# Patient Record
Sex: FEMALE | Race: WHITE | NOT HISPANIC OR LATINO | Employment: OTHER | URBAN - METROPOLITAN AREA
[De-identification: names, ages, dates, MRNs, and addresses within clinical notes are randomized per-mention and may not be internally consistent; named-entity substitution may affect disease eponyms.]

---

## 2023-06-27 ENCOUNTER — HOSPITAL ENCOUNTER (OUTPATIENT)
Dept: DATA CONVERSION | Facility: HOSPITAL | Age: 43
Discharge: STILL ADMITTED OR ADMINISTRATIVE | End: 2023-06-28
Attending: INTERNAL MEDICINE | Admitting: INTERNAL MEDICINE

## 2023-06-27 LAB
ACANTHOCYTES BLD QL SMEAR: ABNORMAL
ALBUMIN SERPL-MCNC: 3.9 GM/DL (ref 3.5–5)
ALBUMIN/GLOB SERPL: 1.2 RATIO (ref 1.5–3)
ALP BLD-CCNC: 179 U/L (ref 35–125)
ALT SERPL-CCNC: 28 U/L (ref 5–40)
AMPHETAMINES UR QL SCN>1000 NG/ML: NEGATIVE
ANION GAP SERPL CALCULATED.3IONS-SCNC: 8 MMOL/L (ref 0–19)
ANISOCYTOSIS BLD QL SMEAR: ABNORMAL
AST SERPL-CCNC: 33 U/L (ref 5–40)
BACTERIA SPEC CULT: NORMAL
BACTERIA UR QL AUTO: NEGATIVE
BARBITURATES UR QL SCN>300 NG/ML: NEGATIVE
BASOPHILS # BLD AUTO: 0.06 K/UL (ref 0–0.22)
BASOPHILS NFR BLD AUTO: 1.5 % (ref 0–1)
BENZODIAZ UR QL SCN>300 NG/ML: NEGATIVE
BILIRUB DIRECT SERPL-MCNC: 0.2 MG/DL (ref 0–0.2)
BILIRUB INDIRECT SERPL-MCNC: 0.3 MG/DL (ref 0–0.8)
BILIRUB SERPL-MCNC: 0.5 MG/DL (ref 0.1–1.2)
BILIRUB UR QL STRIP.AUTO: NEGATIVE
BUN SERPL-MCNC: 5 MG/DL (ref 8–25)
BUN/CREAT SERPL: 5.6 RATIO (ref 8–21)
BZE UR QL SCN>300 NG/ML: NORMAL
CALCIUM SERPL-MCNC: 8.7 MG/DL (ref 8.5–10.4)
CANNABINOIDS UR QL SCN>50 NG/ML: NORMAL
CC # UR: NORMAL /UL
CHLORIDE SERPL-SCNC: 100 MMOL/L (ref 97–107)
CLARITY UR: CLEAR
CO2 SERPL-SCNC: 26 MMOL/L (ref 24–31)
COLOR UR: YELLOW
CREAT SERPL-MCNC: 0.9 MG/DL (ref 0.4–1.6)
DEPRECATED RDW RBC AUTO: 56 FL (ref 37–54)
DIFFERENTIAL METHOD BLD: ABNORMAL
DRUG SCREEN COMMENT UR-IMP: NORMAL
EOSINOPHIL # BLD AUTO: 0.19 K/UL (ref 0–0.45)
EOSINOPHIL NFR BLD: 4.7 % (ref 0–3)
ERYTHROCYTE [DISTWIDTH] IN BLOOD BY AUTOMATED COUNT: 20.8 % (ref 11.7–15)
FENTANYL+NORFENTANYL UR QL SCN: NEGATIVE
GFR SERPL CREATININE-BSD FRML MDRD: 81 ML/MIN/1.73 M2
GLOBULIN SER-MCNC: 3.2 G/DL (ref 1.9–3.7)
GLUCOSE SERPL-MCNC: 72 MG/DL (ref 65–99)
GLUCOSE UR STRIP.AUTO-MCNC: NEGATIVE MG/DL
HCG UR QL: NEGATIVE
HCT VFR BLD AUTO: 34.7 % (ref 36–44)
HGB BLD-MCNC: 11.2 GM/DL (ref 12–15)
HGB UR QL STRIP.AUTO: 1 /HPF (ref 0–3)
HGB UR QL: NEGATIVE
HS TROPONIN T DELTA: 0 (ref 0–4)
HS TROPONIN T DELTA: NORMAL (ref 0–4)
HYALINE CASTS UR QL AUTO: 3 /LPF
IMM GRANULOCYTES # BLD AUTO: 0.01 K/UL (ref 0–0.1)
KETONES UR QL STRIP.AUTO: NEGATIVE
LEUKOCYTE ESTERASE UR QL STRIP.AUTO: ABNORMAL
LIPASE SERPL-CCNC: 16 U/L (ref 16–63)
LYMPHOCYTES # BLD AUTO: 1.46 K/UL (ref 1.2–3.2)
LYMPHOCYTES NFR BLD MANUAL: 36.3 % (ref 20–40)
MCH RBC QN AUTO: 24.2 PG (ref 26–34)
MCHC RBC AUTO-ENTMCNC: 32.3 % (ref 31–37)
MCV RBC AUTO: 75.1 FL (ref 80–100)
METHADONE UR QL SCN>300 NG/ML: NEGATIVE
MICROCYTES BLD QL SMEAR: ABNORMAL
MICROSCOPIC (UA): ABNORMAL
MONOCYTES # BLD AUTO: 0.2 K/UL (ref 0–0.8)
MONOCYTES NFR BLD MANUAL: 5 % (ref 0–8)
NEUTROPHILS # BLD AUTO: 2.1 K/UL
NEUTROPHILS # BLD AUTO: 2.1 K/UL (ref 1.8–7.7)
NEUTROPHILS.IMMATURE NFR BLD: 0.2 % (ref 0–1)
NEUTS SEG NFR BLD: 52.3 % (ref 50–70)
NITRITE UR QL STRIP.AUTO: NEGATIVE
NRBC BLD-RTO: 0 /100 WBC
OPIATES UR QL SCN>300 NG/ML: NEGATIVE
OVALOCYTES BLD QL SMEAR: ABNORMAL
OXYCODONE UR QL: NEGATIVE
PCP UR QL SCN>25 NG/ML: NEGATIVE
PH UR STRIP.AUTO: 5.5 [PH] (ref 4.6–8)
PLATELET # BLD AUTO: 152 K/UL (ref 150–450)
PLATELET BLD QL SMEAR: ABNORMAL
PMV BLD AUTO: ABNORMAL CU (ref 7–12.6)
POIKILOCYTOSIS BLD QL SMEAR: ABNORMAL
POTASSIUM SERPL-SCNC: 3.4 MMOL/L (ref 3.4–5.1)
PROT SERPL-MCNC: 7.1 G/DL (ref 5.9–7.9)
PROT UR STRIP.AUTO-MCNC: ABNORMAL MG/DL
RBC # BLD AUTO: 4.62 M/UL (ref 4–4.9)
REPORT STATUS -LH SQ DATA CONVERSION: NORMAL
SERVICE CMNT-IMP: NORMAL
SODIUM SERPL-SCNC: 134 MMOL/L (ref 133–145)
SP GR UR STRIP.AUTO: 1.02 (ref 1–1.03)
SPECIMEN SOURCE: NORMAL
SQUAMOUS UR QL AUTO: ABNORMAL /HPF
TROPONIN T SERPL-MCNC: 6 NG/L
TROPONIN T SERPL-MCNC: 6 NG/L
URINE CULTURE: ABNORMAL
UROBILINOGEN UR QL STRIP.AUTO: NORMAL MG/DL (ref 0–1)
WBC # BLD AUTO: 4 K/UL (ref 4.5–11)
WBC #/AREA URNS AUTO: 15 /HPF (ref 0–3)
WBC MORPH BLD: ABNORMAL

## 2023-06-28 DIAGNOSIS — F17.290 NICOTINE DEPENDENCE, OTHER TOBACCO PRODUCT, UNCOMPLICATED: ICD-10-CM

## 2023-06-28 DIAGNOSIS — G89.29 OTHER CHRONIC PAIN: ICD-10-CM

## 2023-06-28 DIAGNOSIS — E87.6 HYPOKALEMIA: ICD-10-CM

## 2023-06-28 DIAGNOSIS — I95.1 ORTHOSTATIC HYPOTENSION: Primary | ICD-10-CM

## 2023-06-28 DIAGNOSIS — F14.10 COCAINE ABUSE, UNCOMPLICATED (MULTI): ICD-10-CM

## 2023-06-28 DIAGNOSIS — Z95.1 PRESENCE OF AORTOCORONARY BYPASS GRAFT: ICD-10-CM

## 2023-06-28 DIAGNOSIS — F41.9 ANXIETY DISORDER, UNSPECIFIED: ICD-10-CM

## 2023-06-28 DIAGNOSIS — M54.12 RADICULOPATHY, CERVICAL REGION: ICD-10-CM

## 2023-06-28 DIAGNOSIS — I25.10 ATHEROSCLEROTIC HEART DISEASE OF NATIVE CORONARY ARTERY WITHOUT ANGINA PECTORIS: ICD-10-CM

## 2023-06-28 DIAGNOSIS — F32.A DEPRESSION, UNSPECIFIED: ICD-10-CM

## 2023-06-28 DIAGNOSIS — Z79.82 LONG TERM (CURRENT) USE OF ASPIRIN: ICD-10-CM

## 2023-06-28 DIAGNOSIS — Z95.2 PRESENCE OF PROSTHETIC HEART VALVE: ICD-10-CM

## 2023-06-28 DIAGNOSIS — Z95.5 PRESENCE OF CORONARY ANGIOPLASTY IMPLANT AND GRAFT: ICD-10-CM

## 2023-06-28 DIAGNOSIS — I95.9 HYPOTENSION, UNSPECIFIED: ICD-10-CM

## 2023-06-28 LAB
ACANTHOCYTES BLD QL SMEAR: ABNORMAL
ANION GAP SERPL CALCULATED.3IONS-SCNC: 11 MMOL/L (ref 0–19)
ANISOCYTOSIS BLD QL SMEAR: ABNORMAL
BASOPHILS # BLD AUTO: 0.04 K/UL (ref 0–0.22)
BASOPHILS NFR BLD AUTO: 1 % (ref 0–1)
BUN SERPL-MCNC: 7 MG/DL (ref 8–25)
BUN/CREAT SERPL: 8.8 RATIO (ref 8–21)
CALCIUM SERPL-MCNC: 7.4 MG/DL (ref 8.5–10.4)
CHLORIDE SERPL-SCNC: 105 MMOL/L (ref 97–107)
CO2 SERPL-SCNC: 22 MMOL/L (ref 24–31)
CREAT SERPL-MCNC: 0.8 MG/DL (ref 0.4–1.6)
DACRYOCYTES BLD QL SMEAR: ABNORMAL
DEPRECATED RDW RBC AUTO: 55.1 FL (ref 37–54)
DIFFERENTIAL METHOD BLD: MANUAL DIFF
EOSINOPHIL # BLD AUTO: 0.22 K/UL (ref 0–0.45)
EOSINOPHIL NFR BLD: 6 % (ref 0–3)
ERYTHROCYTE [DISTWIDTH] IN BLOOD BY AUTOMATED COUNT: 20.4 % (ref 11.7–15)
GFR SERPL CREATININE-BSD FRML MDRD: 94 ML/MIN/1.73 M2
GLUCOSE SERPL-MCNC: 109 MG/DL (ref 65–99)
HCT VFR BLD AUTO: 30.6 % (ref 36–44)
HGB BLD-MCNC: 9.8 GM/DL (ref 12–15)
LYMPHOCYTES # BLD AUTO: 2.04 K/UL (ref 1.2–3.2)
LYMPHOCYTES NFR BLD MANUAL: 55 % (ref 20–40)
MCH RBC QN AUTO: 24 PG (ref 26–34)
MCHC RBC AUTO-ENTMCNC: 32 % (ref 31–37)
MCV RBC AUTO: 74.8 FL (ref 80–100)
METAMYELOCYTES # BLD MANUAL: 0.04 K/UL
METAMYELOCYTES NFR BLD MANUAL: 1 %
MICROCYTES BLD QL SMEAR: ABNORMAL
MONOCYTES # BLD AUTO: 0.26 K/UL (ref 0–0.8)
MONOCYTES NFR BLD MANUAL: 7 % (ref 0–8)
NEUTROPHILS # BLD AUTO: 0.89 K/UL (ref 1.8–7.7)
NEUTROPHILS # BLD AUTO: 1.29 K/UL
NEUTROPHILS # BLD MANUAL: 1.08 10*3/UL
NEUTS BAND # BLD AUTO: 0.19 K/UL
NEUTS BAND BLD QL AUTO: 5 %
NEUTS SEG NFR BLD: 24 % (ref 50–70)
NRBC BLD-RTO: 0 /100 WBC
OVALOCYTES BLD QL SMEAR: ABNORMAL
PLATELET # BLD AUTO: 114 K/UL (ref 150–450)
PLATELET BLD QL SMEAR: ABNORMAL
PMV BLD AUTO: ABNORMAL CU (ref 7–12.6)
POIKILOCYTOSIS BLD QL SMEAR: ABNORMAL
POLYCHROMASIA BLD QL SMEAR: ABNORMAL
POTASSIUM SERPL-SCNC: 3.6 MMOL/L (ref 3.4–5.1)
RBC # BLD AUTO: 4.09 M/UL (ref 4–4.9)
SCHISTOCYTES BLD QL SMEAR: ABNORMAL
SODIUM SERPL-SCNC: 138 MMOL/L (ref 133–145)
VARIANT LYMPHS # BLD MANUAL: 1 %
VARIANT LYMPHS NFR BLD MANUAL: 0.04 K/UL
WBC # BLD AUTO: 3.7 K/UL (ref 4.5–11)

## 2023-10-09 ENCOUNTER — APPOINTMENT (OUTPATIENT)
Dept: RADIOLOGY | Facility: HOSPITAL | Age: 43
End: 2023-10-09
Payer: COMMERCIAL

## 2023-10-09 ENCOUNTER — HOSPITAL ENCOUNTER (EMERGENCY)
Facility: HOSPITAL | Age: 43
Discharge: HOME | End: 2023-10-09
Attending: EMERGENCY MEDICINE
Payer: COMMERCIAL

## 2023-10-09 VITALS
WEIGHT: 132 LBS | HEIGHT: 62 IN | BODY MASS INDEX: 24.29 KG/M2 | HEART RATE: 76 BPM | TEMPERATURE: 97.8 F | SYSTOLIC BLOOD PRESSURE: 111 MMHG | DIASTOLIC BLOOD PRESSURE: 75 MMHG | OXYGEN SATURATION: 100 % | RESPIRATION RATE: 19 BRPM

## 2023-10-09 LAB
ALBUMIN SERPL BCP-MCNC: 4.7 G/DL (ref 3.4–5)
ALP SERPL-CCNC: 107 U/L (ref 33–110)
ALT SERPL W P-5'-P-CCNC: 17 U/L (ref 7–45)
ANION GAP SERPL CALC-SCNC: 16 MMOL/L (ref 10–20)
APTT PPP: 38 SECONDS (ref 27–38)
AST SERPL W P-5'-P-CCNC: 25 U/L (ref 9–39)
BASOPHILS # BLD AUTO: 0.1 X10*3/UL (ref 0–0.1)
BASOPHILS NFR BLD AUTO: 1.4 %
BILIRUB SERPL-MCNC: 0.4 MG/DL (ref 0–1.2)
BUN SERPL-MCNC: 10 MG/DL (ref 6–23)
CALCIUM SERPL-MCNC: 9.4 MG/DL (ref 8.6–10.3)
CARDIAC TROPONIN I PNL SERPL HS: <3 NG/L (ref 0–13)
CHLORIDE SERPL-SCNC: 109 MMOL/L (ref 98–107)
CO2 SERPL-SCNC: 23 MMOL/L (ref 21–32)
CREAT SERPL-MCNC: 0.87 MG/DL (ref 0.5–1.05)
EOSINOPHIL # BLD AUTO: 0.28 X10*3/UL (ref 0–0.7)
EOSINOPHIL NFR BLD AUTO: 3.9 %
ERYTHROCYTE [DISTWIDTH] IN BLOOD BY AUTOMATED COUNT: 19.9 % (ref 11.5–14.5)
GFR SERPL CREATININE-BSD FRML MDRD: 85 ML/MIN/1.73M*2
GLUCOSE BLD MANUAL STRIP-MCNC: 113 MG/DL (ref 74–99)
GLUCOSE SERPL-MCNC: 75 MG/DL (ref 74–99)
HCT VFR BLD AUTO: 47.3 % (ref 36–46)
HGB BLD-MCNC: 14.5 G/DL (ref 12–16)
IMM GRANULOCYTES # BLD AUTO: 0.02 X10*3/UL (ref 0–0.7)
IMM GRANULOCYTES NFR BLD AUTO: 0.3 % (ref 0–0.9)
INR PPP: 1 (ref 0.9–1.1)
LYMPHOCYTES # BLD AUTO: 3.14 X10*3/UL (ref 1.2–4.8)
LYMPHOCYTES NFR BLD AUTO: 43.3 %
MCH RBC QN AUTO: 23.5 PG (ref 26–34)
MCHC RBC AUTO-ENTMCNC: 30.7 G/DL (ref 32–36)
MCV RBC AUTO: 77 FL (ref 80–100)
MONOCYTES # BLD AUTO: 0.26 X10*3/UL (ref 0.1–1)
MONOCYTES NFR BLD AUTO: 3.6 %
NEUTROPHILS # BLD AUTO: 3.45 X10*3/UL (ref 1.2–7.7)
NEUTROPHILS NFR BLD AUTO: 47.5 %
NRBC BLD-RTO: 0 /100 WBCS (ref 0–0)
PLATELET # BLD AUTO: 239 X10*3/UL (ref 150–450)
PMV BLD AUTO: 9.9 FL (ref 7.5–11.5)
POTASSIUM SERPL-SCNC: 4.5 MMOL/L (ref 3.5–5.3)
PROT SERPL-MCNC: 7.6 G/DL (ref 6.4–8.2)
PROTHROMBIN TIME: 11 SECONDS (ref 9.8–12.8)
RBC # BLD AUTO: 6.18 X10*6/UL (ref 4–5.2)
SODIUM SERPL-SCNC: 143 MMOL/L (ref 136–145)
WBC # BLD AUTO: 7.3 X10*3/UL (ref 4.4–11.3)

## 2023-10-09 PROCEDURE — 70450 CT HEAD/BRAIN W/O DYE: CPT

## 2023-10-09 PROCEDURE — 70450 CT HEAD/BRAIN W/O DYE: CPT | Performed by: RADIOLOGY

## 2023-10-09 PROCEDURE — 85610 PROTHROMBIN TIME: CPT | Performed by: EMERGENCY MEDICINE

## 2023-10-09 PROCEDURE — 96375 TX/PRO/DX INJ NEW DRUG ADDON: CPT

## 2023-10-09 PROCEDURE — 2500000004 HC RX 250 GENERAL PHARMACY W/ HCPCS (ALT 636 FOR OP/ED): Mod: SE

## 2023-10-09 PROCEDURE — 84484 ASSAY OF TROPONIN QUANT: CPT | Performed by: EMERGENCY MEDICINE

## 2023-10-09 PROCEDURE — 84075 ASSAY ALKALINE PHOSPHATASE: CPT | Performed by: EMERGENCY MEDICINE

## 2023-10-09 PROCEDURE — 99284 EMERGENCY DEPT VISIT MOD MDM: CPT | Mod: 25

## 2023-10-09 PROCEDURE — 85025 COMPLETE CBC W/AUTO DIFF WBC: CPT | Performed by: EMERGENCY MEDICINE

## 2023-10-09 PROCEDURE — 96374 THER/PROPH/DIAG INJ IV PUSH: CPT

## 2023-10-09 PROCEDURE — 82947 ASSAY GLUCOSE BLOOD QUANT: CPT | Mod: 59

## 2023-10-09 PROCEDURE — 85730 THROMBOPLASTIN TIME PARTIAL: CPT | Performed by: EMERGENCY MEDICINE

## 2023-10-09 PROCEDURE — 36415 COLL VENOUS BLD VENIPUNCTURE: CPT | Performed by: EMERGENCY MEDICINE

## 2023-10-09 RX ORDER — ONDANSETRON HYDROCHLORIDE 2 MG/ML
INJECTION, SOLUTION INTRAVENOUS
Status: COMPLETED
Start: 2023-10-09 | End: 2023-10-09

## 2023-10-09 RX ORDER — KETOROLAC TROMETHAMINE 15 MG/ML
15 INJECTION, SOLUTION INTRAMUSCULAR; INTRAVENOUS ONCE
Status: COMPLETED | OUTPATIENT
Start: 2023-10-09 | End: 2023-10-09

## 2023-10-09 RX ORDER — KETOROLAC TROMETHAMINE 15 MG/ML
INJECTION, SOLUTION INTRAMUSCULAR; INTRAVENOUS
Status: COMPLETED
Start: 2023-10-09 | End: 2023-10-09

## 2023-10-09 RX ORDER — ONDANSETRON HYDROCHLORIDE 2 MG/ML
4 INJECTION, SOLUTION INTRAVENOUS ONCE
Status: COMPLETED | OUTPATIENT
Start: 2023-10-09 | End: 2023-10-09

## 2023-10-09 RX ADMIN — ONDANSETRON 4 MG: 2 INJECTION INTRAMUSCULAR; INTRAVENOUS at 13:35

## 2023-10-09 RX ADMIN — KETOROLAC TROMETHAMINE 15 MG: 15 INJECTION INTRAMUSCULAR; INTRAVENOUS at 13:33

## 2023-10-09 RX ADMIN — KETOROLAC TROMETHAMINE 15 MG: 15 INJECTION, SOLUTION INTRAMUSCULAR; INTRAVENOUS at 13:33

## 2023-10-09 RX ADMIN — ONDANSETRON HYDROCHLORIDE 4 MG: 2 INJECTION, SOLUTION INTRAVENOUS at 13:35

## 2023-10-09 ASSESSMENT — COLUMBIA-SUICIDE SEVERITY RATING SCALE - C-SSRS
2. HAVE YOU ACTUALLY HAD ANY THOUGHTS OF KILLING YOURSELF?: NO
6. HAVE YOU EVER DONE ANYTHING, STARTED TO DO ANYTHING, OR PREPARED TO DO ANYTHING TO END YOUR LIFE?: NO
1. IN THE PAST MONTH, HAVE YOU WISHED YOU WERE DEAD OR WISHED YOU COULD GO TO SLEEP AND NOT WAKE UP?: NO

## 2023-10-09 ASSESSMENT — PAIN DESCRIPTION - DESCRIPTORS
DESCRIPTORS: PRESSURE
DESCRIPTORS: PRESSURE

## 2023-10-09 ASSESSMENT — PAIN DESCRIPTION - FREQUENCY: FREQUENCY: CONSTANT/CONTINUOUS

## 2023-10-09 ASSESSMENT — PAIN - FUNCTIONAL ASSESSMENT: PAIN_FUNCTIONAL_ASSESSMENT: 0-10

## 2023-10-09 ASSESSMENT — PAIN SCALES - GENERAL: PAINLEVEL_OUTOF10: 2

## 2023-10-09 NOTE — ED PROVIDER NOTES
"HPI   Chief Complaint   Patient presents with    Dizziness     Dizziness, headache, nausea, chest pressure/pain, head pressure started last night when going to bed and woke feeling the same way. Went to doctor's today for low BP. Doctor's office sent patient here for \"patient's talking\" per patient. Patient says \"brain fart's\"       HPI                    Julian Coma Scale Score: 15         NIH Stroke Scale: 1          Patient History   Past Medical History:   Diagnosis Date    COPD (chronic obstructive pulmonary disease) (CMS/HCC)     Coronary artery disease     Personal history of other endocrine, nutritional and metabolic disease     History of hypopituitarism    Personal history of other mental and behavioral disorders     History of depression    Personal history of other specified conditions     History of brain tumor    Unspecified convulsions (CMS/HCC)     Convulsion     Past Surgical History:   Procedure Laterality Date    MR HEAD ANGIO WO IV CONTRAST  7/15/2021    MR HEAD ANGIO WO IV CONTRAST 7/15/2021 [facility]    MR HEAD ANGIO WO IV CONTRAST  11/28/2017    MR HEAD ANGIO WO IV CONTRAST [facility]    MR HEAD ANGIO WO IV CONTRAST  3/13/2018    MR HEAD ANGIO WO IV CONTRAST [facility]    MR NECK ANGIO WO IV CONTRAST  7/15/2021    MR NECK ANGIO WO IV CONTRAST 7/15/2021 [facility]    OTHER SURGICAL HISTORY  08/17/2013    Craniotomy Tumor Removal - Complete     No family history on file.  Social History     Tobacco Use    Smoking status: Not on file    Smokeless tobacco: Not on file   Substance Use Topics    Alcohol use: Not on file    Drug use: Not on file       Physical Exam   ED Triage Vitals [10/09/23 1020]   Temp Heart Rate Resp BP   36.6 °C (97.8 °F) 76 16 111/75      SpO2 Temp Source Heart Rate Source Patient Position   100 % Oral Monitor --      BP Location FiO2 (%)     -- --       Physical Exam  Constitutional:       General: She is not in acute distress.     " Appearance: Normal appearance. She is not toxic-appearing.   HENT:      Head: Normocephalic and atraumatic.      Right Ear: Tympanic membrane normal.      Left Ear: Tympanic membrane normal.      Mouth/Throat:      Mouth: Mucous membranes are moist.      Pharynx: Oropharynx is clear.   Eyes:      Conjunctiva/sclera: Conjunctivae normal.      Pupils: Pupils are equal, round, and reactive to light.   Cardiovascular:      Rate and Rhythm: Normal rate and regular rhythm.      Pulses: Normal pulses.      Heart sounds: Normal heart sounds.   Pulmonary:      Effort: Pulmonary effort is normal. No respiratory distress.      Breath sounds: Normal breath sounds. No wheezing.   Abdominal:      General: Bowel sounds are normal.      Palpations: Abdomen is soft.      Tenderness: There is no abdominal tenderness. There is no guarding or rebound.   Musculoskeletal:         General: Normal range of motion.      Cervical back: Normal range of motion.   Skin:     General: Skin is warm and dry.   Neurological:      Mental Status: She is alert and oriented to person, place, and time.      Comments: NIH Stroke Scale: 1   (LLE slight weakness)         ED Course & MDM   ED Course as of 10/09/23 1551   Mon Oct 09, 2023   1102 EKG was reviewed at 1102.  Ventricular rate of 77 normal sinus rhythm no ST elevation no evidence of a heart attack at this time.  Interpreted by me. [KA]   1527 Reviewed the blood work the blood work is unremarkable.  Awaiting the urine results at this time. [KA]   1535 Went to update the patient the planned she has removed her IV and removed all her stuff and she walked out of the ED. [KA]      ED Course User Index  [KA] Fracisco Prince, DO       Medical Decision Making    42-year-old female presents to the ER with chief complaint of feeling confused patient reports this been going on since yesterday according to the patient.  Patient went to her PCP who sent her to the ED for evaluation.  According to the patient  she has no focal complaints and she reports that her upper and lower extremities are normal.  Did do an NIH patient had slight weakness left lower extremity at that point decided to do a stroke alert.  Initial head CT is negative.  Blood work imaging was negative.  Updated the patient of her results and patient reports that her foot does not have any weakness.  However due to her lower extremity weakness plan was to admit the patient to the hospital actually  spoke to the inpatient hospitalist Nikki who accepted the patient.  Went to update the patient she was walking out of the ED.        Procedure  Procedures     Fracisco Prince, DO  10/09/23 155

## 2023-10-24 ENCOUNTER — APPOINTMENT (OUTPATIENT)
Dept: RADIOLOGY | Facility: HOSPITAL | Age: 43
End: 2023-10-24
Payer: COMMERCIAL

## 2023-10-24 ENCOUNTER — HOSPITAL ENCOUNTER (EMERGENCY)
Facility: HOSPITAL | Age: 43
Discharge: HOME | End: 2023-10-25
Attending: EMERGENCY MEDICINE
Payer: COMMERCIAL

## 2023-10-24 VITALS
SYSTOLIC BLOOD PRESSURE: 76 MMHG | BODY MASS INDEX: 23.44 KG/M2 | HEIGHT: 63 IN | WEIGHT: 132.28 LBS | OXYGEN SATURATION: 98 % | RESPIRATION RATE: 18 BRPM | HEART RATE: 90 BPM | DIASTOLIC BLOOD PRESSURE: 63 MMHG | TEMPERATURE: 97 F

## 2023-10-24 DIAGNOSIS — R10.9 NONSPECIFIC ABDOMINAL PAIN: Primary | ICD-10-CM

## 2023-10-24 DIAGNOSIS — N30.00 ACUTE CYSTITIS WITHOUT HEMATURIA: ICD-10-CM

## 2023-10-24 LAB
ALBUMIN SERPL BCP-MCNC: 4.6 G/DL (ref 3.4–5)
ALP SERPL-CCNC: 130 U/L (ref 33–110)
ALT SERPL W P-5'-P-CCNC: 16 U/L (ref 7–45)
ANION GAP SERPL CALC-SCNC: 12 MMOL/L (ref 10–20)
APPEARANCE UR: CLEAR
AST SERPL W P-5'-P-CCNC: 28 U/L (ref 9–39)
BASOPHILS # BLD AUTO: 0.07 X10*3/UL (ref 0–0.1)
BASOPHILS NFR BLD AUTO: 1 %
BILIRUB SERPL-MCNC: 0.5 MG/DL (ref 0–1.2)
BILIRUB UR STRIP.AUTO-MCNC: NEGATIVE MG/DL
BUN SERPL-MCNC: 6 MG/DL (ref 6–23)
CALCIUM SERPL-MCNC: 10.1 MG/DL (ref 8.6–10.3)
CHLORIDE SERPL-SCNC: 103 MMOL/L (ref 98–107)
CO2 SERPL-SCNC: 28 MMOL/L (ref 21–32)
COLOR UR: ABNORMAL
CREAT SERPL-MCNC: 0.83 MG/DL (ref 0.5–1.05)
EOSINOPHIL # BLD AUTO: 0.32 X10*3/UL (ref 0–0.7)
EOSINOPHIL NFR BLD AUTO: 4.8 %
ERYTHROCYTE [DISTWIDTH] IN BLOOD BY AUTOMATED COUNT: 18.8 % (ref 11.5–14.5)
GFR SERPL CREATININE-BSD FRML MDRD: 90 ML/MIN/1.73M*2
GLUCOSE SERPL-MCNC: 82 MG/DL (ref 74–99)
GLUCOSE UR STRIP.AUTO-MCNC: NEGATIVE MG/DL
HCT VFR BLD AUTO: 42.8 % (ref 36–46)
HGB BLD-MCNC: 13.2 G/DL (ref 12–16)
IMM GRANULOCYTES # BLD AUTO: 0.01 X10*3/UL (ref 0–0.7)
IMM GRANULOCYTES NFR BLD AUTO: 0.1 % (ref 0–0.9)
KETONES UR STRIP.AUTO-MCNC: NEGATIVE MG/DL
LACTATE SERPL-SCNC: 1.2 MMOL/L (ref 0.4–2)
LEUKOCYTE ESTERASE UR QL STRIP.AUTO: ABNORMAL
LIPASE SERPL-CCNC: 8 U/L (ref 9–82)
LYMPHOCYTES # BLD AUTO: 2.82 X10*3/UL (ref 1.2–4.8)
LYMPHOCYTES NFR BLD AUTO: 42.2 %
MCH RBC QN AUTO: 23.2 PG (ref 26–34)
MCHC RBC AUTO-ENTMCNC: 30.8 G/DL (ref 32–36)
MCV RBC AUTO: 75 FL (ref 80–100)
MONOCYTES # BLD AUTO: 0.39 X10*3/UL (ref 0.1–1)
MONOCYTES NFR BLD AUTO: 5.8 %
NEUTROPHILS # BLD AUTO: 3.08 X10*3/UL (ref 1.2–7.7)
NEUTROPHILS NFR BLD AUTO: 46.1 %
NITRITE UR QL STRIP.AUTO: NEGATIVE
NRBC BLD-RTO: 0 /100 WBCS (ref 0–0)
PH UR STRIP.AUTO: 6 [PH]
PLATELET # BLD AUTO: 238 X10*3/UL (ref 150–450)
PMV BLD AUTO: 10.4 FL (ref 7.5–11.5)
POTASSIUM SERPL-SCNC: 3.8 MMOL/L (ref 3.5–5.3)
PROT SERPL-MCNC: 7.9 G/DL (ref 6.4–8.2)
PROT UR STRIP.AUTO-MCNC: NEGATIVE MG/DL
RBC # BLD AUTO: 5.68 X10*6/UL (ref 4–5.2)
RBC # UR STRIP.AUTO: ABNORMAL /UL
RBC #/AREA URNS AUTO: ABNORMAL /HPF
SODIUM SERPL-SCNC: 139 MMOL/L (ref 136–145)
SP GR UR STRIP.AUTO: 1
UROBILINOGEN UR STRIP.AUTO-MCNC: <2 MG/DL
WBC # BLD AUTO: 6.7 X10*3/UL (ref 4.4–11.3)
WBC #/AREA URNS AUTO: ABNORMAL /HPF

## 2023-10-24 PROCEDURE — 87800 DETECT AGNT MULT DNA DIREC: CPT | Mod: GENLAB | Performed by: EMERGENCY MEDICINE

## 2023-10-24 PROCEDURE — 81025 URINE PREGNANCY TEST: CPT | Performed by: EMERGENCY MEDICINE

## 2023-10-24 PROCEDURE — 74177 CT ABD & PELVIS W/CONTRAST: CPT

## 2023-10-24 PROCEDURE — 2550000001 HC RX 255 CONTRASTS: Mod: SE | Performed by: EMERGENCY MEDICINE

## 2023-10-24 PROCEDURE — 81001 URINALYSIS AUTO W/SCOPE: CPT | Performed by: EMERGENCY MEDICINE

## 2023-10-24 PROCEDURE — 83605 ASSAY OF LACTIC ACID: CPT | Performed by: EMERGENCY MEDICINE

## 2023-10-24 PROCEDURE — 85025 COMPLETE CBC W/AUTO DIFF WBC: CPT | Performed by: EMERGENCY MEDICINE

## 2023-10-24 PROCEDURE — 96365 THER/PROPH/DIAG IV INF INIT: CPT | Mod: 59

## 2023-10-24 PROCEDURE — 87086 URINE CULTURE/COLONY COUNT: CPT | Mod: GENLAB | Performed by: EMERGENCY MEDICINE

## 2023-10-24 PROCEDURE — 99284 EMERGENCY DEPT VISIT MOD MDM: CPT | Mod: 25 | Performed by: EMERGENCY MEDICINE

## 2023-10-24 PROCEDURE — 96366 THER/PROPH/DIAG IV INF ADDON: CPT

## 2023-10-24 PROCEDURE — 36415 COLL VENOUS BLD VENIPUNCTURE: CPT | Performed by: EMERGENCY MEDICINE

## 2023-10-24 PROCEDURE — 80053 COMPREHEN METABOLIC PANEL: CPT | Performed by: EMERGENCY MEDICINE

## 2023-10-24 PROCEDURE — 83690 ASSAY OF LIPASE: CPT | Performed by: EMERGENCY MEDICINE

## 2023-10-24 PROCEDURE — 2500000004 HC RX 250 GENERAL PHARMACY W/ HCPCS (ALT 636 FOR OP/ED): Mod: SE | Performed by: EMERGENCY MEDICINE

## 2023-10-24 PROCEDURE — 96375 TX/PRO/DX INJ NEW DRUG ADDON: CPT

## 2023-10-24 RX ORDER — FENTANYL CITRATE 50 UG/ML
50 INJECTION, SOLUTION INTRAMUSCULAR; INTRAVENOUS ONCE
Status: COMPLETED | OUTPATIENT
Start: 2023-10-24 | End: 2023-10-24

## 2023-10-24 RX ORDER — KETOROLAC TROMETHAMINE 15 MG/ML
15 INJECTION, SOLUTION INTRAMUSCULAR; INTRAVENOUS ONCE
Status: COMPLETED | OUTPATIENT
Start: 2023-10-24 | End: 2023-10-24

## 2023-10-24 RX ORDER — PROMETHAZINE HYDROCHLORIDE 25 MG/ML
INJECTION, SOLUTION INTRAMUSCULAR; INTRAVENOUS
Status: DISCONTINUED
Start: 2023-10-24 | End: 2023-10-25 | Stop reason: HOSPADM

## 2023-10-24 RX ORDER — FAMOTIDINE 10 MG/ML
20 INJECTION INTRAVENOUS ONCE
Status: COMPLETED | OUTPATIENT
Start: 2023-10-24 | End: 2023-10-24

## 2023-10-24 RX ADMIN — FENTANYL CITRATE 50 MCG: 50 INJECTION, SOLUTION INTRAMUSCULAR; INTRAVENOUS at 21:20

## 2023-10-24 RX ADMIN — KETOROLAC TROMETHAMINE 15 MG: 15 INJECTION, SOLUTION INTRAMUSCULAR; INTRAVENOUS at 21:20

## 2023-10-24 RX ADMIN — Medication 12.5 MG: at 21:25

## 2023-10-24 RX ADMIN — FAMOTIDINE 20 MG: 10 INJECTION, SOLUTION INTRAVENOUS at 21:25

## 2023-10-24 RX ADMIN — SODIUM CHLORIDE 1000 ML: 9 INJECTION, SOLUTION INTRAVENOUS at 21:20

## 2023-10-25 PROBLEM — N30.00 ACUTE CYSTITIS WITHOUT HEMATURIA: Status: ACTIVE | Noted: 2023-10-25

## 2023-10-25 PROBLEM — R10.9 NONSPECIFIC ABDOMINAL PAIN: Status: ACTIVE | Noted: 2023-10-25

## 2023-10-25 LAB
C TRACH RRNA SPEC QL NAA+PROBE: POSITIVE
HCG UR QL IA.RAPID: NEGATIVE
HOLD SPECIMEN: NORMAL
N GONORRHOEA DNA SPEC QL PROBE+SIG AMP: NEGATIVE

## 2023-10-25 PROCEDURE — 74177 CT ABD & PELVIS W/CONTRAST: CPT | Performed by: RADIOLOGY

## 2023-10-25 PROCEDURE — 2550000001 HC RX 255 CONTRASTS: Mod: SE | Performed by: EMERGENCY MEDICINE

## 2023-10-25 PROCEDURE — 96372 THER/PROPH/DIAG INJ SC/IM: CPT

## 2023-10-25 PROCEDURE — 2500000001 HC RX 250 WO HCPCS SELF ADMINISTERED DRUGS (ALT 637 FOR MEDICARE OP): Mod: SE

## 2023-10-25 PROCEDURE — 2500000004 HC RX 250 GENERAL PHARMACY W/ HCPCS (ALT 636 FOR OP/ED): Mod: SE | Performed by: EMERGENCY MEDICINE

## 2023-10-25 RX ORDER — IBUPROFEN 600 MG/1
600 TABLET ORAL EVERY 8 HOURS PRN
Qty: 30 TABLET | Refills: 0 | Status: SHIPPED | OUTPATIENT
Start: 2023-10-25 | End: 2024-01-29 | Stop reason: HOSPADM

## 2023-10-25 RX ORDER — DOXYCYCLINE 100 MG/1
100 TABLET ORAL 2 TIMES DAILY
Qty: 20 TABLET | Refills: 0 | Status: SHIPPED | OUTPATIENT
Start: 2023-10-25 | End: 2023-11-04

## 2023-10-25 RX ORDER — CEFTRIAXONE 500 MG/1
500 INJECTION, POWDER, FOR SOLUTION INTRAMUSCULAR; INTRAVENOUS ONCE
Status: COMPLETED | OUTPATIENT
Start: 2023-10-25 | End: 2023-10-25

## 2023-10-25 RX ORDER — DOXYCYCLINE HYCLATE 100 MG
100 TABLET ORAL ONCE
Status: COMPLETED | OUTPATIENT
Start: 2023-10-25 | End: 2023-10-25

## 2023-10-25 RX ORDER — DOXYCYCLINE HYCLATE 50 MG/1
CAPSULE ORAL
Status: COMPLETED
Start: 2023-10-25 | End: 2023-10-25

## 2023-10-25 RX ADMIN — Medication 100 MG: at 01:40

## 2023-10-25 RX ADMIN — CEFTRIAXONE SODIUM 500 MG: 500 INJECTION, POWDER, FOR SOLUTION INTRAMUSCULAR; INTRAVENOUS at 01:40

## 2023-10-25 RX ADMIN — DOXYCYCLINE HYCLATE 100 MG: 50 CAPSULE ORAL at 01:40

## 2023-10-25 RX ADMIN — IOHEXOL 75 ML: 350 INJECTION, SOLUTION INTRAVENOUS at 00:59

## 2023-10-25 ASSESSMENT — COLUMBIA-SUICIDE SEVERITY RATING SCALE - C-SSRS
6. HAVE YOU EVER DONE ANYTHING, STARTED TO DO ANYTHING, OR PREPARED TO DO ANYTHING TO END YOUR LIFE?: NO
1. IN THE PAST MONTH, HAVE YOU WISHED YOU WERE DEAD OR WISHED YOU COULD GO TO SLEEP AND NOT WAKE UP?: NO
2. HAVE YOU ACTUALLY HAD ANY THOUGHTS OF KILLING YOURSELF?: NO

## 2023-10-25 ASSESSMENT — PAIN SCALES - GENERAL: PAINLEVEL_OUTOF10: 9

## 2023-10-25 ASSESSMENT — PAIN DESCRIPTION - DESCRIPTORS: DESCRIPTORS: STABBING

## 2023-10-25 ASSESSMENT — PAIN - FUNCTIONAL ASSESSMENT: PAIN_FUNCTIONAL_ASSESSMENT: 0-10

## 2023-10-25 NOTE — ED PROVIDER NOTES
Department of Emergency Medicine   ED  Provider Note  Admit Date/RoomTime: 10/24/2023  8:56 PM  ED Room: [address] [facility]               History of Present Illness:   Lu Fall is a 42 y.o. female presenting to the ED for right lower quadrant pain, beginning yesterday. Onset gradual..  The complaint has been persistent, moderate in severity, and worsened by nothing.  Patient reports nausea and vomiting x2.  She has had a couple episodes of diarrhea, no constipation.  Pain is in the right lower quadrant that does not radiate.  She denies any dysuria urgency or frequency.  She has diabetes insipidus.  She denies any vaginal discharge.  She did eat some toast around 5 PM and this increased her nausea.  She is a little bit decreased appetite.  She has had prior  x1 but otherwise has not had any significant abdominal surgeries.  She denies any pliar fever or chills.  No URI symptoms.  No chest pain or shortness of breath.      Review of Systems:   Pertinent positives and review of systems as noted above.  Remaining 10 review of systems is negative or noncontributory to today's episode of care.  Review of Systems       --------------------------------------------- PAST HISTORY ---------------------------------------------  Past Medical History:  has a past medical history of COPD (chronic obstructive pulmonary disease) (CMS/Former[facility]), Coronary artery disease, Personal history of other endocrine, nutritional and metabolic disease, Personal history of other mental and behavioral disorders, Personal history of other specified conditions, and Unspecified convulsions (CMS/Former[facility]).    Past Surgical History:  has a past surgical history that includes Other surgical history (2013); MR angio head wo IV contrast (7/15/2021); MR angio neck wo IV contrast (7/15/2021); MR angio head wo IV contrast (2017); and MR angio head wo IV contrast (3/13/2018).  Past surgical history of prior  tricuspid valve repair, aortic valve and mitral valve replacement.  She has had previous craniotomy and removal of pituitary.    Social History:  No tobacco use.  No alcohol use.  No recreational drug use.    Family History: family history is not on file. Unless otherwise noted, family history is non contributory    Patient's Medications   New Prescriptions    DOXYCYCLINE (ADOXA) 100 MG TABLET    Take 1 tablet (100 mg) by mouth 2 times a day for 10 days. Take with a full glass of water and do not lie down for at least 30 minutes after    IBUPROFEN 600 MG TABLET    Take 1 tablet (600 mg) by mouth every 8 hours if needed for mild pain (1 - 3) or fever (temp greater than 38.0 C).   Previous Medications    No medications on file   Modified Medications    No medications on file   Discontinued Medications    No medications on file      The patient’s home medications have been reviewed.    Allergies: Adhesive, Hydrocodone-acetaminophen, Ondansetron, and Hydromorphone patient states she is not allergic to hydromorphone.    -------------------------------------------------- RESULTS -------------------------------------------------  All laboratory and radiology results have been personally reviewed by myself   LABS:  Labs Reviewed   CBC WITH AUTO DIFFERENTIAL - Abnormal       Result Value    WBC 6.7      nRBC 0.0      RBC 5.68 (*)     Hemoglobin 13.2      Hematocrit 42.8      MCV 75 (*)     MCH 23.2 (*)     MCHC 30.8 (*)     RDW 18.8 (*)     Platelets 238      MPV 10.4      Neutrophils % 46.1      Immature Granulocytes %, Automated 0.1      Lymphocytes % 42.2      Monocytes % 5.8      Eosinophils % 4.8      Basophils % 1.0      Neutrophils Absolute 3.08      Immature Granulocytes Absolute, Automated 0.01      Lymphocytes Absolute 2.82      Monocytes Absolute 0.39      Eosinophils Absolute 0.32      Basophils Absolute 0.07     COMPREHENSIVE METABOLIC PANEL - Abnormal    Glucose 82      Sodium 139      Potassium 3.8       Chloride 103      Bicarbonate 28      Anion Gap 12      Urea Nitrogen 6      Creatinine 0.83      eGFR 90      Calcium 10.1      Albumin 4.6      Alkaline Phosphatase 130 (*)     Total Protein 7.9      AST 28      Bilirubin, Total 0.5      ALT 16     LIPASE - Abnormal    Lipase 8 (*)     Narrative:     Venipuncture immediately after or during the administration of Metamizole may lead to falsely low results. Testing should be performed immediately prior to Metamizole dosing.   URINALYSIS WITH REFLEX MICROSCOPIC AND CULTURE - Abnormal    Color, Urine Straw      Appearance, Urine Clear      Specific Gravity, Urine 1.001 (*)     pH, Urine 6.0      Protein, Urine NEGATIVE      Glucose, Urine NEGATIVE      Blood, Urine SMALL (1+) (*)     Ketones, Urine NEGATIVE      Bilirubin, Urine NEGATIVE      Urobilinogen, Urine <2.0      Nitrite, Urine NEGATIVE      Leukocyte Esterase, Urine SMALL (1+) (*)    MICROSCOPIC ONLY, URINE - Abnormal    WBC, Urine 6-10 (*)     RBC, Urine NONE     LACTATE - Normal    Lactate 1.2      Narrative:     Venipuncture immediately after or during the administration of Metamizole may lead to falsely low results. Testing should be performed immediately  prior to Metamizole dosing.   HCG, URINE, QUALITATIVE - Normal    HCG, Urine NEGATIVE     URINE CULTURE   URINALYSIS WITH REFLEX MICROSCOPIC AND CULTURE    Narrative:     The following orders were created for panel order Urinalysis with Reflex Microscopic and Culture.  Procedure                               Abnormality         Status                     ---------                               -----------         ------                     Urinalysis with Reflex M...[[id number]]  Abnormal            Final result               Extra Urine Gray Tube[[id number]]                            Final result                 Please view results for these tests on the individual orders.   EXTRA URINE GRAY TUBE    Extra Tube Hold for add-ons.     C. TRACHOMATIS + N.  "GONORRHOEAE, AMPLIFIED   POCT PREGNANCY, URINE         RADIOLOGY:  Interpreted by Radiologist.  CT abdomen pelvis w IV contrast   Final Result   1. Inflammatory change within the right pelvis which appears to be   centered about the right adnexa. This raises concern for pelvic   inflammatory disease. Pelvic ultrasound is recommended for further   assessment.   2. The appendix is unremarkable.   3. Mild circumferential bladder wall thickening which may be reactive   or related to cystitis. Clinical correlation advised.   4. Ill-defined low-density lesion in the medial left hepatic lobe   adjacent to the gallbladder fossa. While this could represent focal   fatty infiltration this was not definitively seen on prior exam.   Recommend MRI for further assessment on a nonemergent basis.        MACRO:   None        Signed by: Avel Perdue 10/25/2023 1:18 AM   Dictation workstation:   [id number]          No results found for this or any previous visit (from the past 4464 hour(s)).  ------------------------- NURSING NOTES AND VITALS REVIEWED ---------------------------   The nursing notes within the ED encounter and vital signs as below have been reviewed.   BP 76/63   Pulse 90   Temp 36.1 °C (97 °F)   Resp 18   Ht 1.6 m (5' 3\")   Wt 60 kg (132 lb 4.4 oz)   SpO2 98%   BMI 23.43 kg/m²   Oxygen Saturation Interpretation: Normal      ---------------------------------------------------PHYSICAL EXAM--------------------------------------  Physical Exam   Constitutional/General: Alert and oriented x3, well appearing, non toxic in NAD  Head: Normocephalic and atraumatic  Eyes: PERRL, EOMI, conjunctiva normal, sclera non icteric  Mouth: Oropharynx clear, handling secretions, no trismus, no asymmetry of the posterior oropharynx or uvular edema  Neck: Supple, full ROM, non tender to palpation in the midline, no stridor, no crepitus, no meningeal signs  Respiratory: Lungs clear to auscultation bilaterally, no wheezes, rales, " or rhonchi. Not in respiratory distress  Cardiovascular:  Regular rate. Regular rhythm. No murmurs, gallops, or rubs. 2+ distal pulses  Chest: No chest wall tenderness  GI:  Abdomen Soft, Non distended.  +BS. No organomegaly, no palpable masses,  No rebound, guarding, or rigidity.  Patient does not have any acute peritoneal signs.  Negative flank tenderness.  She has some nonspecific right lower quadrant tenderness.  Musculoskeletal: Moves all extremities x 4. Warm and well perfused, no clubbing, cyanosis, or edema. Capillary refill <3 seconds  Integument: skin warm and dry. No rashes.   Lymphatic: no lymphadenopathy noted  Neurologic: GCS 15, no focal deficits, symmetric strength 5/5 in the upper and lower extremities bilaterally  Psychiatric: Normal Affect    Procedures  None  ------------------------------ ED COURSE/MEDICAL DECISION MAKING----------------------    Medical Decision Making:   Patient was seen and evaluated by me.  An IV is started.  Appropriate labs of been ordered.  A CT of the abdomen pelvis with IV contrast has been ordered.  I have ordered some IV Toradol 15 mg for pain.  IV fluids.  I will give her a dose of IV Pepcid as well as IV Phenergan for nausea    CBC is unremarkable.  White blood cell count is normal at 9.7  Comprehensive metabolic panel is normal.  Urinalysis revealed small leukocyte Estrace.  Negative nitrite.  She is not having UTI symptoms.  Patient denies any vaginal discharge  Patient is resting comfortably at this time.  Pain is improved at this time.    CT imaging reveals inflammatory disease around the right adnexa.  Patient does not have a fever.  Does not have a white count.  I doubt this represents PID.  I have ordered a urine gonorrhea and chlamydia test.  Patient was given a dose of Rocephin 500 mg here as well as doxycycline 100 mg orally    Discharged home on doxycycline 500 mg twice daily x10 days  Follow-up with primary care and/or gynecology for reevaluation in 3 to  5 days.  Sooner if worse or return.  Tylenol and/or ibuprofen as needed for pain.  Diagnoses as of 10/25/23 0146   Nonspecific abdominal pain   Acute cystitis without hematuria      Counseling:   The emergency provider has spoken with the patient and discussed today’s results, in addition to providing specific details for the plan of care and counseling regarding the diagnosis and prognosis.  Questions are answered at this time and they are agreeable with the plan.      --------------------------------- IMPRESSION AND DISPOSITION ---------------------------------        IMPRESSION  1. Nonspecific abdominal pain    2. Acute cystitis without hematuria        DISPOSITION  Disposition: Discharge to home  Patient condition is good      Billing Provider Critical Care Time: 0 minutes     Adalberto Nicole, DO  10/25/23 0141       Adalberto Nicole, DO  10/25/23 0146       Adalberto Nicole, DO  10/25/23 0154

## 2023-10-26 ENCOUNTER — TELEPHONE (OUTPATIENT)
Dept: PHARMACY | Facility: HOSPITAL | Age: 43
End: 2023-10-26
Payer: COMMERCIAL

## 2023-10-26 LAB — BACTERIA UR CULT: NORMAL

## 2023-10-26 NOTE — PROGRESS NOTES
EDPD Note: Duration Adjust    Contacted Lu Fall regarding positive chlamydia result that was taken during their recent emergency room visit. I completed education with patient. The patient is being treated with the proper medication; however, the duration of the discharge prescription is incorrect. I gave verbal education about the new medication duration found below. No further follow up needed from EDPD Team.     Drug: Doxycycline 100 mg  Sig: Take 1 tablet by mouth twice daily  Days Supply: 10    Patient states she has not picked up her doxycycline prescription yet, but plans to pick it up today. Patient given the option to stop after 7 days of therapy if symptoms are resolved. Patient plans to schedule an appointment with her PCP.     Alley Reyes, ZairaD

## 2023-11-09 ENCOUNTER — HOSPITAL ENCOUNTER (EMERGENCY)
Facility: HOSPITAL | Age: 43
Discharge: HOME | End: 2023-11-09
Payer: COMMERCIAL

## 2023-11-09 ENCOUNTER — APPOINTMENT (OUTPATIENT)
Dept: RADIOLOGY | Facility: HOSPITAL | Age: 43
End: 2023-11-09
Payer: COMMERCIAL

## 2023-11-09 VITALS
HEIGHT: 63 IN | HEART RATE: 86 BPM | WEIGHT: 133.38 LBS | BODY MASS INDEX: 23.63 KG/M2 | RESPIRATION RATE: 18 BRPM | OXYGEN SATURATION: 98 % | SYSTOLIC BLOOD PRESSURE: 116 MMHG | DIASTOLIC BLOOD PRESSURE: 86 MMHG | TEMPERATURE: 97.7 F

## 2023-11-09 DIAGNOSIS — G43.809 OTHER MIGRAINE WITHOUT STATUS MIGRAINOSUS, NOT INTRACTABLE: ICD-10-CM

## 2023-11-09 DIAGNOSIS — B34.9 VIRAL SYNDROME: Primary | ICD-10-CM

## 2023-11-09 LAB
ALBUMIN SERPL-MCNC: 4.3 G/DL (ref 3.5–5)
ALP BLD-CCNC: 93 U/L (ref 35–125)
ALT SERPL-CCNC: 9 U/L (ref 5–40)
ANION GAP SERPL CALC-SCNC: 7 MMOL/L
AST SERPL-CCNC: 22 U/L (ref 5–40)
BASOPHILS # BLD AUTO: 0.08 X10*3/UL (ref 0–0.1)
BASOPHILS NFR BLD AUTO: 1.3 %
BILIRUB SERPL-MCNC: 0.4 MG/DL (ref 0.1–1.2)
BUN SERPL-MCNC: 15 MG/DL (ref 8–25)
CALCIUM SERPL-MCNC: 9.3 MG/DL (ref 8.5–10.4)
CHLORIDE SERPL-SCNC: 102 MMOL/L (ref 97–107)
CO2 SERPL-SCNC: 28 MMOL/L (ref 24–31)
CREAT SERPL-MCNC: 0.8 MG/DL (ref 0.4–1.6)
EOSINOPHIL # BLD AUTO: 0.33 X10*3/UL (ref 0–0.7)
EOSINOPHIL NFR BLD AUTO: 5.5 %
ERYTHROCYTE [DISTWIDTH] IN BLOOD BY AUTOMATED COUNT: 18.9 % (ref 11.5–14.5)
GFR SERPL CREATININE-BSD FRML MDRD: >90 ML/MIN/1.73M*2
GLUCOSE SERPL-MCNC: 86 MG/DL (ref 65–99)
HCT VFR BLD AUTO: 42.1 % (ref 36–46)
HGB BLD-MCNC: 13.1 G/DL (ref 12–16)
IMM GRANULOCYTES # BLD AUTO: 0.01 X10*3/UL (ref 0–0.7)
IMM GRANULOCYTES NFR BLD AUTO: 0.2 % (ref 0–0.9)
LYMPHOCYTES # BLD AUTO: 3.24 X10*3/UL (ref 1.2–4.8)
LYMPHOCYTES NFR BLD AUTO: 53.8 %
MCH RBC QN AUTO: 23.6 PG (ref 26–34)
MCHC RBC AUTO-ENTMCNC: 31.1 G/DL (ref 32–36)
MCV RBC AUTO: 76 FL (ref 80–100)
MONOCYTES # BLD AUTO: 0.4 X10*3/UL (ref 0.1–1)
MONOCYTES NFR BLD AUTO: 6.6 %
NEUTROPHILS # BLD AUTO: 1.96 X10*3/UL (ref 1.2–7.7)
NEUTROPHILS NFR BLD AUTO: 32.6 %
NRBC BLD-RTO: 0 /100 WBCS (ref 0–0)
PLATELET # BLD AUTO: 221 X10*3/UL (ref 150–450)
POTASSIUM SERPL-SCNC: 4.6 MMOL/L (ref 3.4–5.1)
PROT SERPL-MCNC: 7.3 G/DL (ref 5.9–7.9)
RBC # BLD AUTO: 5.54 X10*6/UL (ref 4–5.2)
SARS-COV-2 RNA RESP QL NAA+PROBE: NOT DETECTED
SODIUM SERPL-SCNC: 137 MMOL/L (ref 133–145)
WBC # BLD AUTO: 6 X10*3/UL (ref 4.4–11.3)

## 2023-11-09 PROCEDURE — 87635 SARS-COV-2 COVID-19 AMP PRB: CPT | Performed by: PHYSICIAN ASSISTANT

## 2023-11-09 PROCEDURE — 99284 EMERGENCY DEPT VISIT MOD MDM: CPT | Mod: 25

## 2023-11-09 PROCEDURE — 96375 TX/PRO/DX INJ NEW DRUG ADDON: CPT

## 2023-11-09 PROCEDURE — 76937 US GUIDE VASCULAR ACCESS: CPT

## 2023-11-09 PROCEDURE — 96374 THER/PROPH/DIAG INJ IV PUSH: CPT

## 2023-11-09 PROCEDURE — 94760 N-INVAS EAR/PLS OXIMETRY 1: CPT

## 2023-11-09 PROCEDURE — 70450 CT HEAD/BRAIN W/O DYE: CPT

## 2023-11-09 PROCEDURE — 85025 COMPLETE CBC W/AUTO DIFF WBC: CPT | Performed by: PHYSICIAN ASSISTANT

## 2023-11-09 PROCEDURE — 36415 COLL VENOUS BLD VENIPUNCTURE: CPT | Performed by: PHYSICIAN ASSISTANT

## 2023-11-09 PROCEDURE — 2500000004 HC RX 250 GENERAL PHARMACY W/ HCPCS (ALT 636 FOR OP/ED): Performed by: PHYSICIAN ASSISTANT

## 2023-11-09 PROCEDURE — 99285 EMERGENCY DEPT VISIT HI MDM: CPT | Mod: 25

## 2023-11-09 PROCEDURE — 80053 COMPREHEN METABOLIC PANEL: CPT | Performed by: PHYSICIAN ASSISTANT

## 2023-11-09 RX ORDER — PROCHLORPERAZINE EDISYLATE 5 MG/ML
10 INJECTION INTRAMUSCULAR; INTRAVENOUS ONCE
Status: COMPLETED | OUTPATIENT
Start: 2023-11-09 | End: 2023-11-09

## 2023-11-09 RX ORDER — ACETAMINOPHEN 325 MG/1
650 TABLET ORAL EVERY 6 HOURS PRN
Qty: 30 TABLET | Refills: 0 | Status: SHIPPED | OUTPATIENT
Start: 2023-11-09 | End: 2023-11-19

## 2023-11-09 RX ORDER — PROMETHAZINE HYDROCHLORIDE 25 MG/1
12.5 TABLET ORAL EVERY 6 HOURS PRN
Qty: 5 TABLET | Refills: 0 | Status: SHIPPED | OUTPATIENT
Start: 2023-11-09

## 2023-11-09 RX ORDER — DIPHENHYDRAMINE HYDROCHLORIDE 50 MG/ML
25 INJECTION INTRAMUSCULAR; INTRAVENOUS ONCE
Status: COMPLETED | OUTPATIENT
Start: 2023-11-09 | End: 2023-11-09

## 2023-11-09 RX ORDER — ACETAMINOPHEN 325 MG/1
650 TABLET ORAL ONCE
Status: COMPLETED | OUTPATIENT
Start: 2023-11-09 | End: 2023-11-09

## 2023-11-09 RX ORDER — KETOROLAC TROMETHAMINE 30 MG/ML
30 INJECTION, SOLUTION INTRAMUSCULAR; INTRAVENOUS ONCE
Status: COMPLETED | OUTPATIENT
Start: 2023-11-09 | End: 2023-11-09

## 2023-11-09 RX ADMIN — DIPHENHYDRAMINE HYDROCHLORIDE 25 MG: 50 INJECTION, SOLUTION INTRAMUSCULAR; INTRAVENOUS at 11:43

## 2023-11-09 RX ADMIN — PROCHLORPERAZINE EDISYLATE 10 MG: 5 INJECTION INTRAMUSCULAR; INTRAVENOUS at 11:43

## 2023-11-09 RX ADMIN — SODIUM CHLORIDE 1000 ML: 900 INJECTION, SOLUTION INTRAVENOUS at 11:43

## 2023-11-09 RX ADMIN — KETOROLAC TROMETHAMINE 30 MG: 30 INJECTION, SOLUTION INTRAMUSCULAR at 12:43

## 2023-11-09 RX ADMIN — ACETAMINOPHEN 650 MG: 325 TABLET ORAL at 12:43

## 2023-11-09 ASSESSMENT — PAIN DESCRIPTION - ORIENTATION: ORIENTATION: RIGHT

## 2023-11-09 ASSESSMENT — PAIN DESCRIPTION - LOCATION: LOCATION: HEAD

## 2023-11-09 ASSESSMENT — PAIN DESCRIPTION - FREQUENCY: FREQUENCY: CONSTANT/CONTINUOUS

## 2023-11-09 ASSESSMENT — PAIN DESCRIPTION - PAIN TYPE: TYPE: ACUTE PAIN

## 2023-11-09 ASSESSMENT — PAIN SCALES - GENERAL: PAINLEVEL_OUTOF10: 9

## 2023-11-09 ASSESSMENT — PAIN - FUNCTIONAL ASSESSMENT: PAIN_FUNCTIONAL_ASSESSMENT: 0-10

## 2023-11-09 ASSESSMENT — PAIN DESCRIPTION - DESCRIPTORS: DESCRIPTORS: ACHING

## 2023-11-09 ASSESSMENT — PAIN DESCRIPTION - PROGRESSION: CLINICAL_PROGRESSION: GRADUALLY WORSENING

## 2023-11-09 ASSESSMENT — PAIN DESCRIPTION - ONSET: ONSET: GRADUAL

## 2023-11-09 NOTE — ED PROVIDER NOTES
HPI   Chief Complaint   Patient presents with    Flu Symptoms     Headache, nausea, vomit, dizzy, x 4 days no diarrhea, no cough or congestion       42-year-old female presented emergency department with a chief complaint of headache.  She has a longstanding history of migraines.  This feels similar to prior migraine headache.  Additionally complains of nausea, vomiting, general not feeling well, body aches.  She denies any recent sick contacts.  She denies chest pain shortness of breath abdominal pain dysuria diarrhea.  Well-appearing nontoxic afebrile.  States that her vision was slightly abnormal in both eyes.  This occasionally happens to her when she has migraine.  No other complaint.                          No data recorded                Patient History   Past Medical History:   Diagnosis Date    COPD (chronic obstructive pulmonary disease) (CMS/HCC)     Coronary artery disease     Personal history of other endocrine, nutritional and metabolic disease     History of hypopituitarism    Personal history of other mental and behavioral disorders     History of depression    Personal history of other specified conditions     History of brain tumor    Unspecified convulsions (CMS/HCC)     Convulsion     Past Surgical History:   Procedure Laterality Date    MR HEAD ANGIO WO IV CONTRAST  7/15/2021    MR HEAD ANGIO WO IV CONTRAST 7/15/2021 [facility]    MR HEAD ANGIO WO IV CONTRAST  11/28/2017    MR HEAD ANGIO WO IV CONTRAST [facility]    MR HEAD ANGIO WO IV CONTRAST  3/13/2018    MR HEAD ANGIO WO IV CONTRAST [facility]    MR NECK ANGIO WO IV CONTRAST  7/15/2021    MR NECK ANGIO WO IV CONTRAST 7/15/2021 [facility]    OTHER SURGICAL HISTORY  08/17/2013    Craniotomy Tumor Removal - Complete     No family history on file.  Social History     Tobacco Use    Smoking status: Not on file    Smokeless tobacco: Not on file   Substance Use Topics    Alcohol use: Not on file    Drug use:  Not on file       Physical Exam   ED Triage Vitals [11/09/23 0902]   Temp Heart Rate Resp BP   36.5 °C (97.7 °F) 81 16 117/70      SpO2 Temp Source Heart Rate Source Patient Position   99 % Oral -- Sitting      BP Location FiO2 (%)     Right arm --       Physical Exam  Vitals and nursing note reviewed.   Constitutional:       Appearance: Normal appearance.   HENT:      Head: Normocephalic and atraumatic.      Nose: Nose normal.      Mouth/Throat:      Mouth: Mucous membranes are moist.   Eyes:      Pupils: Pupils are equal, round, and reactive to light.   Cardiovascular:      Rate and Rhythm: Regular rhythm. Tachycardia present.      Pulses: Normal pulses.      Heart sounds: Normal heart sounds.   Pulmonary:      Effort: Pulmonary effort is normal.      Breath sounds: Normal breath sounds.   Abdominal:      General: Abdomen is flat.      Palpations: Abdomen is soft.   Musculoskeletal:         General: No swelling, tenderness or signs of injury. Normal range of motion.      Cervical back: Normal range of motion and neck supple.   Skin:     General: Skin is warm and dry.   Neurological:      General: No focal deficit present.      Mental Status: She is alert and oriented to person, place, and time.      Cranial Nerves: No cranial nerve deficit.      Sensory: No sensory deficit.      Motor: No weakness.      Coordination: Coordination normal.      Gait: Gait normal.      Deep Tendon Reflexes: Reflexes normal.   Psychiatric:         Mood and Affect: Mood normal.         ED Course & MDM   Diagnoses as of 11/09/23 1232   Viral syndrome   Other migraine without status migrainosus, not intractable       Medical Decision Making  I have seen and evaluated this patient.  Physician available for consultation.  Vital signs have been reviewed.  All laboratory and diagnostic imaging is reviewed by myself and interpreted by myself unless otherwise stated.  Additionally imaging is interpreted by radiologist.    Negative CT brain,  CBC without significant leukocytosis or anemia, metabolic panel without significant renal impairment or electrolyte abnormality.  Patient is overall presentation most consistent with viral syndrome in the setting of migraine.  There is no indication of subarachnoid hemorrhage or meningitis.  Patient has improvement of headache with migraine cocktail.  Will be referred back to neurology.  Released in good condition.      Labs Reviewed   CBC WITH AUTO DIFFERENTIAL - Abnormal       Result Value    WBC 6.0      nRBC 0.0      RBC 5.54 (*)     Hemoglobin 13.1      Hematocrit 42.1      MCV 76 (*)     MCH 23.6 (*)     MCHC 31.1 (*)     RDW 18.9 (*)     Platelets 221      Neutrophils % 32.6      Immature Granulocytes %, Automated 0.2      Lymphocytes % 53.8      Monocytes % 6.6      Eosinophils % 5.5      Basophils % 1.3      Neutrophils Absolute 1.96      Immature Granulocytes Absolute, Automated 0.01      Lymphocytes Absolute 3.24      Monocytes Absolute 0.40      Eosinophils Absolute 0.33      Basophils Absolute 0.08     COMPREHENSIVE METABOLIC PANEL - Normal    Glucose 86      Sodium 137      Potassium 4.6      Chloride 102      Bicarbonate 28      Urea Nitrogen 15      Creatinine 0.80      eGFR >90      Calcium 9.3      Albumin 4.3      Alkaline Phosphatase 93      Total Protein 7.3      AST 22      Bilirubin, Total 0.4      ALT 9      Anion Gap 7     SARS-COV-2 PCR, SCREEN ASYMPTOMATIC - Normal    Coronavirus 2019, PCR Not Detected      Narrative:     This assay has received FDA Emergency Use Authorization (EUA) and is only authorized for the duration of time that circumstances exist to justify the authorization of the emergency use of in vitro diagnostic tests for the detection of SARS-CoV-2 virus and/or diagnosis of COVID-19 infection under section 564(b)(1) of the Act, 21 U.S.C. 360bbb-3(b)(1). This assay is an in vitro diagnostic nucleic acid amplification test for the qualitative detection of SARS-CoV-2 from  nasopharyngeal specimens and has been validated for use at [facility]. Negative results do not preclude COVID-19 infections and should not be used as the sole basis for diagnosis, treatment, or other management decisions.       CT head wo IV contrast   Final Result   No acute intracranial findings.        MACRO:   None        Signed by: Konstantin Puente 11/9/2023 11:18 AM   Dictation workstation:   [id number]        Medications   ketorolac (Toradol) injection 30 mg (has no administration in time range)   acetaminophen (Tylenol) tablet 650 mg (has no administration in time range)   prochlorperazine (Compazine) injection 10 mg (10 mg intravenous Given 11/9/23 1143)   diphenhydrAMINE (BENADryl) injection 25 mg (25 mg intravenous Given 11/9/23 1143)   sodium chloride 0.9 % bolus 1,000 mL (0 mL intravenous Stopped 11/9/23 1213)     New Prescriptions    ACETAMINOPHEN (TYLENOL) 325 MG TABLET    Take 2 tablets (650 mg) by mouth every 6 hours if needed for mild pain (1 - 3) for up to 10 days.    PROMETHAZINE (PHENERGAN) 25 MG TABLET    Take 0.5 tablets (12.5 mg) by mouth every 6 hours if needed for nausea or vomiting for up to 10 doses.       Procedure  Procedures     oCdy Ritchie PA-C  11/09/23 2192

## 2023-11-09 NOTE — Clinical Note
Lu Fall was seen and treated in our emergency department on 11/9/2023.  She may return to work on 11/13/2023.       If you have any questions or concerns, please don't hesitate to call.      Cody Ritchie PA-C

## 2023-11-16 ENCOUNTER — HOSPITAL ENCOUNTER (EMERGENCY)
Facility: HOSPITAL | Age: 43
Discharge: OTHER INSTITUTION | End: 2023-11-17
Attending: EMERGENCY MEDICINE
Payer: COMMERCIAL

## 2023-11-16 DIAGNOSIS — T50.902A INTENTIONAL DRUG OVERDOSE, INITIAL ENCOUNTER (MULTI): Primary | ICD-10-CM

## 2023-11-16 DIAGNOSIS — F32.A DEPRESSION WITH SUICIDAL IDEATION: ICD-10-CM

## 2023-11-16 DIAGNOSIS — R45.851 DEPRESSION WITH SUICIDAL IDEATION: ICD-10-CM

## 2023-11-16 LAB
ALBUMIN SERPL BCP-MCNC: 4.4 G/DL (ref 3.4–5)
ALP SERPL-CCNC: 89 U/L (ref 33–110)
ALT SERPL W P-5'-P-CCNC: 31 U/L (ref 7–45)
AMPHETAMINES UR QL SCN: ABNORMAL
ANION GAP SERPL CALC-SCNC: 14 MMOL/L (ref 10–20)
APAP SERPL-MCNC: <10 UG/ML
AST SERPL W P-5'-P-CCNC: 53 U/L (ref 9–39)
BARBITURATES UR QL SCN: ABNORMAL
BASOPHILS # BLD AUTO: 0.07 X10*3/UL (ref 0–0.1)
BASOPHILS NFR BLD AUTO: 1 %
BENZODIAZ UR QL SCN: ABNORMAL
BILIRUB SERPL-MCNC: 1.1 MG/DL (ref 0–1.2)
BUN SERPL-MCNC: 9 MG/DL (ref 6–23)
BZE UR QL SCN: ABNORMAL
CALCIUM SERPL-MCNC: 9 MG/DL (ref 8.6–10.3)
CANNABINOIDS UR QL SCN: ABNORMAL
CHLORIDE SERPL-SCNC: 108 MMOL/L (ref 98–107)
CK SERPL-CCNC: 47 U/L (ref 0–215)
CO2 SERPL-SCNC: 22 MMOL/L (ref 21–32)
CREAT SERPL-MCNC: 0.79 MG/DL (ref 0.5–1.05)
EOSINOPHIL # BLD AUTO: 0.21 X10*3/UL (ref 0–0.7)
EOSINOPHIL NFR BLD AUTO: 3.1 %
ERYTHROCYTE [DISTWIDTH] IN BLOOD BY AUTOMATED COUNT: 17.9 % (ref 11.5–14.5)
ETHANOL SERPL-MCNC: <10 MG/DL
FENTANYL+NORFENTANYL UR QL SCN: ABNORMAL
GFR SERPL CREATININE-BSD FRML MDRD: >90 ML/MIN/1.73M*2
GLUCOSE SERPL-MCNC: 84 MG/DL (ref 74–99)
HCG UR QL IA.RAPID: NEGATIVE
HCT VFR BLD AUTO: 37.7 % (ref 36–46)
HGB BLD-MCNC: 12 G/DL (ref 12–16)
HOLD SPECIMEN: NORMAL
IMM GRANULOCYTES # BLD AUTO: 0.01 X10*3/UL (ref 0–0.7)
IMM GRANULOCYTES NFR BLD AUTO: 0.1 % (ref 0–0.9)
LYMPHOCYTES # BLD AUTO: 1.96 X10*3/UL (ref 1.2–4.8)
LYMPHOCYTES NFR BLD AUTO: 28.9 %
MCH RBC QN AUTO: 24 PG (ref 26–34)
MCHC RBC AUTO-ENTMCNC: 31.8 G/DL (ref 32–36)
MCV RBC AUTO: 75 FL (ref 80–100)
MONOCYTES # BLD AUTO: 0.3 X10*3/UL (ref 0.1–1)
MONOCYTES NFR BLD AUTO: 4.4 %
NEUTROPHILS # BLD AUTO: 4.23 X10*3/UL (ref 1.2–7.7)
NEUTROPHILS NFR BLD AUTO: 62.5 %
NRBC BLD-RTO: 0 /100 WBCS (ref 0–0)
OPIATES UR QL SCN: ABNORMAL
OXYCODONE+OXYMORPHONE UR QL SCN: ABNORMAL
PCP UR QL SCN: ABNORMAL
PLATELET # BLD AUTO: 169 X10*3/UL (ref 150–450)
POTASSIUM SERPL-SCNC: 3.5 MMOL/L (ref 3.5–5.3)
PROT SERPL-MCNC: 6.8 G/DL (ref 6.4–8.2)
RBC # BLD AUTO: 5.01 X10*6/UL (ref 4–5.2)
SALICYLATES SERPL-MCNC: <3 MG/DL
SARS-COV-2 RNA RESP QL NAA+PROBE: NOT DETECTED
SODIUM SERPL-SCNC: 140 MMOL/L (ref 136–145)
T4 FREE SERPL-MCNC: 0.57 NG/DL (ref 0.61–1.12)
TSH SERPL-ACNC: 0.01 MIU/L (ref 0.44–3.98)
WBC # BLD AUTO: 6.8 X10*3/UL (ref 4.4–11.3)

## 2023-11-16 PROCEDURE — 80307 DRUG TEST PRSMV CHEM ANLYZR: CPT | Performed by: EMERGENCY MEDICINE

## 2023-11-16 PROCEDURE — 84439 ASSAY OF FREE THYROXINE: CPT | Performed by: EMERGENCY MEDICINE

## 2023-11-16 PROCEDURE — 80053 COMPREHEN METABOLIC PANEL: CPT | Performed by: EMERGENCY MEDICINE

## 2023-11-16 PROCEDURE — 87635 SARS-COV-2 COVID-19 AMP PRB: CPT | Performed by: EMERGENCY MEDICINE

## 2023-11-16 PROCEDURE — 37799 UNLISTED PX VASCULAR SURGERY: CPT | Performed by: EMERGENCY MEDICINE

## 2023-11-16 PROCEDURE — 99285 EMERGENCY DEPT VISIT HI MDM: CPT | Mod: 25 | Performed by: EMERGENCY MEDICINE

## 2023-11-16 PROCEDURE — 84443 ASSAY THYROID STIM HORMONE: CPT | Performed by: EMERGENCY MEDICINE

## 2023-11-16 PROCEDURE — 80320 DRUG SCREEN QUANTALCOHOLS: CPT | Performed by: EMERGENCY MEDICINE

## 2023-11-16 PROCEDURE — 82550 ASSAY OF CK (CPK): CPT | Performed by: EMERGENCY MEDICINE

## 2023-11-16 PROCEDURE — 85025 COMPLETE CBC W/AUTO DIFF WBC: CPT | Performed by: EMERGENCY MEDICINE

## 2023-11-16 PROCEDURE — 81025 URINE PREGNANCY TEST: CPT | Performed by: EMERGENCY MEDICINE

## 2023-11-16 PROCEDURE — 2500000001 HC RX 250 WO HCPCS SELF ADMINISTERED DRUGS (ALT 637 FOR MEDICARE OP): Mod: SE | Performed by: EMERGENCY MEDICINE

## 2023-11-16 RX ORDER — DESMOPRESSIN ACETATE 0.1 MG/ML
10 SOLUTION NASAL 2 TIMES DAILY
Status: DISCONTINUED | OUTPATIENT
Start: 2023-11-16 | End: 2023-11-17 | Stop reason: HOSPADM

## 2023-11-16 RX ORDER — HYDROCORTISONE 10 MG/1
20 TABLET ORAL ONCE
Status: COMPLETED | OUTPATIENT
Start: 2023-11-16 | End: 2023-11-16

## 2023-11-16 RX ADMIN — DESMOPRESSIN ACETATE 10 MCG: 0.1 SOLUTION NASAL at 22:30

## 2023-11-16 RX ADMIN — HYDROCORTISONE 20 MG: 10 TABLET ORAL at 23:14

## 2023-11-16 SDOH — HEALTH STABILITY: MENTAL HEALTH: SUICIDAL BEHAVIOR (3 MONTHS): YES

## 2023-11-16 SDOH — HEALTH STABILITY: MENTAL HEALTH: IN THE PAST WEEK, HAVE YOU BEEN HAVING THOUGHTS ABOUT KILLING YOURSELF?: YES

## 2023-11-16 SDOH — HEALTH STABILITY: MENTAL HEALTH: NON-SPECIFIC ACTIVE SUICIDAL THOUGHTS (PAST 1 MONTH): NO

## 2023-11-16 SDOH — HEALTH STABILITY: MENTAL HEALTH: IN THE PAST FEW WEEKS, HAVE YOU FELT THAT YOU OR YOUR FAMILY WOULD BE BETTER OFF IF YOU WERE DEAD?: YES

## 2023-11-16 SDOH — HEALTH STABILITY: MENTAL HEALTH: IN THE PAST FEW WEEKS, HAVE YOU WISHED YOU WERE DEAD?: YES

## 2023-11-16 SDOH — HEALTH STABILITY: MENTAL HEALTH: BEHAVIORS/MOOD: DEFIANT;WANDERING;OTHER (COMMENT)

## 2023-11-16 SDOH — HEALTH STABILITY: MENTAL HEALTH: ACTIVE SUICIDAL IDEATION WITH SPECIFIC PLAN AND INTENT (PAST 1 MONTH): YES

## 2023-11-16 SDOH — HEALTH STABILITY: MENTAL HEALTH: BEHAVIORS/MOOD: CALM;COOPERATIVE

## 2023-11-16 SDOH — HEALTH STABILITY: MENTAL HEALTH: HOW DID YOU TRY TO KILL YOURSELF?: OD

## 2023-11-16 SDOH — HEALTH STABILITY: MENTAL HEALTH: BEHAVIORS/MOOD: ANXIOUS

## 2023-11-16 SDOH — HEALTH STABILITY: MENTAL HEALTH: SUICIDAL BEHAVIOR (DESCRIPTION): SA VIA OD

## 2023-11-16 SDOH — HEALTH STABILITY: MENTAL HEALTH: ACTIVE SUICIDAL IDEATION WITH SOME INTENT TO ACT, WITHOUT SPECIFIC PLAN (PAST 1 MONTH): NO

## 2023-11-16 SDOH — HEALTH STABILITY: MENTAL HEALTH: BEHAVIORS/MOOD: AGITATED

## 2023-11-16 SDOH — HEALTH STABILITY: MENTAL HEALTH: ANXIETY SYMPTOMS: GENERALIZED

## 2023-11-16 SDOH — HEALTH STABILITY: MENTAL HEALTH: HAVE YOU EVER TRIED TO KILL YOURSELF?: YES

## 2023-11-16 SDOH — HEALTH STABILITY: MENTAL HEALTH: ARE YOU HAVING THOUGHTS OF KILLING YOURSELF RIGHT NOW?: YES

## 2023-11-16 SDOH — HEALTH STABILITY: MENTAL HEALTH: WISH TO BE DEAD (PAST 1 MONTH): NO

## 2023-11-16 SDOH — ECONOMIC STABILITY: HOUSING INSECURITY: FEELS SAFE LIVING IN HOME: YES

## 2023-11-16 SDOH — HEALTH STABILITY: MENTAL HEALTH: DESCRIBE YOUR THOUGHTS OF KILLING YOURSELF RIGHT NOW:: SA THIS MORNING VIA OD

## 2023-11-16 SDOH — HEALTH STABILITY: MENTAL HEALTH: SUICIDAL BEHAVIOR (LIFETIME): YES

## 2023-11-16 SDOH — HEALTH STABILITY: MENTAL HEALTH: WHEN DID YOU TRY TO KILL YOURSELF?: TONIGHTS

## 2023-11-16 SDOH — HEALTH STABILITY: MENTAL HEALTH
DEPRESSION SYMPTOMS: APPETITE CHANGE;CHANGE IN ENERGY LEVEL;CRYING;FEELINGS OF HELPLESSNESS;FEELINGS OF HOPELESSESS;INCREASED IRRITABILITY;LOSS OF INTEREST

## 2023-11-16 ASSESSMENT — COLUMBIA-SUICIDE SEVERITY RATING SCALE - C-SSRS
1. IN THE PAST MONTH, HAVE YOU WISHED YOU WERE DEAD OR WISHED YOU COULD GO TO SLEEP AND NOT WAKE UP?: YES
6. HAVE YOU EVER DONE ANYTHING, STARTED TO DO ANYTHING, OR PREPARED TO DO ANYTHING TO END YOUR LIFE?: YES
2. HAVE YOU ACTUALLY HAD ANY THOUGHTS OF KILLING YOURSELF?: YES
5. HAVE YOU STARTED TO WORK OUT OR WORKED OUT THE DETAILS OF HOW TO KILL YOURSELF? DO YOU INTEND TO CARRY OUT THIS PLAN?: YES
4. HAVE YOU HAD THESE THOUGHTS AND HAD SOME INTENTION OF ACTING ON THEM?: YES
6. HAVE YOU EVER DONE ANYTHING, STARTED TO DO ANYTHING, OR PREPARED TO DO ANYTHING TO END YOUR LIFE?: YES
6. HAVE YOU EVER DONE ANYTHING, STARTED TO DO ANYTHING, OR PREPARED TO DO ANYTHING TO END YOUR LIFE?: TOOK A BUNCH OF PILLS

## 2023-11-16 ASSESSMENT — PAIN - FUNCTIONAL ASSESSMENT: PAIN_FUNCTIONAL_ASSESSMENT: 0-10

## 2023-11-16 ASSESSMENT — PAIN DESCRIPTION - LOCATION: LOCATION: HEAD

## 2023-11-16 ASSESSMENT — LIFESTYLE VARIABLES
SUBSTANCE_ABUSE_PAST_12_MONTHS: YES
PRESCIPTION_ABUSE_PAST_12_MONTHS: NO

## 2023-11-16 ASSESSMENT — PAIN SCALES - GENERAL: PAINLEVEL_OUTOF10: 3

## 2023-11-16 NOTE — PROGRESS NOTES
EPAT - Social Work Psychiatric Assessment    Arrival Details  Mode of Arrival: Ambulance  Admission Source: Home  Admission Type: Voluntary  EPAT Assessment Start Date: 11/16/23  EPAT Assessment Start Time: 1445  Name of : Fatoumata Sánchez. LPC    History of Present Illness  HPI: Pt is 43 years old  female who presented to the ED after SA. Pt has a history of NATO and depression. Pt has no known history of inpatient psychiatric admissions. ED provider and nursing notes were reviewed, which indicates that pt “was found by her parents this morning with an empty pill bottle of zolpidem 10mg tabs. The bottle was empty.  30 pills were filled on 10/31/23.  The patient states she also took a handful of gabapentin and an arthritis pill that was prescribed to their pet dog. This was an attempt to harm/kill self”.    SW Readmission Information   Readmission within 30 Days: Yes  Previous ED Visit Date and Reason : 11/14/23, headache  Previous Discharge Date and Location: 11/14/23. [place] [facility]  Factors Contributing to  Readmission Inpatient/ED (Team Perspective): Substance Abuse, Lack of Community Support, Lack of Family/Friend Support  Readmission Factors Team Comments: Substance Abuse;Lack of Community Support;Lack of Family/Friend Support;  Factors Contributing to Readmission (Patient/Family Perspective): Substance Abuse;Lack of Community Support;Lack of Family/Friend Support;    Psychiatric Symptoms  Anxiety Symptoms: Generalized  Depression Symptoms: Appetite change, Change in energy level, Crying, Feelings of helplessness, Feelings of hopelessess, Increased irritability, Loss of interest  Estephania Symptoms: No problems reported or observed.    Psychosis Symptoms  Hallucination Type: No problems reported or observed.  Delusion Type: No problems reported or observed.    Additional Symptoms - Adult  Generalized Anxiety Disorder: Difficult to control worry, Excessive anxiety/worry, Irritability, Sleep  disturbance  Obsessive Compulsive Disorder: No problems reported or observed.  Panic Attack: No problems reported or observed.  Post Traumatic Stress Disorder: No problems reported or observed.  Delirium: No problems reported or observed.    Past Psychiatric History/Meds/Treatments  Past Psychiatric History: NATO, depression  Past Psychiatric Meds/Treatments: outpatient  Past Violence/Victimization History: not assessed    Current Mental Health Contacts   Name/Phone Number: none reported   Last Appointment Date: n/a  Provider Name/Phone Number: unknown  Provider Last Appointment Date: n/a    Support System: Other (Comment) (none)    Living Arrangement: Lives with someone, House    Home Safety  Feels Safe Living in Home: Yes    Income Information  Employment Status for: Patient  Employment Status: Unemployed  Income Source: Unable to Assess    [employer] Service/Education History  Current or Previous  Service: None         Legal  Legal Considerations: Patient/ Family Capacity to Make Sound Judgments  Legal Concerns: none reported    Drug Screening  Have you used any substances (canabis, cocaine, heroin, hallucinogens, inhalants, etc.) in the past 12 months?: Yes  Have you used any prescription drugs other than prescribed in the past 12 months?: No  Is a toxicology screen needed?: Yes    Stage of Change  Stage of Change: Precontemplation  History of Treatment: Other (Comment) (unknown)  Type of Treatment Offered: Inpatient    Psychosocial  Psychosocial (WDL): Within Defined Limits    Orientation  Orientation Level: Oriented X4    General Appearance  Motor Activity: Unremarkable  Speech Pattern: Excessively soft, Slow  General Attitude: Guarded, Withdrawn  Appearance/Hygiene: Unremarkable    Thought Process  Content: Unremarkable  Delusions: Other (Comment) (none)  Perception: Not altered  Hallucination: None  Judgment/Insight: Limited  Confusion: None  Cognition: Appropriate safety  awareness, Follows commands, Poor judgement, Poor attention/concentration    Sleep Pattern  Sleep Pattern: Disturbed/interrupted sleep    Risk Factors  Self Harm/Suicidal Ideation Plan: SA  Previous Self Harm/Suicidal Plans: none  Risk Factors: Age < 19 years old, Major mental illness, Substance abuse, Unemployment    Violence Risk Assessment  Assessment of Violence: None noted  Thoughts of Harm to Others: No    Ability to Assess Risk Screen  Risk Screen - Ability to Assess: Able to be screened  Ask Suicide-Screening Questions  1. In the past few weeks, have you wished you were dead?: Yes  2. In the past few weeks, have you felt that you or your family would be better off if you were dead?: Yes  3. In the past week, have you been having thoughts about killing yourself?: Yes  4. Have you ever tried to kill yourself?: Yes  How did you try to kill yourself?: OD  When did you try to kill yourself?: tonights  5. Are you having thoughts of killing yourself right now?: Yes  Describe your thoughts of killing yourself right now:: SA this morning via OD  Calculated Risk Score: Imminent Risk  [place] Suicide Severity Rating Scale (Screener/Recent Self-Report)  1. Wish to be Dead (Past 1 Month): No  2. Non-Specific Active Suicidal Thoughts (Past 1 Month): No  3. Active Suicidal Ideation with any Methods (Not Plan) Without Intent to Act (Past 1 Month): No  4. Active Suicidal Ideation with Some Intent to Act, Without Specific Plan (Past 1 Month): No  5. Active Suicidal Ideation with Specific Plan and Intent (Past 1 Month): Yes  6. Suicidal Behavior (Lifetime): Yes  6. Suicidal Behavior (3 Months): Yes  6. Suicidal Behavior (Description): SA via OD  Calculated C-SSRS Risk Score (Lifetime/Recent): High Risk  Step 1: Risk Factors  Current & Past Psychiatric Dx: Mood disorder, Alcohol/substance abuse disorders  Presenting Symptoms: Impulsivity, Hopelessness or despair, Anxiety and/or panic  Precipitants/Stressors: Triggering events  leading to humiliation, shame, and/or despair (e.g. loss of relationship, financial or health status) (real or anticipated), Substance intoxication or withdrawal, Social isolation, Inadequate social supports  Change in Treatment: Non-compliant or not receiving treatment  Access to Lethal Methods : No  Step 2: Protective Factors   Protective Factors Internal: Identifies reasons for living  Protective Factors External: Other (Comment) (none)  Step 3: Suicidal Ideation Intensity  Most Severe Suicidal Ideation Identified: SA  How Many Times Have You Had These Thoughts: Daily or almost daily  When You Have the Thoughts How Long do They Last : 1-4 hours/a lot of the time  Could/Can You Stop Thinking About Killing Yourself or Wanting to Die if You Want to: Can control thoughts with some difficulty  Are There Things - Anyone or Anything - That Stopped You From Wanting to Die or Acting on: Deterrents most likely did not stop you  What Sort of Reasons Did You Have For Thinking About Wanting to Die or Killing Yourself: Completely to end or stop the pain (you couldn't go on living with the pain or how you were feeling)  Total Score: 19  Step 5: Documentation  Risk Level: High suicide risk    Psychiatric Impression and Plan of Care  Assessment and Plan: PT is 43 years old  female with a history of NATO and depression was brought to the ED after her parents found her unresponsive on the bathroom floor at home. Pt has no known history of inpatient psychiatric admissions. During the assessment, pt was lying in bed, wearing a hospital gown. Pt was irritated, crying, and superficially cooperative. Pt stated that she feels “terrible” today; she has a lot of medical complications and “don’t want to live like that anymore”.  Pt stated that until this morning she was compliant with her medications. Pt is high-risk on the [place] SSRS. Pt endorses SI. Pt had SA last night. Pt denied any previous history of SA. Pt was not able to  identify any support system “no support people”.  Pt has limited protective factors: reasons to stay alive (daughter and parents), hobbies (“go for a walk to a river”).  When asked where he sees himself in 6 months, pt responded loudly: “I have no plans; I have a brain tumor”. Pt denied access to firearms. Pt denied AH/VH. Pt denied any substance use, yet her UDS was positive for cocaine.  Pt’s father, Piyush Brown, was contacted for further collateral information. He confirmed the pt’s story. He also denied any history of inpatient psychiatric admissions or SA in the past. He did not feel safe for the pt to be discharged home at that moment. Piyush wanted her to be admitted; “she needs to be hospitalized”.       At this time, pt meets the criteria for inpatient psychiatric admission for further evaluation, stability, treatment, and safety. Reviewed with Dr. Nicole, who is in agreement.  Specific Resources Provided to Patient: none  CM Notified: none    Outcome/Disposition  Patient's Perception of Outcome Achieved: agreeabrl  Assessment, Recommendations and Risk Level Reviewed with: Dr. Nicole  Contact Name: Piyush Brown  Contact Number(s): [phone]  Contact Relationship: child  EPAT Assessment Completed Date: 11/16/23  EPAT Assessment Completed Time: 1520    Social Work Note

## 2023-11-16 NOTE — ED PROVIDER NOTES
"Department of Emergency Medicine   ED  Provider Note  Admit Date/RoomTime: 11/16/2023 11:11 AM  ED Room: [facility]/05                  History of Present Illness:   Lu Fall is a 43 y.o. female presenting to the ED for drug overdose, beginning last night around 2 AM.  The complaint has been persistent, moderate in severity, and worsened by nothing.  Patient was found by her parents this morning with an empty pill bottle of zolpidem 10mg tabs.  Bottle was empty.  30 pills filled on 10/31/23.  Patient states she also took a handful of gabapentin and arthritis pill that was prescribed to their pet dog.  Again she took this around 2 AM.  This was an attempt to harm/kill self.  Patient has history of chronic migraines and multiple medical problems.  History of pituitary tumor.      Review of Systems:   Pertinent positives and review of systems as noted above.  Remaining 10 review of systems is negative or noncontributory to today's episode of care.  Review of Systems   A complete review of systems is otherwise negative except as noted above.    --------------------------------------------- PAST HISTORY ---------------------------------------------  Past Medical History:  has a past medical history of COPD (chronic obstructive pulmonary disease) (CMS/[facility]), Coronary artery disease, Personal history of other endocrine, nutritional and metabolic disease, Personal history of other mental and behavioral disorders, Personal history of other specified conditions, and Unspecified convulsions (CMS/[facility]).    Past Surgical History:  has a past surgical history that includes Other surgical history (08/17/2013); MR angio head wo IV contrast (7/15/2021); MR angio neck wo IV contrast (7/15/2021); MR angio head wo IV contrast (11/28/2017); and MR angio head wo IV contrast (3/13/2018).    Social History:  reports that she has never smoked. She has never used smokeless tobacco. She reports current drug use. Drug: \"Crack\" cocaine. She " reports that she does not drink alcohol.    Family History: family history is not on file. Unless otherwise noted, family history is non contributory    Patient's Medications   New Prescriptions    No medications on file   Previous Medications    ACETAMINOPHEN (TYLENOL) 325 MG TABLET    Take 2 tablets (650 mg) by mouth every 6 hours if needed for mild pain (1 - 3) for up to 10 days.    IBUPROFEN 600 MG TABLET    Take 1 tablet (600 mg) by mouth every 8 hours if needed for mild pain (1 - 3) or fever (temp greater than 38.0 C).    PROMETHAZINE (PHENERGAN) 25 MG TABLET    Take 0.5 tablets (12.5 mg) by mouth every 6 hours if needed for nausea or vomiting for up to 10 doses.   Modified Medications    No medications on file   Discontinued Medications    No medications on file      The patient’s home medications have been reviewed.    Allergies: Adhesive, Hydrocodone-acetaminophen, Ondansetron, and Hydromorphone    -------------------------------------------------- RESULTS -------------------------------------------------  All laboratory and radiology results have been personally reviewed by myself   LABS:  Labs Reviewed   CBC WITH AUTO DIFFERENTIAL - Abnormal       Result Value    WBC 6.8      nRBC 0.0      RBC 5.01      Hemoglobin 12.0      Hematocrit 37.7      MCV 75 (*)     MCH 24.0 (*)     MCHC 31.8 (*)     RDW 17.9 (*)     Platelets 169      Neutrophils % 62.5      Immature Granulocytes %, Automated 0.1      Lymphocytes % 28.9      Monocytes % 4.4      Eosinophils % 3.1      Basophils % 1.0      Neutrophils Absolute 4.23      Immature Granulocytes Absolute, Automated 0.01      Lymphocytes Absolute 1.96      Monocytes Absolute 0.30      Eosinophils Absolute 0.21      Basophils Absolute 0.07     COMPREHENSIVE METABOLIC PANEL - Abnormal    Glucose 84      Sodium 140      Potassium 3.5      Chloride 108 (*)     Bicarbonate 22      Anion Gap 14      Urea Nitrogen 9      Creatinine 0.79      eGFR >90      Calcium 9.0       Albumin 4.4      Alkaline Phosphatase 89      Total Protein 6.8      AST 53 (*)     Bilirubin, Total 1.1      ALT 31     DRUG SCREEN,URINE - Abnormal    Amphetamine Screen, Urine Presumptive Negative      Barbiturate Screen, Urine Presumptive Negative      Benzodiazepines Screen, Urine Presumptive Negative      Cannabinoid Screen, Urine Presumptive Negative      Cocaine Metabolite Screen, Urine Presumptive Positive (*)     Fentanyl Screen, Urine Presumptive Negative      Opiate Screen, Urine Presumptive Negative      Oxycodone Screen, Urine Presumptive Negative      PCP Screen, Urine Presumptive Negative      Narrative:     Drug screen results are presumptive and should not be used to assess   compliance with prescribed medication. Contact the performing [facility] laboratory   to add-on definitive confirmatory testing if clinically indicated.    Toxicology screening results are reported qualitatively. The concentration must   be greater than or equal to the cutoff to be reported as positive. The concentration   at which the screening test can detect an individual drug or metabolite varies.   The absence of expected drug(s) and/or drug metabolite(s) may indicate non-compliance,   inappropriate timing of specimen collection relative to drug administration, poor drug   absorption, diluted/adulterated urine, or limitations of testing. For medical purposes   only; not valid for forensic use.    Interpretive questions should be directed to the laboratory medical directors.   TSH WITH REFLEX TO FREE T4 IF ABNORMAL - Abnormal    Thyroid Stimulating Hormone 0.01 (*)     Narrative:     TSH testing is performed using different testing methodology at [facility] than at other [facility]. Direct result comparisons should only be made within the same method.     THYROXINE, FREE - Abnormal    Thyroxine, Free 0.57 (*)     Narrative:     Thyroxine Free testing is performed using different testing methodology at  "[facility] than at other [facility]. Direct result comparisons should only be made within the same method.    Biotin can cause falsely elevated free T4 results. Patients taking a Biotin dose of up to 10 mg/day should refrain from taking Biotin for 24 hours before sample collection. Patient taking a Biotin dose of >10 mg/day should consult with their physician or the laboratory before the blood draw.   ACUTE TOXICOLOGY PANEL, BLOOD - Normal    Acetaminophen <10.0      Salicylate  <3      Alcohol <10     HCG, URINE, QUALITATIVE - Normal    HCG, Urine NEGATIVE     SARS-COV-2 PCR, SYMPTOMATIC - Normal    Coronavirus 2019, PCR Not Detected      Narrative:     This assay has received FDA Emergency Use Authorization (EUA) and is only authorized for the duration of time that circumstances exist to justify the authorization of the emergency use of in vitro diagnostic tests for the detection of SARS-CoV-2 virus and/or diagnosis of COVID-19 infection under section 564(b)(1) of the Act, 21 U.S.C. 360bbb-3(b)(1). This assay is an in vitro diagnostic nucleic acid amplification test for the qualitative detection of SARS-CoV-2 from nasopharyngeal specimens and has been validated for use at [facility]. Negative results do not preclude COVID-19 infections and should not be used as the sole basis for diagnosis, treatment, or other management decisions.     CREATINE KINASE - Normal    Creatine Kinase 47     GRAY TOP    Extra Tube Hold for add-ons.           RADIOLOGY:  Interpreted by Radiologist.  No orders to display       No results found for this or any previous visit (from the past 4464 hour(s)).  ------------------------- NURSING NOTES AND VITALS REVIEWED ---------------------------   The nursing notes within the ED encounter and vital signs as below have been reviewed.   /89   Pulse 89   Temp 36.8 °C (98.3 °F) (Temporal)   Resp 20   Ht 1.6 m (5' 3\")   Wt 59 kg (130 lb)   " SpO2 97%   BMI 23.03 kg/m²   Oxygen Saturation Interpretation: Normal      ---------------------------------------------------PHYSICAL EXAM--------------------------------------  Physical Exam  Vitals and nursing note reviewed.   Constitutional:       General: She is not in acute distress.     Appearance: She is well-developed. She is not ill-appearing or toxic-appearing.   HENT:      Head: Normocephalic and atraumatic.      Nose: Nose normal.      Mouth/Throat:      Mouth: Mucous membranes are moist.      Pharynx: Oropharynx is clear. No oropharyngeal exudate or posterior oropharyngeal erythema.   Eyes:      Extraocular Movements: Extraocular movements intact.      Conjunctiva/sclera: Conjunctivae normal.      Pupils: Pupils are equal, round, and reactive to light.   Cardiovascular:      Rate and Rhythm: Normal rate and regular rhythm.      Heart sounds: No murmur heard.     No gallop.   Pulmonary:      Effort: Pulmonary effort is normal. No respiratory distress.      Breath sounds: Normal breath sounds. No wheezing, rhonchi or rales.   Abdominal:      General: There is no distension.      Palpations: Abdomen is soft. There is no mass.      Tenderness: There is no abdominal tenderness. There is no right CVA tenderness, left CVA tenderness, guarding or rebound.      Hernia: No hernia is present.   Musculoskeletal:         General: No swelling.      Cervical back: Normal range of motion and neck supple. No rigidity or tenderness.      Right lower leg: No edema.      Left lower leg: No edema.   Lymphadenopathy:      Cervical: No cervical adenopathy.   Skin:     General: Skin is warm and dry.      Capillary Refill: Capillary refill takes less than 2 seconds.      Coloration: Skin is not jaundiced.      Findings: No rash.   Neurological:      Mental Status: She is alert and oriented to person, place, and time.      Cranial Nerves: No cranial nerve deficit.      Sensory: No sensory deficit.      Motor: No weakness.       Comments: Alert and oriented x2-3.  She follows commands.  She is cooperative.  Moving all 4 extremities.  No focal neurologic findings.   Psychiatric:      Comments: Patient has depressed mood and depressed affect.            Procedures  NONE  ------------------------------ ED COURSE/MEDICAL DECISION MAKING----------------------    Medical Decision Making:   Patient was seen and examined by me.  Nursing called discussed with poison control.  Treatment is supportive care at this point.    ED Course as of 11/16/23 1840   Thu Nov 16, 2023   1244 EKG 1122 interpreted by me at 1130.  Normal sinus rhythm 92 bpm.  Normal axis.   ms, QRS 94 ms,  ms.  Patient has nonspecific ST/T wave change.  There is no evidence of acute ST segment elevation or depression.  No STEMI. [EC]   1306 Urine toxicology is negative except presumptive positive for cocaine metabolite.  Patient denies to using cocaine at this time. [EC]   1358 CBC and Auto Differential(!)  White blood cell count 6.8, hemoglobin 12.0, hematocrit 37, platelet count 169, no shift on the differential [EC]   1359 Comprehensive Metabolic Panel(!)  Comprehensive metabolic panel is unremarkable. [EC]   1400 Creatine Kinase [EC]   1400 CK is normal at 47 with [EC]   1400 Acute Toxicology Panel, Blood  Acute toxicology panel revealed normal acetaminophen level, normal salicylate level, normal alcohol level. [EC]   1400 Pregnancy test is negative.  Coronavirus 2019 is not detected. [EC]   1401 The patient will be medically cleared for further evaluation by emergency psychiatric assessment team. [EC]      ED Course User Index  [EC] Adalberto Nicole,          Diagnoses as of 11/16/23 1840   Intentional drug overdose, initial encounter (CMS/[facility])   Depression with suicidal ideation      PATIENT IS MEDICALLY CLEARED FOR PSYCHIATRIC EVALUATION AND/OR ADMISSION BY PSYCHIATRY    The patient was seen and evaluated by EPAT  EPAT feels patient would benefit from urgent  hospitalization and stabilization of her Depression and SI.  Patient is currently awaiting placement and will be signed out on shift change to Dr JONATHON Schuster.    Counseling:   The emergency provider has spoken with the patient and discussed today’s results, in addition to providing specific details for the plan of care and counseling regarding the diagnosis and prognosis.  Questions are answered at this time and they are agreeable with the plan.      --------------------------------- IMPRESSION AND DISPOSITION ---------------------------------        IMPRESSION  1. Intentional drug overdose, initial encounter (CMS/[facility])    2. Depression with suicidal ideation        DISPOSITION  Disposition:  Patient seen and evaluated by EPAT.  Patient deemed to benefit from urgent hospitalization and stabilization of her depression and suicidal ideation.  Patient condition is fair      Billing Provider Critical Care Time: 0 minutes     Adalberto Nicole,   11/16/23 7015

## 2023-11-17 VITALS
HEIGHT: 63 IN | TEMPERATURE: 98.2 F | WEIGHT: 130 LBS | DIASTOLIC BLOOD PRESSURE: 91 MMHG | HEART RATE: 102 BPM | SYSTOLIC BLOOD PRESSURE: 138 MMHG | BODY MASS INDEX: 23.04 KG/M2 | OXYGEN SATURATION: 95 % | RESPIRATION RATE: 16 BRPM

## 2023-11-17 SDOH — HEALTH STABILITY: MENTAL HEALTH: BEHAVIORS/MOOD: CALM;COOPERATIVE

## 2023-11-17 NOTE — SIGNIFICANT EVENT
Application for Emergency Admission      Ready for Transfer?  Is the patient medically cleared for transfer to inpatient psychiatry: Yes  Has the patient been accepted to an inpatient psychiatric hospital: Yes    Application for Emergency Admission  IN ACCORDANCE WITH [place] 5122[place]10 [place]  The Chief Clinical Officer of: Mary 11/16/2023 .11:35 PM    Reason for Hospitalization  The undersigned has reason to believe that: Lu Fall Is a mentally ill person subject to hospitalization by court order under division B Section 5122.01 of the Revised Code, i.e., this person:    1.Yes  Represents a substantial risk of physical harm to self as manifested by evidence of threats of, or attempts at, suicide or serious self-inflicted bodily harm    2.Yes Represents a substantial risk of physical harm to others as manifested by evidence of recent homicidal or other violent behavior, evidence of recent threats that place another in reasonable fear of violent behavior and serious physical harm, or other evidence of present dangerousness    3.Yes Represents a substantial and immediate risk of serious physical impairment or injury to self as manifested by  evidence that the person is unable to provide for and is not providing for the person's basic physical needs because of the person's mental illness and that appropriate provision for those needs cannot be made  immediately available in the community    4.Yes Would benefit from treatment in a hospital for his mental illness and is in need of such treatment as manifested by evidence of behavior that creates a grave and imminent risk to substantial rights of others or  himself.    5.Yes Would benefit from treatment as manifested by evidence of behavior that indicates all of the following:       (a) The person is unlikely to survive safely in the community without supervision, based on a clinical determination.       (b) The person has a history of lack of  compliance with treatment for mental illness and one of the following applies:      (i) At least twice within the thirty-six months prior to the filing of an affidavit seeking court-ordered treatment of the person under section 5122.111 of the Revised Code, the lack of compliance has been a significant factor in necessitating hospitalization in a hospital or receipt of services in a forensic or other mental health unit of a correctional facility, provided that the thirty-six-month period shall be extended by the length of any hospitalization or incarceration of the person that occurred within the thirty-six-month period.      (ii) Within the forty-eight months prior to the filing of an affidavit seeking court-ordered treatment of the person under section 5122.111 of the Revised Code, the lack of compliance resulted in one or more acts of serious violent behavior toward self or others or threats of, or attempts at, serious physical harm to self or others, provided that the forty-eight-month period shall be extended by the length of any hospitalization or incarceration of the person that occurred within the forty-eight-month period.      (c) The person, as a result of mental illness, is unlikely to voluntarily participate in necessary treatment.       (d) In view of the person's treatment history and current behavior, the person is in need of treatment in order to prevent a relapse or deterioration that would be likely to result in substantial risk of serious harm to the person or others.    (e) Represents a substantial risk of physical harm to self or others if allowed to remain at liberty pending examination.    Therefore, it is requested that said person be admitted to the above named facility.    STATEMENT OF BELIEF    Must be filled out by one of the following: a psychiatrist, licensed physician, licensed clinical psychologist, health or ,  or .  (Statement shall include the  circumstances under which the individual was taken into custody and the reason for the person's belief that hospitalization is necessary. The statement shall also include a reference to efforts made to secure the individual's property at his residence if he was taken into custody there. Every reasonable and appropriate effort should be made to take this person into custody in the least conspicuous manner possible.)    [facility]     Romel Schuster MD 11/16/2023     _____________________________________________________________   Place of Employment: [facility]    STATEMENT OF OBSERVATION BY PSYCHIATRIST, LICENSED PHYSICIAN, OR LICENSED CLINICAL PSYCHOLOGIST, IF APPLICABLE    Place of Observation (e.g., [facility], general hospital, office, emergency facility)  (If applicable, please complete)    Romel Schuster MD 11/16/2023    _____________________________________________________________

## 2023-11-21 ENCOUNTER — APPOINTMENT (OUTPATIENT)
Dept: RADIOLOGY | Facility: HOSPITAL | Age: 43
End: 2023-11-21
Payer: COMMERCIAL

## 2023-11-21 ENCOUNTER — HOSPITAL ENCOUNTER (EMERGENCY)
Facility: HOSPITAL | Age: 43
Discharge: HOME | End: 2023-11-21
Attending: STUDENT IN AN ORGANIZED HEALTH CARE EDUCATION/TRAINING PROGRAM
Payer: COMMERCIAL

## 2023-11-21 VITALS
WEIGHT: 134.26 LBS | RESPIRATION RATE: 16 BRPM | HEART RATE: 85 BPM | TEMPERATURE: 98.4 F | SYSTOLIC BLOOD PRESSURE: 134 MMHG | BODY MASS INDEX: 23.79 KG/M2 | HEIGHT: 63 IN | OXYGEN SATURATION: 100 % | DIASTOLIC BLOOD PRESSURE: 82 MMHG

## 2023-11-21 DIAGNOSIS — R42 DIZZINESS: Primary | ICD-10-CM

## 2023-11-21 PROCEDURE — 70450 CT HEAD/BRAIN W/O DYE: CPT

## 2023-11-21 PROCEDURE — 99285 EMERGENCY DEPT VISIT HI MDM: CPT | Mod: 25 | Performed by: STUDENT IN AN ORGANIZED HEALTH CARE EDUCATION/TRAINING PROGRAM

## 2023-11-21 PROCEDURE — 99284 EMERGENCY DEPT VISIT MOD MDM: CPT | Mod: 25 | Performed by: STUDENT IN AN ORGANIZED HEALTH CARE EDUCATION/TRAINING PROGRAM

## 2023-11-21 ASSESSMENT — PAIN SCALES - GENERAL
PAINLEVEL_OUTOF10: 0 - NO PAIN

## 2023-11-21 ASSESSMENT — PAIN - FUNCTIONAL ASSESSMENT: PAIN_FUNCTIONAL_ASSESSMENT: 0-10

## 2023-11-21 ASSESSMENT — COLUMBIA-SUICIDE SEVERITY RATING SCALE - C-SSRS

## 2023-11-22 NOTE — DISCHARGE INSTRUCTIONS
Thank you for choosing [facility]  for your emergency care.    Please return to the Emergency Department immediately if new or worsening symptoms occur. Symptoms that are most concerning include fevers or chill that necessitate immediate return.     It is important to remember that your care does not end here and you must continue to monitor your condition closely. Please return to the emergency department for any worsening or concerning signs or symptoms as directed by our conversations and the discharge instructions. Otherwise please follow up with your doctor in 2 days if no better or worse. If you do not have a doctor please contact the referral number on your discharge instructions. Please contact any physician specialists provided in your discharge notes as it is very important to follow up with them regarding your condition. If you are unable to reach the physicians provided, please come back to the Emergency Department at any time.      As always, please take medications as directed. If you have any questions at all regarding your medications, please contact the pharmacist, the emergency department, or your doctor. Before taking any medication prescribed in the Emergency Department, please review the medication side effects and drug interactions as they may interact with your home medications.     Having trouble affording medications? Try [employer] ! (This is not a hospital endorsed website, merely a recommendation based on my own personal experiences with [employer])       Star Oneill MD

## 2023-11-22 NOTE — ED PROVIDER NOTES
"HPI   Chief Complaint   Patient presents with    Dizziness       HPI  See [facility]                  Mason Coma Scale Score: 14         NIH Stroke Scale: 1          Patient History   Past Medical History:   Diagnosis Date    COPD (chronic obstructive pulmonary disease) (CMS/HCC)     Coronary artery disease     Personal history of other endocrine, nutritional and metabolic disease     History of hypopituitarism    Personal history of other mental and behavioral disorders     History of depression    Personal history of other specified conditions     History of brain tumor    Unspecified convulsions (CMS/HCC)     Convulsion     Past Surgical History:   Procedure Laterality Date    MR HEAD ANGIO WO IV CONTRAST  7/15/2021    MR HEAD ANGIO WO IV CONTRAST 7/15/2021 [facility]    MR HEAD ANGIO WO IV CONTRAST  11/28/2017    MR HEAD ANGIO WO IV CONTRAST [facility]    MR HEAD ANGIO WO IV CONTRAST  3/13/2018    MR HEAD ANGIO WO IV CONTRAST [facility]    MR NECK ANGIO WO IV CONTRAST  7/15/2021    MR NECK ANGIO WO IV CONTRAST 7/15/2021 [facility]    OTHER SURGICAL HISTORY  08/17/2013    Craniotomy Tumor Removal - Complete     No family history on file.  Social History     Tobacco Use    Smoking status: Never    Smokeless tobacco: Never   Vaping Use    Vaping Use: Never used   Substance Use Topics    Alcohol use: Never    Drug use: Yes     Types: \"Crack\" cocaine     Comment: last used 4 weeks ago       Physical Exam   ED Triage Vitals [11/21/23 1813]   Temp Heart Rate Resp BP   36.9 °C (98.4 °F) 82 16 (!) 128/114      SpO2 Temp Source Heart Rate Source Patient Position   100 % Oral Monitor Lying      BP Location FiO2 (%)     Left arm --       Physical Exam  See [facility]  ED Course & [facility]   Diagnoses as of 11/21/23 1922   Dizziness       Medical Decision Making  43-year-old female with various past medical history presents emergency room with complaint of dizziness.  Patient at this time is unable to " explain how long the symptoms have been going on for.  Patient was recently discharged from [facility] for a suicide attempt.  Patient notes that she is otherwise just been sleeping at home.  Review of documentation from out side facility's reveals that patient has been having symptoms for about 2 weeks now.  Patient has been to multiple emergency room's and at this time is requesting an MRI.  She otherwise denies recent fevers, chills, falls, nausea, vomiting.    Vital signs reviewed: Afebrile, 82 bpm, normotensive, 100% on room air    Exam     Constitutional: No acute distress. Resting comfortably.   Head: Normocephalic, atraumatic.   Eyes: Pupils equal bilaterally, EOM grossly intact, conjunctiva normal.  Mouth/Throat: Oropharynx is clear, moist mucus membranes.   Neck: Supple. No lymphadenopathy.  Cardiovascular: Regular rate and regular rhythm. Extremities are well-perfused.   Pulmonary/Chest: No respiratory distress, breathing comfortably on room air.    Abdominal: Soft, non-tender, non-distended. No rebound or guarding.   Musculoskeletal: No lower extremity edema.       Skin: Warm, dry, and intact.   Neurological: Cranial nerves II through XII grossly intact, no drift appreciated in upper or lower extremities, strength 5 out of 5 in upper and lower extremities.  No dysdiadochokinesia, ambulatory with a steady gait.  Patient is oriented to person, place, time, and situation. Face symmetric, hearing intact to voice, speech normal. Moves all extremities.   Psych: Mood, affect, thought content, and judgment normal.    Differential includes but is not limited to:  Viral illness versus malingering    Amount and/or Complexity of Data Reviewed  External Data Reviewed: notes.  Patient seen in emergency room's on 11/16, 11/14, 11/9, 10/24.  Multiple notes from various providers reviewed including patient's endocrinologist notably a virtual visit on 11/13 who has patient scheduled for MRI outpatient.  Patient  had some medication changes at that visit but otherwise no significant changes.    Multiple calls made to endocrinologist office and they are recommending she presents to emergency room to evaluate for hemorrhage.  Patient has had a CT head performed on the ninth of this month that was unremarkable and CT head was performed today which is also unremarkable.    Labs: Considered but not indicated.    Radiology: ordered and independent interpretation performed.  CT head as interpreted by me shows no large intracranial hemorrhage at this time.    EKG as interpreted by me shows normal sinus rhythm at a ventricular rate of 82 bpm, no ST changes to suggest ischemia, normal axis, prolonged QT to 486.    Patient with inconsistent exam and inconsistent history at this time.  Suspect malingering at this time versus anxiety.  Patient mildly compliant with neurologic exam but when compliant shows no focal findings at this time.  CT per radiology is interpreted as negative with no focal or acute findings.    Patient otherwise has scheduled MRI outpatient and will follow-up with her endocrinologist Dr. Balbuena.    PLAN AND FOLLOW-UP: Patient counseled on all findings, diagnosis and treatment plan. Patient's questions and concerns addressed. Patient stable, discharged with instructions to follow up with PMD, and to return to ED at any time for worsening symptoms or any other concerns. Patient demonstrates understanding of the findings and the importance of appropriate follow up care.  Procedure  Procedures     Star Oneill MD  11/21/23 1927       Star Oneill MD  11/22/23 0008

## 2023-12-01 ENCOUNTER — HOSPITAL ENCOUNTER (OUTPATIENT)
Dept: CARDIOLOGY | Facility: HOSPITAL | Age: 43
Discharge: HOME | End: 2023-12-01
Payer: COMMERCIAL

## 2023-12-01 PROCEDURE — 93005 ELECTROCARDIOGRAM TRACING: CPT

## 2023-12-04 ENCOUNTER — HOSPITAL ENCOUNTER (OUTPATIENT)
Dept: CARDIOLOGY | Facility: HOSPITAL | Age: 43
Discharge: HOME | End: 2023-12-04
Payer: COMMERCIAL

## 2023-12-04 PROCEDURE — 93005 ELECTROCARDIOGRAM TRACING: CPT

## 2023-12-05 LAB
ATRIAL RATE: 77 BPM
ATRIAL RATE: 92 BPM
P AXIS: 0 DEGREES
P AXIS: 14 DEGREES
P OFFSET: 173 MS
P OFFSET: 179 MS
P ONSET: 123 MS
P ONSET: 140 MS
PR INTERVAL: 160 MS
PR INTERVAL: 194 MS
Q ONSET: 220 MS
Q ONSET: 220 MS
QRS COUNT: 12 BEATS
QRS COUNT: 15 BEATS
QRS DURATION: 94 MS
QRS DURATION: 94 MS
QT INTERVAL: 406 MS
QT INTERVAL: 414 MS
QTC CALCULATION(BAZETT): 459 MS
QTC CALCULATION(BAZETT): 511 MS
QTC FREDERICIA: 441 MS
QTC FREDERICIA: 477 MS
R AXIS: 63 DEGREES
R AXIS: 80 DEGREES
T AXIS: 117 DEGREES
T AXIS: 17 DEGREES
T OFFSET: 423 MS
T OFFSET: 427 MS
VENTRICULAR RATE: 77 BPM
VENTRICULAR RATE: 92 BPM

## 2023-12-13 LAB
HOLD SPECIMEN: NORMAL

## 2023-12-15 NOTE — PROGRESS NOTES
"Emergency Medicine Transition of Care Note.    I received Lu Fall in signout from  Dr. Nicole. Please see the previous ED provider note for all HPI, PE and MDM up to the time of signout at 1900.. This is in addition to the primary record.    In brief Lu Fall is an 43 y.o. female presenting for   Chief Complaint   Patient presents with    Drug Overdose     Patient took a bottle of zolpidem 10mg last night she says about 30 pills. She also  states she took some of her dog''s arthritis mediation unknown amount or what it is called, she also took an unknown amount of 100mg gabapentin. States took it at approx 2am. She states she took it because \"she's going to die\" . Upon arrival she is sleepy but is alert , alert only to herself.      At the time of signout we were awaiting: EPAT followup.    ED Course as of 12/15/23 1721   Thu Nov 16, 2023   1244 EKG 1122 interpreted by me at 1130.  Normal sinus rhythm 92 bpm.  Normal axis.   ms, QRS 94 ms,  ms.  Patient has nonspecific ST/T wave change.  There is no evidence of acute ST segment elevation or depression.  No STEMI. [EC]   1306 Urine toxicology is negative except presumptive positive for cocaine metabolite.  Patient denies to using cocaine at this time. [EC]   1358 CBC and Auto Differential(!)  White blood cell count 6.8, hemoglobin 12.0, hematocrit 37, platelet count 169, no shift on the differential [EC]   1359 Comprehensive Metabolic Panel(!)  Comprehensive metabolic panel is unremarkable. [EC]   1400 Creatine Kinase [EC]   1400 CK is normal at 47 with [EC]   1400 Acute Toxicology Panel, Blood  Acute toxicology panel revealed normal acetaminophen level, normal salicylate level, normal alcohol level. [EC]   1400 Pregnancy test is negative.  Coronavirus 2019 is not detected. [EC]   1401 The patient will be medically cleared for further evaluation by emergency psychiatric assessment team. [EC]      ED Course User Index  [EC] Adalberto ARANDA" DO Bonnie         Diagnoses as of 12/15/23 1721   Intentional drug overdose, initial encounter (CMS/[facility])   Depression with suicidal ideation       Medical Decision Making  Awaiting EPAT followup. Once they have discussed, they will be placing the patient.        Final diagnoses:   [T50.902A] Intentional drug overdose, initial encounter (CMS/[facility])   [F32.A, R45.851] Depression with suicidal ideation           Procedure  Procedures    Romel Schuster MD

## 2024-01-28 ENCOUNTER — HOSPITAL ENCOUNTER (OUTPATIENT)
Facility: HOSPITAL | Age: 44
Setting detail: OBSERVATION
LOS: 1 days | Discharge: HOME | DRG: 206 | End: 2024-01-29
Attending: EMERGENCY MEDICINE | Admitting: NURSE PRACTITIONER
Payer: MEDICARE

## 2024-01-28 ENCOUNTER — APPOINTMENT (OUTPATIENT)
Dept: RADIOLOGY | Facility: HOSPITAL | Age: 44
DRG: 206 | End: 2024-01-28
Payer: MEDICARE

## 2024-01-28 DIAGNOSIS — R07.9 CHEST PAIN, UNSPECIFIED TYPE: Primary | ICD-10-CM

## 2024-01-28 DIAGNOSIS — R06.00 DYSPNEA, UNSPECIFIED TYPE: ICD-10-CM

## 2024-01-28 DIAGNOSIS — R07.2 PRECORDIAL PAIN: ICD-10-CM

## 2024-01-28 LAB
ALBUMIN SERPL BCP-MCNC: 4.7 G/DL (ref 3.4–5)
ALP SERPL-CCNC: 82 U/L (ref 33–110)
ALT SERPL W P-5'-P-CCNC: 11 U/L (ref 7–45)
ANION GAP SERPL CALC-SCNC: 12 MMOL/L (ref 10–20)
AST SERPL W P-5'-P-CCNC: 34 U/L (ref 9–39)
BASOPHILS # BLD AUTO: 0.09 X10*3/UL (ref 0–0.1)
BASOPHILS NFR BLD AUTO: 1.4 %
BILIRUB SERPL-MCNC: 0.5 MG/DL (ref 0–1.2)
BNP SERPL-MCNC: 31 PG/ML (ref 0–99)
BUN SERPL-MCNC: 18 MG/DL (ref 6–23)
CALCIUM SERPL-MCNC: 9.6 MG/DL (ref 8.6–10.3)
CARDIAC TROPONIN I PNL SERPL HS: <3 NG/L (ref 0–13)
CARDIAC TROPONIN I PNL SERPL HS: <3 NG/L (ref 0–13)
CHLORIDE SERPL-SCNC: 100 MMOL/L (ref 98–107)
CO2 SERPL-SCNC: 28 MMOL/L (ref 21–32)
CREAT SERPL-MCNC: 0.81 MG/DL (ref 0.5–1.05)
D DIMER PPP FEU-MCNC: 386 NG/ML FEU
EGFRCR SERPLBLD CKD-EPI 2021: >90 ML/MIN/1.73M*2
EOSINOPHIL # BLD AUTO: 0.28 X10*3/UL (ref 0–0.7)
EOSINOPHIL NFR BLD AUTO: 4.4 %
ERYTHROCYTE [DISTWIDTH] IN BLOOD BY AUTOMATED COUNT: 17.3 % (ref 11.5–14.5)
FLUAV RNA RESP QL NAA+PROBE: NOT DETECTED
FLUBV RNA RESP QL NAA+PROBE: NOT DETECTED
GLUCOSE SERPL-MCNC: 67 MG/DL (ref 74–99)
HCT VFR BLD AUTO: 39.9 % (ref 36–46)
HGB BLD-MCNC: 13 G/DL (ref 12–16)
IMM GRANULOCYTES # BLD AUTO: 0.01 X10*3/UL (ref 0–0.7)
IMM GRANULOCYTES NFR BLD AUTO: 0.2 % (ref 0–0.9)
LYMPHOCYTES # BLD AUTO: 3.09 X10*3/UL (ref 1.2–4.8)
LYMPHOCYTES NFR BLD AUTO: 48.4 %
MAGNESIUM SERPL-MCNC: 2.42 MG/DL (ref 1.6–2.4)
MCH RBC QN AUTO: 25.5 PG (ref 26–34)
MCHC RBC AUTO-ENTMCNC: 32.6 G/DL (ref 32–36)
MCV RBC AUTO: 78 FL (ref 80–100)
MONOCYTES # BLD AUTO: 0.3 X10*3/UL (ref 0.1–1)
MONOCYTES NFR BLD AUTO: 4.7 %
NEUTROPHILS # BLD AUTO: 2.62 X10*3/UL (ref 1.2–7.7)
NEUTROPHILS NFR BLD AUTO: 40.9 %
NRBC BLD-RTO: 0 /100 WBCS (ref 0–0)
PLATELET # BLD AUTO: 207 X10*3/UL (ref 150–450)
POTASSIUM SERPL-SCNC: 5 MMOL/L (ref 3.5–5.3)
PROT SERPL-MCNC: 7.9 G/DL (ref 6.4–8.2)
RBC # BLD AUTO: 5.09 X10*6/UL (ref 4–5.2)
RSV RNA RESP QL NAA+PROBE: NOT DETECTED
SARS-COV-2 RNA RESP QL NAA+PROBE: NOT DETECTED
SODIUM SERPL-SCNC: 135 MMOL/L (ref 136–145)
WBC # BLD AUTO: 6.4 X10*3/UL (ref 4.4–11.3)

## 2024-01-28 PROCEDURE — 96375 TX/PRO/DX INJ NEW DRUG ADDON: CPT

## 2024-01-28 PROCEDURE — 71045 X-RAY EXAM CHEST 1 VIEW: CPT | Performed by: RADIOLOGY

## 2024-01-28 PROCEDURE — 99285 EMERGENCY DEPT VISIT HI MDM: CPT | Performed by: EMERGENCY MEDICINE

## 2024-01-28 PROCEDURE — 99285 EMERGENCY DEPT VISIT HI MDM: CPT | Mod: 25

## 2024-01-28 PROCEDURE — 83880 ASSAY OF NATRIURETIC PEPTIDE: CPT

## 2024-01-28 PROCEDURE — 84484 ASSAY OF TROPONIN QUANT: CPT | Mod: IPSPLIT | Performed by: NURSE PRACTITIONER

## 2024-01-28 PROCEDURE — 36415 COLL VENOUS BLD VENIPUNCTURE: CPT

## 2024-01-28 PROCEDURE — G0378 HOSPITAL OBSERVATION PER HR: HCPCS

## 2024-01-28 PROCEDURE — 83735 ASSAY OF MAGNESIUM: CPT

## 2024-01-28 PROCEDURE — 87502 INFLUENZA DNA AMP PROBE: CPT

## 2024-01-28 PROCEDURE — 2500000004 HC RX 250 GENERAL PHARMACY W/ HCPCS (ALT 636 FOR OP/ED): Mod: SE | Performed by: EMERGENCY MEDICINE

## 2024-01-28 PROCEDURE — 96365 THER/PROPH/DIAG IV INF INIT: CPT

## 2024-01-28 PROCEDURE — 71045 X-RAY EXAM CHEST 1 VIEW: CPT

## 2024-01-28 PROCEDURE — 2500000004 HC RX 250 GENERAL PHARMACY W/ HCPCS (ALT 636 FOR OP/ED): Mod: SE

## 2024-01-28 PROCEDURE — 93010 ELECTROCARDIOGRAM REPORT: CPT | Performed by: INTERNAL MEDICINE

## 2024-01-28 PROCEDURE — 1200000002 HC GENERAL ROOM WITH TELEMETRY DAILY: Mod: IPSPLIT

## 2024-01-28 PROCEDURE — 84484 ASSAY OF TROPONIN QUANT: CPT

## 2024-01-28 PROCEDURE — 80053 COMPREHEN METABOLIC PANEL: CPT

## 2024-01-28 PROCEDURE — 85379 FIBRIN DEGRADATION QUANT: CPT

## 2024-01-28 PROCEDURE — 85025 COMPLETE CBC W/AUTO DIFF WBC: CPT

## 2024-01-28 RX ORDER — ATORVASTATIN CALCIUM 40 MG/1
80 TABLET, FILM COATED ORAL NIGHTLY
Status: DISCONTINUED | OUTPATIENT
Start: 2024-01-28 | End: 2024-01-29 | Stop reason: HOSPADM

## 2024-01-28 RX ORDER — MORPHINE SULFATE 4 MG/ML
INJECTION, SOLUTION INTRAMUSCULAR; INTRAVENOUS
Status: COMPLETED
Start: 2024-01-28 | End: 2024-01-28

## 2024-01-28 RX ORDER — ENOXAPARIN SODIUM 100 MG/ML
40 INJECTION SUBCUTANEOUS EVERY 24 HOURS
Status: DISCONTINUED | OUTPATIENT
Start: 2024-01-28 | End: 2024-01-29 | Stop reason: HOSPADM

## 2024-01-28 RX ORDER — NAPROXEN SODIUM 220 MG/1
81 TABLET, FILM COATED ORAL DAILY
Status: DISCONTINUED | OUTPATIENT
Start: 2024-01-29 | End: 2024-01-29 | Stop reason: HOSPADM

## 2024-01-28 RX ORDER — PROMETHAZINE HYDROCHLORIDE 25 MG/ML
INJECTION, SOLUTION INTRAMUSCULAR; INTRAVENOUS
Status: COMPLETED
Start: 2024-01-28 | End: 2024-01-28

## 2024-01-28 RX ORDER — NITROGLYCERIN 0.4 MG/1
0.4 TABLET SUBLINGUAL EVERY 5 MIN PRN
Status: DISCONTINUED | OUTPATIENT
Start: 2024-01-28 | End: 2024-01-29 | Stop reason: HOSPADM

## 2024-01-28 RX ORDER — ONDANSETRON HYDROCHLORIDE 2 MG/ML
4 INJECTION, SOLUTION INTRAVENOUS ONCE
Status: DISCONTINUED | OUTPATIENT
Start: 2024-01-28 | End: 2024-01-28

## 2024-01-28 RX ORDER — SODIUM CHLORIDE 9 MG/ML
100 INJECTION, SOLUTION INTRAVENOUS CONTINUOUS
Status: DISCONTINUED | OUTPATIENT
Start: 2024-01-28 | End: 2024-01-29

## 2024-01-28 RX ORDER — METOPROLOL TARTRATE 25 MG/1
25 TABLET, FILM COATED ORAL EVERY 12 HOURS SCHEDULED
Status: DISCONTINUED | OUTPATIENT
Start: 2024-01-28 | End: 2024-01-29

## 2024-01-28 RX ORDER — MORPHINE SULFATE 4 MG/ML
4 INJECTION, SOLUTION INTRAMUSCULAR; INTRAVENOUS ONCE
Status: COMPLETED | OUTPATIENT
Start: 2024-01-28 | End: 2024-01-28

## 2024-01-28 RX ADMIN — PROMETHAZINE HYDROCHLORIDE 12.5 MG: 25 INJECTION INTRAMUSCULAR; INTRAVENOUS at 20:25

## 2024-01-28 RX ADMIN — MORPHINE SULFATE 4 MG: 4 INJECTION, SOLUTION INTRAMUSCULAR; INTRAVENOUS at 21:55

## 2024-01-28 ASSESSMENT — PAIN DESCRIPTION - ONSET: ONSET: AWAKENED FROM SLEEP

## 2024-01-28 ASSESSMENT — PAIN DESCRIPTION - DESCRIPTORS
DESCRIPTORS: PRESSURE
DESCRIPTORS: ACHING

## 2024-01-28 ASSESSMENT — PAIN SCALES - GENERAL
PAINLEVEL_OUTOF10: 10 - WORST POSSIBLE PAIN
PAINLEVEL_OUTOF10: 10 - WORST POSSIBLE PAIN

## 2024-01-28 ASSESSMENT — PAIN DESCRIPTION - PROGRESSION: CLINICAL_PROGRESSION: NOT CHANGED

## 2024-01-28 ASSESSMENT — COLUMBIA-SUICIDE SEVERITY RATING SCALE - C-SSRS
1. IN THE PAST MONTH, HAVE YOU WISHED YOU WERE DEAD OR WISHED YOU COULD GO TO SLEEP AND NOT WAKE UP?: NO
2. HAVE YOU ACTUALLY HAD ANY THOUGHTS OF KILLING YOURSELF?: NO
6. HAVE YOU EVER DONE ANYTHING, STARTED TO DO ANYTHING, OR PREPARED TO DO ANYTHING TO END YOUR LIFE?: NO

## 2024-01-28 ASSESSMENT — PAIN - FUNCTIONAL ASSESSMENT: PAIN_FUNCTIONAL_ASSESSMENT: 0-10

## 2024-01-28 ASSESSMENT — PAIN DESCRIPTION - ORIENTATION: ORIENTATION: LEFT;RIGHT;MID

## 2024-01-28 ASSESSMENT — PAIN DESCRIPTION - FREQUENCY: FREQUENCY: CONSTANT/CONTINUOUS

## 2024-01-28 ASSESSMENT — PAIN DESCRIPTION - PAIN TYPE: TYPE: ACUTE PAIN

## 2024-01-28 ASSESSMENT — PAIN DESCRIPTION - LOCATION: LOCATION: CHEST

## 2024-01-29 ENCOUNTER — APPOINTMENT (OUTPATIENT)
Dept: CARDIOLOGY | Facility: HOSPITAL | Age: 44
DRG: 206 | End: 2024-01-29
Payer: MEDICARE

## 2024-01-29 VITALS
BODY MASS INDEX: 23.55 KG/M2 | HEART RATE: 79 BPM | RESPIRATION RATE: 18 BRPM | WEIGHT: 132.94 LBS | TEMPERATURE: 97.9 F | SYSTOLIC BLOOD PRESSURE: 100 MMHG | HEIGHT: 63 IN | DIASTOLIC BLOOD PRESSURE: 71 MMHG | OXYGEN SATURATION: 95 %

## 2024-01-29 LAB
ANION GAP SERPL CALC-SCNC: 12 MMOL/L (ref 10–20)
AORTIC VALVE PEAK VELOCITY: 1.48 M/S
APTT PPP: 44 SECONDS (ref 27–38)
AV PEAK GRADIENT: 8.8 MMHG
AVA (PEAK VEL): 2.41 CM2
BUN SERPL-MCNC: 16 MG/DL (ref 6–23)
CALCIUM SERPL-MCNC: 9.1 MG/DL (ref 8.6–10.3)
CARDIAC TROPONIN I PNL SERPL HS: <3 NG/L (ref 0–13)
CHLORIDE SERPL-SCNC: 101 MMOL/L (ref 98–107)
CHOLEST SERPL-MCNC: 154 MG/DL (ref 0–199)
CHOLESTEROL/HDL RATIO: 3.5
CO2 SERPL-SCNC: 30 MMOL/L (ref 21–32)
CREAT SERPL-MCNC: 0.81 MG/DL (ref 0.5–1.05)
CRP SERPL-MCNC: 1.4 MG/DL
EGFRCR SERPLBLD CKD-EPI 2021: >90 ML/MIN/1.73M*2
EJECTION FRACTION APICAL 4 CHAMBER: 66.4
EJECTION FRACTION: 66 %
ERYTHROCYTE [DISTWIDTH] IN BLOOD BY AUTOMATED COUNT: 17.1 % (ref 11.5–14.5)
EST. AVERAGE GLUCOSE BLD GHB EST-MCNC: 97 MG/DL
GLUCOSE SERPL-MCNC: 80 MG/DL (ref 74–99)
HBA1C MFR BLD: 5 %
HCT VFR BLD AUTO: 40 % (ref 36–46)
HDLC SERPL-MCNC: 44.2 MG/DL
HGB BLD-MCNC: 12.6 G/DL (ref 12–16)
INR PPP: 1.2 (ref 0.9–1.1)
LDLC SERPL CALC-MCNC: 84 MG/DL
LEFT ATRIUM VOLUME AREA LENGTH INDEX BSA: 18.5 ML/M2
LEFT VENTRICLE INTERNAL DIMENSION DIASTOLE: 4.49 CM (ref 3.5–6)
LEFT VENTRICULAR OUTFLOW TRACT DIAMETER: 1.9 CM
MCH RBC QN AUTO: 25.3 PG (ref 26–34)
MCHC RBC AUTO-ENTMCNC: 31.5 G/DL (ref 32–36)
MCV RBC AUTO: 80 FL (ref 80–100)
MITRAL VALVE E/A RATIO: 1.89
MITRAL VALVE E/E' RATIO: 18.11
NON HDL CHOLESTEROL: 110 MG/DL (ref 0–149)
NRBC BLD-RTO: 0 /100 WBCS (ref 0–0)
PLATELET # BLD AUTO: 204 X10*3/UL (ref 150–450)
POTASSIUM SERPL-SCNC: 3.5 MMOL/L (ref 3.5–5.3)
PROTHROMBIN TIME: 13.6 SECONDS (ref 9.8–12.8)
RBC # BLD AUTO: 4.99 X10*6/UL (ref 4–5.2)
RIGHT VENTRICLE FREE WALL PEAK S': 9.15 CM/S
RIGHT VENTRICLE PEAK SYSTOLIC PRESSURE: 27.4 MMHG
SODIUM SERPL-SCNC: 139 MMOL/L (ref 136–145)
TRICUSPID ANNULAR PLANE SYSTOLIC EXCURSION: 1.3 CM
TRIGL SERPL-MCNC: 131 MG/DL (ref 0–149)
VLDL: 26 MG/DL (ref 0–40)
WBC # BLD AUTO: 5.8 X10*3/UL (ref 4.4–11.3)

## 2024-01-29 PROCEDURE — 99222 1ST HOSP IP/OBS MODERATE 55: CPT | Performed by: NURSE PRACTITIONER

## 2024-01-29 PROCEDURE — 2500000004 HC RX 250 GENERAL PHARMACY W/ HCPCS (ALT 636 FOR OP/ED): Mod: IPSPLIT | Performed by: NURSE PRACTITIONER

## 2024-01-29 PROCEDURE — 2500000001 HC RX 250 WO HCPCS SELF ADMINISTERED DRUGS (ALT 637 FOR MEDICARE OP): Mod: IPSPLIT

## 2024-01-29 PROCEDURE — 83036 HEMOGLOBIN GLYCOSYLATED A1C: CPT | Mod: GENLAB | Performed by: NURSE PRACTITIONER

## 2024-01-29 PROCEDURE — 93306 TTE W/DOPPLER COMPLETE: CPT | Mod: IPSPLIT

## 2024-01-29 PROCEDURE — 2500000004 HC RX 250 GENERAL PHARMACY W/ HCPCS (ALT 636 FOR OP/ED): Mod: IPSPLIT

## 2024-01-29 PROCEDURE — 96375 TX/PRO/DX INJ NEW DRUG ADDON: CPT | Mod: 59

## 2024-01-29 PROCEDURE — 2500000005 HC RX 250 GENERAL PHARMACY W/O HCPCS: Mod: IPSPLIT

## 2024-01-29 PROCEDURE — 93005 ELECTROCARDIOGRAM TRACING: CPT | Mod: 76,IPSPLIT

## 2024-01-29 PROCEDURE — 99232 SBSQ HOSP IP/OBS MODERATE 35: CPT

## 2024-01-29 PROCEDURE — 80061 LIPID PANEL: CPT | Mod: IPSPLIT | Performed by: NURSE PRACTITIONER

## 2024-01-29 PROCEDURE — 85610 PROTHROMBIN TIME: CPT | Mod: IPSPLIT | Performed by: NURSE PRACTITIONER

## 2024-01-29 PROCEDURE — 36415 COLL VENOUS BLD VENIPUNCTURE: CPT | Mod: IPSPLIT | Performed by: NURSE PRACTITIONER

## 2024-01-29 PROCEDURE — 85027 COMPLETE CBC AUTOMATED: CPT | Mod: IPSPLIT | Performed by: NURSE PRACTITIONER

## 2024-01-29 PROCEDURE — 96361 HYDRATE IV INFUSION ADD-ON: CPT

## 2024-01-29 PROCEDURE — 80048 BASIC METABOLIC PNL TOTAL CA: CPT | Mod: IPSPLIT | Performed by: NURSE PRACTITIONER

## 2024-01-29 PROCEDURE — 96374 THER/PROPH/DIAG INJ IV PUSH: CPT

## 2024-01-29 PROCEDURE — 93005 ELECTROCARDIOGRAM TRACING: CPT | Mod: IPSPLIT

## 2024-01-29 PROCEDURE — 93010 ELECTROCARDIOGRAM REPORT: CPT | Performed by: INTERNAL MEDICINE

## 2024-01-29 PROCEDURE — 2500000001 HC RX 250 WO HCPCS SELF ADMINISTERED DRUGS (ALT 637 FOR MEDICARE OP): Mod: IPSPLIT | Performed by: NURSE PRACTITIONER

## 2024-01-29 PROCEDURE — G0378 HOSPITAL OBSERVATION PER HR: HCPCS

## 2024-01-29 PROCEDURE — 86140 C-REACTIVE PROTEIN: CPT | Mod: IPSPLIT | Performed by: NURSE PRACTITIONER

## 2024-01-29 PROCEDURE — 96376 TX/PRO/DX INJ SAME DRUG ADON: CPT | Mod: 59

## 2024-01-29 PROCEDURE — 2500000002 HC RX 250 W HCPCS SELF ADMINISTERED DRUGS (ALT 637 FOR MEDICARE OP, ALT 636 FOR OP/ED): Mod: IPSPLIT

## 2024-01-29 RX ORDER — ATORVASTATIN CALCIUM 80 MG/1
80 TABLET, FILM COATED ORAL NIGHTLY
COMMUNITY

## 2024-01-29 RX ORDER — MIDODRINE HYDROCHLORIDE 5 MG/1
10 TABLET ORAL 3 TIMES DAILY
COMMUNITY
Start: 2023-07-13

## 2024-01-29 RX ORDER — BUSPIRONE HYDROCHLORIDE 15 MG/1
15 TABLET ORAL EVERY 12 HOURS PRN
COMMUNITY
Start: 2022-11-30

## 2024-01-29 RX ORDER — MECLIZINE HYDROCHLORIDE 25 MG/1
1 TABLET ORAL 3 TIMES DAILY PRN
COMMUNITY
Start: 2021-11-30

## 2024-01-29 RX ORDER — FLUOXETINE 10 MG/1
10 CAPSULE ORAL DAILY
Status: DISCONTINUED | OUTPATIENT
Start: 2024-01-29 | End: 2024-01-29 | Stop reason: HOSPADM

## 2024-01-29 RX ORDER — LIDOCAINE 560 MG/1
1 PATCH PERCUTANEOUS; TOPICAL; TRANSDERMAL DAILY
Qty: 15 PATCH | Refills: 0 | Status: SHIPPED | OUTPATIENT
Start: 2024-01-30

## 2024-01-29 RX ORDER — METOCLOPRAMIDE 10 MG/1
10 TABLET ORAL EVERY 8 HOURS PRN
COMMUNITY
Start: 2020-08-28

## 2024-01-29 RX ORDER — ALUMINUM HYDROXIDE, MAGNESIUM HYDROXIDE, AND SIMETHICONE 1200; 120; 1200 MG/30ML; MG/30ML; MG/30ML
30 SUSPENSION ORAL 4 TIMES DAILY PRN
Qty: 355 ML | Refills: 0 | Status: SHIPPED | OUTPATIENT
Start: 2024-01-29

## 2024-01-29 RX ORDER — ALUMINUM HYDROXIDE, MAGNESIUM HYDROXIDE, AND SIMETHICONE 1200; 120; 1200 MG/30ML; MG/30ML; MG/30ML
30 SUSPENSION ORAL 4 TIMES DAILY PRN
Status: DISCONTINUED | OUTPATIENT
Start: 2024-01-29 | End: 2024-01-29 | Stop reason: HOSPADM

## 2024-01-29 RX ORDER — LEVOTHYROXINE SODIUM 112 UG/1
112 TABLET ORAL DAILY
Status: DISCONTINUED | OUTPATIENT
Start: 2024-01-29 | End: 2024-01-29 | Stop reason: HOSPADM

## 2024-01-29 RX ORDER — ARIPIPRAZOLE 15 MG/1
1 TABLET ORAL DAILY
COMMUNITY
Start: 2018-10-24

## 2024-01-29 RX ORDER — MIDODRINE HYDROCHLORIDE 5 MG/1
10 TABLET ORAL
Status: DISCONTINUED | OUTPATIENT
Start: 2024-01-29 | End: 2024-01-29 | Stop reason: HOSPADM

## 2024-01-29 RX ORDER — PROMETHAZINE HYDROCHLORIDE 25 MG/ML
INJECTION, SOLUTION INTRAMUSCULAR; INTRAVENOUS
Status: DISPENSED
Start: 2024-01-29 | End: 2024-01-29

## 2024-01-29 RX ORDER — ZOLPIDEM TARTRATE 10 MG/1
10 TABLET ORAL DAILY PRN
COMMUNITY
Start: 2022-06-30 | End: 2024-03-25

## 2024-01-29 RX ORDER — PANTOPRAZOLE SODIUM 40 MG/1
40 TABLET, DELAYED RELEASE ORAL
Status: DISCONTINUED | OUTPATIENT
Start: 2024-01-29 | End: 2024-01-29 | Stop reason: HOSPADM

## 2024-01-29 RX ORDER — PANTOPRAZOLE SODIUM 40 MG/1
40 TABLET, DELAYED RELEASE ORAL
COMMUNITY
Start: 2022-09-26

## 2024-01-29 RX ORDER — AMANTADINE HYDROCHLORIDE 100 MG/1
100 CAPSULE, GELATIN COATED ORAL 2 TIMES DAILY
Status: DISCONTINUED | OUTPATIENT
Start: 2024-01-29 | End: 2024-01-29 | Stop reason: HOSPADM

## 2024-01-29 RX ORDER — AMANTADINE HYDROCHLORIDE 100 MG/1
100 CAPSULE, GELATIN COATED ORAL 2 TIMES DAILY
COMMUNITY
Start: 2022-09-26

## 2024-01-29 RX ORDER — NAPROXEN SODIUM 220 MG/1
81 TABLET, FILM COATED ORAL DAILY
COMMUNITY
Start: 2018-09-26

## 2024-01-29 RX ORDER — EZETIMIBE 10 MG/1
10 TABLET ORAL DAILY
COMMUNITY
Start: 2022-07-01

## 2024-01-29 RX ORDER — NAPROXEN 500 MG/1
500 TABLET ORAL
Qty: 10 TABLET | Refills: 0 | Status: SHIPPED | OUTPATIENT
Start: 2024-01-30 | End: 2024-02-04

## 2024-01-29 RX ORDER — LEVETIRACETAM 750 MG/1
750 TABLET ORAL 2 TIMES DAILY
COMMUNITY
Start: 2023-09-27 | End: 2024-03-25

## 2024-01-29 RX ORDER — CETIRIZINE HYDROCHLORIDE 10 MG/1
10 TABLET ORAL EVERY 12 HOURS
COMMUNITY

## 2024-01-29 RX ORDER — ARIPIPRAZOLE 2 MG/1
2 TABLET ORAL NIGHTLY
COMMUNITY
Start: 2018-10-24

## 2024-01-29 RX ORDER — LEVETIRACETAM 500 MG/1
750 TABLET ORAL 2 TIMES DAILY
Status: DISCONTINUED | OUTPATIENT
Start: 2024-01-29 | End: 2024-01-29 | Stop reason: HOSPADM

## 2024-01-29 RX ORDER — EZETIMIBE 10 MG/1
10 TABLET ORAL DAILY
Status: DISCONTINUED | OUTPATIENT
Start: 2024-01-29 | End: 2024-01-29 | Stop reason: HOSPADM

## 2024-01-29 RX ORDER — METHOCARBAMOL 500 MG/1
500 TABLET, FILM COATED ORAL EVERY 6 HOURS PRN
Status: DISCONTINUED | OUTPATIENT
Start: 2024-01-29 | End: 2024-01-29 | Stop reason: HOSPADM

## 2024-01-29 RX ORDER — ACETAMINOPHEN 500 MG
1000 TABLET ORAL EVERY 6 HOURS PRN
COMMUNITY
Start: 2021-11-29

## 2024-01-29 RX ORDER — SODIUM CHLORIDE 9 MG/ML
INJECTION, SOLUTION INTRAVENOUS
Status: COMPLETED
Start: 2024-01-29 | End: 2024-01-29

## 2024-01-29 RX ORDER — METHOCARBAMOL 500 MG/1
500 TABLET, FILM COATED ORAL EVERY 6 HOURS PRN
COMMUNITY
Start: 2023-10-30

## 2024-01-29 RX ORDER — PREGABALIN 100 MG/1
100 CAPSULE ORAL 2 TIMES DAILY
COMMUNITY

## 2024-01-29 RX ORDER — DESMOPRESSIN ACETATE 0.1 MG/ML
1 SOLUTION NASAL NIGHTLY
Status: DISCONTINUED | OUTPATIENT
Start: 2024-01-29 | End: 2024-01-29 | Stop reason: HOSPADM

## 2024-01-29 RX ORDER — HYDROCORTISONE 10 MG/1
5 TABLET ORAL
Status: DISCONTINUED | OUTPATIENT
Start: 2024-01-29 | End: 2024-01-29 | Stop reason: HOSPADM

## 2024-01-29 RX ORDER — FLUOXETINE 10 MG/1
10 CAPSULE ORAL DAILY
COMMUNITY
Start: 2022-06-11

## 2024-01-29 RX ORDER — TRAMADOL HYDROCHLORIDE 50 MG/1
50 TABLET ORAL EVERY 6 HOURS PRN
Status: DISCONTINUED | OUTPATIENT
Start: 2024-01-29 | End: 2024-01-29 | Stop reason: HOSPADM

## 2024-01-29 RX ORDER — FREMANEZUMAB-VFRM 225 MG/1.5ML
225 INJECTION SUBCUTANEOUS
COMMUNITY
Start: 2019-08-26

## 2024-01-29 RX ORDER — MECLIZINE HCL 12.5 MG 12.5 MG/1
25 TABLET ORAL 3 TIMES DAILY PRN
Status: DISCONTINUED | OUTPATIENT
Start: 2024-01-29 | End: 2024-01-29 | Stop reason: HOSPADM

## 2024-01-29 RX ORDER — ARIPIPRAZOLE 5 MG/1
15 TABLET ORAL DAILY
Status: DISCONTINUED | OUTPATIENT
Start: 2024-01-29 | End: 2024-01-29 | Stop reason: HOSPADM

## 2024-01-29 RX ORDER — METOCLOPRAMIDE 10 MG/1
10 TABLET ORAL EVERY 8 HOURS PRN
Status: DISCONTINUED | OUTPATIENT
Start: 2024-01-29 | End: 2024-01-29 | Stop reason: HOSPADM

## 2024-01-29 RX ORDER — ZOLPIDEM TARTRATE 5 MG/1
10 TABLET ORAL DAILY PRN
Status: DISCONTINUED | OUTPATIENT
Start: 2024-01-29 | End: 2024-01-29 | Stop reason: HOSPADM

## 2024-01-29 RX ORDER — HYDROCORTISONE 10 MG/1
10 TABLET ORAL
Status: DISCONTINUED | OUTPATIENT
Start: 2024-01-30 | End: 2024-01-29 | Stop reason: HOSPADM

## 2024-01-29 RX ORDER — NAPROXEN 250 MG/1
500 TABLET ORAL
Status: DISCONTINUED | OUTPATIENT
Start: 2024-01-29 | End: 2024-01-29 | Stop reason: HOSPADM

## 2024-01-29 RX ORDER — PREGABALIN 100 MG/1
100 CAPSULE ORAL 2 TIMES DAILY
Status: DISCONTINUED | OUTPATIENT
Start: 2024-01-29 | End: 2024-01-29 | Stop reason: HOSPADM

## 2024-01-29 RX ORDER — ACETAMINOPHEN 325 MG/1
650 TABLET ORAL EVERY 6 HOURS PRN
Status: DISCONTINUED | OUTPATIENT
Start: 2024-01-29 | End: 2024-01-29 | Stop reason: HOSPADM

## 2024-01-29 RX ORDER — ARIPIPRAZOLE 5 MG/1
2.5 TABLET ORAL NIGHTLY
Status: DISCONTINUED | OUTPATIENT
Start: 2024-01-29 | End: 2024-01-29 | Stop reason: HOSPADM

## 2024-01-29 RX ORDER — HYDROCORTISONE 5 MG/1
5 TABLET ORAL
COMMUNITY
Start: 2012-10-05

## 2024-01-29 RX ORDER — DESMOPRESSIN ACETATE 0.1 MG/ML
1 SOLUTION NASAL NIGHTLY
COMMUNITY
Start: 2012-09-04

## 2024-01-29 RX ORDER — LORATADINE 10 MG/1
10 TABLET ORAL DAILY
Status: DISCONTINUED | OUTPATIENT
Start: 2024-01-29 | End: 2024-01-29 | Stop reason: HOSPADM

## 2024-01-29 RX ORDER — LEVOTHYROXINE SODIUM 112 UG/1
112 TABLET ORAL DAILY
COMMUNITY

## 2024-01-29 RX ORDER — LIDOCAINE 560 MG/1
1 PATCH PERCUTANEOUS; TOPICAL; TRANSDERMAL DAILY
Status: DISCONTINUED | OUTPATIENT
Start: 2024-01-29 | End: 2024-01-29 | Stop reason: HOSPADM

## 2024-01-29 RX ADMIN — LEVOTHYROXINE SODIUM 112 MCG: 0.11 TABLET ORAL at 10:54

## 2024-01-29 RX ADMIN — ALUMINUM HYDROXIDE, MAGNESIUM HYDROXIDE, AND SIMETHICONE 30 ML: 200; 200; 20 SUSPENSION ORAL at 09:02

## 2024-01-29 RX ADMIN — HYDROCORTISONE 5 MG: 10 TABLET ORAL at 11:03

## 2024-01-29 RX ADMIN — NAPROXEN 500 MG: 250 TABLET ORAL at 16:26

## 2024-01-29 RX ADMIN — MIDODRINE HYDROCHLORIDE 10 MG: 5 TABLET ORAL at 11:04

## 2024-01-29 RX ADMIN — MIDODRINE HYDROCHLORIDE 10 MG: 5 TABLET ORAL at 16:26

## 2024-01-29 RX ADMIN — ARIPIPRAZOLE 15 MG: 5 TABLET ORAL at 10:54

## 2024-01-29 RX ADMIN — NITROGLYCERIN 0.4 MG: 0.4 TABLET SUBLINGUAL at 03:06

## 2024-01-29 RX ADMIN — PROMETHAZINE HYDROCHLORIDE 12.5 MG: 25 INJECTION INTRAMUSCULAR; INTRAVENOUS at 05:45

## 2024-01-29 RX ADMIN — NAPROXEN 500 MG: 250 TABLET ORAL at 10:53

## 2024-01-29 RX ADMIN — ACETAMINOPHEN 650 MG: 325 TABLET ORAL at 03:33

## 2024-01-29 RX ADMIN — LEVETIRACETAM 750 MG: 500 TABLET, FILM COATED ORAL at 10:57

## 2024-01-29 RX ADMIN — PANTOPRAZOLE SODIUM 40 MG: 40 TABLET, DELAYED RELEASE ORAL at 11:02

## 2024-01-29 RX ADMIN — AMANTADINE 100 MG: 100 CAPSULE ORAL at 10:56

## 2024-01-29 RX ADMIN — LORATADINE 10 MG: 10 TABLET ORAL at 10:54

## 2024-01-29 RX ADMIN — PREGABALIN 100 MG: 100 CAPSULE ORAL at 10:53

## 2024-01-29 RX ADMIN — SODIUM CHLORIDE 100 ML/HR: 9 INJECTION, SOLUTION INTRAVENOUS at 00:05

## 2024-01-29 RX ADMIN — ENOXAPARIN SODIUM 40 MG: 40 INJECTION SUBCUTANEOUS at 09:02

## 2024-01-29 RX ADMIN — ASPIRIN 81 MG CHEWABLE TABLET 81 MG: 81 TABLET CHEWABLE at 09:02

## 2024-01-29 RX ADMIN — EZETIMIBE 10 MG: 10 TABLET ORAL at 10:55

## 2024-01-29 RX ADMIN — ALUMINUM HYDROXIDE, MAGNESIUM HYDROXIDE, AND SIMETHICONE 30 ML: 200; 200; 20 SUSPENSION ORAL at 16:27

## 2024-01-29 RX ADMIN — LIDOCAINE 1 PATCH: 4 PATCH TOPICAL at 10:50

## 2024-01-29 RX ADMIN — PROMETHAZINE HYDROCHLORIDE 12.5 MG: 25 INJECTION INTRAMUSCULAR; INTRAVENOUS at 16:27

## 2024-01-29 RX ADMIN — HYDROMORPHONE HYDROCHLORIDE 0.4 MG: 1 INJECTION, SOLUTION INTRAMUSCULAR; INTRAVENOUS; SUBCUTANEOUS at 00:05

## 2024-01-29 RX ADMIN — FLUOXETINE 10 MG: 10 CAPSULE ORAL at 10:56

## 2024-01-29 SDOH — SOCIAL STABILITY: SOCIAL INSECURITY: DOES ANYONE TRY TO KEEP YOU FROM HAVING/CONTACTING OTHER FRIENDS OR DOING THINGS OUTSIDE YOUR HOME?: NO

## 2024-01-29 SDOH — SOCIAL STABILITY: SOCIAL INSECURITY: ARE THERE ANY APPARENT SIGNS OF INJURIES/BEHAVIORS THAT COULD BE RELATED TO ABUSE/NEGLECT?: NO

## 2024-01-29 SDOH — SOCIAL STABILITY: SOCIAL INSECURITY: DO YOU FEEL UNSAFE GOING BACK TO THE PLACE WHERE YOU ARE LIVING?: NO

## 2024-01-29 SDOH — SOCIAL STABILITY: SOCIAL INSECURITY: DO YOU FEEL ANYONE HAS EXPLOITED OR TAKEN ADVANTAGE OF YOU FINANCIALLY OR OF YOUR PERSONAL PROPERTY?: NO

## 2024-01-29 SDOH — SOCIAL STABILITY: SOCIAL INSECURITY: ARE YOU OR HAVE YOU BEEN THREATENED OR ABUSED PHYSICALLY, EMOTIONALLY, OR SEXUALLY BY ANYONE?: NO

## 2024-01-29 SDOH — SOCIAL STABILITY: SOCIAL INSECURITY: HAS ANYONE EVER THREATENED TO HURT YOUR FAMILY OR YOUR PETS?: NO

## 2024-01-29 SDOH — SOCIAL STABILITY: SOCIAL INSECURITY: ABUSE: ADULT

## 2024-01-29 SDOH — SOCIAL STABILITY: SOCIAL INSECURITY: HAVE YOU HAD THOUGHTS OF HARMING ANYONE ELSE?: NO

## 2024-01-29 ASSESSMENT — COGNITIVE AND FUNCTIONAL STATUS - GENERAL
MOBILITY SCORE: 24
DAILY ACTIVITIY SCORE: 24
MOBILITY SCORE: 24
PATIENT BASELINE BEDBOUND: NO
DAILY ACTIVITIY SCORE: 24

## 2024-01-29 ASSESSMENT — ACTIVITIES OF DAILY LIVING (ADL)
JUDGMENT_ADEQUATE_SAFELY_COMPLETE_DAILY_ACTIVITIES: YES
LACK_OF_TRANSPORTATION: NO
GROOMING: INDEPENDENT
WALKS IN HOME: INDEPENDENT
HEARING - RIGHT EAR: FUNCTIONAL
PATIENT'S MEMORY ADEQUATE TO SAFELY COMPLETE DAILY ACTIVITIES?: YES
LACK_OF_TRANSPORTATION: NO
ADEQUATE_TO_COMPLETE_ADL: YES
HEARING - LEFT EAR: FUNCTIONAL
BATHING: INDEPENDENT
FEEDING YOURSELF: INDEPENDENT
TOILETING: INDEPENDENT
DRESSING YOURSELF: INDEPENDENT

## 2024-01-29 ASSESSMENT — PAIN - FUNCTIONAL ASSESSMENT
PAIN_FUNCTIONAL_ASSESSMENT: 0-10

## 2024-01-29 ASSESSMENT — LIFESTYLE VARIABLES
HOW OFTEN DO YOU HAVE A DRINK CONTAINING ALCOHOL: NEVER
SKIP TO QUESTIONS 9-10: 1
HOW OFTEN DO YOU HAVE 6 OR MORE DRINKS ON ONE OCCASION: NEVER
AUDIT-C TOTAL SCORE: 0
HOW MANY STANDARD DRINKS CONTAINING ALCOHOL DO YOU HAVE ON A TYPICAL DAY: PATIENT DOES NOT DRINK
AUDIT-C TOTAL SCORE: 0

## 2024-01-29 ASSESSMENT — COLUMBIA-SUICIDE SEVERITY RATING SCALE - C-SSRS
2. HAVE YOU ACTUALLY HAD ANY THOUGHTS OF KILLING YOURSELF?: NO
1. IN THE PAST MONTH, HAVE YOU WISHED YOU WERE DEAD OR WISHED YOU COULD GO TO SLEEP AND NOT WAKE UP?: NO
6. HAVE YOU EVER DONE ANYTHING, STARTED TO DO ANYTHING, OR PREPARED TO DO ANYTHING TO END YOUR LIFE?: NO

## 2024-01-29 ASSESSMENT — PAIN SCALES - GENERAL
PAINLEVEL_OUTOF10: 8
PAINLEVEL_OUTOF10: 10 - WORST POSSIBLE PAIN
PAINLEVEL_OUTOF10: 10 - WORST POSSIBLE PAIN
PAINLEVEL_OUTOF10: 6
PAINLEVEL_OUTOF10: 3
PAINLEVEL_OUTOF10: 8
PAINLEVEL_OUTOF10: 9

## 2024-01-29 ASSESSMENT — ENCOUNTER SYMPTOMS: BACK PAIN: 1

## 2024-01-29 ASSESSMENT — PATIENT HEALTH QUESTIONNAIRE - PHQ9
2. FEELING DOWN, DEPRESSED OR HOPELESS: NOT AT ALL
1. LITTLE INTEREST OR PLEASURE IN DOING THINGS: NOT AT ALL
SUM OF ALL RESPONSES TO PHQ9 QUESTIONS 1 & 2: 0

## 2024-01-29 ASSESSMENT — PAIN DESCRIPTION - DESCRIPTORS: DESCRIPTORS: SQUEEZING

## 2024-01-29 NOTE — PROGRESS NOTES
Emergency Medicine Transition of Care Note.    I received Lu Fall in signout from   Nunu Land.Please see the previous ED provider note for all HPI, PE and MDM up to the time of signout at 8 PM.. This is in addition to the primary record.    In brief Lu Fall is an 43 y.o. female presenting for   Chief Complaint   Patient presents with    Chest Pain     Chest pain starting at 5am cardiac hx     At the time of signout we were awaiting: Repeat troponin    Diagnoses as of 01/28/24 2214   Chest pain, unspecified type   Dyspnea, unspecified type   Precordial pain       Medical Decision Making  Patient was signed out to me in stable condition by my nurse practitioner Nunu Land.  Patient has a history of coronary artery disease and has had bypass before.  She comes in with chest pain some dyspnea.  Her EKG was nondiagnostic and her first and second troponins were less than 3.  Patient's pain came back so she required 4 of morphine IV.  I feel that she will be best served being observed overnight with repeat troponins and cardiology consult if indicated.        Final diagnoses:   [R07.9] Chest pain, unspecified type   [R06.00] Dyspnea, unspecified type   [R07.2] Precordial pain           Procedure  Procedures    Romel Schuster MD

## 2024-01-29 NOTE — CONSULTS
Cardiology Consult Note  Patient: Lu Fall  Unit/Bed: 227/227-A  YOB: 1980    Admitting Diagnosis: Precordial pain [R07.2]  Dyspnea, unspecified type [R06.00]  Chest pain, unspecified type [R07.9]  Date:  1/28/2024  Hospital Day: 1  Attending: Domenico Fonseca MD      Chief Complaint   Patient presents with    Chest Pain     Chest pain starting at 5am cardiac hx        SUBJECTIVE:     History of Present Illness:  Lu Fall is a 43 y.o. female patient who is being seen at the request of Dr. Fonseca for inpatient consultation of  chest pain. Presents to ED with complaints of global chest discomfort radiating into shoulder blades that started around 5am yesterday. Describes is as a dull ache/pressure and has been consistent. Nothing makes it better or worse. Also notes midsternal pain from sternotomy patient reports has been there since her bypass. That pain is worse with movement and deep breathing. Per patient this is not like any discomfort she has had before. States she has been nauseated for a couple of days and vomited this AM. Still nauseated and afraid to eat, but ate a cooking during interview. Pain/discomfort does not change with position sitting up or laying down. Denies smoking or ETOH, endorses Cocaine use 1 week ago with a history of abuse as well.     All previous records reviewed.    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      Allergies:  Allergies   Allergen Reactions    Adhesive Hives and Unknown    Hydrocodone-Acetaminophen Nausea/vomiting, Other and Unknown     Other  reaction(s): Vomiting    Ondansetron Unknown and Other     prolonged QT    Client is unable to take this medication due  An elongated QT wave   Other reaction(s): Unknown   Prolonged QT per patient    Prolonged QT per patient    Hydromorphone Itching and Rash      Home Medication:  Medications Prior to Admission   Medication Sig Dispense Refill Last Dose    acetaminophen (Tylenol) 500 mg tablet Take 2 tablets (1,000 mg) by mouth every 6 hours if needed for moderate pain (4 - 6) or mild pain (1 - 3).       amantadine (Symmetrel) 100 mg capsule Take 1 capsule (100 mg) by mouth twice a day.       ARIPiprazole (Abilify) 15 mg tablet Take 1 tablet (15 mg) by mouth once daily.       ARIPiprazole (Abilify) 2 mg tablet Take 1 tablet (2 mg) by mouth once daily at bedtime.       aspirin 81 mg chewable tablet Chew 1 tablet (81 mg) once daily.       busPIRone (Buspar) 15 mg tablet Take 1 tablet (15 mg) by mouth every 12 hours if needed.       desmopressin (DDAVP) 10 mcg/spray (0.1 mL) Administer 1 spray (10 mcg) into one nostril once daily at bedtime.       ezetimibe (Zetia) 10 mg tablet Take 1 tablet (10 mg) by mouth once daily.       FLUoxetine (PROzac) 10 mg capsule Take 1 capsule (10 mg) by mouth once daily.       fremanezumab (Ajovy Syringe) 225 mg/1.5 mL prefilled syringe Inject 1.5 mL (225 mg) under the skin every 30 (thirty) days.       hydrocortisone (Cortef) 5 mg tablet Take 1 tablet (5 mg) by mouth.  Take 2 tabs in am, one tablet at lunch, and one tablet at dinner       levETIRAcetam (Keppra) 750 mg tablet Take 1 tablet (750 mg) by mouth twice a day.       meclizine (Antivert) 25 mg tablet Take 1 tablet (25 mg) by mouth 3 times a day as needed for dizziness.       methocarbamol (Robaxin) 500 mg tablet Take 1 tablet (500 mg) by mouth every 6 hours if needed.       metoclopramide (Reglan) 10 mg tablet Take 1 tablet (10 mg) by mouth every 8 hours if needed.       midodrine (Proamatine) 5 mg tablet Take 2 tablets (10 mg)  by mouth 3 times a day.       pantoprazole (ProtoNix) 40 mg EC tablet Take 1 tablet (40 mg) by mouth once daily.       zolpidem (Ambien) 10 mg tablet Take 1 tablet (10 mg) by mouth once daily as needed for sleep.       atorvastatin (Lipitor) 80 mg tablet Take 1 tablet (80 mg) by mouth once daily at bedtime.       cetirizine (ZyrTEC) 10 mg tablet Take 1 tablet (10 mg) by mouth every 12 hours.       ibuprofen 600 mg tablet Take 1 tablet (600 mg) by mouth every 8 hours if needed for mild pain (1 - 3) or fever (temp greater than 38.0 C). (Patient not taking: Reported on 1/29/2024) 30 tablet 0 Not Taking    levothyroxine (Synthroid, Levoxyl) 112 mcg tablet Take 1 tablet (112 mcg) by mouth once daily.       pregabalin (Lyrica) 100 mg capsule Take 1 capsule (100 mg) by mouth 2 times a day.       promethazine (Phenergan) 25 mg tablet Take 0.5 tablets (12.5 mg) by mouth every 6 hours if needed for nausea or vomiting for up to 10 doses. 5 tablet 0     ubrogepant (Ubrelvy) 100 mg tablet tablet Take 1 tablet (100 mg) by mouth if needed.         PMHx:  Past Medical History:   Diagnosis Date    COPD (chronic obstructive pulmonary disease) (CMS/HCC)     Coronary artery disease     Personal history of other endocrine, nutritional and metabolic disease     History of hypopituitarism    Personal history of other mental and behavioral disorders     History of depression    Personal history of other specified conditions     History of brain tumor    Unspecified convulsions (CMS/HCC)     Convulsion       PSHx:  Past Surgical History:   Procedure Laterality Date    MR HEAD ANGIO WO IV CONTRAST  7/15/2021    MR HEAD ANGIO WO IV CONTRAST 7/15/2021 [facility]    MR HEAD ANGIO WO IV CONTRAST  11/28/2017    MR HEAD ANGIO WO IV CONTRAST [facility]    MR HEAD ANGIO WO IV CONTRAST  3/13/2018    MR HEAD ANGIO WO IV CONTRAST [facility]    MR NECK ANGIO WO IV CONTRAST  7/15/2021    MR NECK ANGIO WO IV CONTRAST  "7/15/2021 [facility]    OTHER SURGICAL HISTORY  08/17/2013    Craniotomy Tumor Removal - Complete       Social Hx:  Social History     Socioeconomic History    Marital status:      Spouse name: None    Number of children: None    Years of education: None    Highest education level: None   Occupational History    None   Tobacco Use    Smoking status: Never    Smokeless tobacco: Never   Vaping Use    Vaping Use: Never used   Substance and Sexual Activity    Alcohol use: Never    Drug use: Yes     Types: \"Crack\" cocaine     Comment: last used 4 weeks ago    Sexual activity: None   Other Topics Concern    None   Social History Narrative    None     Social Determinants of Health     Financial Resource Strain: Low Risk  (1/29/2024)    Overall Financial Resource Strain (CARDIA)     Difficulty of Paying Living Expenses: Not very hard   Food Insecurity: Not on file   Transportation Needs: No Transportation Needs (1/29/2024)    PRAPARE - Transportation     Lack of Transportation (Medical): No     Lack of Transportation (Non-Medical): No   Physical Activity: Not on file   Stress: Not on file   Social Connections: Not on file   Intimate Partner Violence: Not on file   Housing Stability: Low Risk  (1/29/2024)    Housing Stability Vital Sign     Unable to Pay for Housing in the Last Year: No     Number of Places Lived in the Last Year: 1     Unstable Housing in the Last Year: No     Family Hx:  No family history on file.    OBJECTIVE  Vitals:   Visit Vitals  BP 98/69 (BP Location: Left arm, Patient Position: Lying)   Pulse 69   Temp 36.1 °C (97 °F) (Temporal)   Resp 22        Wt Readings from Last 5 Encounters:   01/28/24 60.3 kg (132 lb 15 oz)   11/21/23 60.9 kg (134 lb 4.2 oz)   11/16/23 59 kg (130 lb)   11/09/23 60.5 kg (133 lb 6.1 oz)   10/24/23 60 kg (132 lb 4.4 oz)       Intake / Output:   I/O last 3 completed shifts:  In: 1060 (17.6 mL/kg) [P.O.:300; I.V.:710 (11.8 mL/kg); IV Piggyback:50]  Out: - (0 " mL/kg)   Weight: 60.3 kg      Tele monitoring: SR rate 60s    Physical Examination:    Vitals reviewed.   Constitutional:       General: Awake.      Appearance: Normal and healthy appearance. Well-developed and not in distress.   Eyes:      General: Lids are normal.      Pupils: Pupils are equal, round, and reactive to light.   HENT:      Nose: Nose normal.    Mouth/Throat:      Lips: Pink.      Mouth: Mucous membranes are moist.   Neck:      Vascular: JVD normal.   Pulmonary:      Effort: Pulmonary effort is normal.      Breath sounds: Normal breath sounds and air entry.   Chest:      Chest wall: Not tender to palpatation.   Cardiovascular:      PMI at left midclavicular line. Normal rate. Regular rhythm. Normal S1. Normal S2.       Murmurs: There is no murmur.   Edema:     Peripheral edema absent.   Abdominal:      General: Bowel sounds are normal.      Palpations: Abdomen is soft.      Tenderness: There is no abdominal tenderness.   Musculoskeletal: Normal range of motion.      Cervical back: Full passive range of motion without pain, normal range of motion and neck supple.      Comments: Point tenderness to sternum Skin:     General: Skin is warm and dry.   Neurological:      General: No focal deficit present.      Mental Status: Alert, oriented to person, place, and time and oriented to person, place and time. Mental status is at baseline.   Psychiatric:         Attention and Perception: Attention and perception normal.         Mood and Affect: Mood and affect normal.         Speech: Speech normal.         Behavior: Behavior normal. Behavior is cooperative.         Thought Content: Thought content normal.          LABS:  CMP:   Recent Labs     01/29/24  0522 01/28/24  1922 11/16/23  1325 11/09/23  1101 10/24/23  2210 10/09/23  1416 09/12/23  1218 08/27/23  1625 07/10/23  1532 06/28/23  0443 06/27/23  0916 06/26/23  0935 06/24/23  0120 06/16/23  1403 06/07/23  2321 06/06/23  1032 05/30/23  1136 04/22/23  0527  04/21/23  1137 12/20/22  1942 12/14/22  1926 12/08/22  1202 11/28/22  0827 10/08/22  0323 08/30/22  1211    135* 140 137 139 143 134* 138 136 138 134 137 137   < > 140 135 139   < > 133*   < > 137   < > 137   < > 136   K 3.5 5.0 3.5 4.6 3.8 4.5 4.0 4.1 3.4* 3.6 3.4 3.8 3.9   < > 3.6 4.4 4.1   < > 3.7   < > 4.2   < > 3.3*   < > 3.2*    100 108* 102 103 109* 99 107 101 105 100 105 103   < > 107 103 105   < > 102   < > 99   < > 103   < > 99   CO2 30 28 22 28 28 23 27 22 26 22* 26 24 25   < > 24 26 25   < > 23   < > 26   < > 25   < > 25   ANIONGAP 12 12 14 7 12 16 12 13 12 11 8 12 13   < > 13 6 9   < > 12   < > 16   < > 12   < > 15   BUN 16 18 9 15 6 10 8 15 7 7* 5* 8 8   < > 14 11 10   < > 8   < > 12   < > 6   < > 5*   CREATININE 0.81 0.81 0.79 0.80 0.83 0.87 0.81 0.67 0.74 0.8 0.9 0.79 0.72   < > 0.73 0.7 0.8   < > 0.75   < > 0.91   < > 0.72   < > 0.77   EGFR >90 >90 >90 >90 90 85  --   --   --  94 81  --   --   --   --  110 94   < >  --    < >  --    < >  --    < >  --    MG  --  2.42*  --   --   --   --  2.32 2.07  --   --   --  2.02 2.17  --  2.33  --   --   --  1.92  --  2.00  --  1.81  --  1.98    < > = values in this interval not displayed.     LIVER ENZYMES:   Recent Labs     01/28/24  1922 11/16/23  1325 11/09/23  1101 10/24/23  2210 10/09/23  1416 09/12/23  1218 08/27/23  1625 07/10/23  1532 07/10/23  1500 06/27/23  0916 06/16/23  1403 06/08/23  0022 04/07/23  0155 02/07/23  2304 12/20/22  1942 12/14/22  1926 11/28/22  0827 11/12/22  1824 10/30/22  1618   ALBUMIN 4.7 4.4 4.3 4.6 4.7 4.5 4.2 3.9  --  3.9   < >  --    < > 4.6   < > 4.5   < > 4.1 4.3   ALT 11 31 9 16 17 17 26 20  --  28   < >  --    < > 13   < > 25   < > 14 7   AST 34 53* 22 28 25 23 34 38  --  33   < >  --    < > 22   < > 56*   < > 44* 24   BILITOT 0.5 1.1 0.4 0.5 0.4 0.8 0.4 0.8  --  0.5   < >  --    < > 0.5   < > 0.7   < > 0.4 0.3   LIPASE  --   --   --  8*  --   --   --   --  7* 16  --  12  --  20  --  9  --  8* 16    < > =  values in this interval not displayed.     CBC:  Recent Labs     01/29/24  0522 01/28/24  1922 11/16/23  1325 11/09/23  1101 10/24/23  2210 10/09/23  1415 09/12/23  1218 08/27/23  1625   WBC 5.8 6.4 6.8 6.0 6.7 7.3 5.8 7.0   HGB 12.6 13.0 12.0 13.1 13.2 14.5 11.8* 12.0   HCT 40.0 39.9 37.7 42.1 42.8 47.3* 38.2 38.5    207 169 221 238 239 207 199   MCV 80 78* 75* 76* 75* 77* 75* 76*     COAG:   Recent Labs     01/29/24  0522 10/09/23  1416 07/10/23  1500 06/07/23  2321 12/14/22  1926 11/28/22  0827 11/12/22  1824 08/30/22  1211   INR 1.2* 1.0 1.2* 1.1 1.2* 1.3* 1.3* 1.4*     ABO:   Recent Labs     12/14/22 2055   ABO A     HEME/ENDO:  Recent Labs     11/16/23  1325 07/28/23  1413 05/05/23  0831 02/01/23  1033 07/13/22  1405   FERRITIN  --   --   --  34  --    IRONSAT  --   --   --  16*  --    TSH 0.01*  --  0.92  --   --    HGBA1C  --  5.3  --   --  5.0      CARDIAC:   Recent Labs     01/28/24  2343 01/28/24  2054 01/28/24  1922 10/09/23  1415 08/27/23  2025 08/27/23  1919 08/27/23  1625 07/10/23  1839 06/24/23  0120 06/16/23  1403 04/21/23  1137 12/14/22  1926 11/28/22  0947 11/28/22  0827 08/30/22  1338 08/30/22  1211 08/30/22  1210 06/29/22  1044 06/29/22  0925 05/10/22  1124 05/10/22  1006 01/10/22  0910   LDH  --   --   --   --   --   --   --   --   --   --   --   --   --   --   --  225  --   --   --   --   --   --    TROPHS <3 <3 <3 <3 CANCELED 3 <3 7   < > 3   < > 3   < > 3   < > 3  --    < > 3   < > <3  --    BNP  --   --  31  --   --   --   --   --   --  34  --  40  --  50  --   --  44  --  340*  --  50 42    < > = values in this interval not displayed.       Lipid Panel:  Lab Results   Component Value Date    HDL 44.2 01/29/2024    CHHDL 3.5 01/29/2024    VLDL 26 01/29/2024    TRIG 131 01/29/2024    NHDL 110 01/29/2024        Current Medications:   MEDICATIONS  Infusions:  sodium chloride 0.9%, Last Rate: 100 mL/hr (01/29/24 0005)      Scheduled:  aspirin, 81 mg, Daily  atorvastatin, 80 mg,  Nightly  enoxaparin, 40 mg, q24h  promethazine, ,       PRN:  acetaminophen, 650 mg, q6h PRN  alum-mag hydroxide-simeth, 30 mL, 4x daily PRN  nitroglycerin, 0.4 mg, q5 min PRN  oxygen, , Continuous PRN - O2/gases  promethazine, ,         Cardiovascular studies:    EKG:  All available ECGs independently reviewed  Encounter Date: 01/28/24   ECG 12 lead Serial 0, 2, 4   Result Value    Ventricular Rate 59    Atrial Rate 59    NY Interval 162    QRS Duration 92    QT Interval 474    QTC Calculation(Bazett) 469    P Axis 21    R Axis 79    T Axis 74    QRS Count 10    Q Onset 219    P Onset 138    P Offset 173    T Offset 456    QTC Fredericia 471    Narrative    Sinus bradycardia  Otherwise normal ECG  When compared with ECG of 28-JAN-2024 23:22, (unconfirmed)  No significant change was found     Echocardiogram: IMGRESULT([id number]:1:1825)   Echocardiogram     Narrative  PROCEDURE:         ECHO 2-D WO CONTRAST - IEC  0010  REASON FOR EXAM: Dizziness, CAD    RESULT: [id number]    [place] [address]  Phone [phone]    TRANSTHORACIC ECHOCARDIOGRAM REPORT      Patient Name:     LEATHA Fajardo Physician:  [id number] Adrián White MD  Study Date:       6/28/2023         Referring           [id number] Dorisbette Diaz CNP  Physician:  MRN/PID:          [id number]          PCP:  Accession/Order#: [id number]        Department          [facility]  Location:  YOB: 1980        Fellow:  Gender:           F                 Nurse:  Admit Date:       6/28/2023         Sonographer:        Nunu Lopez RDCS  Admission Status: Inpatient -       Additional Staff:  Routine  Height:           160.02 cm         CC Report to:  Weight:           58.97 kg          Study Type:         ECHO 2-D WO CONTRAST  BSA:              1.61 m2  Blood Pressure: 92 /58 mmHg    Diagnosis/ICD: R42-Dizziness and giddiness  Indication:     Dizziness, CAD  Procedure/CPT: Echo Complete w Full [id number]    Patient History:  Pertinent History: CAD. orthostatic hypotension, craniopharyngioma, diabetes  insipidus, anxiety, migraines, panhypopituitarism, pituitary  tumor, cervical radiculopathy, chronic pain, mitral valve  repair with porcine valve 7 months ago, tricuspid valve  repair, CABG x1.    Study Detail: The following Echo studies were performed: 2D, M-Mode, Doppler  and  color flow.      PHYSICIAN INTERPRETATION:  Left Ventricle: Left ventricular systolic function is normal, with an  estimated ejection fraction of 60%. There are no regional wall motion  abnormalities. The left ventricular cavity size is normal. Spectral Doppler  shows a normal pattern of left ventricular diastolic filling.  Left Atrium: The left atrium is normal in size.  Right Ventricle: The right ventricle is normal in size. There is normal  right ventricular global systolic function.  Right Atrium: The right atrium is normal in size.  Aortic Valve: The aortic valve is trileaflet. There is trivial aortic valve  regurgitation. The peak instantaneous gradient of the aortic valve is 11.2  mmHg. The mean gradient of the aortic valve is 6.0 mmHg.  Mitral Valve: There is a prosthetic mitral valve present. Echo findings are  consistent with normal mitral valve prosthesis structure and function. There  is a bileaflet mitral valve prosthesis. There is a mitral valve  bioprosthesis. There is no prosthetic mitral valve regurgitaion. There is no  evidence of mitral valve regurgitation. S/p mitral ring placement.  Tricuspid Valve: The tricuspid valve is structurally normal. There is mild  tricuspid regurgitation. The tricuspid valve regurgitant jet flows toward  the atrial septum. The Doppler estimated RVSP is mildly elevated at 40.0  mmHg.  Pulmonic Valve: The pulmonic valve is structurally normal. There is no  indication of pulmonic valve regurgitation.  Pericardium: There is no  pericardial effusion noted.  Aorta: The aortic root is normal.      CONCLUSIONS:  1. Left ventricular systolic function is normal with a 60% estimated  ejection fraction.  2. S/p mitral ring placement.  3. There is a bileaflet mitral valve prosthesis.  4. No evidence of mitral valve regurgitation.  5. Mild tricuspid regurgitation is visualized.  6. Mildly elevated RVSP.    QUANTITATIVE DATA SUMMARY:  2D MEASUREMENTS:  Normal Ranges:  LAs:           3.00 cm   (2.7-4.0cm)  IVSd:          0.75 cm   (0.6-1.1cm)  LVPWd:         1.02 cm   (0.6-1.1cm)  LVIDd:         4.07 cm   (3.9-5.9cm)  LVIDs:         2.82 cm  LV Mass Index: 68.4 g/m2  LV % FS        30.7 %    LA VOLUME:  Normal Ranges:  LA Volume Index: 21.7 ml/m2    RA VOLUME BY A/L METHOD:  Normal Ranges:  RA Area A4C: 10.8 cm2    M-MODE MEASUREMENTS:  Normal Ranges:  Ao Root: 2.60 cm (2.0-3.7cm)  LAs:     3.50 cm (2.7-4.0cm)    LV SYSTOLIC FUNCTION BY 2D PLANIMETRY (MOD):  Normal Ranges:  EF-A4C View: 58.2 % (>=55%)  EF-A2C View: 61.1 %  EF-Biplane:  59.9 %    LV DIASTOLIC FUNCTION:  Normal Ranges:  MV Peak E:    2.09 m/s (0.7-1.2 m/s)  MV Peak A:    0.93 m/s (0.42-0.7 m/s)  E/A Ratio:    2.25     (1.0-2.2)  MV lateral e' 0.08 m/s  MV medial e'  0.06 m/s    MITRAL VALVE:  Normal Ranges:  MV Vmax:    2.61 m/s  (<=1.3m/s)  MV peak P.2 mmHg (<5mmHg)  MV mean P.0 mmHg  (<48mmHg)  MV DT:      404 msec  (150-240msec)    AORTIC VALVE:  Normal Ranges:  AoV Vmax:                1.67 m/s  (<=1.7m/s)  AoV Peak P.2 mmHg (<20mmHg)  AoV Mean P.0 mmHg  (1.7-11.5mmHg)  LVOT Max Sp:            1.35 m/s  (<=1.1m/s)  AoV VTI:                 39.50 cm  (18-25cm)  LVOT VTI:                33.00 cm  LVOT Diameter:           1.60 cm   (1.8-2.4cm)  AoV Area, VTI:           1.68 cm2  (2.5-5.5cm2)  AoV Area,Vmax:           1.63 cm2  (2.5-4.5cm2)  AoV Dimensionless Index: 0.84      RIGHT VENTRICLE:  RV Basal 3.29 cm  RV Mid   2.59 cm  RV Major 7.3  cm  TAPSE:   18.9 mm  RV s'    0.09 m/s    TRICUSPID VALVE/RVSP:  Normal Ranges:  Peak TR Velocity: 3.04 m/s  RV Syst Pressure: 40.0 mmHg (< 30mmHg)  IVC Diam:         1.64 cm      [id number] Adrián Estrada MD  Electronically signed on 6/28/2023 at 10:17:34 AM    Original Interpreting Physician:   ADRIÁN ESTRADA MD  Original Transcribed by/Date: DEMETRA Jun 28 2023  7:48A  Original Electronically Signed by/Date: ADRIÁN ESTRADA MD Jun 28 2023  10:17A    Addendum Interpreting Physician:  Addendum Transcribed by/Date: NO ADDENDUM  Addendum Electronically Signed by/Date:    Stress Testing IMGRESULT([id number]:1:1825):   NM CARDIAC STRESS REST (MYOCARDIAL PERFUSION MIBI) 01/12/2022    Narrative  MRN: [id number]  Patient Name: LEATHA VALDEZ    STUDY:  CARDIAC STRESS/REST INJECTION; CARDIAC STRESS/REST (MYOCARDIAL  PERFUSION/MIBI);  1/12/2022 10:32 am    INDICATION:  Chest pain, abnormal EKG, diabetes, high cholesterol, CAD, previous  heart surgery, history of smoking, fatigue, cough, family history of  heart disease    COMPARISON:  None.    ACCESSION NUMBER(S):  [id number]; [id number]    ORDERING CLINICIAN:  EFRAIN HORNER    TECHNIQUE:  DIVISION OF NUCLEAR MEDICINE  PHARMACOLOGIC STRESS MYOCARDIAL PERFUSION SCAN, ONE DAY PROTOCOL    The patient received an intravenous dose of  10.5 mCi of Tc-99m  Myoview and resting emission tomographic (SPECT) images of the  myocardium were acquired. The patient then received an intravenous  infusion of 0.4mg regadenoson (Lexiscan)  followed by an additional  dose of  34.5 mCi of Tc-99m  Myoview. Stress phase SPECT images of  the myocardium were then acquired. These included ECG-gated images to  assess and quantify ventricular function.    FINDINGS:  Both stress and rest studies demonstrate grossly normal perfusion  throughout the left ventricle. Small area of mild anteroapical defect  is likely artifactual.    The left ventricle is normal in size.    Gated rest and post-stress images  demonstrate normal LV wall motion  with an LV EF estimated at greater than 65%.    Impression  1.  No definite evidence of ischemia or prior infarction.  2. The left ventricle is normal in size.  3. Normal LV wall motion with an LV EF estimated at greater than 65%.      I personally reviewed the images/study and I agree with the findings  as stated. This study was interpreted at [facility], [place], [place].    Cardiac Catheterization: No results found for this or any previous visit from the past 1825 days.  No results found for this or any previous visit from the past 3650 days.     Cardiac Scoring: No results found for this or any previous visit from the past 1825 days.    AAA : No results found for this or any previous visit from the past 1825 days.    OTHER: No results found for this or any previous visit from the past 1825 days.      LAST IMAGING RESULTS   ECG 12 lead Serial 0, 2, 4  Sinus bradycardia  Otherwise normal ECG  When compared with ECG of 28-JAN-2024 23:22, (unconfirmed)  No significant change was found  ECG 12 lead  Normal sinus rhythm  Nonspecific T wave abnormality  Prolonged QT  Abnormal ECG  When compared with ECG of 28-JAN-2024 18:56, (unconfirmed)  No significant change was found  ECG 12 lead  Normal sinus rhythm  Rightward axis  T wave abnormality, consider anterolateral ischemia  Prolonged QT  Abnormal ECG  When compared with ECG of 21-NOV-2023 18:15, (unconfirmed)  T wave inversion now evident in Anterior leads      Assessment:    Patient Active Problem List   Diagnosis    Nonspecific abdominal pain    Acute cystitis without hematuria    Intentional drug overdose (CMS/HCC)    Depression with suicidal ideation    Chest pain, unspecified type        Assessment/Plan   Problem List Items Addressed This Visit             ICD-10-CM    * (Principal) Chest pain, unspecified type - Primary R07.9    Relevant Orders    Transthoracic Echo (TTE) Complete     Other  Visit Diagnoses         Codes    Dyspnea, unspecified type     R06.00    Precordial pain     R07.2          42 yo women admitted with Chest pain with significant Cardiac history  CAD s/p PCI 2018 LAD  CABG x1 LIMA-LAD 2022  Severe Mitral stenosis s/p MVR with bioprosthetic 2022  Tricuspid valve repair 2022  Chronic hypotension  DLD  Diabetes insipidus  Adrenal insufficiency  Hx Cocaine abuse     Plan:   ACS ruled out, ECGs with out acute or dynamic changes, HS trop <3 x3  Echocardiogram 6/2023 reviewed, preserved LVEF and normally functioning Mitral valve with trace mitral regurg  Pain is atypical and non-cardiac in nature  Agree with treatment of costochondritis-Short course Naproxen 500 mg BID  Continue all home cardiac medication as prescribed, no changes  Supportive treatment per primary team    Follow up as an OP with primary cardiologist    Thank you for the opportunity to participate in the care of your patient.  Do not hesitate to call if you have any questions.   Will follow peripherally.     Electronically signed by HEMALATHA Montoya on 1/29/2024 at 8:59 AM  ____________________________________________________________  Division of Cardiovascular Medicine  [facility]  [facility] and [facility]

## 2024-01-29 NOTE — PROGRESS NOTES
Patient is day 1 admission for chest pain.  ADOD 1-2 days.    Discussed plan of care in interdisciplinary rounds,  cardiology consult and ECHO pending.      Met with patient at bedside to discuss discharge planning. Patient is A&OX3 from home with her parents and her daughter, they reside in a two story home with one step to enter.   There are 12 steps to the second floor, patient denies difficulty navigating the stairs.  Prior to admission patient was independent in all ADL's and IADL's,  uses no DME, and does not drive.  Patient relies on her family to provide transportation.  Confirmed PCP is Freddy Bernabe.  Patient feels they have all the help they need at home and are currently denying the need for additional services and/or resources on discharge.       Home no need  DC plan secure

## 2024-01-29 NOTE — CARE PLAN
The patient's goals for the shift include Get some pain free rest this shift    The clinical goals for the shift include Patient to be pain free by end of shift  Patient only slept in small doses during the night. Pain was an issue and addressed with the HS provider multiple times. Tele NSR, long QT. VSS on room air.

## 2024-01-29 NOTE — ED PROVIDER NOTES
Limitations to history: None  Independent Historians: Family  External Records Reviewed: HIE, OARRS, outpatient notes, inpatient notes, paper charts if needed    History of Present Illness:  Patient is a 8 chest pain that radiates into her shoulder blades that started at 5 AM.  Patient reports a past medical history of drug abuse, COPD, coronary artery disease & CABG.patient reports she does not take blood thinners, that she does use birth control.  Patient reports that she is also been experiencing some shortness of breath.  Denies any fevers, denies vomiting and/or diarrhea.  Patient is alert and orient x 3 upon examination, no apparent distress.      Denies HA, ABD pain, Vomiting, Diarrhea, Weakness, Dizziness, Fever, Chills.    PMFSH:   As per HPI, otherwise nurses notes reviewed in EMR    Physical Exam:  Appearance: Alert, oriented x3, supine on exam table with head elevated, cooperative, in no acute distress. Well nourished & well hydrated.      Skin: Intact, dry skin, no lesions, rash, petechiae or purpura.     Eyes: PERRLA, EOMs intact, Conjunctiva pink with no redness or exudates. No scleral icterus.     Ears: Hearing grossly intact.      Nose: Nares patent, no epistaxis.     Mouth: Dentition without concerning abnormalities. no obstruction of posterior pharynx.     Neck: Supple, without meningismus. Trachea at midline.     Pulmonary: Clear bilaterally with good chest wall excursion. No rales, rhonchi or wheezing. No accessory muscle use or stridor. Talking in full sentences.     Cardiac: Normal S1, S2 without murmur, rub, gallop or extrasystole.     Abdomen: Soft, nontender to light and deep palpation to all quadrants, normoactive bowel sounds.  No palpable organomegaly.  No rebound or guarding.     Genitourinary: Physical exam deferred.     Musculoskeletal: Normal gait. Full range of motion to all extremities. Rest of the exam reveals no pain on palpation, instability, or deformity. Pulses full and equal.  No cyanosis or clubbing. capillary refill <2 seconds to all examined digits.     Neurological:  Cranial nerves II through XII are grossly intact, normal sensation, no weakness, no focal findings identified.      Psychiatric: Appropriate mood and affect.    EKG interpreted by me shows   Ventricular rate of  73 bpm  VT interval    180            ms  QTc     418/460                       ms  No T wave elevation or depression        Differential Diagnosis: MI, COPD, pulmonary embolism, bronchitis, influenza, COVID, RSV, pneumonia    Labs Reviewed   CBC WITH AUTO DIFFERENTIAL - Abnormal       Result Value    WBC 6.4      nRBC 0.0      RBC 5.09      Hemoglobin 13.0      Hematocrit 39.9      MCV 78 (*)     MCH 25.5 (*)     MCHC 32.6      RDW 17.3 (*)     Platelets 207      Neutrophils % 40.9      Immature Granulocytes %, Automated 0.2      Lymphocytes % 48.4      Monocytes % 4.7      Eosinophils % 4.4      Basophils % 1.4      Neutrophils Absolute 2.62      Immature Granulocytes Absolute, Automated 0.01      Lymphocytes Absolute 3.09      Monocytes Absolute 0.30      Eosinophils Absolute 0.28      Basophils Absolute 0.09     COMPREHENSIVE METABOLIC PANEL - Abnormal    Glucose 67 (*)     Sodium 135 (*)     Potassium 5.0      Chloride 100      Bicarbonate 28      Anion Gap 12      Urea Nitrogen 18      Creatinine 0.81      eGFR >90      Calcium 9.6      Albumin 4.7      Alkaline Phosphatase 82      Total Protein 7.9      AST 34      Bilirubin, Total 0.5      ALT 11     MAGNESIUM - Abnormal    Magnesium 2.42 (*)    D-DIMER, NON VTE - Normal    D-Dimer Non VTE, Quant (ng/mL FEU) 386      Narrative:     The D-Dimer assay is reported in ng/mL Fibrinogen Equivalent Units (FEU). The results of this assay should NOT be used for the exclusion of Deep Vein Thrombosis and/or Pulmonary Embolism.   SERIAL TROPONIN-INITIAL - Normal    Troponin I, High Sensitivity <3      Narrative:     Less than 99th percentile of normal range  cutoff-  Female and children under 18 years old <14 ng/L; Male <21 ng/L: Negative  Repeat testing should be performed if clinically indicated.     Female and children under 18 years old 14-50 ng/L; Male 21-50 ng/L:  Consistent with possible cardiac damage and possible increased clinical   risk. Serial measurements may help to assess extent of myocardial damage.     >50 ng/L: Consistent with cardiac damage, increased clinical risk and  myocardial infarction. Serial measurements may help assess extent of   myocardial damage.      NOTE: Children less than 1 year old may have higher baseline troponin   levels and results should be interpreted in conjunction with the overall   clinical context.     NOTE: Troponin I testing is performed using a different   testing methodology at [facility] than at other   [facility]. Direct result comparisons should only   be made within the same method.   B-TYPE NATRIURETIC PEPTIDE - Normal    BNP 31      Narrative:        <100 pg/mL - Heart failure unlikely  100-299 pg/mL - Intermediate probability of acute heart                  failure exacerbation. Correlate with clinical                  context and patient history.    >=300 pg/mL - Heart Failure likely. Correlate with clinical                  context and patient history.    BNP testing is performed using different testing methodology at [facility] than at other [facility]. Direct result comparisons should only be made within the same method.      SARS-COV-2 PCR, SYMPTOMATIC - Normal    Coronavirus 2019, PCR Not Detected      Narrative:     This assay has received FDA Emergency Use Authorization (EUA) and is only authorized for the duration of time that circumstances exist to justify the authorization of the emergency use of in vitro diagnostic tests for the detection of SARS-CoV-2 virus and/or diagnosis of COVID-19 infection under section 564(b)(1) of the Act, 21 U.S.C. 360bbb-3(b)(1). This  assay is an in vitro diagnostic nucleic acid amplification test for the qualitative detection of SARS-CoV-2 from nasopharyngeal specimens and has been validated for use at [facility]. Negative results do not preclude COVID-19 infections and should not be used as the sole basis for diagnosis, treatment, or other management decisions.     INFLUENZA A AND B PCR - Normal    Flu A Result Not Detected      Flu B Result Not Detected      Narrative:     This assay is an in vitro diagnostic multiplex nucleic acid amplification test for the detection and discrimination of Influenza A & B from nasopharyngeal specimens, and has been validated for use at [facility]. Negative results do not preclude Influenza A/B infections, and should not be used as the sole basis for diagnosis, treatment, or other management decisions. If Influenza A/B and RSV PCR results are negative, testing for Parainfluenza virus, Adenovirus and Metapneumovirus is routinely performed for [facility] oncology and intensive care inpatients, and is available on other patients by placing an add-on request.   RSV PCR - Normal    RSV PCR Not Detected      Narrative:     This assay is an FDA-cleared, in vitro diagnostic nucleic acid amplification test for the detection of RSV from nasopharyngeal specimens, and has been validated for use at [facility]. Negative results do not preclude RSV infections, and should not be used as the sole basis for diagnosis, treatment, or other management decisions. If Influenza A/B and RSV PCR results are negative, testing for Parainfluenza virus, Adenovirus and Metapneumovirus is routinely performed for pediatric oncology and intensive care inpatients at [facility], and is available on other patients by placing an add-on request.       TROPONIN SERIES- (INITIAL, 1 HR)    Narrative:     The following orders were created for panel order Troponin I Series, High  Sensitivity (0, 1 HR).  Procedure                               Abnormality         Status                     ---------                               -----------         ------                     Troponin I, High Sensiti...[[id number]]  Normal              Final result               Troponin, High Sensitivi...[[id number]]                                                   Please view results for these tests on the individual orders.   SERIAL TROPONIN, 1 HOUR      XR chest 1 view   Final Result   No acute findings.             Signed by: Regine Choudhury 1/28/2024 7:57 PM   Dictation workstation:   [id number]           Repeat Evaluation below    Summary:  Medical Decision Making:   Patient presented as described in HPI. Patient case including ROS, PE, and treatment and plan discussed with ED attending if attached as cosigner. Due to patients presentation orders completed include as documented.  Patient evaluated for complaints of chest pain, shortness of breath.  Chest x-ray revealed no acute findings.  Troponin level negative.  Patient is found to be RSV, influenza, COVID-negative.  Patient's BNP value of 31 obtained.  All lab work was within normal limits.  Magnesium slightly elevated.  All Case findings also discussed with ED assuming provider Dr. Little.      Tests/Medications/Escalations of Care considered but not given:    Patient care discussed with: N/A  Social Determinants affecting care: N/A    Final diagnosis and disposition as documented in impression         Disposition:  Discharge         This note has been transcribed using voice recognition and may contain grammatical errors, misplaced words, incorrect words, incorrect phrases or other errors.     Nunu Land, ROSENDO-CNP  01/28/24 [id number]

## 2024-01-29 NOTE — PROGRESS NOTES
"Lu Fall is a 43 y.o. female on day 1 of admission presenting with Chest pain, unspecified type.    Subjective     No overnight events reported.     Patient reports continued chest pain this morning. She states that she has had intermittent sharp chest pain since September of 2022 when she had her CABG. However, she is now experiencing a constant pressure that worsens with cough or deep breath. She says she had some associated dizziness and shortness of breath yesterday. She did not pass out or have any palpitations at that time. Chest pain is reproducible on exam. Cardiology is consulted to see the patient. Plan of care discussed with patient, all questions answered at this time.        Objective     Physical Exam  Constitutional:       General: She is not in acute distress.     Appearance: She is normal weight. She is not toxic-appearing.   HENT:      Head: Normocephalic and atraumatic.      Mouth/Throat:      Mouth: Mucous membranes are moist.   Eyes:      Conjunctiva/sclera: Conjunctivae normal.   Cardiovascular:      Rate and Rhythm: Normal rate and regular rhythm.      Heart sounds: No murmur heard.  Pulmonary:      Effort: No respiratory distress.      Breath sounds: Normal breath sounds.   Abdominal:      General: There is no distension.      Palpations: Abdomen is soft.      Tenderness: There is no abdominal tenderness.   Musculoskeletal:         General: No swelling.      Comments: Reproducible anterior chest wall tenderness   Skin:     General: Skin is warm and dry.      Findings: No rash.   Neurological:      Mental Status: She is alert and oriented to person, place, and time.   Psychiatric:         Mood and Affect: Mood normal.         Behavior: Behavior normal.         Last Recorded Vitals  Blood pressure 98/69, pulse 69, temperature 36.1 °C (97 °F), temperature source Temporal, resp. rate 22, height 1.6 m (5' 3\"), weight 60.3 kg (132 lb 15 oz), SpO2 95 %.  Intake/Output last 3 Shifts:  I/O last " 3 completed shifts:  In: 1060 (17.6 mL/kg) [P.O.:300; I.V.:710 (11.8 mL/kg); IV Piggyback:50]  Out: - (0 mL/kg)   Weight: 60.3 kg     Relevant Results        Scheduled medications  amantadine, 100 mg, oral, BID  ARIPiprazole, 15 mg, oral, Daily  ARIPiprazole, 2.5 mg, oral, Nightly  aspirin, 81 mg, oral, Daily  atorvastatin, 80 mg, oral, Nightly  desmopressin, 1 spray, One Nostril, Nightly  enoxaparin, 40 mg, subcutaneous, q24h  ezetimibe, 10 mg, oral, Daily  FLUoxetine, 10 mg, oral, Daily  [START ON 1/30/2024] hydrocortisone, 10 mg, oral, Daily before breakfast  hydrocortisone, 5 mg, oral, 2 times per day  levETIRAcetam, 750 mg, oral, BID  levothyroxine, 112 mcg, oral, Daily  lidocaine, 1 patch, transdermal, Daily  loratadine, 10 mg, oral, Daily  midodrine, 10 mg, oral, TID with meals  naproxen, 500 mg, oral, BID with meals  pantoprazole, 40 mg, oral, Daily  pregabalin, 100 mg, oral, BID  promethazine, , ,       Continuous medications  sodium chloride 0.9%, 100 mL/hr, Last Rate: 100 mL/hr (01/29/24 0005)      PRN medications  PRN medications: acetaminophen, alum-mag hydroxide-simeth, busPIRone, meclizine, methocarbamol, metoclopramide, nitroglycerin, oxygen, promethazine, promethazine, traMADol, zolpidem    Results for orders placed or performed during the hospital encounter of 01/28/24 (from the past 24 hour(s))   ECG 12 lead   Result Value Ref Range    Ventricular Rate 73 BPM    Atrial Rate 73 BPM    NC Interval 180 ms    QRS Duration 96 ms    QT Interval 418 ms    QTC Calculation(Bazett) 460 ms    P Axis 68 degrees    R Axis 98 degrees    T Axis 83 degrees    QRS Count 12 beats    Q Onset 221 ms    P Onset 131 ms    P Offset 177 ms    T Offset 430 ms    QTC Fredericia 446 ms   CBC and Auto Differential   Result Value Ref Range    WBC 6.4 4.4 - 11.3 x10*3/uL    nRBC 0.0 0.0 - 0.0 /100 WBCs    RBC 5.09 4.00 - 5.20 x10*6/uL    Hemoglobin 13.0 12.0 - 16.0 g/dL    Hematocrit 39.9 36.0 - 46.0 %    MCV 78 (L) 80 - 100  fL    MCH 25.5 (L) 26.0 - 34.0 pg    MCHC 32.6 32.0 - 36.0 g/dL    RDW 17.3 (H) 11.5 - 14.5 %    Platelets 207 150 - 450 x10*3/uL    Neutrophils % 40.9 40.0 - 80.0 %    Immature Granulocytes %, Automated 0.2 0.0 - 0.9 %    Lymphocytes % 48.4 13.0 - 44.0 %    Monocytes % 4.7 2.0 - 10.0 %    Eosinophils % 4.4 0.0 - 6.0 %    Basophils % 1.4 0.0 - 2.0 %    Neutrophils Absolute 2.62 1.20 - 7.70 x10*3/uL    Immature Granulocytes Absolute, Automated 0.01 0.00 - 0.70 x10*3/uL    Lymphocytes Absolute 3.09 1.20 - 4.80 x10*3/uL    Monocytes Absolute 0.30 0.10 - 1.00 x10*3/uL    Eosinophils Absolute 0.28 0.00 - 0.70 x10*3/uL    Basophils Absolute 0.09 0.00 - 0.10 x10*3/uL   Comprehensive Metabolic Panel   Result Value Ref Range    Glucose 67 (L) 74 - 99 mg/dL    Sodium 135 (L) 136 - 145 mmol/L    Potassium 5.0 3.5 - 5.3 mmol/L    Chloride 100 98 - 107 mmol/L    Bicarbonate 28 21 - 32 mmol/L    Anion Gap 12 10 - 20 mmol/L    Urea Nitrogen 18 6 - 23 mg/dL    Creatinine 0.81 0.50 - 1.05 mg/dL    eGFR >90 >60 mL/min/1.73m*2    Calcium 9.6 8.6 - 10.3 mg/dL    Albumin 4.7 3.4 - 5.0 g/dL    Alkaline Phosphatase 82 33 - 110 U/L    Total Protein 7.9 6.4 - 8.2 g/dL    AST 34 9 - 39 U/L    Bilirubin, Total 0.5 0.0 - 1.2 mg/dL    ALT 11 7 - 45 U/L   Magnesium   Result Value Ref Range    Magnesium 2.42 (H) 1.60 - 2.40 mg/dL   D-Dimer, Quantitative Non VTE   Result Value Ref Range    D-Dimer Non VTE, Quant (ng/mL FEU) 386 <=500 ng/mL FEU   Troponin I, High Sensitivity, Initial   Result Value Ref Range    Troponin I, High Sensitivity <3 0 - 13 ng/L   B-type natriuretic peptide   Result Value Ref Range    BNP 31 0 - 99 pg/mL   SARS-CoV-2 RT PCR   Result Value Ref Range    Coronavirus 2019, PCR Not Detected Not Detected   Influenza A, and B PCR   Result Value Ref Range    Flu A Result Not Detected Not Detected    Flu B Result Not Detected Not Detected   RSV PCR   Result Value Ref Range    RSV PCR Not Detected Not Detected   ECG 12 lead   Result  Value Ref Range    Ventricular Rate 67 BPM    Atrial Rate 67 BPM    CO Interval 188 ms    QRS Duration 96 ms    QT Interval 428 ms    QTC Calculation(Bazett) 452 ms    P Axis 40 degrees    R Axis 95 degrees    T Axis 83 degrees    QRS Count 11 beats    Q Onset 220 ms    P Onset 126 ms    P Offset 176 ms    T Offset 434 ms    QTC Fredericia 444 ms   Troponin, High Sensitivity, 1 Hour   Result Value Ref Range    Troponin I, High Sensitivity <3 0 - 13 ng/L   Troponin I, High Sensitivity   Result Value Ref Range    Troponin I, High Sensitivity <3 0 - 13 ng/L   ECG 12 lead Serial 0, 2, 4   Result Value Ref Range    Ventricular Rate 64 BPM    Atrial Rate 64 BPM    CO Interval 186 ms    QRS Duration 92 ms    QT Interval 478 ms    QTC Calculation(Bazett) 493 ms    P Axis 36 degrees    R Axis 83 degrees    T Axis 78 degrees    QRS Count 10 beats    Q Onset 220 ms    P Onset 127 ms    P Offset 175 ms    T Offset 459 ms    QTC Fredericia 488 ms   ECG 12 lead Serial 0, 2, 4   Result Value Ref Range    Ventricular Rate 59 BPM    Atrial Rate 59 BPM    CO Interval 162 ms    QRS Duration 92 ms    QT Interval 474 ms    QTC Calculation(Bazett) 469 ms    P Axis 21 degrees    R Axis 79 degrees    T Axis 74 degrees    QRS Count 10 beats    Q Onset 219 ms    P Onset 138 ms    P Offset 173 ms    T Offset 456 ms    QTC Fredericia 471 ms   C-Reactive Protein   Result Value Ref Range    C-Reactive Protein 1.40 (H) <1.00 mg/dL   CBC   Result Value Ref Range    WBC 5.8 4.4 - 11.3 x10*3/uL    nRBC 0.0 0.0 - 0.0 /100 WBCs    RBC 4.99 4.00 - 5.20 x10*6/uL    Hemoglobin 12.6 12.0 - 16.0 g/dL    Hematocrit 40.0 36.0 - 46.0 %    MCV 80 80 - 100 fL    MCH 25.3 (L) 26.0 - 34.0 pg    MCHC 31.5 (L) 32.0 - 36.0 g/dL    RDW 17.1 (H) 11.5 - 14.5 %    Platelets 204 150 - 450 x10*3/uL   Coagulation Screen   Result Value Ref Range    Protime 13.6 (H) 9.8 - 12.8 seconds    INR 1.2 (H) 0.9 - 1.1    aPTT 44 (H) 27 - 38 seconds   Basic Metabolic Panel   Result  Value Ref Range    Glucose 80 74 - 99 mg/dL    Sodium 139 136 - 145 mmol/L    Potassium 3.5 3.5 - 5.3 mmol/L    Chloride 101 98 - 107 mmol/L    Bicarbonate 30 21 - 32 mmol/L    Anion Gap 12 10 - 20 mmol/L    Urea Nitrogen 16 6 - 23 mg/dL    Creatinine 0.81 0.50 - 1.05 mg/dL    eGFR >90 >60 mL/min/1.73m*2    Calcium 9.1 8.6 - 10.3 mg/dL   Lipid Panel   Result Value Ref Range    Cholesterol 154 0 - 199 mg/dL    HDL-Cholesterol 44.2 mg/dL    Cholesterol/HDL Ratio 3.5     LDL Calculated 84 <=99 mg/dL    VLDL 26 0 - 40 mg/dL    Triglycerides 131 0 - 149 mg/dL    Non HDL Cholesterol 110 0 - 149 mg/dL       ECG 12 lead Serial 0, 2, 4    Result Date: 1/29/2024  Normal sinus rhythm Nonspecific T wave abnormality Prolonged QT Abnormal ECG When compared with ECG of 28-JAN-2024 20:26, (unconfirmed) No significant change was found    ECG 12 lead Serial 0, 2, 4    Result Date: 1/29/2024  Sinus bradycardia Otherwise normal ECG When compared with ECG of 28-JAN-2024 23:22, (unconfirmed) No significant change was found    ECG 12 lead    Result Date: 1/29/2024  Normal sinus rhythm Rightward axis T wave abnormality, consider anterior ischemia Abnormal ECG When compared with ECG of 28-JAN-2024 18:56, (unconfirmed) No significant change was found    ECG 12 lead    Result Date: 1/29/2024  Normal sinus rhythm Rightward axis T wave abnormality, consider anterolateral ischemia Prolonged QT Abnormal ECG When compared with ECG of 21-NOV-2023 18:15, (unconfirmed) T wave inversion now evident in Anterior leads    XR chest 1 view    Result Date: 1/28/2024  Interpreted By:  Regine Choudhury, STUDY: XR CHEST 1 VIEW; ;  1/28/2024 7:28 pm   INDICATION: Signs/Symptoms:cp, sob.   COMPARISON: 09/12/2023   ACCESSION NUMBER(S): [id number]   ORDERING CLINICIAN: KITTY CABRALES   TECHNIQUE: Portable chest   FINDINGS: Intact sternotomy wires. Mediastinal clips, likely from CABG. Lungs are clear. Heart size and mediastinal contours are within normal limits. No  evidence of pleural effusion or pneumothorax. Bones are unremarkable.       No acute findings.     Signed by: Regine Choudhury 1/28/2024 7:57 PM Dictation workstation:   [id number]                  Assessment/Plan   Principal Problem:    Chest pain, unspecified type    #Chest pain, ACS ruled out, likely 2/2 costochondritis   #CAD s/p CABG   #Hypotension, chronic  #HLD  - EKG with no ischemic changes   - Trop <3 x 3   - BNP 31   - D-dimer 386 on admission   - CRP 1.40   - Lipid panel WNL   - A1c pending   - Echo from 6/20223 with normal LVEF   - Continue aspirin, atorvastatin and ezetimibe - home meds   - Continue midodrine TID for hypotension   - Start treatment for costochondritis: naproxen 500 mg BID  - Lidocaine patch ordered   - Cardiac diet  - Telemetry monitoring  - Monitor HR and BP; last recorded HR 69 bpm, BP 98/69   - Stat EKG for worsening chest pain or new symptoms   - Cardiology consulted, agree with treating for costochondritis     #Adrenal insufficiency  - continue hydrocortisone    #Diabetes insipidus   - desmopressin nasal spray not stocked by pharmacy    #Hypothyroidism  - continue levothyroxine    #Cocaine use  #Anxiety  #Mixed obsessional thoughts  - Patient reports that she last used cocaine about a week ago  - Continue amantadine, aripiprazole, fluoxetine, levetiracetam, and pregabalin  - Buspar PRN for anxiety   - Encourage cocaine cessation on discharge; may be contributing to chest pain      DVT PPx: Lovenox   GI PPx: Protonix   Diet: Cardiac  Code Status: Full code     Disposition: Patient requires inpatient management at this time.     Total accumulated time spent face to face and not face to face preparing to see the patient, obtaining and reviewing separately obtained history; performing a medically appropriate examination and/or evaluation; counseling and educating the patient, family; ordering medications, tests, or procedures; referring and communicating with other health care professionals;  documenting clinical information in the patient's medical record; independently interpreting results and communicating the results to the patient, family; and care coordination was 45 minutes.     Nikki Solorio PA-C

## 2024-01-29 NOTE — DISCHARGE INSTR - OTHER ORDERS
Thank you for choosing [facility] for your Health Care needs.  As you transition from the hospital back to home, we hope we took your preferences into account on how you manage your health needs so you can manage your health at home.  You may receive a survey in the mail within a couple weeks.  Please take the time to complete it and return it.  Your input is ALWAYS important to us.  Thank you!  Danna Rodriguez Debbie, & Justo  Your Care Transition team, [phone]

## 2024-01-29 NOTE — H&P
History and Physical         Lu Fall 43 y.o. 1980     History Of Present Illness  Lu Fall is a 43 y.o. female presented to [facility] ED from home.  PMH COPD, coronary artery disease & CABG Patient presented today with right sided chest pain radiating to her back.  EKG at bedside NSR   CXR  Intact sternotomy wires. Mediastinal clips, likely from CABG. Lungs are clear. Heart size and mediastinal contours are within normal limits. No evidence of pleural effusion or pneumothorax. Bones are unremarkable.     Troponin  Serial all <3 -BNP 31  D Dimer 386  On exam patient resting in bed.  Patient alert x 4. Tearful. Patient c/o pain  between shoulders 10/10 that increases with inspiration. Patient denies CP SOB N/V/D      Past Medical History  Past Medical History:   Diagnosis Date    COPD (chronic obstructive pulmonary disease) (CMS/HCC)     Coronary artery disease     Personal history of other endocrine, nutritional and metabolic disease     History of hypopituitarism    Personal history of other mental and behavioral disorders     History of depression    Personal history of other specified conditions     History of brain tumor    Unspecified convulsions (CMS/HCC)     Convulsion        Surgical History  She has a past surgical history that includes Other surgical history (08/17/2013); MR angio head wo IV contrast (7/15/2021); MR angio neck wo IV contrast (7/15/2021); MR angio head wo IV contrast (11/28/2017); and MR angio head wo IV contrast (3/13/2018).     Social History  Social History     Socioeconomic History    Marital status:      Spouse name: Not on file    Number of children: Not on file    Years of education: Not on file    Highest education level: Not on file   Occupational History    Not on file   Tobacco Use    Smoking status: Never    Smokeless tobacco: Never   Vaping Use    Vaping Use: Never used   Substance and Sexual Activity    Alcohol use: Never     "Drug use: Yes     Types: \"Crack\" cocaine     Comment: last used 4 weeks ago    Sexual activity: Not on file   Other Topics Concern    Not on file   Social History Narrative    Not on file     Social Determinants of Health     Financial Resource Strain: Not on file   Food Insecurity: Not on file   Transportation Needs: Not on file   Physical Activity: Not on file   Stress: Not on file   Social Connections: Not on file   Intimate Partner Violence: Not on file   Housing Stability: Not on file        Family History  No family history on file.     Allergies  Allergies   Allergen Reactions    Adhesive Hives and Unknown    Hydrocodone-Acetaminophen Nausea/vomiting, Other and Unknown     Other reaction(s): Vomiting    Ondansetron Unknown and Other     prolonged QT    Client is unable to take this medication due  An elongated QT wave   Other reaction(s): Unknown   Prolonged QT per patient    Prolonged QT per patient    Hydromorphone Itching and Rash        Vital Signs  Temp:  [35.4 °C (95.7 °F)-36.1 °C (97 °F)] 35.4 °C (95.7 °F)  Heart Rate:  [61-81] 67  Resp:  [11-24] 22  BP: (127-149)/() 127/85    Home Medications   Prior to Admission Medications   Prescriptions Last Dose Informant Patient Reported? Taking?   ibuprofen 600 mg tablet   No No   Sig: Take 1 tablet (600 mg) by mouth every 8 hours if needed for mild pain (1 - 3) or fever (temp greater than 38.0 C).   promethazine (Phenergan) 25 mg tablet   No No   Sig: Take 0.5 tablets (12.5 mg) by mouth every 6 hours if needed for nausea or vomiting for up to 10 doses.      Facility-Administered Medications: None       New Hospital Orders  Current Facility-Administered Medications   Medication Dose Route Frequency Provider Last Rate Last Admin    aspirin chewable tablet 81 mg  81 mg oral Daily Star Duron APRN-DAMIAN        atorvastatin (Lipitor) tablet 80 mg  80 mg oral Nightly ROSENDO Boyd-CNP        enoxaparin (Lovenox) syringe 40 mg  40 mg subcutaneous q24h " "ROSENDO Boyd-DAMIAN        metoprolol tartrate (Lopressor) tablet 25 mg  25 mg oral q12h [facility] ROSENDO Boyd-DAMIAN        nitroglycerin (Nitrostat) SL tablet 0.4 mg  0.4 mg sublingual q5 min PRN HEMALATHA Boyd        oxygen (O2) therapy   inhalation Continuous PRN - O2/gases ROSENDO Boyd-CNP        sodium chloride 0.9% infusion  100 mL/hr intravenous Continuous DEA BoydCNP 100 mL/hr at 01/29/24 0005 100 mL/hr at 01/29/24 0005           Review of Systems   Musculoskeletal:  Positive for back pain.   All other systems reviewed and are negative.          Physical Exam  Constitutional:       Appearance: Normal appearance.   HENT:      Head: Normocephalic and atraumatic.      Nose: Nose normal.      Mouth/Throat:      Mouth: Mucous membranes are moist.      Pharynx: Oropharynx is clear.   Eyes:      Conjunctiva/sclera: Conjunctivae normal.      Pupils: Pupils are equal, round, and reactive to light.   Cardiovascular:      Rate and Rhythm: Normal rate and regular rhythm.      Pulses: Normal pulses.      Heart sounds: Normal heart sounds.   Pulmonary:      Effort: Pulmonary effort is normal.      Breath sounds: Normal breath sounds.   Abdominal:      General: Abdomen is flat. Bowel sounds are normal.      Palpations: Abdomen is soft.   Musculoskeletal:         General: Tenderness present.      Cervical back: Normal range of motion and neck supple.      Comments: Pain between shoulders    Skin:     General: Skin is warm and dry.   Neurological:      General: No focal deficit present.      Mental Status: She is alert and oriented to person, place, and time.   Psychiatric:      Comments: Anxious             Last Recorded Vitals  Blood pressure 127/85, pulse 67, temperature 35.4 °C (95.7 °F), temperature source Temporal, resp. rate 22, height 1.6 m (5' 3\"), weight 60.3 kg (132 lb 15 oz), SpO2 96 %.    Relevant Results  Results for orders placed or performed during the hospital encounter of " 01/28/24   CBC and Auto Differential   Result Value Ref Range    WBC 6.4 4.4 - 11.3 x10*3/uL    nRBC 0.0 0.0 - 0.0 /100 WBCs    RBC 5.09 4.00 - 5.20 x10*6/uL    Hemoglobin 13.0 12.0 - 16.0 g/dL    Hematocrit 39.9 36.0 - 46.0 %    MCV 78 (L) 80 - 100 fL    MCH 25.5 (L) 26.0 - 34.0 pg    MCHC 32.6 32.0 - 36.0 g/dL    RDW 17.3 (H) 11.5 - 14.5 %    Platelets 207 150 - 450 x10*3/uL    Neutrophils % 40.9 40.0 - 80.0 %    Immature Granulocytes %, Automated 0.2 0.0 - 0.9 %    Lymphocytes % 48.4 13.0 - 44.0 %    Monocytes % 4.7 2.0 - 10.0 %    Eosinophils % 4.4 0.0 - 6.0 %    Basophils % 1.4 0.0 - 2.0 %    Neutrophils Absolute 2.62 1.20 - 7.70 x10*3/uL    Immature Granulocytes Absolute, Automated 0.01 0.00 - 0.70 x10*3/uL    Lymphocytes Absolute 3.09 1.20 - 4.80 x10*3/uL    Monocytes Absolute 0.30 0.10 - 1.00 x10*3/uL    Eosinophils Absolute 0.28 0.00 - 0.70 x10*3/uL    Basophils Absolute 0.09 0.00 - 0.10 x10*3/uL   Comprehensive Metabolic Panel   Result Value Ref Range    Glucose 67 (L) 74 - 99 mg/dL    Sodium 135 (L) 136 - 145 mmol/L    Potassium 5.0 3.5 - 5.3 mmol/L    Chloride 100 98 - 107 mmol/L    Bicarbonate 28 21 - 32 mmol/L    Anion Gap 12 10 - 20 mmol/L    Urea Nitrogen 18 6 - 23 mg/dL    Creatinine 0.81 0.50 - 1.05 mg/dL    eGFR >90 >60 mL/min/1.73m*2    Calcium 9.6 8.6 - 10.3 mg/dL    Albumin 4.7 3.4 - 5.0 g/dL    Alkaline Phosphatase 82 33 - 110 U/L    Total Protein 7.9 6.4 - 8.2 g/dL    AST 34 9 - 39 U/L    Bilirubin, Total 0.5 0.0 - 1.2 mg/dL    ALT 11 7 - 45 U/L   Magnesium   Result Value Ref Range    Magnesium 2.42 (H) 1.60 - 2.40 mg/dL   D-Dimer, Quantitative Non VTE   Result Value Ref Range    D-Dimer Non VTE, Quant (ng/mL FEU) 386 <=500 ng/mL FEU   Troponin I, High Sensitivity, Initial   Result Value Ref Range    Troponin I, High Sensitivity <3 0 - 13 ng/L   B-type natriuretic peptide   Result Value Ref Range    BNP 31 0 - 99 pg/mL   SARS-CoV-2 RT PCR   Result Value Ref Range    Coronavirus 2019, PCR Not  Detected Not Detected   Influenza A, and B PCR   Result Value Ref Range    Flu A Result Not Detected Not Detected    Flu B Result Not Detected Not Detected   RSV PCR   Result Value Ref Range    RSV PCR Not Detected Not Detected   Troponin, High Sensitivity, 1 Hour   Result Value Ref Range    Troponin I, High Sensitivity <3 0 - 13 ng/L   Troponin I, High Sensitivity   Result Value Ref Range    Troponin I, High Sensitivity <3 0 - 13 ng/L        Imaging   XR chest 1 view    Result Date: 1/28/2024  Interpreted By:  Regine Choudhury, STUDY: XR CHEST 1 VIEW; ;  1/28/2024 7:28 pm   INDICATION: Signs/Symptoms:cp, sob.   COMPARISON: 09/12/2023   ACCESSION NUMBER(S): [id number]   ORDERING CLINICIAN: KITTY CABRALES   TECHNIQUE: Portable chest   FINDINGS: Intact sternotomy wires. Mediastinal clips, likely from CABG. Lungs are clear. Heart size and mediastinal contours are within normal limits. No evidence of pleural effusion or pneumothorax. Bones are unremarkable.       No acute findings.     Signed by: Regine Choudhury 1/28/2024 7:57 PM Dictation workstation:   [id number]        Assessment/Plan   Principal Problem:    Chest pain, unspecified type  Patient is a 43 year old female who presented today with right sided chest pain radiating to her back. Patient c/o pain 10/10 that increases with inspiration.   PMH COPD, coronary artery disease & CABG   - EKG at bedside NSR   -CXR  Intact sternotomy wires. Mediastinal clips, likely from CABG. Lungs are clear. Heart size and mediastinal contours are within normal limits. No evidence of pleural effusion or pneumothorax. Bones are unremarkable.     - Troponin  Serial all <3   -BNP 31  - D Dimer 386   -Aspirin 81 mg daily   - Atorvastatin 80 mg at bedtime   - Pending HbA1c   -Continuous telemetry monitoring  -Nitroglycerin as needed if BP appropriate  -Morphine given in ED with limited results   -Ordered Dilaudid IVP x 1 NOW   -Cardiology consulted, appreciate recommendations       DVT Prophylaxis  Lovenox   Fluids: PRN    Electrolytes: replace as needed  Nutrition: Cardiac  2 mg sodium   Adjuncts: PIV        Total accumulated time spent face to face and not face to face preparing to see the patient, obtaining and reviewing separately obtained history; performing a medically appropriate examination and/or evaluation; counseling and educating the patient, family; ordering medications, tests, or procedures; referring and communicating with other health care professionals; documenting clinical information in the patient's medical record; independently interpreting results and communicating the results to the patient, family; and care coordination was 40 minutes      ROSENDO Boyd-CNP

## 2024-01-29 NOTE — NURSING NOTE
Patient arrived to the floor with pain to the chest and back, rated it as a 10/10. Seen by JAVIER Duron and hydromorphone was given.  When woke for EKG Patient states   227 her pain was dull while sleeping but is now an 8/10, center chest and back. Bp 128/84, HR 64, pox 92 on room air. Here is her serial Ekg. Want me to give nitro?   Colt instructed to give nitroglycerin.  0320 Patient took one nitro, continues to rate pain at a 9/10. now has a HA and BP is down to 96/68  42 mins  DB  Star Duron, APRN-CNP     Provider states her BP is too low for pain meds and to give tylenol for headache. This was done and patient cooperative with plan.    0530 Patient rates pain as a 10/10 pressure and pain to breath. Also nausea. BP 97/68, HR 65, 96% on RA. Colt, NP ordered phenergan and stated BP is too soft for pain meds. Cardiology to see in am.

## 2024-01-29 NOTE — DISCHARGE INSTRUCTIONS
Costochondritis Discharge Instructions    About this topic  Your chest wall is made up of bones and cartilage. These protect the lungs and heart. Costochondritis is a pain or discomfort felt in your chest from your chest wall. You may feel a sharp stabbing or aching pain in your ribs. The area will likely be painful to touch. It often hurts worse when you take a deep breath or do certain movements. This illness can be caused by:  An injury to the ribs  A strain from exercise or putting too much stress on your body  Repeated coughing  Costochondritis can go away after a few days even without treatment. Your doctor may give you drugs to help with pain.    What care is needed at home?  Ask your doctor what you need to do when you go home. Make sure you ask questions if you do not understand what the doctor says. This way you will know what you need to do.  Get lots of rest.paragraph.  If the chest pain is because of coughing, put a cool-mist humidifier in your room.  For coughing, hold a pillow to your chest with your arms for support.  If your doctor tells you to use heat to help with pain, put a heating pad on your sore area for no more than 20 minutes at a time. Never go to sleep with a heating pad on as this can cause burns.  Avoid moving your arms and shoulders if it causes pain.  What follow-up care is needed?  Your doctor may ask you to make visits to the office to check on your progress. Be sure to keep these visits.  What drugs may be needed?  The doctor may order drugs to:  Help with pain  Reduce cough  Will physical activity be limited?  Limit movements that can trigger your chest wall pain.  You may have to limit your activities until your pain is gone. You can slowly increase your normal activities. Talk to your doctor about the right amount of activity for you.  What problems could happen?  Your pain may come back.  What can be done to prevent this health problem?  Avoid lifting heavy objects. This can  pull your chest muscles.  Avoid activities that can injure your chest.  When do I need to call the doctor?  Sudden breathing problems  Coughing up blood  Pain does not get better in a few days  Helpful tips  Try to relax when you feel pain in your chest. Slow, deep breathing will help.  Teach Back: Helping You Understand  The Teach Back Method helps you understand the information we are giving you. After you talk with the staff, tell them in your own words what you learned. This helps to make sure the staff has described each thing clearly. It also helps to explain things that may have been confusing. Before going home, make sure you can do these:  I can tell you about my condition.  I can tell you what may help ease my pain.  I can tell you what I will do if I have trouble breathing, I cough up blood, or my pain does not go away.

## 2024-01-29 NOTE — CARE PLAN
The patient's goals for the shift include Get some pain free rest this shift    The clinical goals for the shift include Patient to be pain free by end of shift  Patient had complaint of pain throughout shift and consulted with the provider multiple times. Several things were tried but by morning no further pain meds ordered. Cardiology to consult on the concern. Tolerated fluids well. TELE NSR with long QT, VSS

## 2024-01-29 NOTE — CARE PLAN
The patient's goals for the shift include Get some pain free rest this shift    The clinical goals for the shift include Pain will less than 5/10 after pain meds  Pt discharged to home with daughter. She will go home on antiinlammatory.  She rates chest pain at 3/10 now--no furthur nausea or emesis. Ambulating in room. Appetite good.  Up ad stefano.

## 2024-01-30 LAB
ATRIAL RATE: 65 BPM
P AXIS: 15 DEGREES
P OFFSET: 169 MS
P ONSET: 140 MS
PR INTERVAL: 158 MS
Q ONSET: 219 MS
QRS COUNT: 10 BEATS
QRS DURATION: 98 MS
QT INTERVAL: 450 MS
QTC CALCULATION(BAZETT): 468 MS
QTC FREDERICIA: 462 MS
R AXIS: 84 DEGREES
T AXIS: 90 DEGREES
T OFFSET: 444 MS
VENTRICULAR RATE: 65 BPM

## 2024-01-30 NOTE — DISCHARGE SUMMARY
Discharge Diagnosis  Chest pain, unspecified type    Issues Requiring Follow-Up      Discharge Meds     Your medication list        START taking these medications        Instructions Last Dose Given Next Dose Due   alum-mag hydroxide-simeth 200-200-20 mg/5 mL oral suspension  Commonly known as: Mylanta      Take 30 mL by mouth 4 times a day as needed for indigestion or heartburn.       lidocaine 4 % patch  Start taking on: January 30, 2024      Place 1 patch over 12 hours on the skin once daily. Remove & discard patch within 12 hours or as directed by MD. Do not start before January 30, 2024.       naproxen 500 mg tablet  Commonly known as: Naprosyn  Start taking on: January 30, 2024      Take 1 tablet (500 mg) by mouth 2 times a day with meals for 5 days. Do not start before January 30, 2024.              CONTINUE taking these medications        Instructions Last Dose Given Next Dose Due   acetaminophen 500 mg tablet  Commonly known as: Tylenol           Ajovy Syringe 225 mg/1.5 mL prefilled syringe  Generic drug: fremanezumab           amantadine 100 mg capsule  Commonly known as: Symmetrel           ARIPiprazole 15 mg tablet  Commonly known as: Abilify           ARIPiprazole 2 mg tablet  Commonly known as: Abilify           aspirin 81 mg chewable tablet           atorvastatin 80 mg tablet  Commonly known as: Lipitor           busPIRone 15 mg tablet  Commonly known as: Buspar           cetirizine 10 mg tablet  Commonly known as: ZyrTEC           desmopressin 10 mcg/spray (0.1 mL)  Commonly known as: DDAVP           ezetimibe 10 mg tablet  Commonly known as: Zetia           FLUoxetine 10 mg capsule  Commonly known as: PROzac           hydrocortisone 5 mg tablet  Commonly known as: Cortef           levETIRAcetam 750 mg tablet  Commonly known as: Keppra           levothyroxine 112 mcg tablet  Commonly known as: Synthroid, Levoxyl           meclizine 25 mg tablet  Commonly known as: Antivert           methocarbamol  500 mg tablet  Commonly known as: Robaxin           metoclopramide 10 mg tablet  Commonly known as: Reglan           midodrine 5 mg tablet  Commonly known as: Proamatine           pantoprazole 40 mg EC tablet  Commonly known as: ProtoNix           pregabalin 100 mg capsule  Commonly known as: Lyrica           promethazine 25 mg tablet  Commonly known as: Phenergan      Take 0.5 tablets (12.5 mg) by mouth every 6 hours if needed for nausea or vomiting for up to 10 doses.       ubrogepant 100 mg tablet tablet  Commonly known as: Ubrelvy           zolpidem 10 mg tablet  Commonly known as: Ambien                  STOP taking these medications      ibuprofen 600 mg tablet                  Where to Get Your Medications        These medications were sent to [facility] [address] [place] [address]  [address] [place] [address]      Phone: [phone]   alum-mag hydroxide-simeth 200-200-20 mg/5 mL oral suspension  lidocaine 4 % patch  naproxen 500 mg tablet         Test Results Pending At Discharge  Pending Labs       No current pending labs.            Hospital Course   Lu Fall is a 43 y.o. female presented to [facility] ED from home.  PMH COPD, coronary artery disease & CABG Patient presented today with right sided chest pain radiating to her back.  EKG at bedside NSR   CXR  Intact sternotomy wires. Mediastinal clips, likely from CABG. Lungs are clear. Heart size and mediastinal contours are within normal limits. No evidence of pleural effusion or pneumothorax. Bones are unremarkable.     Troponin  Serial all <3 -BNP 31  D Dimer 386  On exam patient resting in bed.  Patient alert x 4. Tearful. Patient c/o pain  between shoulders 10/10 that increases with inspiration. Patient denies CP SOB N/V/D     #Chest pain, ACS ruled out, likely 2/2 costochondritis   #CAD s/p CABG   #Hypotension, chronic  #HLD  - EKG with no ischemic changes   - Trop <3 x 3   - BNP 31   - D-dimer 386 on admission    - CRP 1.40   - Lipid panel WNL   - A1c 5.0  - Echo from 6/20223 with normal LVEF   - Continue aspirin, atorvastatin and ezetimibe - home meds   - Continue midodrine TID for hypotension   - Start treatment for costochondritis: naproxen 500 mg BID  - Lidocaine patch ordered   - Cardiac diet  - Telemetry monitoring  - Monitor HR and BP; last recorded HR 79 bpm, /71   - Stat EKG for worsening chest pain or new symptoms   - Cardiology consulted, agree with treating for costochondritis      #Adrenal insufficiency  - continue hydrocortisone     #Diabetes insipidus   - desmopressin nasal spray not stocked by pharmacy     #Hypothyroidism  - continue levothyroxine     #Cocaine use  #Anxiety  #Mixed obsessional thoughts  - Patient reports that she last used cocaine about a week ago  - Continue amantadine, aripiprazole, fluoxetine, levetiracetam, and pregabalin  - Buspar PRN for anxiety   - Encourage cocaine cessation on discharge; may be contributing to chest pain      DVT PPx: Lovenox   GI PPx: Protonix   Diet: Cardiac  Code Status: Full code     Disposition: Patient stable for discharge home. She is discharged on naproxen 500 mg PO BID x 5 days for costochondritis. Rx for lidocaine patch and Maalox sent to pharmacy. Patient will follow up with her PCP and cardiology on discharge.     Total cumulative time spent in preparation of this discharge including documentation review, coordination of care with the medical team including PT/SW/care coordinators and treating consultants, discussion with patient and pertinent family members and finalization of prescriptions, follow-up appointments, and this discharge summary was approximately 45 minutes.     Pertinent Physical Exam At Time of Discharge  Physical Exam  Constitutional:       General: She is not in acute distress.     Appearance: She is not toxic-appearing.   HENT:      Head: Normocephalic and atraumatic.      Mouth/Throat:      Mouth: Mucous membranes are moist.    Eyes:      Conjunctiva/sclera: Conjunctivae normal.   Cardiovascular:      Rate and Rhythm: Normal rate and regular rhythm.      Heart sounds: No murmur heard.  Pulmonary:      Effort: No respiratory distress.      Breath sounds: Normal breath sounds.   Abdominal:      General: There is no distension.      Palpations: Abdomen is soft.      Tenderness: There is no abdominal tenderness.   Musculoskeletal:         General: No swelling.      Comments: Reproducible anterior chest wall tenderness    Skin:     General: Skin is warm and dry.      Findings: No rash.   Neurological:      Mental Status: She is alert and oriented to person, place, and time.   Psychiatric:         Mood and Affect: Mood normal.         Behavior: Behavior normal.         Outpatient Follow-Up  No future appointments.      Nikki Solorio PA-C

## 2024-01-31 LAB
ATRIAL RATE: 67 BPM
ATRIAL RATE: 73 BPM
P AXIS: 40 DEGREES
P AXIS: 68 DEGREES
P OFFSET: 176 MS
P OFFSET: 177 MS
P ONSET: 126 MS
P ONSET: 131 MS
PR INTERVAL: 180 MS
PR INTERVAL: 188 MS
Q ONSET: 220 MS
Q ONSET: 221 MS
QRS COUNT: 11 BEATS
QRS COUNT: 12 BEATS
QRS DURATION: 96 MS
QRS DURATION: 96 MS
QT INTERVAL: 418 MS
QT INTERVAL: 428 MS
QTC CALCULATION(BAZETT): 452 MS
QTC CALCULATION(BAZETT): 460 MS
QTC FREDERICIA: 444 MS
QTC FREDERICIA: 446 MS
R AXIS: 95 DEGREES
R AXIS: 98 DEGREES
T AXIS: 83 DEGREES
T AXIS: 83 DEGREES
T OFFSET: 430 MS
T OFFSET: 434 MS
VENTRICULAR RATE: 67 BPM
VENTRICULAR RATE: 73 BPM

## 2024-02-05 ENCOUNTER — HOSPITAL ENCOUNTER (EMERGENCY)
Facility: HOSPITAL | Age: 44
Discharge: HOME | End: 2024-02-05
Attending: PHYSICIAN ASSISTANT
Payer: MEDICARE

## 2024-02-05 ENCOUNTER — APPOINTMENT (OUTPATIENT)
Dept: CARDIOLOGY | Facility: HOSPITAL | Age: 44
End: 2024-02-05
Payer: MEDICARE

## 2024-02-05 ENCOUNTER — APPOINTMENT (OUTPATIENT)
Dept: RADIOLOGY | Facility: HOSPITAL | Age: 44
End: 2024-02-05
Payer: MEDICARE

## 2024-02-05 VITALS
OXYGEN SATURATION: 97 % | TEMPERATURE: 96.9 F | HEART RATE: 84 BPM | RESPIRATION RATE: 15 BRPM | DIASTOLIC BLOOD PRESSURE: 80 MMHG | WEIGHT: 130 LBS | SYSTOLIC BLOOD PRESSURE: 139 MMHG | BODY MASS INDEX: 23.92 KG/M2 | HEIGHT: 62 IN

## 2024-02-05 DIAGNOSIS — R07.89 CHEST WALL PAIN: Primary | ICD-10-CM

## 2024-02-05 DIAGNOSIS — R11.2 NAUSEA AND VOMITING, UNSPECIFIED VOMITING TYPE: ICD-10-CM

## 2024-02-05 LAB
ALBUMIN SERPL BCP-MCNC: 4.5 G/DL (ref 3.4–5)
ALP SERPL-CCNC: 94 U/L (ref 33–110)
ALT SERPL W P-5'-P-CCNC: 9 U/L (ref 7–45)
ANION GAP SERPL CALC-SCNC: 16 MMOL/L (ref 10–20)
APTT PPP: 40 SECONDS (ref 27–38)
AST SERPL W P-5'-P-CCNC: 22 U/L (ref 9–39)
BASOPHILS # BLD AUTO: 0.09 X10*3/UL (ref 0–0.1)
BASOPHILS NFR BLD AUTO: 1 %
BILIRUB SERPL-MCNC: 1 MG/DL (ref 0–1.2)
BUN SERPL-MCNC: 9 MG/DL (ref 6–23)
CALCIUM SERPL-MCNC: 9.7 MG/DL (ref 8.6–10.3)
CARDIAC TROPONIN I PNL SERPL HS: 16 NG/L (ref 0–13)
CARDIAC TROPONIN I PNL SERPL HS: 18 NG/L (ref 0–13)
CHLORIDE SERPL-SCNC: 103 MMOL/L (ref 98–107)
CO2 SERPL-SCNC: 22 MMOL/L (ref 21–32)
CREAT SERPL-MCNC: 0.77 MG/DL (ref 0.5–1.05)
EGFRCR SERPLBLD CKD-EPI 2021: >90 ML/MIN/1.73M*2
EOSINOPHIL # BLD AUTO: 0.17 X10*3/UL (ref 0–0.7)
EOSINOPHIL NFR BLD AUTO: 1.8 %
ERYTHROCYTE [DISTWIDTH] IN BLOOD BY AUTOMATED COUNT: 17.8 % (ref 11.5–14.5)
FLUAV RNA RESP QL NAA+PROBE: NOT DETECTED
FLUBV RNA RESP QL NAA+PROBE: NOT DETECTED
GLUCOSE SERPL-MCNC: 79 MG/DL (ref 74–99)
HCT VFR BLD AUTO: 46.4 % (ref 36–46)
HGB BLD-MCNC: 14.3 G/DL (ref 12–16)
HOLD SPECIMEN: NORMAL
IMM GRANULOCYTES # BLD AUTO: 0.02 X10*3/UL (ref 0–0.7)
IMM GRANULOCYTES NFR BLD AUTO: 0.2 % (ref 0–0.9)
INR PPP: 1.2 (ref 0.9–1.1)
LYMPHOCYTES # BLD AUTO: 2.67 X10*3/UL (ref 1.2–4.8)
LYMPHOCYTES NFR BLD AUTO: 29 %
MAGNESIUM SERPL-MCNC: 2.61 MG/DL (ref 1.6–2.4)
MCH RBC QN AUTO: 24.7 PG (ref 26–34)
MCHC RBC AUTO-ENTMCNC: 30.8 G/DL (ref 32–36)
MCV RBC AUTO: 80 FL (ref 80–100)
MONOCYTES # BLD AUTO: 0.41 X10*3/UL (ref 0.1–1)
MONOCYTES NFR BLD AUTO: 4.5 %
NEUTROPHILS # BLD AUTO: 5.85 X10*3/UL (ref 1.2–7.7)
NEUTROPHILS NFR BLD AUTO: 63.5 %
NRBC BLD-RTO: 0 /100 WBCS (ref 0–0)
PLATELET # BLD AUTO: 247 X10*3/UL (ref 150–450)
POTASSIUM SERPL-SCNC: 4.1 MMOL/L (ref 3.5–5.3)
PROT SERPL-MCNC: 8.3 G/DL (ref 6.4–8.2)
PROTHROMBIN TIME: 13.2 SECONDS (ref 9.8–12.8)
RBC # BLD AUTO: 5.8 X10*6/UL (ref 4–5.2)
RSV RNA RESP QL NAA+PROBE: NOT DETECTED
SARS-COV-2 RNA RESP QL NAA+PROBE: NOT DETECTED
SODIUM SERPL-SCNC: 137 MMOL/L (ref 136–145)
WBC # BLD AUTO: 9.2 X10*3/UL (ref 4.4–11.3)

## 2024-02-05 PROCEDURE — 99285 EMERGENCY DEPT VISIT HI MDM: CPT | Mod: 25 | Performed by: PHYSICIAN ASSISTANT

## 2024-02-05 PROCEDURE — 99284 EMERGENCY DEPT VISIT MOD MDM: CPT | Mod: 25,27

## 2024-02-05 PROCEDURE — 87637 SARSCOV2&INF A&B&RSV AMP PRB: CPT | Performed by: PHYSICIAN ASSISTANT

## 2024-02-05 PROCEDURE — 36415 COLL VENOUS BLD VENIPUNCTURE: CPT | Performed by: PHYSICIAN ASSISTANT

## 2024-02-05 PROCEDURE — 85610 PROTHROMBIN TIME: CPT | Performed by: PHYSICIAN ASSISTANT

## 2024-02-05 PROCEDURE — 71045 X-RAY EXAM CHEST 1 VIEW: CPT

## 2024-02-05 PROCEDURE — 71045 X-RAY EXAM CHEST 1 VIEW: CPT | Performed by: RADIOLOGY

## 2024-02-05 PROCEDURE — 96374 THER/PROPH/DIAG INJ IV PUSH: CPT | Mod: 59

## 2024-02-05 PROCEDURE — 85730 THROMBOPLASTIN TIME PARTIAL: CPT | Performed by: PHYSICIAN ASSISTANT

## 2024-02-05 PROCEDURE — 83735 ASSAY OF MAGNESIUM: CPT | Performed by: PHYSICIAN ASSISTANT

## 2024-02-05 PROCEDURE — 93005 ELECTROCARDIOGRAM TRACING: CPT

## 2024-02-05 PROCEDURE — 2500000004 HC RX 250 GENERAL PHARMACY W/ HCPCS (ALT 636 FOR OP/ED): Mod: SE | Performed by: PHYSICIAN ASSISTANT

## 2024-02-05 PROCEDURE — 84484 ASSAY OF TROPONIN QUANT: CPT | Performed by: PHYSICIAN ASSISTANT

## 2024-02-05 PROCEDURE — 96372 THER/PROPH/DIAG INJ SC/IM: CPT

## 2024-02-05 PROCEDURE — 96376 TX/PRO/DX INJ SAME DRUG ADON: CPT

## 2024-02-05 PROCEDURE — 96361 HYDRATE IV INFUSION ADD-ON: CPT

## 2024-02-05 PROCEDURE — 85025 COMPLETE CBC W/AUTO DIFF WBC: CPT | Performed by: PHYSICIAN ASSISTANT

## 2024-02-05 PROCEDURE — 80053 COMPREHEN METABOLIC PANEL: CPT | Performed by: PHYSICIAN ASSISTANT

## 2024-02-05 PROCEDURE — 96375 TX/PRO/DX INJ NEW DRUG ADDON: CPT

## 2024-02-05 RX ORDER — KETOROLAC TROMETHAMINE 15 MG/ML
15 INJECTION, SOLUTION INTRAMUSCULAR; INTRAVENOUS ONCE
Status: DISCONTINUED | OUTPATIENT
Start: 2024-02-05 | End: 2024-02-05

## 2024-02-05 RX ORDER — KETOROLAC TROMETHAMINE 15 MG/ML
15 INJECTION, SOLUTION INTRAMUSCULAR; INTRAVENOUS ONCE
Status: COMPLETED | OUTPATIENT
Start: 2024-02-05 | End: 2024-02-05

## 2024-02-05 RX ADMIN — KETOROLAC TROMETHAMINE 15 MG: 15 INJECTION INTRAMUSCULAR; INTRAVENOUS at 20:26

## 2024-02-05 RX ADMIN — SODIUM CHLORIDE 1000 ML: 9 INJECTION, SOLUTION INTRAVENOUS at 17:35

## 2024-02-05 RX ADMIN — KETOROLAC TROMETHAMINE 15 MG: 15 INJECTION INTRAMUSCULAR; INTRAVENOUS at 20:28

## 2024-02-05 RX ADMIN — PROMETHAZINE HYDROCHLORIDE 12.5 MG: 25 INJECTION INTRAMUSCULAR; INTRAVENOUS at 17:35

## 2024-02-05 ASSESSMENT — PAIN DESCRIPTION - LOCATION
LOCATION: CHEST
LOCATION: CHEST
LOCATION: BACK
LOCATION: CHEST

## 2024-02-05 ASSESSMENT — COLUMBIA-SUICIDE SEVERITY RATING SCALE - C-SSRS
2. HAVE YOU ACTUALLY HAD ANY THOUGHTS OF KILLING YOURSELF?: NO
6. HAVE YOU EVER DONE ANYTHING, STARTED TO DO ANYTHING, OR PREPARED TO DO ANYTHING TO END YOUR LIFE?: NO
2. HAVE YOU ACTUALLY HAD ANY THOUGHTS OF KILLING YOURSELF?: NO
1. IN THE PAST MONTH, HAVE YOU WISHED YOU WERE DEAD OR WISHED YOU COULD GO TO SLEEP AND NOT WAKE UP?: NO
6. HAVE YOU EVER DONE ANYTHING, STARTED TO DO ANYTHING, OR PREPARED TO DO ANYTHING TO END YOUR LIFE?: NO
1. IN THE PAST MONTH, HAVE YOU WISHED YOU WERE DEAD OR WISHED YOU COULD GO TO SLEEP AND NOT WAKE UP?: NO

## 2024-02-05 ASSESSMENT — PAIN SCALES - GENERAL
PAINLEVEL_OUTOF10: 9
PAINLEVEL_OUTOF10: 10 - WORST POSSIBLE PAIN
PAINLEVEL_OUTOF10: 9
PAINLEVEL_OUTOF10: 6
PAINLEVEL_OUTOF10: 10 - WORST POSSIBLE PAIN

## 2024-02-05 ASSESSMENT — PAIN - FUNCTIONAL ASSESSMENT
PAIN_FUNCTIONAL_ASSESSMENT: 0-10

## 2024-02-05 NOTE — ED PROVIDER NOTES
"HPI   Chief Complaint   Patient presents with    Chest Pain    Shortness of Breath    Illness    Nausea       43-year-old female with past medical history positive for COPD cranial tumor removal patient says CABG for valve replacement and cardiac stent placement 4-day history of cough runny nose sneezing body aches and headache and then today developed left side chest wall pain worse with certain movements no fevers that she knows of                              No data recorded                Patient History   Past Medical History:   Diagnosis Date    COPD (chronic obstructive pulmonary disease) (CMS/HCC)     Coronary artery disease     Personal history of other endocrine, nutritional and metabolic disease     History of hypopituitarism    Personal history of other mental and behavioral disorders     History of depression    Personal history of other specified conditions     History of brain tumor    Unspecified convulsions (CMS/HCC)     Convulsion     Past Surgical History:   Procedure Laterality Date    MR HEAD ANGIO WO IV CONTRAST  7/15/2021    MR HEAD ANGIO WO IV CONTRAST 7/15/2021 [facility]    MR HEAD ANGIO WO IV CONTRAST  11/28/2017    MR HEAD ANGIO WO IV CONTRAST [facility]    MR HEAD ANGIO WO IV CONTRAST  3/13/2018    MR HEAD ANGIO WO IV CONTRAST [facility]    MR NECK ANGIO WO IV CONTRAST  7/15/2021    MR NECK ANGIO WO IV CONTRAST 7/15/2021 [facility]    OTHER SURGICAL HISTORY  08/17/2013    Craniotomy Tumor Removal - Complete     No family history on file.  Social History     Tobacco Use    Smoking status: Never    Smokeless tobacco: Never   Vaping Use    Vaping Use: Never used   Substance Use Topics    Alcohol use: Never    Drug use: Yes     Types: \"Crack\" cocaine     Comment: last used 4 weeks ago       Physical Exam   ED Triage Vitals [02/05/24 1656]   Temperature Heart Rate Respirations BP   36.1 °C (96.9 °F) 83 16 (!) 112/95      Pulse Ox Temp src Heart Rate " Source Patient Position   96 % -- -- Lying      BP Location FiO2 (%)     Left arm --       Physical Exam  Vitals and nursing note reviewed.   Constitutional:       General: She is not in acute distress.     Appearance: She is well-developed.   HENT:      Head: Normocephalic and atraumatic.      Right Ear: Tympanic membrane, ear canal and external ear normal.      Left Ear: Tympanic membrane, ear canal and external ear normal.      Nose: Nose normal.      Mouth/Throat:      Mouth: Mucous membranes are moist.   Eyes:      Conjunctiva/sclera: Conjunctivae normal.   Cardiovascular:      Rate and Rhythm: Normal rate and regular rhythm.      Heart sounds: No murmur heard.     Comments: Left side chest wall pain with palp  Pulmonary:      Effort: Pulmonary effort is normal. No respiratory distress.      Breath sounds: Normal breath sounds.   Abdominal:      Palpations: Abdomen is soft.      Tenderness: There is no abdominal tenderness.   Musculoskeletal:         General: No swelling.      Cervical back: Neck supple.   Skin:     General: Skin is warm and dry.      Capillary Refill: Capillary refill takes less than 2 seconds.   Neurological:      Mental Status: She is alert.   Psychiatric:         Mood and Affect: Mood normal.         ED Course & MDM   Diagnoses as of 02/05/24 2137   Chest wall pain   Nausea and vomiting, unspecified vomiting type       Medical Decision Making  Patient with left-sided chest wall pain labs did show mild elevation in troponin that came down with the second troponin EKG shows no acute changes imaging was negative for acute pneumonia  The nausea and vomiting resolved and patient has been eating and drinking without any difficulty she was given IV fluids will be discharged home with close follow-up for recheck  With her primary doctor return if symptoms get worse    XR chest 1 view   Final Result    1.  No active cardiopulmonary process.                Signed by: Carlos A Bonilla 2/5/2024 5:57 PM     Dictation workstation:   [id number]       Labs Reviewed  CBC WITH AUTO DIFFERENTIAL - Abnormal     WBC                           9.2                    nRBC                          0.0                    RBC                           5.80 (*)               Hemoglobin                    14.3                   Hematocrit                    46.4 (*)               MCV                           80                     MCH                           24.7 (*)               MCHC                          30.8 (*)               RDW                           17.8 (*)               Platelets                     247                    Neutrophils %                 63.5                   Immature Granulocytes %, Automated   0.2                    Lymphocytes %                 29.0                   Monocytes %                   4.5                    Eosinophils %                 1.8                    Basophils %                   1.0                    Neutrophils Absolute          5.85                   Immature Granulocytes Absolute, Au*   0.02                   Lymphocytes Absolute          2.67                   Monocytes Absolute            0.41                   Eosinophils Absolute          0.17                   Basophils Absolute            0.09                MAGNESIUM - Abnormal     Magnesium                     2.61 (*)            COMPREHENSIVE METABOLIC PANEL - Abnormal     Glucose                       79                     Sodium                        137                    Potassium                     4.1                    Chloride                      103                    Bicarbonate                   22                     Anion Gap                     16                     Urea Nitrogen                 9                      Creatinine                    0.77                   eGFR                          >90                    Calcium                       9.7                    Albumin                        4.5                    Alkaline Phosphatase          94                     Total Protein                 8.3 (*)                AST                           22                     Bilirubin, Total              1.0                    ALT                           9                   PROTIME-INR - Abnormal     Protime                       13.2 (*)               INR                           1.2 (*)             APTT - Abnormal     aPTT                          40 (*)                     Narrative: The APTT is no longer used for monitoring Unfractionated Heparin Therapy. For monitoring Heparin Therapy, use the Heparin Assay.  TROPONIN I, HIGH SENSITIVITY - Abnormal     Troponin I, High Sensitivity   18 (*)                     Narrative: Less than 99th percentile of normal range cutoff-                  Female and children under 18 years old <14 ng/L; Male <21 ng/L: Negative                  Repeat testing should be performed if clinically indicated.                                     Female and children under 18 years old 14-50 ng/L; Male 21-50 ng/L:                  Consistent with possible cardiac damage and possible increased clinical                   risk. Serial measurements may help to assess extent of myocardial damage.                                     >50 ng/L: Consistent with cardiac damage, increased clinical risk and                  myocardial infarction. Serial measurements may help assess extent of                   myocardial damage.                                      NOTE: Children less than 1 year old may have higher baseline troponin                   levels and results should be interpreted in conjunction with the overall                   clinical context.                                     NOTE: Troponin I testing is performed using a different                   testing methodology at [facility] than at other                   [facility]. Direct result  comparisons should only                   be made within the same method.  TROPONIN I, HIGH SENSITIVITY - Abnormal     Troponin I, High Sensitivity   16 (*)                     Narrative: Less than 99th percentile of normal range cutoff-                  Female and children under 18 years old <14 ng/L; Male <21 ng/L: Negative                  Repeat testing should be performed if clinically indicated.                                     Female and children under 18 years old 14-50 ng/L; Male 21-50 ng/L:                  Consistent with possible cardiac damage and possible increased clinical                   risk. Serial measurements may help to assess extent of myocardial damage.                                     >50 ng/L: Consistent with cardiac damage, increased clinical risk and                  myocardial infarction. Serial measurements may help assess extent of                   myocardial damage.                                      NOTE: Children less than 1 year old may have higher baseline troponin                   levels and results should be interpreted in conjunction with the overall                   clinical context.                                     NOTE: Troponin I testing is performed using a different                   testing methodology at [facility] than at other                   [facility]. Direct result comparisons should only                   be made within the same method.  SARS-COV-2 AND INFLUENZA A/B PCR - Normal     Flu A Result                                         Flu B Result                                         Coronavirus 2019, PCR                                    Narrative: This assay has received FDA Emergency Use Authorization (EUA) and  is only authorized for the duration of time that circumstances exist to justify the authorization of the emergency use of in vitro diagnostic tests for the detection of SARS-CoV-2 virus and/or diagnosis of  COVID-19 infection under section 564(b)(1) of the Act, 21 U.S.C. 360bbb-3(b)(1). Testing for SARS-CoV-2 is only recommended for patients who meet current clinical and/or epidemiological criteria as defined by federal, state, or local public health directives. This assay is an in vitro diagnostic nucleic acid amplification test for the qualitative detection of SARS-CoV-2, Influenza A, and Influenza B from nasopharyngeal specimens and has been validated for use at [facility]. Negative results do not preclude COVID-19 infections or Influenza A/B infections, and should not be used as the sole basis for diagnosis, treatment, or other management decisions. If Influenza A/B and RSV PCR results are negative, testing for Parainfluenza virus, Adenovirus and Metapneumovirus is routinely performed for [facility] pediatric oncology and intensive care inpatients, and is available on other patients by placing an add-on request.   RSV PCR - Normal      Amount and/or Complexity of Data Reviewed  ECG/medicine tests: independent interpretation performed.     Details: EKG shows sinus rhythm rate of 81 rightward axis ST/T wave changes and prolonged QT of QTc 520 unchanged compared to previous EKG        Procedure  Procedures     Charmaine Burdick PA-C  02/05/24 1728       Charmaine Burdick PA-C  02/05/24 1810       Charmaine Burdick PA-C  02/05/24 1906       Charmaine Burdick PA-C  02/05/24 2140

## 2024-02-09 ENCOUNTER — APPOINTMENT (OUTPATIENT)
Dept: CARDIOLOGY | Facility: HOSPITAL | Age: 44
End: 2024-02-09
Payer: MEDICARE

## 2024-02-09 ENCOUNTER — HOSPITAL ENCOUNTER (OUTPATIENT)
Dept: CARDIOLOGY | Facility: HOSPITAL | Age: 44
Discharge: HOME | End: 2024-02-09
Payer: MEDICARE

## 2024-02-09 ENCOUNTER — APPOINTMENT (OUTPATIENT)
Dept: RADIOLOGY | Facility: HOSPITAL | Age: 44
End: 2024-02-09
Payer: MEDICARE

## 2024-02-09 ENCOUNTER — HOSPITAL ENCOUNTER (EMERGENCY)
Facility: HOSPITAL | Age: 44
Discharge: HOME | End: 2024-02-10
Attending: STUDENT IN AN ORGANIZED HEALTH CARE EDUCATION/TRAINING PROGRAM
Payer: MEDICARE

## 2024-02-09 DIAGNOSIS — R07.9 CHEST PAIN, UNSPECIFIED TYPE: Primary | ICD-10-CM

## 2024-02-09 LAB
ALBUMIN SERPL BCP-MCNC: 4.8 G/DL (ref 3.4–5)
ALP SERPL-CCNC: 101 U/L (ref 33–110)
ALT SERPL W P-5'-P-CCNC: 7 U/L (ref 7–45)
ANION GAP SERPL CALC-SCNC: 13 MMOL/L (ref 10–20)
AST SERPL W P-5'-P-CCNC: 17 U/L (ref 9–39)
ATRIAL RATE: 81 BPM
BASOPHILS # BLD AUTO: 0.09 X10*3/UL (ref 0–0.1)
BASOPHILS NFR BLD AUTO: 1.5 %
BILIRUB SERPL-MCNC: 0.6 MG/DL (ref 0–1.2)
BNP SERPL-MCNC: 85 PG/ML (ref 0–99)
BUN SERPL-MCNC: 10 MG/DL (ref 6–23)
CALCIUM SERPL-MCNC: 9.8 MG/DL (ref 8.6–10.3)
CARDIAC TROPONIN I PNL SERPL HS: 6 NG/L (ref 0–13)
CARDIAC TROPONIN I PNL SERPL HS: 6 NG/L (ref 0–13)
CHLORIDE SERPL-SCNC: 103 MMOL/L (ref 98–107)
CO2 SERPL-SCNC: 24 MMOL/L (ref 21–32)
CREAT SERPL-MCNC: 0.7 MG/DL (ref 0.5–1.05)
D DIMER PPP FEU-MCNC: 559 NG/ML FEU
EGFRCR SERPLBLD CKD-EPI 2021: >90 ML/MIN/1.73M*2
EOSINOPHIL # BLD AUTO: 0.31 X10*3/UL (ref 0–0.7)
EOSINOPHIL NFR BLD AUTO: 5.1 %
ERYTHROCYTE [DISTWIDTH] IN BLOOD BY AUTOMATED COUNT: 16.7 % (ref 11.5–14.5)
FLUAV RNA RESP QL NAA+PROBE: NOT DETECTED
FLUBV RNA RESP QL NAA+PROBE: NOT DETECTED
GLUCOSE SERPL-MCNC: 102 MG/DL (ref 74–99)
HCT VFR BLD AUTO: 42.9 % (ref 36–46)
HGB BLD-MCNC: 13.8 G/DL (ref 12–16)
IMM GRANULOCYTES # BLD AUTO: 0.02 X10*3/UL (ref 0–0.7)
IMM GRANULOCYTES NFR BLD AUTO: 0.3 % (ref 0–0.9)
LYMPHOCYTES # BLD AUTO: 2.57 X10*3/UL (ref 1.2–4.8)
LYMPHOCYTES NFR BLD AUTO: 42.3 %
MAGNESIUM SERPL-MCNC: 2.65 MG/DL (ref 1.6–2.4)
MCH RBC QN AUTO: 25.3 PG (ref 26–34)
MCHC RBC AUTO-ENTMCNC: 32.2 G/DL (ref 32–36)
MCV RBC AUTO: 79 FL (ref 80–100)
MONOCYTES # BLD AUTO: 0.36 X10*3/UL (ref 0.1–1)
MONOCYTES NFR BLD AUTO: 5.9 %
NEUTROPHILS # BLD AUTO: 2.73 X10*3/UL (ref 1.2–7.7)
NEUTROPHILS NFR BLD AUTO: 44.9 %
NRBC BLD-RTO: 0 /100 WBCS (ref 0–0)
P AXIS: 38 DEGREES
P OFFSET: 188 MS
P ONSET: 157 MS
PLATELET # BLD AUTO: 241 X10*3/UL (ref 150–450)
POTASSIUM SERPL-SCNC: 3.7 MMOL/L (ref 3.5–5.3)
PR INTERVAL: 130 MS
PROT SERPL-MCNC: 8.1 G/DL (ref 6.4–8.2)
Q ONSET: 222 MS
QRS COUNT: 13 BEATS
QRS DURATION: 92 MS
QT INTERVAL: 448 MS
QTC CALCULATION(BAZETT): 520 MS
QTC FREDERICIA: 495 MS
R AXIS: 94 DEGREES
RBC # BLD AUTO: 5.46 X10*6/UL (ref 4–5.2)
RSV RNA RESP QL NAA+PROBE: NOT DETECTED
SARS-COV-2 RNA RESP QL NAA+PROBE: NOT DETECTED
SODIUM SERPL-SCNC: 136 MMOL/L (ref 136–145)
T AXIS: 131 DEGREES
T OFFSET: 446 MS
VENTRICULAR RATE: 81 BPM
WBC # BLD AUTO: 6.1 X10*3/UL (ref 4.4–11.3)

## 2024-02-09 PROCEDURE — 71275 CT ANGIOGRAPHY CHEST: CPT | Performed by: SURGERY

## 2024-02-09 PROCEDURE — 71275 CT ANGIOGRAPHY CHEST: CPT

## 2024-02-09 PROCEDURE — 93005 ELECTROCARDIOGRAM TRACING: CPT | Mod: 76

## 2024-02-09 PROCEDURE — 87637 SARSCOV2&INF A&B&RSV AMP PRB: CPT | Performed by: PHYSICIAN ASSISTANT

## 2024-02-09 PROCEDURE — 36415 COLL VENOUS BLD VENIPUNCTURE: CPT | Performed by: PHYSICIAN ASSISTANT

## 2024-02-09 PROCEDURE — 83735 ASSAY OF MAGNESIUM: CPT | Performed by: PHYSICIAN ASSISTANT

## 2024-02-09 PROCEDURE — 83880 ASSAY OF NATRIURETIC PEPTIDE: CPT | Performed by: PHYSICIAN ASSISTANT

## 2024-02-09 PROCEDURE — 85025 COMPLETE CBC W/AUTO DIFF WBC: CPT | Performed by: PHYSICIAN ASSISTANT

## 2024-02-09 PROCEDURE — 2550000001 HC RX 255 CONTRASTS: Mod: SE | Performed by: STUDENT IN AN ORGANIZED HEALTH CARE EDUCATION/TRAINING PROGRAM

## 2024-02-09 PROCEDURE — 85379 FIBRIN DEGRADATION QUANT: CPT | Performed by: PHYSICIAN ASSISTANT

## 2024-02-09 PROCEDURE — 2500000004 HC RX 250 GENERAL PHARMACY W/ HCPCS (ALT 636 FOR OP/ED): Mod: SE | Performed by: STUDENT IN AN ORGANIZED HEALTH CARE EDUCATION/TRAINING PROGRAM

## 2024-02-09 PROCEDURE — 93005 ELECTROCARDIOGRAM TRACING: CPT

## 2024-02-09 PROCEDURE — 84484 ASSAY OF TROPONIN QUANT: CPT | Mod: 91 | Performed by: PHYSICIAN ASSISTANT

## 2024-02-09 PROCEDURE — 71045 X-RAY EXAM CHEST 1 VIEW: CPT

## 2024-02-09 PROCEDURE — 84484 ASSAY OF TROPONIN QUANT: CPT | Performed by: PHYSICIAN ASSISTANT

## 2024-02-09 PROCEDURE — 99285 EMERGENCY DEPT VISIT HI MDM: CPT | Mod: 25,27

## 2024-02-09 PROCEDURE — 96374 THER/PROPH/DIAG INJ IV PUSH: CPT

## 2024-02-09 PROCEDURE — 71045 X-RAY EXAM CHEST 1 VIEW: CPT | Performed by: RADIOLOGY

## 2024-02-09 PROCEDURE — 84075 ASSAY ALKALINE PHOSPHATASE: CPT | Performed by: PHYSICIAN ASSISTANT

## 2024-02-09 PROCEDURE — 99284 EMERGENCY DEPT VISIT MOD MDM: CPT | Mod: 25,27 | Performed by: STUDENT IN AN ORGANIZED HEALTH CARE EDUCATION/TRAINING PROGRAM

## 2024-02-09 RX ORDER — KETOROLAC TROMETHAMINE 15 MG/ML
15 INJECTION, SOLUTION INTRAMUSCULAR; INTRAVENOUS ONCE
Status: COMPLETED | OUTPATIENT
Start: 2024-02-09 | End: 2024-02-09

## 2024-02-09 RX ADMIN — KETOROLAC TROMETHAMINE 15 MG: 15 INJECTION INTRAMUSCULAR; INTRAVENOUS at 21:33

## 2024-02-09 RX ADMIN — IOHEXOL 64 ML: 350 INJECTION, SOLUTION INTRAVENOUS at 22:05

## 2024-02-09 ASSESSMENT — PAIN DESCRIPTION - LOCATION: LOCATION: CHEST

## 2024-02-09 ASSESSMENT — PAIN SCALES - GENERAL
PAINLEVEL_OUTOF10: 4
PAINLEVEL_OUTOF10: 9

## 2024-02-09 ASSESSMENT — PAIN DESCRIPTION - DESCRIPTORS: DESCRIPTORS: SHARP

## 2024-02-09 ASSESSMENT — PAIN - FUNCTIONAL ASSESSMENT: PAIN_FUNCTIONAL_ASSESSMENT: 0-10

## 2024-02-10 VITALS
OXYGEN SATURATION: 99 % | BODY MASS INDEX: 23.92 KG/M2 | WEIGHT: 130 LBS | SYSTOLIC BLOOD PRESSURE: 126 MMHG | RESPIRATION RATE: 16 BRPM | DIASTOLIC BLOOD PRESSURE: 78 MMHG | TEMPERATURE: 97.9 F | HEIGHT: 62 IN | HEART RATE: 98 BPM

## 2024-02-10 NOTE — ED PROVIDER NOTES
HPI   Chief Complaint   Patient presents with    Chest Pain     Pt states having midsternal CP starting this AM. Pt was seen in the ED a few days ago for the same complaint       History of present illness:  43-year-old female presents the emergency room for complaints of chest pain in midsternum.  The patient states that earlier today she began having pain in the center of her chest and states that it is rating up her chest slightly.  She denies rating into the arms at all and denies any nausea vomiting shortness of breath or headache.  She states that she has a history of COPD as well as depression and history of a brain tumor.  She denies any prior heart disease.  She denies any symptoms at this time.  She denies any recent surgeries or history of blood clotting disorders.    Social history: Negative for alcohol and drug use.    Review of systems:   Gen.: No weight loss, fatigue, anorexia, insomnia, fever.   Eyes: No vision loss, double vision  ENT: No pharyngitis, neck pain  Cardiac: No palpitations, syncope, near syncope.   Pulmonary: No shortness of breath, cough, hemoptysis.   Heme/lymph: No swollen glands, fever, bleeding.   GI: No abdominal pain, change in bowel habits, melena, hematemesis, hematochezia, nausea, vomiting, diarrhea.   : No discharge, dysuria, frequency, urgency, hematuria.   Musculoskeletal: No limb pain, joint pain, joint swelling.   Skin: No rashes.   Review of systems is otherwise negative unless stated above or in history of present illness.      Physical exam:  General: Vitals noted, no distress. Afebrile.   EENT: No lymphadenopathy appreciated  Cardiac: Regular, rate, rhythm, no murmur.   Pulmonary: Lungs clear bilaterally with good aeration. No adventitious breath sounds.   Abdomen: Soft, nonsurgical. Nontender. No peritoneal signs. Normoactive bowel sounds.   Extremities: No peripheral edema.  Negative Homans' sign bilaterally  Skin: No rash.   Neuro: No focal neurologic  deficits          Medical decision making:   Testing: CBC CMP troponin D-dimer: D-dimer is elevated at 556 troponin x 2 is negative CBC CMP unremarkable EKG unremarkable, chest x-ray unremarkable as interpreted by radiology, CT scan is pending at this time  Plan: Home-going.  Discussed differential. Will follow-up with the primary physician in the next 2-3 days. Return if worse. They understand return precautions and discharge instructions. Patient and family/friend/caregiver are in agreement with this plan. 43-year-old female presents the emergency room for complaints of chest pain in midsterum.  The patient states that earlier today she began having pain in the center of her chest and states that it is rating up her chest slightly.  She denies rating into the arms at all and denies any nausea vomiting shortness of breath or headache.  She states that she has a history of COPD as well as depression and history of a brain tumor.  She denies any prior heart disease.  She denies any symptoms at this time.  She denies any recent surgeries or history of blood clotting disorders. Pulmonary: Lungs clear bilaterally with good aeration. No adventitious breath sounds. Cardiac: Regular, rate, rhythm, no murmur.  No peripheral edema.  Vitals look appropriate.  This time.  The patient's D-dimer is elevated this time and explained that a CT scan would need to be done.  It was negative at this time and explained to the patient I am sure the cause of her discomfort but I would request that she follow-up with cardiology.  She is given a referral at this time.  Impression:   1.  Chest pain            History provided by:  Patient   used: No                        [place] Coma Scale Score: 15                     Patient History   Past Medical History:   Diagnosis Date    COPD (chronic obstructive pulmonary disease) (CMS/HCC)     Coronary artery disease     Personal history of other endocrine, nutritional and  "metabolic disease     History of hypopituitarism    Personal history of other mental and behavioral disorders     History of depression    Personal history of other specified conditions     History of brain tumor    Unspecified convulsions (CMS/HCC)     Convulsion     Past Surgical History:   Procedure Laterality Date    MR HEAD ANGIO WO IV CONTRAST  7/15/2021    MR HEAD ANGIO WO IV CONTRAST 7/15/2021 [facility]    MR HEAD ANGIO WO IV CONTRAST  11/28/2017    MR HEAD ANGIO WO IV CONTRAST [facility]    MR HEAD ANGIO WO IV CONTRAST  3/13/2018    MR HEAD ANGIO WO IV CONTRAST [facility]    MR NECK ANGIO WO IV CONTRAST  7/15/2021    MR NECK ANGIO WO IV CONTRAST 7/15/2021 [facility]    OTHER SURGICAL HISTORY  08/17/2013    Craniotomy Tumor Removal - Complete     No family history on file.  Social History     Tobacco Use    Smoking status: Never    Smokeless tobacco: Never   Vaping Use    Vaping Use: Never used   Substance Use Topics    Alcohol use: Never    Drug use: Yes     Types: \"Crack\" cocaine     Comment: last used 4 weeks ago       Physical Exam   ED Triage Vitals [02/09/24 1950]   Temperature Heart Rate Respirations BP   36.6 °C (97.9 °F) 78 14 110/83      Pulse Ox Temp Source Heart Rate Source Patient Position   98 % Temporal -- --      BP Location FiO2 (%)     -- --       Physical Exam    ED Course & [facility]   ED Course as of 02/13/24 1825 Fri Feb 09, 2024 2009 EKG at 1949 shows February 9, 2024 shows rightward axis rate of 80, T wave inversions in all leads, seen on previous EKG from January 28, 2024, prolonged QT at 470 QTc at 542 [JF]      ED Course User Index  [JF] Saulo Osorio PA-C         Diagnoses as of 02/13/24 1825   Chest pain, unspecified type       Medical Decision Making      Procedure  Procedures     Saulo Osorio PA-C  02/13/24 1829    "

## 2024-02-12 LAB
ATRIAL RATE: 76 BPM
ATRIAL RATE: 80 BPM
P AXIS: 40 DEGREES
P AXIS: 9 DEGREES
P OFFSET: 178 MS
P OFFSET: 184 MS
P ONSET: 132 MS
P ONSET: 138 MS
PR INTERVAL: 164 MS
PR INTERVAL: 180 MS
Q ONSET: 220 MS
Q ONSET: 222 MS
QRS COUNT: 13 BEATS
QRS COUNT: 13 BEATS
QRS DURATION: 102 MS
QRS DURATION: 96 MS
QT INTERVAL: 470 MS
QT INTERVAL: 482 MS
QTC CALCULATION(BAZETT): 542 MS
QTC CALCULATION(BAZETT): 542 MS
QTC FREDERICIA: 517 MS
QTC FREDERICIA: 521 MS
R AXIS: 100 DEGREES
R AXIS: 113 DEGREES
T AXIS: 153 DEGREES
T AXIS: 174 DEGREES
T OFFSET: 457 MS
T OFFSET: 461 MS
VENTRICULAR RATE: 76 BPM
VENTRICULAR RATE: 80 BPM

## 2024-02-17 ENCOUNTER — HOSPITAL ENCOUNTER (EMERGENCY)
Facility: HOSPITAL | Age: 44
Discharge: HOME | End: 2024-02-18
Attending: EMERGENCY MEDICINE
Payer: MEDICARE

## 2024-02-17 DIAGNOSIS — R07.89 CHEST WALL PAIN: Primary | ICD-10-CM

## 2024-02-17 LAB
ERYTHROCYTE [DISTWIDTH] IN BLOOD BY AUTOMATED COUNT: 17.4 % (ref 11.5–14.5)
HCT VFR BLD AUTO: 47.5 % (ref 36–46)
HGB BLD-MCNC: 14.7 G/DL (ref 12–16)
MCH RBC QN AUTO: 24.9 PG (ref 26–34)
MCHC RBC AUTO-ENTMCNC: 30.9 G/DL (ref 32–36)
MCV RBC AUTO: 81 FL (ref 80–100)
NRBC BLD-RTO: 0 /100 WBCS (ref 0–0)
PLATELET # BLD AUTO: 175 X10*3/UL (ref 150–450)
RBC # BLD AUTO: 5.9 X10*6/UL (ref 4–5.2)
WBC # BLD AUTO: 5.9 X10*3/UL (ref 4.4–11.3)

## 2024-02-17 PROCEDURE — 84484 ASSAY OF TROPONIN QUANT: CPT | Performed by: EMERGENCY MEDICINE

## 2024-02-17 PROCEDURE — 85027 COMPLETE CBC AUTOMATED: CPT | Performed by: EMERGENCY MEDICINE

## 2024-02-17 PROCEDURE — 36415 COLL VENOUS BLD VENIPUNCTURE: CPT | Performed by: EMERGENCY MEDICINE

## 2024-02-17 PROCEDURE — 80053 COMPREHEN METABOLIC PANEL: CPT | Performed by: EMERGENCY MEDICINE

## 2024-02-17 PROCEDURE — 99284 EMERGENCY DEPT VISIT MOD MDM: CPT

## 2024-02-17 ASSESSMENT — PAIN DESCRIPTION - PAIN TYPE: TYPE: ACUTE PAIN

## 2024-02-17 ASSESSMENT — COLUMBIA-SUICIDE SEVERITY RATING SCALE - C-SSRS
6. HAVE YOU EVER DONE ANYTHING, STARTED TO DO ANYTHING, OR PREPARED TO DO ANYTHING TO END YOUR LIFE?: NO
2. HAVE YOU ACTUALLY HAD ANY THOUGHTS OF KILLING YOURSELF?: NO
1. IN THE PAST MONTH, HAVE YOU WISHED YOU WERE DEAD OR WISHED YOU COULD GO TO SLEEP AND NOT WAKE UP?: NO

## 2024-02-17 ASSESSMENT — PAIN DESCRIPTION - FREQUENCY: FREQUENCY: CONSTANT/CONTINUOUS

## 2024-02-17 ASSESSMENT — PAIN - FUNCTIONAL ASSESSMENT: PAIN_FUNCTIONAL_ASSESSMENT: 0-10

## 2024-02-17 ASSESSMENT — PAIN SCALES - GENERAL: PAINLEVEL_OUTOF10: 7

## 2024-02-17 ASSESSMENT — PAIN DESCRIPTION - LOCATION: LOCATION: CHEST

## 2024-02-18 ENCOUNTER — APPOINTMENT (OUTPATIENT)
Dept: RADIOLOGY | Facility: HOSPITAL | Age: 44
End: 2024-02-18
Payer: MEDICARE

## 2024-02-18 VITALS
DIASTOLIC BLOOD PRESSURE: 77 MMHG | RESPIRATION RATE: 17 BRPM | HEART RATE: 83 BPM | HEIGHT: 62 IN | OXYGEN SATURATION: 99 % | WEIGHT: 130 LBS | SYSTOLIC BLOOD PRESSURE: 106 MMHG | BODY MASS INDEX: 23.92 KG/M2

## 2024-02-18 LAB
ALBUMIN SERPL BCP-MCNC: 4.6 G/DL (ref 3.4–5)
ALP SERPL-CCNC: 99 U/L (ref 33–110)
ALT SERPL W P-5'-P-CCNC: 10 U/L (ref 7–45)
ANION GAP SERPL CALC-SCNC: 16 MMOL/L (ref 10–20)
AST SERPL W P-5'-P-CCNC: 22 U/L (ref 9–39)
ATRIAL RATE: 59 BPM
ATRIAL RATE: 64 BPM
BILIRUB SERPL-MCNC: 0.5 MG/DL (ref 0–1.2)
BUN SERPL-MCNC: 11 MG/DL (ref 6–23)
CALCIUM SERPL-MCNC: 9.9 MG/DL (ref 8.6–10.3)
CARDIAC TROPONIN I PNL SERPL HS: 3 NG/L (ref 0–13)
CARDIAC TROPONIN I PNL SERPL HS: 3 NG/L (ref 0–13)
CHLORIDE SERPL-SCNC: 102 MMOL/L (ref 98–107)
CO2 SERPL-SCNC: 23 MMOL/L (ref 21–32)
CREAT SERPL-MCNC: 0.79 MG/DL (ref 0.5–1.05)
EGFRCR SERPLBLD CKD-EPI 2021: >90 ML/MIN/1.73M*2
GLUCOSE SERPL-MCNC: 98 MG/DL (ref 74–99)
P AXIS: 21 DEGREES
P AXIS: 36 DEGREES
P OFFSET: 173 MS
P OFFSET: 175 MS
P ONSET: 127 MS
P ONSET: 138 MS
POTASSIUM SERPL-SCNC: 3.5 MMOL/L (ref 3.5–5.3)
PR INTERVAL: 162 MS
PR INTERVAL: 186 MS
PROT SERPL-MCNC: 8.3 G/DL (ref 6.4–8.2)
Q ONSET: 219 MS
Q ONSET: 220 MS
QRS COUNT: 10 BEATS
QRS COUNT: 10 BEATS
QRS DURATION: 92 MS
QRS DURATION: 92 MS
QT INTERVAL: 474 MS
QT INTERVAL: 478 MS
QTC CALCULATION(BAZETT): 469 MS
QTC CALCULATION(BAZETT): 493 MS
QTC FREDERICIA: 471 MS
QTC FREDERICIA: 488 MS
R AXIS: 79 DEGREES
R AXIS: 83 DEGREES
SODIUM SERPL-SCNC: 137 MMOL/L (ref 136–145)
T AXIS: 74 DEGREES
T AXIS: 78 DEGREES
T OFFSET: 456 MS
T OFFSET: 459 MS
VENTRICULAR RATE: 59 BPM
VENTRICULAR RATE: 64 BPM

## 2024-02-18 PROCEDURE — 71045 X-RAY EXAM CHEST 1 VIEW: CPT

## 2024-02-18 PROCEDURE — 84484 ASSAY OF TROPONIN QUANT: CPT | Performed by: EMERGENCY MEDICINE

## 2024-02-18 PROCEDURE — 36415 COLL VENOUS BLD VENIPUNCTURE: CPT | Performed by: EMERGENCY MEDICINE

## 2024-02-18 PROCEDURE — 71045 X-RAY EXAM CHEST 1 VIEW: CPT | Performed by: RADIOLOGY

## 2024-02-18 PROCEDURE — 96375 TX/PRO/DX INJ NEW DRUG ADDON: CPT

## 2024-02-18 PROCEDURE — 96374 THER/PROPH/DIAG INJ IV PUSH: CPT

## 2024-02-18 PROCEDURE — 2500000004 HC RX 250 GENERAL PHARMACY W/ HCPCS (ALT 636 FOR OP/ED): Mod: SE | Performed by: EMERGENCY MEDICINE

## 2024-02-18 RX ORDER — MORPHINE SULFATE 4 MG/ML
4 INJECTION, SOLUTION INTRAMUSCULAR; INTRAVENOUS ONCE
Status: COMPLETED | OUTPATIENT
Start: 2024-02-18 | End: 2024-02-18

## 2024-02-18 RX ORDER — KETOROLAC TROMETHAMINE 30 MG/ML
30 INJECTION, SOLUTION INTRAMUSCULAR; INTRAVENOUS ONCE
Status: COMPLETED | OUTPATIENT
Start: 2024-02-18 | End: 2024-02-18

## 2024-02-18 RX ADMIN — KETOROLAC TROMETHAMINE 30 MG: 30 INJECTION, SOLUTION INTRAMUSCULAR; INTRAVENOUS at 02:18

## 2024-02-18 RX ADMIN — MORPHINE SULFATE 4 MG: 4 INJECTION, SOLUTION INTRAMUSCULAR; INTRAVENOUS at 00:43

## 2024-02-18 ASSESSMENT — PAIN DESCRIPTION - PAIN TYPE: TYPE: ACUTE PAIN

## 2024-02-18 ASSESSMENT — PAIN SCALES - GENERAL
PAINLEVEL_OUTOF10: 5 - MODERATE PAIN
PAINLEVEL_OUTOF10: 7

## 2024-02-18 NOTE — ED TRIAGE NOTES
Patient brought by [facility] for chest pain that started today at 4pm and at 7 pm the pain started radiating towards the left shoulder and left arm. Patient took Tylenol. The pain intensified and she called EMS> to come to the ED. No difficulty breathing, nasal flaring, retracting or grunting present/Patient alert and oriented x3. Skin warm and dry. Resp. Even and unlabored. Vital signs stable. EKG done and patient placed on CR monitor  and continuous pulse ox

## 2024-02-18 NOTE — ED PROVIDER NOTES
HPI   Chief Complaint   Patient presents with    Chest Pain     Started at 4 pm today and started radiated to left shoulder and left arm at 7pm       Patient is a 43-year-old female with a history of heart attack and bypass who comes in tonight with pain in her chest.  She was here 2 weeks ago with similar pain but tonight she says is going down her left arm.  She says she has increased pain in the sternal area with a deep breath.  She says that she does feel little short of breath but she has had no nausea or diaphoresis.  No fever or chills.  Her EKG tonight looks the same as it did before but we will check enzymes and a dimer since she does have some pleuritic nature to this pain tonight.  If her dimer is elevated we can scan her chest to rule out a PE.                        [place] Coma Scale Score: 15                     Patient History   Past Medical History:   Diagnosis Date    COPD (chronic obstructive pulmonary disease) (CMS/HCC)     Coronary artery disease     Personal history of other endocrine, nutritional and metabolic disease     History of hypopituitarism    Personal history of other mental and behavioral disorders     History of depression    Personal history of other specified conditions     History of brain tumor    Unspecified convulsions (CMS/HCC)     Convulsion     Past Surgical History:   Procedure Laterality Date    MR HEAD ANGIO WO IV CONTRAST  7/15/2021    MR HEAD ANGIO WO IV CONTRAST 7/15/2021 [facility]    MR HEAD ANGIO WO IV CONTRAST  11/28/2017    MR HEAD ANGIO WO IV CONTRAST [facility]    MR HEAD ANGIO WO IV CONTRAST  3/13/2018    MR HEAD ANGIO WO IV CONTRAST [facility]    MR NECK ANGIO WO IV CONTRAST  7/15/2021    MR NECK ANGIO WO IV CONTRAST 7/15/2021 [facility]    OTHER SURGICAL HISTORY  08/17/2013    Craniotomy Tumor Removal - Complete     No family history on file.  Social History     Tobacco Use    Smoking status: Never    Smokeless  "tobacco: Never   Vaping Use    Vaping Use: Never used   Substance Use Topics    Alcohol use: Never    Drug use: Yes     Types: \"Crack\" cocaine     Comment: last used 4 weeks ago       Physical Exam   ED Triage Vitals [02/17/24 2315]   Temp Heart Rate Respirations BP   -- 78 20 128/81      Pulse Ox Temp src Heart Rate Source Patient Position   100 % -- -- Sitting      BP Location FiO2 (%)     -- --       Physical Exam  Vitals and nursing note reviewed.   HENT:      Head: Normocephalic and atraumatic.      Right Ear: Tympanic membrane and ear canal normal.      Left Ear: Tympanic membrane and ear canal normal.      Nose: Nose normal.      Mouth/Throat:      Mouth: Mucous membranes are moist.   Cardiovascular:      Rate and Rhythm: Normal rate.   Pulmonary:      Effort: Pulmonary effort is normal.      Breath sounds: Normal breath sounds.   Chest:      Comments: Well-healed sternotomy scar  Abdominal:      General: Abdomen is flat.      Palpations: Abdomen is soft.   Musculoskeletal:         General: Normal range of motion.      Cervical back: Normal range of motion.   Skin:     General: Skin is warm and dry.   Neurological:      General: No focal deficit present.      Mental Status: She is alert.         ED Course & MDM   ED Course as of 02/18/24 0235   Sat Feb 17, 2024   2342 EKG interpreted by me: Sinus rhythm with rate of 77.  There is a rightward axis with diffuse T wave inversion inferiorly and anterolaterally.  This resembles in every aspect her EKG from her most recent visit a couple of weeks ago. [KW]      ED Course User Index  [KW] Romel Schuster MD         Diagnoses as of 02/18/24 0235   Chest wall pain       Medical Decision Making  The patient's lab work and chest x-ray were all negative.  She is having a snack at this point is that she feels better.  Her enzymes were negative.  I did not check a CT tonight because 1 had been done recently for the same issue.  I have encouraged her to call her " cardiologist this coming week for an appointment.        Procedure  Procedures     Romel Schuster MD  02/17/24 6940       Romel Schuster MD  02/18/24 7860

## 2024-02-19 ENCOUNTER — HOSPITAL ENCOUNTER (OUTPATIENT)
Dept: CARDIOLOGY | Facility: HOSPITAL | Age: 44
Discharge: HOME | End: 2024-02-19
Payer: MEDICARE

## 2024-02-19 PROCEDURE — 93005 ELECTROCARDIOGRAM TRACING: CPT

## 2024-02-20 ENCOUNTER — APPOINTMENT (OUTPATIENT)
Dept: RADIOLOGY | Facility: HOSPITAL | Age: 44
DRG: 948 | End: 2024-02-20
Payer: MEDICARE

## 2024-02-20 ENCOUNTER — APPOINTMENT (OUTPATIENT)
Dept: CARDIOLOGY | Facility: HOSPITAL | Age: 44
DRG: 948 | End: 2024-02-20
Payer: MEDICARE

## 2024-02-20 ENCOUNTER — HOSPITAL ENCOUNTER (INPATIENT)
Facility: HOSPITAL | Age: 44
LOS: 1 days | Discharge: HOME | DRG: 948 | End: 2024-02-22
Attending: STUDENT IN AN ORGANIZED HEALTH CARE EDUCATION/TRAINING PROGRAM | Admitting: INTERNAL MEDICINE
Payer: MEDICARE

## 2024-02-20 DIAGNOSIS — R29.90 STROKE-LIKE SYMPTOMS: Primary | ICD-10-CM

## 2024-02-20 DIAGNOSIS — N39.0 ACUTE UTI: ICD-10-CM

## 2024-02-20 PROBLEM — G45.9 TIA (TRANSIENT ISCHEMIC ATTACK): Status: ACTIVE | Noted: 2024-02-20

## 2024-02-20 LAB
ALBUMIN SERPL-MCNC: 4 G/DL (ref 3.5–5)
ALP BLD-CCNC: 101 U/L (ref 35–125)
ALT SERPL-CCNC: 9 U/L (ref 5–40)
ANION GAP SERPL CALC-SCNC: 10 MMOL/L
APPEARANCE UR: CLEAR
APTT PPP: 29.6 SECONDS (ref 22–32.5)
AST SERPL-CCNC: 24 U/L (ref 5–40)
BACTERIA #/AREA URNS AUTO: ABNORMAL /HPF
BASOPHILS # BLD AUTO: 0.09 X10*3/UL (ref 0–0.1)
BASOPHILS NFR BLD AUTO: 1.1 %
BILIRUB SERPL-MCNC: 0.4 MG/DL (ref 0.1–1.2)
BILIRUB UR STRIP.AUTO-MCNC: NEGATIVE MG/DL
BUN SERPL-MCNC: 13 MG/DL (ref 8–25)
CALCIUM SERPL-MCNC: 8.9 MG/DL (ref 8.5–10.4)
CHLORIDE SERPL-SCNC: 105 MMOL/L (ref 97–107)
CO2 SERPL-SCNC: 22 MMOL/L (ref 24–31)
COLOR UR: YELLOW
CREAT SERPL-MCNC: 0.7 MG/DL (ref 0.4–1.6)
EGFRCR SERPLBLD CKD-EPI 2021: >90 ML/MIN/1.73M*2
EOSINOPHIL # BLD AUTO: 0.41 X10*3/UL (ref 0–0.7)
EOSINOPHIL NFR BLD AUTO: 4.8 %
ERYTHROCYTE [DISTWIDTH] IN BLOOD BY AUTOMATED COUNT: 17.2 % (ref 11.5–14.5)
GLUCOSE BLD MANUAL STRIP-MCNC: 120 MG/DL (ref 74–99)
GLUCOSE SERPL-MCNC: 134 MG/DL (ref 65–99)
GLUCOSE UR STRIP.AUTO-MCNC: NORMAL MG/DL
HCT VFR BLD AUTO: 39.6 % (ref 36–46)
HGB BLD-MCNC: 12.7 G/DL (ref 12–16)
HYALINE CASTS #/AREA URNS AUTO: ABNORMAL /LPF
IMM GRANULOCYTES # BLD AUTO: 0.01 X10*3/UL (ref 0–0.7)
IMM GRANULOCYTES NFR BLD AUTO: 0.1 % (ref 0–0.9)
INR PPP: 1.1 (ref 0.9–1.2)
KETONES UR STRIP.AUTO-MCNC: NEGATIVE MG/DL
LEUKOCYTE ESTERASE UR QL STRIP.AUTO: ABNORMAL
LYMPHOCYTES # BLD AUTO: 3.52 X10*3/UL (ref 1.2–4.8)
LYMPHOCYTES NFR BLD AUTO: 41.5 %
MCH RBC QN AUTO: 25.3 PG (ref 26–34)
MCHC RBC AUTO-ENTMCNC: 32.1 G/DL (ref 32–36)
MCV RBC AUTO: 79 FL (ref 80–100)
MONOCYTES # BLD AUTO: 0.4 X10*3/UL (ref 0.1–1)
MONOCYTES NFR BLD AUTO: 4.7 %
MUCOUS THREADS #/AREA URNS AUTO: ABNORMAL /LPF
NEUTROPHILS # BLD AUTO: 4.06 X10*3/UL (ref 1.2–7.7)
NEUTROPHILS NFR BLD AUTO: 47.8 %
NITRITE UR QL STRIP.AUTO: NEGATIVE
NRBC BLD-RTO: 0 /100 WBCS (ref 0–0)
PH UR STRIP.AUTO: 5 [PH]
PLATELET # BLD AUTO: 214 X10*3/UL (ref 150–450)
POTASSIUM SERPL-SCNC: 4.3 MMOL/L (ref 3.4–5.1)
PROT SERPL-MCNC: 7.4 G/DL (ref 5.9–7.9)
PROT UR STRIP.AUTO-MCNC: ABNORMAL MG/DL
PROTHROMBIN TIME: 11.6 SECONDS (ref 9.3–12.7)
RBC # BLD AUTO: 5.02 X10*6/UL (ref 4–5.2)
RBC # UR STRIP.AUTO: NEGATIVE /UL
RBC #/AREA URNS AUTO: ABNORMAL /HPF
SODIUM SERPL-SCNC: 137 MMOL/L (ref 133–145)
SP GR UR STRIP.AUTO: 1.03
SQUAMOUS #/AREA URNS AUTO: ABNORMAL /HPF
TRANS CELLS #/AREA UR COMP ASSIST: ABNORMAL /HPF
TROPONIN T SERPL-MCNC: <6 NG/L
UROBILINOGEN UR STRIP.AUTO-MCNC: NORMAL MG/DL
WBC # BLD AUTO: 8.5 X10*3/UL (ref 4.4–11.3)
WBC #/AREA URNS AUTO: >50 /HPF

## 2024-02-20 PROCEDURE — 70450 CT HEAD/BRAIN W/O DYE: CPT

## 2024-02-20 PROCEDURE — 85025 COMPLETE CBC W/AUTO DIFF WBC: CPT | Performed by: STUDENT IN AN ORGANIZED HEALTH CARE EDUCATION/TRAINING PROGRAM

## 2024-02-20 PROCEDURE — 93010 ELECTROCARDIOGRAM REPORT: CPT | Performed by: INTERNAL MEDICINE

## 2024-02-20 PROCEDURE — 2500000004 HC RX 250 GENERAL PHARMACY W/ HCPCS (ALT 636 FOR OP/ED): Performed by: STUDENT IN AN ORGANIZED HEALTH CARE EDUCATION/TRAINING PROGRAM

## 2024-02-20 PROCEDURE — 82947 ASSAY GLUCOSE BLOOD QUANT: CPT

## 2024-02-20 PROCEDURE — 2500000001 HC RX 250 WO HCPCS SELF ADMINISTERED DRUGS (ALT 637 FOR MEDICARE OP): Performed by: STUDENT IN AN ORGANIZED HEALTH CARE EDUCATION/TRAINING PROGRAM

## 2024-02-20 PROCEDURE — 99291 CRITICAL CARE FIRST HOUR: CPT

## 2024-02-20 PROCEDURE — 96365 THER/PROPH/DIAG IV INF INIT: CPT | Mod: 59

## 2024-02-20 PROCEDURE — G0378 HOSPITAL OBSERVATION PER HR: HCPCS

## 2024-02-20 PROCEDURE — 85730 THROMBOPLASTIN TIME PARTIAL: CPT | Performed by: STUDENT IN AN ORGANIZED HEALTH CARE EDUCATION/TRAINING PROGRAM

## 2024-02-20 PROCEDURE — 93005 ELECTROCARDIOGRAM TRACING: CPT

## 2024-02-20 PROCEDURE — 85610 PROTHROMBIN TIME: CPT | Performed by: STUDENT IN AN ORGANIZED HEALTH CARE EDUCATION/TRAINING PROGRAM

## 2024-02-20 PROCEDURE — 96374 THER/PROPH/DIAG INJ IV PUSH: CPT | Mod: 59

## 2024-02-20 PROCEDURE — 80053 COMPREHEN METABOLIC PANEL: CPT | Performed by: STUDENT IN AN ORGANIZED HEALTH CARE EDUCATION/TRAINING PROGRAM

## 2024-02-20 PROCEDURE — 36415 COLL VENOUS BLD VENIPUNCTURE: CPT | Performed by: STUDENT IN AN ORGANIZED HEALTH CARE EDUCATION/TRAINING PROGRAM

## 2024-02-20 PROCEDURE — 81001 URINALYSIS AUTO W/SCOPE: CPT | Performed by: STUDENT IN AN ORGANIZED HEALTH CARE EDUCATION/TRAINING PROGRAM

## 2024-02-20 PROCEDURE — 96375 TX/PRO/DX INJ NEW DRUG ADDON: CPT

## 2024-02-20 PROCEDURE — 71045 X-RAY EXAM CHEST 1 VIEW: CPT

## 2024-02-20 PROCEDURE — 71045 X-RAY EXAM CHEST 1 VIEW: CPT | Mod: FOREIGN READ | Performed by: RADIOLOGY

## 2024-02-20 PROCEDURE — 84484 ASSAY OF TROPONIN QUANT: CPT | Performed by: STUDENT IN AN ORGANIZED HEALTH CARE EDUCATION/TRAINING PROGRAM

## 2024-02-20 PROCEDURE — 96361 HYDRATE IV INFUSION ADD-ON: CPT

## 2024-02-20 RX ORDER — NAPROXEN SODIUM 220 MG/1
324 TABLET, FILM COATED ORAL ONCE
Status: COMPLETED | OUTPATIENT
Start: 2024-02-20 | End: 2024-02-20

## 2024-02-20 RX ORDER — ARIPIPRAZOLE 5 MG/1
15 TABLET ORAL DAILY
Status: DISCONTINUED | OUTPATIENT
Start: 2024-02-21 | End: 2024-02-22 | Stop reason: HOSPADM

## 2024-02-20 RX ORDER — DIPHENHYDRAMINE HYDROCHLORIDE 50 MG/ML
25 INJECTION INTRAMUSCULAR; INTRAVENOUS ONCE
Status: COMPLETED | OUTPATIENT
Start: 2024-02-20 | End: 2024-02-20

## 2024-02-20 RX ORDER — PREGABALIN 100 MG/1
100 CAPSULE ORAL 2 TIMES DAILY
Status: DISCONTINUED | OUTPATIENT
Start: 2024-02-20 | End: 2024-02-22 | Stop reason: HOSPADM

## 2024-02-20 RX ORDER — LEVETIRACETAM 750 MG/1
750 TABLET ORAL 2 TIMES DAILY
Status: DISCONTINUED | OUTPATIENT
Start: 2024-02-20 | End: 2024-02-22 | Stop reason: HOSPADM

## 2024-02-20 RX ORDER — ENOXAPARIN SODIUM 100 MG/ML
40 INJECTION SUBCUTANEOUS EVERY 24 HOURS
Status: DISCONTINUED | OUTPATIENT
Start: 2024-02-20 | End: 2024-02-22 | Stop reason: HOSPADM

## 2024-02-20 RX ORDER — LIDOCAINE 560 MG/1
1 PATCH PERCUTANEOUS; TOPICAL; TRANSDERMAL DAILY
Status: DISCONTINUED | OUTPATIENT
Start: 2024-02-21 | End: 2024-02-22 | Stop reason: HOSPADM

## 2024-02-20 RX ORDER — PANTOPRAZOLE SODIUM 40 MG/1
40 TABLET, DELAYED RELEASE ORAL
Status: DISCONTINUED | OUTPATIENT
Start: 2024-02-21 | End: 2024-02-22 | Stop reason: HOSPADM

## 2024-02-20 RX ORDER — ACETAMINOPHEN 325 MG/1
650 TABLET ORAL ONCE
Status: COMPLETED | OUTPATIENT
Start: 2024-02-20 | End: 2024-02-20

## 2024-02-20 RX ORDER — MECLIZINE HYDROCHLORIDE 25 MG/1
25 TABLET ORAL ONCE
Status: COMPLETED | OUTPATIENT
Start: 2024-02-20 | End: 2024-02-20

## 2024-02-20 RX ORDER — FLUOXETINE 10 MG/1
10 CAPSULE ORAL DAILY
Status: DISCONTINUED | OUTPATIENT
Start: 2024-02-21 | End: 2024-02-21

## 2024-02-20 RX ORDER — MIDODRINE HYDROCHLORIDE 10 MG/1
10 TABLET ORAL
Status: DISCONTINUED | OUTPATIENT
Start: 2024-02-21 | End: 2024-02-22 | Stop reason: HOSPADM

## 2024-02-20 RX ORDER — MIDODRINE HYDROCHLORIDE 5 MG/1
10 TABLET ORAL ONCE
Status: COMPLETED | OUTPATIENT
Start: 2024-02-20 | End: 2024-02-20

## 2024-02-20 RX ORDER — DESMOPRESSIN ACETATE 0.1 MG/ML
1 SOLUTION NASAL NIGHTLY
Status: DISCONTINUED | OUTPATIENT
Start: 2024-02-20 | End: 2024-02-22 | Stop reason: HOSPADM

## 2024-02-20 RX ORDER — ZOLPIDEM TARTRATE 5 MG/1
10 TABLET ORAL DAILY PRN
Status: DISCONTINUED | OUTPATIENT
Start: 2024-02-20 | End: 2024-02-22 | Stop reason: HOSPADM

## 2024-02-20 RX ORDER — CEFTRIAXONE 1 G/50ML
1 INJECTION, SOLUTION INTRAVENOUS ONCE
Status: COMPLETED | OUTPATIENT
Start: 2024-02-20 | End: 2024-02-20

## 2024-02-20 RX ORDER — ARIPIPRAZOLE 2 MG/1
2 TABLET ORAL NIGHTLY
Status: DISCONTINUED | OUTPATIENT
Start: 2024-02-20 | End: 2024-02-22 | Stop reason: HOSPADM

## 2024-02-20 RX ORDER — ACETAMINOPHEN 500 MG
1000 TABLET ORAL EVERY 6 HOURS PRN
Status: DISCONTINUED | OUTPATIENT
Start: 2024-02-20 | End: 2024-02-22 | Stop reason: HOSPADM

## 2024-02-20 RX ORDER — LEVOTHYROXINE SODIUM 112 UG/1
112 TABLET ORAL
Status: DISCONTINUED | OUTPATIENT
Start: 2024-02-21 | End: 2024-02-22 | Stop reason: HOSPADM

## 2024-02-20 RX ORDER — PROCHLORPERAZINE EDISYLATE 5 MG/ML
10 INJECTION INTRAMUSCULAR; INTRAVENOUS ONCE
Status: COMPLETED | OUTPATIENT
Start: 2024-02-20 | End: 2024-02-20

## 2024-02-20 RX ORDER — NAPROXEN SODIUM 220 MG/1
81 TABLET, FILM COATED ORAL DAILY
Status: DISCONTINUED | OUTPATIENT
Start: 2024-02-21 | End: 2024-02-22 | Stop reason: HOSPADM

## 2024-02-20 RX ORDER — MEROPENEM 1 G/1
1 INJECTION, POWDER, FOR SOLUTION INTRAVENOUS EVERY 8 HOURS
Status: DISCONTINUED | OUTPATIENT
Start: 2024-02-21 | End: 2024-02-21

## 2024-02-20 RX ADMIN — SODIUM CHLORIDE 500 ML: 900 INJECTION, SOLUTION INTRAVENOUS at 19:17

## 2024-02-20 RX ADMIN — SODIUM CHLORIDE 1000 ML: 900 INJECTION, SOLUTION INTRAVENOUS at 20:46

## 2024-02-20 RX ADMIN — DIPHENHYDRAMINE HYDROCHLORIDE 25 MG: 50 INJECTION, SOLUTION INTRAMUSCULAR; INTRAVENOUS at 20:47

## 2024-02-20 RX ADMIN — MECLIZINE HYDROCHLORIDE 25 MG: 25 TABLET ORAL at 19:17

## 2024-02-20 RX ADMIN — ASPIRIN 324 MG: 81 TABLET, CHEWABLE ORAL at 19:17

## 2024-02-20 RX ADMIN — ACETAMINOPHEN 650 MG: 325 TABLET ORAL at 20:46

## 2024-02-20 RX ADMIN — PROCHLORPERAZINE EDISYLATE 10 MG: 5 INJECTION INTRAMUSCULAR; INTRAVENOUS at 20:47

## 2024-02-20 RX ADMIN — MIDODRINE HYDROCHLORIDE 10 MG: 5 TABLET ORAL at 20:46

## 2024-02-20 RX ADMIN — CEFTRIAXONE SODIUM 1 G: 1 INJECTION, SOLUTION INTRAVENOUS at 23:09

## 2024-02-20 RX ADMIN — HYDROCORTISONE SODIUM SUCCINATE 50 MG: 100 INJECTION, POWDER, FOR SOLUTION INTRAMUSCULAR; INTRAVENOUS at 20:47

## 2024-02-20 ASSESSMENT — ENCOUNTER SYMPTOMS
EYES NEGATIVE: 1
ENDOCRINE NEGATIVE: 1
MUSCULOSKELETAL NEGATIVE: 1
CONSTITUTIONAL NEGATIVE: 1
ALLERGIC/IMMUNOLOGIC NEGATIVE: 1
WEAKNESS: 1
RESPIRATORY NEGATIVE: 1
PSYCHIATRIC NEGATIVE: 1
CARDIOVASCULAR NEGATIVE: 1
GASTROINTESTINAL NEGATIVE: 1
DIZZINESS: 1
HEMATOLOGIC/LYMPHATIC NEGATIVE: 1

## 2024-02-20 ASSESSMENT — PAIN SCALES - GENERAL: PAINLEVEL_OUTOF10: 0 - NO PAIN

## 2024-02-20 ASSESSMENT — PAIN - FUNCTIONAL ASSESSMENT: PAIN_FUNCTIONAL_ASSESSMENT: 0-10

## 2024-02-21 ENCOUNTER — APPOINTMENT (OUTPATIENT)
Dept: RADIOLOGY | Facility: HOSPITAL | Age: 44
DRG: 948 | End: 2024-02-21
Payer: MEDICARE

## 2024-02-21 LAB
ABO GROUP (TYPE) IN BLOOD: NORMAL
ALBUMIN SERPL-MCNC: 3.9 G/DL (ref 3.5–5)
ALP BLD-CCNC: 100 U/L (ref 35–125)
ALT SERPL-CCNC: 9 U/L (ref 5–40)
ANION GAP SERPL CALC-SCNC: 10 MMOL/L
ANTIBODY SCREEN: NORMAL
APPEARANCE UR: CLEAR
AST SERPL-CCNC: 19 U/L (ref 5–40)
ATRIAL RATE: 81 BPM
BILIRUB SERPL-MCNC: 0.2 MG/DL (ref 0.1–1.2)
BILIRUB UR STRIP.AUTO-MCNC: NEGATIVE MG/DL
BUN SERPL-MCNC: 10 MG/DL (ref 8–25)
CALCIUM SERPL-MCNC: 8.8 MG/DL (ref 8.5–10.4)
CHLORIDE SERPL-SCNC: 109 MMOL/L (ref 97–107)
CK SERPL-CCNC: 81 U/L (ref 24–195)
CO2 SERPL-SCNC: 22 MMOL/L (ref 24–31)
COLOR UR: ABNORMAL
CREAT SERPL-MCNC: 0.7 MG/DL (ref 0.4–1.6)
EGFRCR SERPLBLD CKD-EPI 2021: >90 ML/MIN/1.73M*2
ERYTHROCYTE [DISTWIDTH] IN BLOOD BY AUTOMATED COUNT: 16.7 % (ref 11.5–14.5)
EST. AVERAGE GLUCOSE BLD GHB EST-MCNC: 108 MG/DL
GLUCOSE SERPL-MCNC: 146 MG/DL (ref 65–99)
GLUCOSE UR STRIP.AUTO-MCNC: NORMAL MG/DL
HBA1C MFR BLD: 5.4 %
HCG UR QL IA.RAPID: NEGATIVE
HCT VFR BLD AUTO: 36.1 % (ref 36–46)
HGB BLD-MCNC: 11.4 G/DL (ref 12–16)
INR PPP: 1.1 (ref 0.9–1.2)
KETONES UR STRIP.AUTO-MCNC: ABNORMAL MG/DL
LEUKOCYTE ESTERASE UR QL STRIP.AUTO: ABNORMAL
MAGNESIUM SERPL-MCNC: 2.1 MG/DL (ref 1.6–3.1)
MCH RBC QN AUTO: 25.1 PG (ref 26–34)
MCHC RBC AUTO-ENTMCNC: 31.6 G/DL (ref 32–36)
MCV RBC AUTO: 79 FL (ref 80–100)
NITRITE UR QL STRIP.AUTO: NEGATIVE
NRBC BLD-RTO: 0 /100 WBCS (ref 0–0)
NT-PROBNP SERPL-MCNC: 278 PG/ML (ref 0–178)
P AXIS: 17 DEGREES
P OFFSET: 182 MS
P ONSET: 141 MS
PH UR STRIP.AUTO: 5.5 [PH]
PLATELET # BLD AUTO: 178 X10*3/UL (ref 150–450)
POTASSIUM SERPL-SCNC: 4 MMOL/L (ref 3.4–5.1)
PR INTERVAL: 162 MS
PROT SERPL-MCNC: 7 G/DL (ref 5.9–7.9)
PROT UR STRIP.AUTO-MCNC: NEGATIVE MG/DL
PROTHROMBIN TIME: 11.7 SECONDS (ref 9.3–12.7)
Q ONSET: 222 MS
QRS COUNT: 13 BEATS
QRS DURATION: 100 MS
QT INTERVAL: 456 MS
QTC CALCULATION(BAZETT): 529 MS
QTC FREDERICIA: 503 MS
R AXIS: 78 DEGREES
RBC # BLD AUTO: 4.55 X10*6/UL (ref 4–5.2)
RBC # UR STRIP.AUTO: NEGATIVE /UL
RBC #/AREA URNS AUTO: ABNORMAL /HPF
RH FACTOR (ANTIGEN D): NORMAL
SODIUM SERPL-SCNC: 141 MMOL/L (ref 133–145)
SP GR UR STRIP.AUTO: 1.02
T AXIS: 141 DEGREES
T OFFSET: 450 MS
TROPONIN T SERPL-MCNC: <6 NG/L
UROBILINOGEN UR STRIP.AUTO-MCNC: NORMAL MG/DL
VENTRICULAR RATE: 81 BPM
WBC # BLD AUTO: 7.2 X10*3/UL (ref 4.4–11.3)
WBC #/AREA URNS AUTO: ABNORMAL /HPF

## 2024-02-21 PROCEDURE — 83036 HEMOGLOBIN GLYCOSYLATED A1C: CPT | Performed by: INTERNAL MEDICINE

## 2024-02-21 PROCEDURE — 2500000004 HC RX 250 GENERAL PHARMACY W/ HCPCS (ALT 636 FOR OP/ED): Performed by: INTERNAL MEDICINE

## 2024-02-21 PROCEDURE — 2500000005 HC RX 250 GENERAL PHARMACY W/O HCPCS: Performed by: INTERNAL MEDICINE

## 2024-02-21 PROCEDURE — 80053 COMPREHEN METABOLIC PANEL: CPT | Performed by: INTERNAL MEDICINE

## 2024-02-21 PROCEDURE — 99222 1ST HOSP IP/OBS MODERATE 55: CPT | Performed by: STUDENT IN AN ORGANIZED HEALTH CARE EDUCATION/TRAINING PROGRAM

## 2024-02-21 PROCEDURE — 81001 URINALYSIS AUTO W/SCOPE: CPT | Performed by: INTERNAL MEDICINE

## 2024-02-21 PROCEDURE — 2500000004 HC RX 250 GENERAL PHARMACY W/ HCPCS (ALT 636 FOR OP/ED): Performed by: NURSE PRACTITIONER

## 2024-02-21 PROCEDURE — 99232 SBSQ HOSP IP/OBS MODERATE 35: CPT | Performed by: NURSE PRACTITIONER

## 2024-02-21 PROCEDURE — 97166 OT EVAL MOD COMPLEX 45 MIN: CPT | Mod: GO

## 2024-02-21 PROCEDURE — G0378 HOSPITAL OBSERVATION PER HR: HCPCS

## 2024-02-21 PROCEDURE — 82550 ASSAY OF CK (CPK): CPT | Performed by: INTERNAL MEDICINE

## 2024-02-21 PROCEDURE — 2500000001 HC RX 250 WO HCPCS SELF ADMINISTERED DRUGS (ALT 637 FOR MEDICARE OP): Performed by: NURSE PRACTITIONER

## 2024-02-21 PROCEDURE — 85610 PROTHROMBIN TIME: CPT | Performed by: INTERNAL MEDICINE

## 2024-02-21 PROCEDURE — 87040 BLOOD CULTURE FOR BACTERIA: CPT | Mod: TRILAB | Performed by: INTERNAL MEDICINE

## 2024-02-21 PROCEDURE — 70551 MRI BRAIN STEM W/O DYE: CPT

## 2024-02-21 PROCEDURE — 2500000002 HC RX 250 W HCPCS SELF ADMINISTERED DRUGS (ALT 637 FOR MEDICARE OP, ALT 636 FOR OP/ED): Performed by: NURSE PRACTITIONER

## 2024-02-21 PROCEDURE — 2500000002 HC RX 250 W HCPCS SELF ADMINISTERED DRUGS (ALT 637 FOR MEDICARE OP, ALT 636 FOR OP/ED): Performed by: INTERNAL MEDICINE

## 2024-02-21 PROCEDURE — 81025 URINE PREGNANCY TEST: CPT | Performed by: INTERNAL MEDICINE

## 2024-02-21 PROCEDURE — 86901 BLOOD TYPING SEROLOGIC RH(D): CPT | Performed by: INTERNAL MEDICINE

## 2024-02-21 PROCEDURE — 99222 1ST HOSP IP/OBS MODERATE 55: CPT | Performed by: INTERNAL MEDICINE

## 2024-02-21 PROCEDURE — 2500000001 HC RX 250 WO HCPCS SELF ADMINISTERED DRUGS (ALT 637 FOR MEDICARE OP): Performed by: INTERNAL MEDICINE

## 2024-02-21 PROCEDURE — 85027 COMPLETE CBC AUTOMATED: CPT | Performed by: INTERNAL MEDICINE

## 2024-02-21 PROCEDURE — 83735 ASSAY OF MAGNESIUM: CPT | Performed by: INTERNAL MEDICINE

## 2024-02-21 PROCEDURE — 97161 PT EVAL LOW COMPLEX 20 MIN: CPT | Mod: GP

## 2024-02-21 PROCEDURE — 36415 COLL VENOUS BLD VENIPUNCTURE: CPT | Performed by: INTERNAL MEDICINE

## 2024-02-21 PROCEDURE — 76770 US EXAM ABDO BACK WALL COMP: CPT

## 2024-02-21 PROCEDURE — 84484 ASSAY OF TROPONIN QUANT: CPT | Performed by: INTERNAL MEDICINE

## 2024-02-21 PROCEDURE — 99223 1ST HOSP IP/OBS HIGH 75: CPT

## 2024-02-21 PROCEDURE — 83880 ASSAY OF NATRIURETIC PEPTIDE: CPT | Performed by: INTERNAL MEDICINE

## 2024-02-21 RX ORDER — HYDROCORTISONE 5 MG/1
5 TABLET ORAL
Status: DISCONTINUED | OUTPATIENT
Start: 2024-02-21 | End: 2024-02-22 | Stop reason: HOSPADM

## 2024-02-21 RX ORDER — CEFTRIAXONE 1 G/50ML
1 INJECTION, SOLUTION INTRAVENOUS DAILY
Status: DISCONTINUED | OUTPATIENT
Start: 2024-02-21 | End: 2024-02-22 | Stop reason: HOSPADM

## 2024-02-21 RX ORDER — IBUPROFEN 600 MG/1
600 TABLET ORAL ONCE
Status: COMPLETED | OUTPATIENT
Start: 2024-02-21 | End: 2024-02-21

## 2024-02-21 RX ORDER — BUSPIRONE HYDROCHLORIDE 15 MG/1
15 TABLET ORAL EVERY 12 HOURS PRN
Status: DISCONTINUED | OUTPATIENT
Start: 2024-02-21 | End: 2024-02-22 | Stop reason: HOSPADM

## 2024-02-21 RX ORDER — HYDROCORTISONE 5 MG/1
10 TABLET ORAL
Status: DISCONTINUED | OUTPATIENT
Start: 2024-02-22 | End: 2024-02-22 | Stop reason: HOSPADM

## 2024-02-21 RX ORDER — ATORVASTATIN CALCIUM 80 MG/1
80 TABLET, FILM COATED ORAL NIGHTLY
Status: DISCONTINUED | OUTPATIENT
Start: 2024-02-21 | End: 2024-02-22 | Stop reason: HOSPADM

## 2024-02-21 RX ORDER — FLUOXETINE HYDROCHLORIDE 20 MG/1
40 CAPSULE ORAL DAILY
Status: DISCONTINUED | OUTPATIENT
Start: 2024-02-22 | End: 2024-02-22 | Stop reason: HOSPADM

## 2024-02-21 RX ORDER — AMANTADINE HYDROCHLORIDE 100 MG/1
100 CAPSULE, GELATIN COATED ORAL 2 TIMES DAILY
Status: DISCONTINUED | OUTPATIENT
Start: 2024-02-21 | End: 2024-02-22 | Stop reason: HOSPADM

## 2024-02-21 RX ADMIN — PREGABALIN 100 MG: 100 CAPSULE ORAL at 10:01

## 2024-02-21 RX ADMIN — LEVETIRACETAM 750 MG: 750 TABLET, FILM COATED ORAL at 01:14

## 2024-02-21 RX ADMIN — AMANTADINE HYDROCHLORIDE 100 MG: 100 CAPSULE ORAL at 20:59

## 2024-02-21 RX ADMIN — PREGABALIN 100 MG: 100 CAPSULE ORAL at 20:59

## 2024-02-21 RX ADMIN — HYDROCORTISONE SODIUM SUCCINATE 50 MG: 100 INJECTION, POWDER, FOR SOLUTION INTRAMUSCULAR; INTRAVENOUS at 02:16

## 2024-02-21 RX ADMIN — ENOXAPARIN SODIUM 40 MG: 40 INJECTION SUBCUTANEOUS at 01:14

## 2024-02-21 RX ADMIN — MEROPENEM 1 G: 1 INJECTION, POWDER, FOR SOLUTION INTRAVENOUS at 09:03

## 2024-02-21 RX ADMIN — LEVETIRACETAM 750 MG: 750 TABLET, FILM COATED ORAL at 10:01

## 2024-02-21 RX ADMIN — LIDOCAINE 1 PATCH: 4 PATCH TOPICAL at 10:01

## 2024-02-21 RX ADMIN — IBUPROFEN 600 MG: 600 TABLET, FILM COATED ORAL at 11:40

## 2024-02-21 RX ADMIN — MIDODRINE HYDROCHLORIDE 10 MG: 10 TABLET ORAL at 08:55

## 2024-02-21 RX ADMIN — DESMOPRESSIN ACETATE 10 MCG: 10 SPRAY NASAL at 20:59

## 2024-02-21 RX ADMIN — HYDROCORTISONE SODIUM SUCCINATE 50 MG: 100 INJECTION, POWDER, FOR SOLUTION INTRAMUSCULAR; INTRAVENOUS at 08:55

## 2024-02-21 RX ADMIN — HYDROCORTISONE 5 MG: 5 TABLET ORAL at 17:29

## 2024-02-21 RX ADMIN — AMANTADINE HYDROCHLORIDE 100 MG: 100 CAPSULE ORAL at 14:03

## 2024-02-21 RX ADMIN — PREGABALIN 100 MG: 100 CAPSULE ORAL at 01:14

## 2024-02-21 RX ADMIN — ARIPIPRAZOLE 2 MG: 2 TABLET ORAL at 01:32

## 2024-02-21 RX ADMIN — PANTOPRAZOLE SODIUM 40 MG: 40 TABLET, DELAYED RELEASE ORAL at 06:09

## 2024-02-21 RX ADMIN — ENOXAPARIN SODIUM 40 MG: 40 INJECTION SUBCUTANEOUS at 22:11

## 2024-02-21 RX ADMIN — FLUOXETINE 10 MG: 10 CAPSULE ORAL at 10:01

## 2024-02-21 RX ADMIN — HYDROCORTISONE 5 MG: 5 TABLET ORAL at 14:03

## 2024-02-21 RX ADMIN — CEFTRIAXONE SODIUM 1 G: 1 INJECTION, SOLUTION INTRAVENOUS at 11:39

## 2024-02-21 RX ADMIN — ZOLPIDEM TARTRATE 10 MG: 5 TABLET ORAL at 02:20

## 2024-02-21 RX ADMIN — LEVOTHYROXINE SODIUM 112 MCG: 0.11 TABLET ORAL at 06:10

## 2024-02-21 RX ADMIN — ARIPIPRAZOLE 2 MG: 2 TABLET ORAL at 20:59

## 2024-02-21 RX ADMIN — ATORVASTATIN CALCIUM 80 MG: 80 TABLET, FILM COATED ORAL at 20:59

## 2024-02-21 RX ADMIN — LEVETIRACETAM 750 MG: 750 TABLET, FILM COATED ORAL at 20:59

## 2024-02-21 RX ADMIN — MIDODRINE HYDROCHLORIDE 10 MG: 10 TABLET ORAL at 14:03

## 2024-02-21 RX ADMIN — MIDODRINE HYDROCHLORIDE 10 MG: 10 TABLET ORAL at 17:29

## 2024-02-21 RX ADMIN — ASPIRIN 81 MG: 81 TABLET, CHEWABLE ORAL at 10:01

## 2024-02-21 RX ADMIN — ACETAMINOPHEN 1000 MG: 500 TABLET ORAL at 08:55

## 2024-02-21 RX ADMIN — ARIPIPRAZOLE 15 MG: 5 TABLET ORAL at 10:01

## 2024-02-21 RX ADMIN — DESMOPRESSIN ACETATE 10 MCG: 10 SPRAY NASAL at 01:32

## 2024-02-21 SDOH — SOCIAL STABILITY: SOCIAL INSECURITY: DO YOU FEEL ANYONE HAS EXPLOITED OR TAKEN ADVANTAGE OF YOU FINANCIALLY OR OF YOUR PERSONAL PROPERTY?: NO

## 2024-02-21 SDOH — SOCIAL STABILITY: SOCIAL INSECURITY: ARE THERE ANY APPARENT SIGNS OF INJURIES/BEHAVIORS THAT COULD BE RELATED TO ABUSE/NEGLECT?: NO

## 2024-02-21 SDOH — SOCIAL STABILITY: SOCIAL INSECURITY: ARE YOU OR HAVE YOU BEEN THREATENED OR ABUSED PHYSICALLY, EMOTIONALLY, OR SEXUALLY BY ANYONE?: NO

## 2024-02-21 SDOH — SOCIAL STABILITY: SOCIAL INSECURITY: HAVE YOU HAD THOUGHTS OF HARMING ANYONE ELSE?: NO

## 2024-02-21 SDOH — SOCIAL STABILITY: SOCIAL INSECURITY: DO YOU FEEL UNSAFE GOING BACK TO THE PLACE WHERE YOU ARE LIVING?: NO

## 2024-02-21 SDOH — SOCIAL STABILITY: SOCIAL INSECURITY: HAS ANYONE EVER THREATENED TO HURT YOUR FAMILY OR YOUR PETS?: NO

## 2024-02-21 SDOH — SOCIAL STABILITY: SOCIAL INSECURITY: ABUSE: ADULT

## 2024-02-21 SDOH — SOCIAL STABILITY: SOCIAL INSECURITY: DOES ANYONE TRY TO KEEP YOU FROM HAVING/CONTACTING OTHER FRIENDS OR DOING THINGS OUTSIDE YOUR HOME?: NO

## 2024-02-21 ASSESSMENT — COGNITIVE AND FUNCTIONAL STATUS - GENERAL
DAILY ACTIVITIY SCORE: 24
MOBILITY SCORE: 24
MOBILITY SCORE: 20
MOVING TO AND FROM BED TO CHAIR: A LITTLE
CLIMB 3 TO 5 STEPS WITH RAILING: A LITTLE
TOILETING: A LITTLE
HELP NEEDED FOR BATHING: A LITTLE
PATIENT BASELINE BEDBOUND: NO
WALKING IN HOSPITAL ROOM: A LITTLE
DRESSING REGULAR UPPER BODY CLOTHING: A LITTLE
STANDING UP FROM CHAIR USING ARMS: A LITTLE
DAILY ACTIVITIY SCORE: 20
MOBILITY SCORE: 24
DAILY ACTIVITIY SCORE: 24
DRESSING REGULAR LOWER BODY CLOTHING: A LITTLE
MOBILITY SCORE: 24
DAILY ACTIVITIY SCORE: 24

## 2024-02-21 ASSESSMENT — PATIENT HEALTH QUESTIONNAIRE - PHQ9
1. LITTLE INTEREST OR PLEASURE IN DOING THINGS: SEVERAL DAYS
2. FEELING DOWN, DEPRESSED OR HOPELESS: SEVERAL DAYS
SUM OF ALL RESPONSES TO PHQ9 QUESTIONS 1 & 2: 2

## 2024-02-21 ASSESSMENT — PAIN SCALES - GENERAL
PAINLEVEL_OUTOF10: 0 - NO PAIN
PAINLEVEL_OUTOF10: 0 - NO PAIN
PAINLEVEL_OUTOF10: 6
PAINLEVEL_OUTOF10: 0 - NO PAIN
PAINLEVEL_OUTOF10: 3
PAINLEVEL_OUTOF10: 9
PAINLEVEL_OUTOF10: 5 - MODERATE PAIN
PAINLEVEL_OUTOF10: 9

## 2024-02-21 ASSESSMENT — ACTIVITIES OF DAILY LIVING (ADL)
FEEDING YOURSELF: INDEPENDENT
ADL_ASSISTANCE: INDEPENDENT
TOILETING: INDEPENDENT
BATHING_ASSISTANCE: STAND BY
LACK_OF_TRANSPORTATION: NO
JUDGMENT_ADEQUATE_SAFELY_COMPLETE_DAILY_ACTIVITIES: YES
ADL_ASSISTANCE: INDEPENDENT
DRESSING YOURSELF: INDEPENDENT
PATIENT'S MEMORY ADEQUATE TO SAFELY COMPLETE DAILY ACTIVITIES?: YES
GROOMING: INDEPENDENT
LACK_OF_TRANSPORTATION: NO
HEARING - RIGHT EAR: FUNCTIONAL
ADEQUATE_TO_COMPLETE_ADL: YES
WALKS IN HOME: INDEPENDENT
HEARING - LEFT EAR: FUNCTIONAL
BATHING: INDEPENDENT

## 2024-02-21 ASSESSMENT — PAIN - FUNCTIONAL ASSESSMENT
PAIN_FUNCTIONAL_ASSESSMENT: 0-10

## 2024-02-21 ASSESSMENT — LIFESTYLE VARIABLES
SUBSTANCE_ABUSE_PAST_12_MONTHS: NO
HOW OFTEN DO YOU HAVE 6 OR MORE DRINKS ON ONE OCCASION: NEVER
HOW OFTEN DO YOU HAVE A DRINK CONTAINING ALCOHOL: NEVER
AUDIT-C TOTAL SCORE: 0
PRESCIPTION_ABUSE_PAST_12_MONTHS: NO
SKIP TO QUESTIONS 9-10: 1
AUDIT-C TOTAL SCORE: 0
HOW MANY STANDARD DRINKS CONTAINING ALCOHOL DO YOU HAVE ON A TYPICAL DAY: PATIENT DOES NOT DRINK

## 2024-02-21 ASSESSMENT — PAIN DESCRIPTION - LOCATION
LOCATION: BACK
LOCATION: HEAD

## 2024-02-21 ASSESSMENT — PAIN SCALES - WONG BAKER
WONGBAKER_NUMERICALRESPONSE: NO HURT
WONGBAKER_NUMERICALRESPONSE: NO HURT

## 2024-02-21 ASSESSMENT — PAIN DESCRIPTION - ORIENTATION
ORIENTATION: ANTERIOR
ORIENTATION: MID

## 2024-02-21 NOTE — ED PROVIDER NOTES
HPI   Chief Complaint   Patient presents with    Weakness, Gen     Pt stated that she was having dizziness and blurred vision. She stated that she just doesn't feel right. She stated that it started this morning. She stated that it started at 11 am.        Patient is a 43-year-old female that presents to the emergency department for evaluation of dizziness, lightheadedness and left-sided weakness.  Patient states that she woke up this morning at 11 AM and noted that she was feeling very unsteady, lightheaded and dizzy.  Nothing seems to make it better or worse.  She states that she has been very unsteady and seem to be falling to the left when she was feeling lightheaded.  She denies hitting her head or loss of consciousness.  She states that she went to an urgent care prior to arrival and started noticing that she had left-sided weakness of the arm and leg.  She does admit to mild chest discomfort which she has been having for the last several months and has been worked up for it previously.  She denies shortness of breath, abdominal pain, nausea or vomiting.  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      History provided by:  Patient                      Melbeta Coma Scale Score: 15         NIH Stroke Scale: 3             Patient History   Past Medical History:   Diagnosis Date    COPD (chronic obstructive pulmonary disease) (CMS/HCC)     Coronary artery disease     Personal history of other endocrine, nutritional and metabolic disease     History of hypopituitarism    Personal history of other mental and behavioral disorders     History of depression    Personal history of other specified conditions     History of brain tumor    Unspecified convulsions (CMS/HCC)     Convulsion     Past Surgical History:   Procedure Laterality Date    MR HEAD ANGIO WO IV CONTRAST  7/15/2021    MR HEAD ANGIO WO IV CONTRAST 7/15/2021 [facility]Y LEGACY    MR HEAD ANGIO WO  "IV CONTRAST  11/28/2017    MR HEAD ANGIO WO IV CONTRAST [facility]    MR HEAD ANGIO WO IV CONTRAST  3/13/2018    MR HEAD ANGIO WO IV CONTRAST [facility]    MR NECK ANGIO WO IV CONTRAST  7/15/2021    MR NECK ANGIO WO IV CONTRAST 7/15/2021 [facility] EMERGENCY LEGACY    OTHER SURGICAL HISTORY  08/17/2013    Craniotomy Tumor Removal - Complete     No family history on file.  Social History     Tobacco Use    Smoking status: Never    Smokeless tobacco: Never   Vaping Use    Vaping Use: Never used   Substance Use Topics    Alcohol use: Never    Drug use: Yes     Types: \"Crack\" cocaine     Comment: last used 4 weeks ago       Physical Exam   ED Triage Vitals [02/20/24 1841]   Temperature Heart Rate Respirations BP   37.7 °C (99.8 °F) 78 16 97/51      Pulse Ox Temp Source Heart Rate Source Patient Position   95 % Tympanic Monitor Sitting      BP Location FiO2 (%)     Right arm --       Physical Exam  Vitals and nursing note reviewed.   Constitutional:       General: She is not in acute distress.     Appearance: Normal appearance. She is not ill-appearing.   HENT:      Head: Normocephalic and atraumatic.      Mouth/Throat:      Mouth: Mucous membranes are moist.   Eyes:      Extraocular Movements: Extraocular movements intact.      Pupils: Pupils are equal, round, and reactive to light.   Cardiovascular:      Rate and Rhythm: Normal rate and regular rhythm.      Pulses: Normal pulses.   Pulmonary:      Effort: Pulmonary effort is normal.      Breath sounds: Normal breath sounds.   Abdominal:      General: Abdomen is flat.      Tenderness: There is no abdominal tenderness. There is no guarding or rebound.   Musculoskeletal:         General: No swelling.   Skin:     General: Skin is warm and dry.   Neurological:      Mental Status: She is alert.      Sensory: No sensory deficit.      Motor: Weakness (There is weakness in the left arm and left leg present worse in the left lower leg than the left arm) present. "       Recent Results (from the past 24 hour(s))   CBC and Auto Differential    Collection Time: 02/20/24  6:50 PM   Result Value Ref Range    WBC 8.5 4.4 - 11.3 x10*3/uL    nRBC 0.0 0.0 - 0.0 /100 WBCs    RBC 5.02 4.00 - 5.20 x10*6/uL    Hemoglobin 12.7 12.0 - 16.0 g/dL    Hematocrit 39.6 36.0 - 46.0 %    MCV 79 (L) 80 - 100 fL    MCH 25.3 (L) 26.0 - 34.0 pg    MCHC 32.1 32.0 - 36.0 g/dL    RDW 17.2 (H) 11.5 - 14.5 %    Platelets 214 150 - 450 x10*3/uL    Neutrophils % 47.8 40.0 - 80.0 %    Immature Granulocytes %, Automated 0.1 0.0 - 0.9 %    Lymphocytes % 41.5 13.0 - 44.0 %    Monocytes % 4.7 2.0 - 10.0 %    Eosinophils % 4.8 0.0 - 6.0 %    Basophils % 1.1 0.0 - 2.0 %    Neutrophils Absolute 4.06 1.20 - 7.70 x10*3/uL    Immature Granulocytes Absolute, Automated 0.01 0.00 - 0.70 x10*3/uL    Lymphocytes Absolute 3.52 1.20 - 4.80 x10*3/uL    Monocytes Absolute 0.40 0.10 - 1.00 x10*3/uL    Eosinophils Absolute 0.41 0.00 - 0.70 x10*3/uL    Basophils Absolute 0.09 0.00 - 0.10 x10*3/uL   Comprehensive metabolic panel    Collection Time: 02/20/24  6:50 PM   Result Value Ref Range    Glucose 134 (H) 65 - 99 mg/dL    Sodium 137 133 - 145 mmol/L    Potassium 4.3 3.4 - 5.1 mmol/L    Chloride 105 97 - 107 mmol/L    Bicarbonate 22 (L) 24 - 31 mmol/L    Urea Nitrogen 13 8 - 25 mg/dL    Creatinine 0.70 0.40 - 1.60 mg/dL    eGFR >90 >60 mL/min/1.73m*2    Calcium 8.9 8.5 - 10.4 mg/dL    Albumin 4.0 3.5 - 5.0 g/dL    Alkaline Phosphatase 101 35 - 125 U/L    Total Protein 7.4 5.9 - 7.9 g/dL    AST 24 5 - 40 U/L    Bilirubin, Total 0.4 0.1 - 1.2 mg/dL    ALT 9 5 - 40 U/L    Anion Gap 10 <=19 mmol/L   Troponin T, High Sensitivity    Collection Time: 02/20/24  6:50 PM   Result Value Ref Range    Troponin T, High Sensitivity <6 <=14 ng/L   Protime-INR    Collection Time: 02/20/24  6:50 PM   Result Value Ref Range    Protime 11.6 9.3 - 12.7 seconds    INR 1.1 0.9 - 1.2   APTT    Collection Time: 02/20/24  6:50 PM   Result Value Ref Range     aPTT 29.6 22.0 - 32.5 seconds   POCT GLUCOSE    Collection Time: 02/20/24  7:15 PM   Result Value Ref Range    POCT Glucose 120 (H) 74 - 99 mg/dL         ED Course & MDM   ED Course as of 02/20/24 2251   derrick Feb 20, 2024 2037 EKG Time:1848  EKG Interpretation time:1850  EKG Interpretation: EKG shows normal sinus rhythm with a rate of 81, normal axis, incomplete right bundle branch block, prolonged QT at 529, nonspecific T wave changes but no evidence of STEMI    EKG was interpreted by myself independently [JL]      ED Course User Index  [JL] Cody Dimas DO         Diagnoses as of 02/20/24 2251   Stroke-like symptoms   Acute UTI       Medical Decision Making  Patient is a 43-year-old female who presents emergency department for evaluation of generalized weakness, lightheadedness, dizziness, left-sided weakness.  Patient uncomfortable appearing but in no obvious distress on exam.  She is awake, alert and oriented.  She has noted be borderline hypotensive with a blood pressure of 97/51 however otherwise hemodynamically stable.  She does have mild headache on presentation and NIH score of 3 given the left arm and leg weakness and a code brain attack was called.  She is well outside of window for TNK/tPA given that she woke up with the symptoms around 11 AM and is now outside of the 4-hour window.  Patient also has a NIH of 3 and is not a candidate for thrombectomy.  CT scan of the brain was ordered without contrast which showed no evidence of intracranial hemorrhage and does show evidence of postsurgical changes consistent with her known history.  Blood work ordered including CBC, CMP, troponin, PT/INR, PTT.  Blood work was unremarkable including normal electrolytes, normal kidney function, normal white count.  Urinalysis does show evidence of urinary tract infection and patient was given IV ceftriaxone.  She was initially given IV fluids however blood pressure remained low at 90 systolic.  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  She was given dose of 10 mg midodrine orally as she states she has not been taking it recently.  She was also given IV Solu-Cortef as her stress dose.  This did improve patient's blood pressure to 116/65.  I discussed case with neurology who recommends admission for MRI.  Case was also discussed with hospitalist who accepted patient for further admission.    CRITICAL CARE NOTE:   Upon my evaluation, this patient had a high probability of imminent or life-threatening deterioration due to strokelike symptoms, which required my direct attention, intervention, and personal management    30 total minutes of critical care were personally provided which excludes all other billable procedures. This was for time at the bedside, re-evaluations, interpretation of lab and imaging results, discussions with consultants, and monitoring for potential decompensation. Intervention were performed as documented above.        Procedure  Procedures     Cody Dimas, DO  02/20/24 0842

## 2024-02-21 NOTE — PROGRESS NOTES
Occupational Therapy    Evaluation    Patient Name: Lu Fall  MRN: [id number]  Today's Date: 2/21/2024  Time Calculation  Start Time: 0819  Stop Time: 0837  Time Calculation (min): 18 min      Assessment:  OT Assessment: 42 yo female with a history of behavioral disorders was admitted for a neurological workup d/t stroke like symptoms.  On eval, patient  presents with left-sided weakness per testing but WFL per OBS with functional tasks and distraction.  Pt would benefit from further OT services to provide ADL retraining utilizing compensatory techniques and progressive strengthening pending progress but is potentially at baseline functional status.  Prognosis: Good  Barriers to Discharge: None  Evaluation/Treatment Tolerance: Patient tolerated treatment well  Medical Staff Made Aware: Yes  End of Session Communication: Bedside nurse  End of Session Patient Position: Up in chair, Alarm on  OT Assessment Results: Decreased ADL status, Decreased safe judgment during ADL, Decreased cognition, Decreased endurance, Decreased functional mobility, Decreased IADLs, Decreased upper extremity strength  Prognosis: Good  Barriers to Discharge: None  Evaluation/Treatment Tolerance: Patient tolerated treatment well  Medical Staff Made Aware: Yes  Barriers to Participation: Comorbidities    Plan:  Treatment Interventions: ADL retraining, Functional transfer training, UE strengthening/ROM, Endurance training, Patient/family training, Equipment evaluation/education, Compensatory technique education, Continued evaluation  OT Frequency: 3 times per week  OT Discharge Recommendations: Low intensity level of continued care, 24 hr supervision due to cognition  OT - OK to Discharge: Yes  Treatment Interventions: ADL retraining, Functional transfer training, UE strengthening/ROM, Endurance training, Patient/family training, Equipment evaluation/education, Compensatory technique education, Continued evaluation    Subjective     Current  "Problem:  1. Stroke-like symptoms        2. Acute UTI          General:  General  Reason for Referral: Decline in ADLs  Referred By: Dr Barcensa  Past Medical History Relevant to Rehab: behavioral/mental disorders; unspecified convulsions; COPD; CAD  Family/Caregiver Present: No  Co-Treatment: PT  Co-Treatment Reason: partial cotreat for safety with mobility  Prior to Session Communication: Bedside nurse  Patient Position Received: Bed, 3 rail up  Preferred Learning Style: verbal, visual  General Comment: Pt admitted for neurological workup d/t stroke-like symptoms (left-sided weakness).  Pt was cleared by nursing and agreeable for therapy.    Precautions:  Medical Precautions: Fall precautions    Pain:  Pain Assessment  Pain Assessment: 0-10  Pain Score: 9  Pain Type: Acute pain  Pain Location: Head  Pain Interventions:  (Notified nursing)    Objective     Cognition:  Orientation Level: Oriented X4  Cognition Comments: menal/behavioral disorders impacting safety awareness/insight  Memory:  (inconsistent responses)  Safety/Judgement: Exceptions to WFL     Home Living:  Type of Home: House  Lives With: Parent(s)  Home Adaptive Equipment: Walker rolling or standard, Long-handled sponge (rollator)  Home Layout: Multi-level  Bathroom Shower/Tub: Tub/shower unit  Bathroom Toilet: Standard  Bathroom Equipment: Grab bars in shower  Bathroom Accessibility:  (bathrooms on both floors)    Prior Function:  Level of [place]: Independent with ADLs and functional transfers  Receives Help From: Parent(s)  ADL Assistance: Independent  Homemaking Assistance:  (Shares with parents)  Ambulatory Assistance: Independent (with rollator)  Leisure: enjoys amusement king  Hand Dominance: Right  Prior Function Comments: patient reports independence with ADLs but also states  \"I haven't been doing much on my own lately\"    ADL:  Eating Assistance: Independent  Grooming Assistance: Stand by  Grooming Deficit: Setup (per clinical " judgement)  Bathing Assistance: Stand by  Bathing Deficit: Setup, Supervision/safety (per clinical judgement)  UE Dressing Assistance: Stand by  UE Dressing Deficit: Setup (per clinical judgement)  LE Dressing Assistance: Stand by  LE Dressing Deficit: Setup, Supervision/safety (to bernarda socks and pants)  Toileting Assistance with Device: Stand by  Toileting Deficit: Supervison/safety (per clinical judgement)  ADL Comments: Functional performance incosistent with self report and testing    Activity Tolerance:  Endurance: Endurance does not limit participation in activity    Bed Mobility/Transfers: Transfers  Transfer: Yes  Transfer 1  Transfer From 1: Bed to  Transfer to 1: Stand  Technique 1: Sit to stand  Transfer Device 1: Walker  Transfer Level of Assistance 1: Close supervision  Transfers 2  Transfer From 2: Stand to  Transfer to 2: Sit, Chair with arms  Technique 2: Stand to sit  Transfer Device 2: Walker  Transfer Level of Assistance 2: Close supervision    Ambulation/Gait Training:  Ambulation/Gait Training  Ambulation/Gait Training Performed: Yes (Supervision with a 2 wheeled walker when distracted by conversation, otherwise min assist for safety.  Requested to use a walker despite first several steps being steady without an A.D.)     Sensation:  Sensation Comment: Reports acute numbness on left foot and left olecranon    Hand Function:  Gross Grasp: Functional  Coordination: Functional    Extremities: RUE   RUE : Within Functional Limits and LUE   LUE: Within Functional Limits (impaired with testing but WFL based on function/OBS)    Outcome Measures:[facility] Daily Activity  Putting on and taking off regular lower body clothing: A little  Bathing (including washing, rinsing, drying): A little  Putting on and taking off regular upper body clothing: A little  Toileting, which includes using toilet, bedpan or urinal: A little  Taking care of personal grooming such as brushing teeth: None  Eating Meals: None  Daily  Activity - Total Score: 20    OP EDUCATION:  Education  Individual(s) Educated: Patient  Education Provided: Fall precautons, Risk and benefits of OT discussed with patient or other, POC discussed and agreed upon  Risk and Benefits Discussed with Patient/Caregiver/Other: yes  Patient/Caregiver Demonstrated Understanding: yes  Plan of Care Discussed and Agreed Upon: yes  Patient Response to Education: Patient/Caregiver Verbalized Understanding of Information    Goals:  Encounter Problems       Encounter Problems (Active)       OT Goals       ADLs (Progressing)       Start:  02/21/24    Expected End:  02/28/24       Pt will complete all ADL tasks independently.         Functional transfers (Progressing)       Start:  02/21/24    Expected End:  02/28/24       Pt will complete toilet, bed, and chair transfers independently.         UE strength (Progressing)       Start:  02/21/24    Expected End:  02/28/24       Pt will increase left UE strength/coordination to WFL based on testing.

## 2024-02-21 NOTE — PROGRESS NOTES
Physical Therapy    Physical Therapy Evaluation    Patient Name: Lu Fall  MRN: [id number]  Today's Date: 2/21/2024   Time Calculation  Start Time: 0815  Stop Time: 0829  Time Calculation (min): 14 min    Assessment/Plan   PT Assessment  PT Assessment Results: Decreased strength, Decreased endurance, Impaired balance, Decreased safety awareness, Impaired judgement, Decreased mobility  Rehab Prognosis: Fair  Medical Staff Made Aware: Yes  End of Session Communication: Bedside nurse  End of Session Patient Position: Up in chair, Alarm on, Alarm off, caregiver present (OT at bedside)    Assessment Comment: 42 y/o female who presents with decreased mobility. Pt presents inconsistently with physical abilities. Appears able to ambulate in [place] without gait deviations, when talking with therapist. Will cont to monitor physical abilities while in-house. Anticipate pt will return home wtih parents at NM.    IP OR SWING BED PT PLAN  Inpatient or Swing Bed: Inpatient  PT Plan  Treatment/Interventions: Gait training, Balance training, Strengthening, Endurance training, Therapeutic activity  PT Plan: Skilled PT  PT Frequency: 2 times per week  PT Discharge Recommendations: 24 hr supervision due to cognition  PT Recommended Transfer Status: Assist x1  PT - OK to Discharge: Yes      Subjective   General Visit Information:  General  Reason for Referral: impaired mobility  Past Medical History Relevant to Rehab: behavioral/mental issues; COPD; CAD; brain tumor  Family/Caregiver Present: No  Co-Treatment Reason: partial co-treat with OT for safe mobility  Prior to Session Communication: Bedside nurse  Patient Position Received: Bed, 3 rail up, Alarm off, not on at start of session  Preferred Learning Style: verbal, visual  General Comment: 42 y/o female who presented with dizziness and L sided weakness. Stroke work-up. Cleared for mobility per nursing. Pt supine in bed upon arrival and agreeable to participate.  Home  "Living:  Home Living  Type of Home: House  Lives With: Parent(s)  Home Adaptive Equipment: Walker rolling or standard  Home Layout: Multi-level, Bed/bath upstairs  Home Access: Stairs to enter with rails  Entrance Stairs-Number of Steps: 1  Home Living Comments: reportedly live with parents in multi story home. Pt's bedroom and bathroom are on second floor. \"I go up and down all day long.\"  Prior Level of Function:  Prior Function Per Pt/Caregiver Report  Level of [place]: Independent with ADLs and functional transfers, Needs assistance with homemaking  ADL Assistance: Independent  Ambulatory Assistance: Independent  Prior Function Comments: Ambulates with no AD vs rollator. Pt stated, \"I've been weak lately,\" and reported to use rollator, although stated, \"no\" to using AD when in conversation. parents procide transportation. Share IADL reponsibiltiies.  Precautions:  Precautions  Medical Precautions: Fall precautions  Precautions Comment: behavioral  Vital Signs:       Objective   Pain:  Pain Assessment  Pain Assessment: 0-10  Pain Score: 9  Pain Location:  (headache)  Cognition:  Cognition  Overall Cognitive Status:  (a/o x 4.  able to follow commands appropriately. Pt is inconsistent with presentation throughout session. Physcial abilities observed different when pt is distracted vs abilities when asked specifically about given task.)  Attention:  (easily distracted)  Insight: Mild  Impulsive: Mildly    General Assessments:     Activity Tolerance  Endurance: Endurance does not limit participation in activity    Sensation  Light Touch: No apparent deficits    Strength  Strength Comments: B LEs > 3+/5 observed.   When asked about strength, pt reported LLE weakness and would present with buckling of LLE during ambuation and state, \"sometimes it gives out on me,\" however would then ambulate without AD without any observed functional weaknesses in LLE.       Postural Control  Posture Comment: flexed " "posture    Static Sitting Balance  Static Sitting-Level of Assistance: Modified independent  Dynamic Sitting Balance  Dynamic Sitting-Comments: Donned socks and pants sitting on EOB without LOB    Static Standing Balance  Static Standing-Level of Assistance: Contact guard  Static Standing-Comment/Number of Minutes: with and without UE support  Dynamic Standing Balance  Dynamic Standing-Comments: CGA with UE support of walker.  Pt would occasionally buckle in LLE; questionable weakness vs behavioural  Functional Assessments:  Bed Mobility  Bed Mobility: Yes  Bed Mobility 1  Bed Mobility 1: Supine to sitting  Level of Assistance 1: Modified independent  Bed Mobility Comments 1: HOB elevated    Transfers  Transfer: Yes  Transfer 1  Technique 1: Sit to stand, Stand to sit  Transfer Level of Assistance 1: Close supervision  Trials/Comments 1: from elevated EOB. onto chair with arms    Ambulation/Gait Training  Ambulation/Gait Training Performed: Yes  Ambulation/Gait Training 1  Surface 1: Level tile  Device 1: Rolling walker  Assistance 1: Contact guard  Quality of Gait 1:  (decreased ector. would occasionally state, \"my knee andre on me.\" and present with knee buckle, however inconsistent during ambulation. when talking with therapist, able to ambulate with walker without any observed gait deviations.)  Comments/Distance (ft) 1: about 15 ft without AD. About 100 ft with walker (pt requested to alexa walker when ambulating past it in room)  Extremity/Trunk Assessments:  RLE   RLE : Within Functional Limits  LLE   LLE : Within Functional Limits  Outcome Measures:  [facility] Basic Mobility  Turning from your back to your side while in a flat bed without using bedrails: None  Moving from lying on your back to sitting on the side of a flat bed without using bedrails: None  Moving to and from bed to chair (including a wheelchair): A little  Standing up from a chair using your arms (e.g. wheelchair or bedside chair): A " little  To walk in hospital room: A little  Climbing 3-5 steps with railing: A little  Basic Mobility - Total Score: 20    Encounter Problems       Encounter Problems (Active)       Balance       dynamic (Progressing)       Start:  02/21/24    Expected End:  03/05/24       Pt will perform DGI and score >/= 22/24 to identify low falls risk              Mobility       LTG - Patient will navigate 4-6 steps with rails/device (Progressing)       Start:  02/21/24    Expected End:  03/05/24            ambulation (Progressing)       Start:  02/21/24    Expected End:  03/05/24       Pt will amb >  250 ft with LRAD and mod independence             Safety       LTG - Patient will utilize safety techniques (Progressing)       Start:  02/21/24    Expected End:  03/05/24               Transfers       sit to stand (Progressing)       Start:  02/21/24    Expected End:  03/05/24       Pt will perform sit to stand transfer with LRAD and mod independence.                 Education Documentation  Precautions, taught by Naida Holbrook PT at 2/21/2024 10:33 AM.  Learner: Patient  Readiness: Acceptance  Method: Explanation  Response: Needs Reinforcement    Mobility Training, taught by Naida Holbrook PT at 2/21/2024 10:33 AM.  Learner: Patient  Readiness: Acceptance  Method: Explanation  Response: Needs Reinforcement    Education Comments  No comments found.

## 2024-02-21 NOTE — CONSULTS
Consults  History Of Present Illness:    Lu Fall is a 43 y.o. female presenting with left-sided weakness.    This patient is a 43-year-old white female with a very complicated past medical history.  She does have coronary artery disease and underwent a PCI and stent deployment to the LAD in 2018.  The patient also had a history of rheumatic heart disease with severe mitral valve stenosis.  The patient underwent CABG x 1 with LIMA to LAD along with a bioprosthetic mitral valve replacement for severe mitral stenosis and a tricuspid valve repair in 9/2022.  She did have an echocardiogram performed in 2/2023 which demonstrated a preserved LV ejection fraction normal function of the mitral valve replacement but residual 3+ tricuspid valve regurgitation with no evidence of right-sided heart failure.  The patient does have a history of hyperlipidemia treated with statin therapy.  She also has a history of orthostatic hypotension with chronic hypotension thought in part to be related to adrenal insufficiency.  Her systolic blood pressures have commonly been in the 80 mmHg range and midodrine was previously initiated along with compression stockings.  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  She has a history of GERD obstructive sleep apnea seizure disorder bronchospastic airway disease chronic anxiety depression smoking and cocaine abuse.  The patient has had a previous appendectomy.  Of note the patient did have a recent echocardiogram on 1/29/2024 demonstrating a preserved LV ejection fraction at 60 to 65%, bioprosthetic mitral valve with 1+ mitral valve regurgitation, tricuspid valve repair with 1-2+ tricuspid valve regurgitation and a normal estimated PA systolic pressure of 27 mmHg    Patient currently admitted on 2/20/2024 with some generalized weakness and perceived increased left-sided weakness.  After several hours  she was taken by her parents to a local urgent care center at 5 PM and she was redirected to the emergency room due to concern for left-sided weakness.  The patient underwent brain attack protocol with stat head CT that was negative for any acute findings.  Her issues were discussed with telestroke neurology and a CT angiogram was not performed and no intervention was recommended.  Her evaluation in the emergency room included a CBC that was essentially unremarkable.  The comprehensive metabolic panel was normal other than glucose 134.  Creatinine was 0.70.  The initial high-sensitivity troponin was less than 6.  The standard chest x-ray was clear.  Renal ultrasound showed the possibility of pyelonephritis and a small focus of bladder wall thickening with a possible central cystic structure.  Telemetry monitor demonstrates sinus rhythm.  Her preadmission medications including in part Symmetrel Abilify and aspirin Lipitor BuSpar Zyrtec DDAVP he is out of my fluoxetine Cortef P.o. Synthroid Reglan Robaxin midodrine 10 mg 3 times daily      Last Recorded Vitals:  Vitals:    02/20/24 2213 02/20/24 2300 02/21/24 0429 02/21/24 0700   BP: 105/72 111/76 (!) 95/48 115/69   BP Location:  Right arm Right arm Right leg   Patient Position:  Lying Lying Sitting   Pulse: 75 77 83    Resp: 17 18 17 17   Temp:  36.8 °C (98.2 °F) 36.2 °C (97.2 °F) 36.6 °C (97.9 °F)   TempSrc:  Temporal Temporal Temporal   SpO2: 100% 100% 97%    Weight:       Height:           Last Labs:  CBC - 2/21/2024:  5:28 AM  7.2 11.4 178    36.1      CMP - 2/21/2024:  5:29 AM  8.8 7.0 19 --- 0.2   _ 3.9 9 100      PTT - 2/20/2024:  6:50 PM  1.1   11.7 29.6     Troponin I, High Sensitivity   Date/Time Value Ref Range Status   02/18/2024 12:33 AM 3 0 - 13 ng/L Final   02/17/2024 11:34 PM 3 0 - 13 ng/L Final   02/09/2024 09:44 PM 6 0 - 13 ng/L Final     BNP   Date/Time Value Ref Range Status   02/09/2024 08:24 PM 85 0 - 99 pg/mL Final   01/28/2024 07:22 PM 31 0 -  99 pg/mL Final     Hemoglobin A1C   Date/Time Value Ref Range Status   02/21/2024 12:57 AM 5.4 See below % Final   01/29/2024 05:22 AM 5.0 see below % Final     LDL Calculated   Date/Time Value Ref Range Status   01/29/2024 05:22 AM 84 <=99 mg/dL Final     Comment:                                 Near   Borderline      AGE      Desirable  Optimal    High     High     Very High     0-19 Y     0 - 109     ---    110-129   >/= 130     ----    20-24 Y     0 - 119     ---    120-159   >/= 160     ----      >24 Y     0 -  99   100-129  130-159   160-189     >/=190     01/06/2020 01:52 AM 72 65 - 130 MG/DL Final   05/22/2018 08:11 AM 51 (L) 65 - 130 MG/DL Final   03/13/2018 01:53 AM  65 - 130 MG/DL Final    Unable to report calculated LDL due to increased triglycerides. Direct     Comment:      LDL to be run.     VLDL   Date/Time Value Ref Range Status   01/29/2024 05:22 AM 26 0 - 40 mg/dL Final   04/22/2023 05:27 AM 72 (H) 0 - 40 mg/dL Final   05/11/2022 06:05 AM 48 (H) 0 - 40 mg/dL Final      Last I/O:  I/O last 3 completed shifts:  In: 1550 (26.3 mL/kg) [IV Piggyback:1550]  Out: - (0 mL/kg)   Weight: 59 kg     Past Cardiology Tests (Last 3 Years):  EKG:  ECG 12 lead 02/20/2024 (Preliminary)      ECG 12 lead 02/19/2024 (Preliminary)      ECG 12 lead 02/09/2024      ECG 12 lead 02/09/2024      ECG 12 lead 02/05/2024      ECG 12 lead 01/29/2024      ECG 12 lead Serial 0, 2, 4       ECG 12 lead Serial 0, 2, 4 01/29/2024      ECG 12 lead       ECG 12 lead       ECG 12 lead 12/04/2023 (Preliminary)      ECG 12 lead 12/01/2023 (Preliminary)      ECG 12 Lead       ECG 12 Lead       ECG 12 Lead     Echo:  Transthoracic Echo (TTE) Complete 01/29/2024    Ejection Fractions:  EF   Date/Time Value Ref Range Status   01/29/2024 01:00 PM 66 %      Cath:  No results found for this or any previous visit from the past 1095 days.    Stress Test:  Nuclear Stress Test 01/12/2022    Cardiac Imaging:  No results found for this or any previous  "visit from the past 1095 days.      Past Medical History:  She has a past medical history of COPD (chronic obstructive pulmonary disease) (CMS/[facility]), Coronary artery disease, Personal history of other endocrine, nutritional and metabolic disease, Personal history of other mental and behavioral disorders, Personal history of other specified conditions, and Unspecified convulsions (CMS/[facility]).    Past Surgical History:  She has a past surgical history that includes Other surgical history (08/17/2013); MR angio head wo IV contrast (7/15/2021); MR angio neck wo IV contrast (7/15/2021); MR angio head wo IV contrast (11/28/2017); and MR angio head wo IV contrast (3/13/2018).      Social History:  She reports that she has never smoked. She has never used smokeless tobacco. She reports current drug use. Drug: \"Crack\" cocaine. She reports that she does not drink alcohol.    Family History:  No family history on file.     Allergies:  Adhesive, Hydrocodone-acetaminophen, Ondansetron, and Hydromorphone    Inpatient Medications:  Scheduled medications   Medication Dose Route Frequency    ARIPiprazole  15 mg oral Daily    ARIPiprazole  2 mg oral Nightly    aspirin  81 mg oral Daily    desmopressin  1 spray One Nostril Nightly    enoxaparin  40 mg subcutaneous q24h    FLUoxetine  10 mg oral Daily    hydrocortisone sodium succinate  50 mg intravenous q8h    levETIRAcetam  750 mg oral BID    levothyroxine  112 mcg oral Daily before breakfast    lidocaine  1 patch transdermal Daily    meropenem  1 g intravenous q8h    midodrine  10 mg oral TID with meals    pantoprazole  40 mg oral Daily    pregabalin  100 mg oral BID     PRN medications   Medication    acetaminophen    zolpidem     Continuous Medications   Medication Dose Last Rate     Outpatient Medications:  Current Outpatient Medications   Medication Instructions    acetaminophen (TYLENOL) 1,000 mg, oral, Every 6 hours PRN    Ajovy Syringe 225 mg, subcutaneous, Every 30 days    " alum-mag hydroxide-simeth (Mylanta) 200-200-20 mg/5 mL oral suspension 30 mL, oral, 4 times daily PRN    amantadine (SYMMETREL) 100 mg, oral, 2 times daily    ARIPiprazole (Abilify) 15 mg tablet 1 tablet, oral, Daily    ARIPiprazole (ABILIFY) 2 mg, oral, Nightly    aspirin 81 mg, oral, Daily    atorvastatin (LIPITOR) 80 mg, oral, Nightly    busPIRone (BUSPAR) 15 mg, oral, Every 12 hours PRN    cetirizine (ZYRTEC) 10 mg, oral, Every 12 hours    desmopressin (DDAVP) 10 mcg/spray (0.1 mL) 1 spray, One Nostril, Nightly    ezetimibe (ZETIA) 10 mg, oral, Daily    FLUoxetine (PROZAC) 10 mg, oral, Daily    hydrocortisone (CORTEF) 5 mg, oral,  Take 2 tabs in am, one tablet at lunch, and one tablet at dinner    levETIRAcetam (KEPPRA) 750 mg, oral, 2 times daily    levothyroxine (SYNTHROID, LEVOXYL) 112 mcg, oral, Daily    lidocaine 4 % patch 1 patch, transdermal, Daily, Remove & discard patch within 12 hours or as directed by MD.    meclizine (Antivert) 25 mg tablet 1 tablet, oral, 3 times daily PRN    methocarbamol (ROBAXIN) 500 mg, oral, Every 6 hours PRN    metoclopramide (REGLAN) 10 mg, oral, Every 8 hours PRN    midodrine (PROAMATINE) 10 mg, oral, 3 times daily    pantoprazole (PROTONIX) 40 mg, oral, Daily RT    pregabalin (LYRICA) 100 mg, oral, 2 times daily    promethazine (PHENERGAN) 12.5 mg, oral, Every 6 hours PRN    ubrogepant (UBRELVY) 100 mg, oral, As needed    zolpidem (AMBIEN) 10 mg, oral, Daily PRN       Physical Exam:  The patient is a mildly overweight early middle-aged white female lying in bed in no overt distress.  She is awake alert conversant.  JVP not elevated carotid and pulses 2+ no bruit no palpable thyroid enlargement  Chest fair air movement breath sounds are clear  Cardiac rhythm is regular no premature beats S1-S2 normal minimal systolic murmur no gallop rub  Abdomen is soft and benign no focal tenderness  No peripheral edema pedal pulses present     Assessment/Plan   2/21: This unfortunate  43-year-old white female has a very extensive past history including coronary artery disease necessitating PCI and stent deployment to LAD in 2018.  She also had a history of rheumatic heart disease and in 2022 required CABG x 1 with LIMA to LAD with a bioprosthetic mitral valve replacement for severe mitral valve stenosis along with a tricuspid valve repair for tricuspid valve insufficiency.  She did have a recent surveillance echocardiogram on 1/29/2024 demonstrating a preserved LV ejection fraction and normally functioning mitral valve replacement and 1-2+ tricuspid valve regurgitation.  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  She unfortunately has a history of smoking and substance abuse including cocaine.  Patient admitted with perceived left-sided weakness not currently reproducible.  Initial evaluation negative for acute CVA.  Systolic blood pressures are currently in the lower 100 mmHg range.  Telemetry monitor shows a stable sinus rhythm.  The patient is at risk for developing atrial fibrillation but this has not been documented.  From a cardiac perspective would continue her previously prescribed therapies including in part low-dose aspirin 81 mg daily midodrine 10 mg 3 times daily along with atorvastatin 80 mg daily ezetimibe 10 mg daily.  The patient does have a abnormal EKG with T wave inversions in the precordial plane and to a lesser extent in the inferior leads.  Review of previous EKGs indicate that the T wave abnormalities are not new and are presumably residual to her rheumatic heart disease and CAD.  No indication at this point of an acute coronary syndrome.  Peripheral IV 02/20/24 Distal;Lower;Right;Anterior Ankle (Active)   Site Assessment Clean;Dry;Intact 02/21/24 0900   Dressing Status Clean;Dry 02/21/24 0900   Number of days: 1       Peripheral IV 02/20/24  24 G Right;Anterior Hand (Active)   Site Assessment Clean;Dry;Intact 02/21/24 0900   Dressing Status Clean;Dry 02/21/24 0900   Number of days: 1       Code Status:  Full Code    I spent 40 minutes in the professional and overall care of this patient.        Charles Randhawa MD

## 2024-02-21 NOTE — PROGRESS NOTES
02/21/24 1412   Discharge Planning   Home or Post Acute Services None   Patient expects to be discharged to: Patient to return home with no needs   Does the patient need discharge transport arranged? No     Patient to return home with no needs at discharge.  RN TCC following.

## 2024-02-21 NOTE — CARE PLAN
Problem: Pain  Goal: My pain/discomfort is manageable  Outcome: Progressing     Problem: Safety  Goal: Patient will be injury free during hospitalization  Outcome: Progressing  Goal: I will remain free of falls  Outcome: Progressing     Problem: Daily Care  Goal: Daily care needs are met  Outcome: Progressing     Problem: Psychosocial Needs  Goal: Demonstrates ability to cope with hospitalization/illness  Outcome: Progressing  Goal: Collaborate with me, my family, and caregiver to identify my specific goals  Outcome: Progressing     Problem: Discharge Barriers  Goal: My discharge needs are met  Outcome: Progressing   The patient's goals for the shift include Use call light when ambulates    The clinical goals for the shift include Q4 neuro checks    Over the shift, the patient did make progress toward the following goals. Barriers to progression include increased pain. Recommendations to address these barriers include PRN analgesics.

## 2024-02-21 NOTE — CARE PLAN
The patient's goals for the shift include Use call light when ambulates    The clinical goals for the shift include Nuero checks every 4 hours    Over the shift, the patient did not make progress toward the following goals. Barriers to progression include none. Recommendations to address these barriers include none.

## 2024-02-21 NOTE — CONSULTS
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 significant change was found Confirmed by Karen Gaines ([id number]) on 2/18/2024 5:05:32 AM    ECG 12 lead Serial 0, 2, 4    Result Date: 2/18/2024  Normal sinus rhythm Nonspecific T wave abnormality Prolonged QT Abnormal ECG When compared with ECG of 28-JAN-2024 20:26, (unconfirmed) No significant change was found Confirmed by Karen Gaines ([id number]) on 2/18/2024 5:05:31 AM    XR chest 1 view    Result Date: 2/18/2024  Interpreted By:  Jeramie Wise, STUDY: XR CHEST 1 VIEW;  2/18/2024 1:06 am   INDICATION: Signs/Symptoms:Chest pain with trouble breathing.   COMPARISON: 2/9/2024   ACCESSION NUMBER(S): [id number]   ORDERING CLINICIAN: AME NAGY   FINDINGS: Postsurgical change with sternotomy wires. The cardiac silhouette is normal in size. No focal airspace consolidation or pleural effusion. No pneumothorax.       No airspace consolidation or pleural effusion.   MACRO: None   Signed by: Jeramie Wise 2/18/2024 1:24 AM Dictation workstation:   [id number]    ECG 12 lead    Result Date: 2/12/2024  Normal sinus rhythm Right axis deviation T wave abnormality, consider anterolateral ischemia Prolonged QT Abnormal ECG When compared with ECG of 09-FEB-2024 19:49, (unconfirmed) No  significant change was found See ED provider note for full interpretation and clinical correlation Confirmed by Cortez Green ([id number]) on 2/12/2024 7:34:39 PM    ECG 12 lead    Result Date: 2/12/2024  Normal sinus rhythm Rightward axis ST & T wave abnormality, consider inferior ischemia ST & T wave abnormality, consider anterolateral ischemia Prolonged QT Abnormal ECG When compared with ECG of 05-FEB-2024 16:53, T wave inversion more evident in Lateral leads See ED provider note for full interpretation and clinical correlation Confirmed by Cortez Green ([id number]) on 2/12/2024 7:32:46 PM    CT angio chest for pulmonary embolism    Result Date: 2/9/2024  Interpreted By:  Sage Das, STUDY: CT ANGIO CHEST FOR PULMONARY EMBOLISM;  2/9/2024 10:06 pm   INDICATION: Signs/Symptoms:elevated dimer.   COMPARISON: 08/27/2023.   ACCESSION NUMBER(S): [id number]   ORDERING CLINICIAN: FELISA HYLTON   TECHNIQUE: Helical data acquisition of the chest was obtained after intravenous administration of 64 cc Omnipaque 350, as per PE protocol. Images were reformatted in coronal and sagittal planes. Axial and coronal maximum intensity projection (MIP) images were created and reviewed.   FINDINGS: POTENTIAL LIMITATIONS OF THE STUDY: None   HEART AND VESSELS: There are no discrete filling defects within main pulmonary artery and its branches to suggest acute pulmonary embolism. Main pulmonary artery and its branches are normal in caliber.   The thoracic aorta normal in course and caliber. There is moderate to severe focal narrowing of the proximal left subclavian artery (series 401, images 86-80), also present previously. No coronary artery calcifications are seen. Please note, the study is not optimized for evaluation of coronary arteries.   The cardiac chambers are not enlarged.Prosthetic mitral valve.   There is no pericardial effusion seen.   MEDIASTINUM AND DALIA, LOWER NECK AND AXILLA: The visualized thyroid gland is within  normal limits. Nonspecific borderline and mildly enlarged mediastinal nodes. Esophagus appears within normal limits as seen.   LUNGS AND AIRWAYS: The trachea and central airways are patent. No endobronchial lesion is seen.   The bilateral lungs are clear without evidence of focal consolidation, pleural effusion, or pneumothorax.     UPPER ABDOMEN: The visualized subdiaphragmatic structures demonstrate no remarkable findings.       CHEST WALL AND OSSEOUS STRUCTURES: Median sternotomy. No acute osseous pathology.There are no suspicious osseous lesions.       No evidence of acute pulmonary embolism. No evidence of acute pathology in the chest.   Median sternotomy and prosthetic mitral valve noted.   Moderate to severe focal narrowing of the proximal left subclavian artery again seen.   Additional findings as discussed above.   MACRO: None   Signed by: Sage Das 2/9/2024 10:33 PM Dictation workstation:   [id number]    XR chest 1 view    Result Date: 2/9/2024  Interpreted By:  Kyle Griffin, STUDY: XR CHEST 1 VIEW  2/9/2024 8:10 pm   INDICATION: Signs/Symptoms:Chest Pain   COMPARISON: 02/05/2024   ACCESSION NUMBER(S): [id number]   ORDERING CLINICIAN: NAFISA GANDHI   TECHNIQUE: A single AP portable radiograph of the chest was obtained.   FINDINGS: Multiple cardiac monitoring leads are seen over the chest.  Sternal wires and mediastinal surgical clips are present. No focal infiltrate, pleural effusion or pneumothorax is identified. The cardiac silhouette is within normal limits for size.       No focal infiltrate or pneumothorax is identified.   MACRO: None.   Signed by: Kyle Griffin 2/9/2024 8:18 PM Dictation workstation:   [id number]    ECG 12 lead    Result Date: 2/9/2024  Normal sinus rhythm Rightward axis Anterior infarct , age undetermined T wave abnormality, consider inferolateral ischemia Prolonged QT Abnormal ECG When compared with ECG of 29-JAN-2024 14:22, T wave inversion now evident in Inferior leads QT  has lengthened See ED provider note for full interpretation and clinical correlation Confirmed by Cortez Green ([id number]) on 2/9/2024 10:55:46 AM    XR chest 1 view    Result Date: 2/5/2024  Interpreted By:  Carlos A Bonilla, STUDY: XR CHEST 1 VIEW;  2/5/2024 5:42 pm   INDICATION: Signs/Symptoms:cough sob.   COMPARISON: 01/28/2024   ACCESSION NUMBER(S): [id number]   ORDERING CLINICIAN: JANE ATKINS   FINDINGS:   The cardiac silhouette is unremarkable. Costophrenic angles are sharp. Lungs are clear. The trachea is midline. There is no pneumothorax. No acute osseous abnormality is seen. Sternotomy wires are grossly intact.       1.  No active cardiopulmonary process.     Signed by: Carlos A Bonilla 2/5/2024 5:57 PM Dictation workstation:   [id number]    ECG 12 lead    Result Date: 1/31/2024  Normal sinus rhythm Rightward axis T wave abnormality, consider anterior ischemia Abnormal ECG When compared with ECG of 28-JAN-2024 18:56, (unconfirmed) No significant change was found See ED provider note for full interpretation and clinical correlation Confirmed by Giovanni Alicea ([id number]) on 1/31/2024 6:02:48 PM    ECG 12 lead    Result Date: 1/31/2024  Normal sinus rhythm Rightward axis T wave abnormality, consider anterolateral ischemia Prolonged QT Abnormal ECG When compared with ECG of 21-NOV-2023 18:15, (unconfirmed) T wave inversion now evident in Anterior leads See ED provider note for full interpretation and clinical correlation Confirmed by Desirae Freeman ([id number]) on 1/31/2024 1:17:11 PM    ECG 12 lead    Result Date: 1/30/2024  Normal sinus rhythm T wave abnormality, consider anterior ischemia Prolonged QT Abnormal ECG When compared with ECG of 29-JAN-2024 02:47, (unconfirmed) No significant change was found Confirmed by Noel Buckner ([id number]) on 1/30/2024 6:20:08 AM    Transthoracic Echo (TTE) Complete    Result Date: 1/29/2024   [facility], [address] [address]               Tel [phone] and Fax [phone] TRANSTHORACIC ECHOCARDIOGRAM REPORT  Patient Name:      LEATHA VALDEZ      Reading Physician:    [id number] Noel Buckner MD Study Date:        1/29/2024            Ordering Provider:    [id number] HIRA QUEEN MRN/PID:           [id number]             Fellow: Accession#:        [id number]         Nurse: Date of Birth/Age: 1980 / 43      Sonographer:          Rebecca moran                                      RDDEX Gender:            F                    Additional Staff: Height:            160.02 cm            Admit Date: Weight:            59.88 kg             Admission Status:     Inpatient -                                                               Routine BSA:               1.62 m2              Encounter#:           [id number]                                         Department Location:  [facility] Blood Pressure: 98 /69 mmHg Study Type:    TRANSTHORACIC ECHO (TTE) COMPLETE Diagnosis/ICD: Chest pain, unspecified-R07.9 Indication:    Chest Pain CPT Code:      Echo Complete w Full [id number] Patient History: Pertinent History: Chest Pain. s/p MV ring and TV repair, hx drug OD. Study Detail: The following Echo studies were performed: 2D, M-Mode, Doppler and               color flow. Technically challenging study due to body habitus. The               patient was awake.  PHYSICIAN INTERPRETATION: Left Ventricle: The left ventricular systolic function is normal, with an estimated ejection fraction of 60-65%. There are no regional wall motion abnormalities. The left ventricular cavity size is normal. Spectral Doppler shows a pseudonormal pattern of left ventricular diastolic filling. Left Atrium: The left atrium is normal in size. Right Ventricle:  The right ventricle is normal in size. There is normal right ventricular global systolic function. Right Atrium: The right atrium is normal in size. Aortic Valve: The aortic valve is trileaflet. There is trivial aortic valve regurgitation. The peak instantaneous gradient of the aortic valve is 8.8 mmHg. Mitral Valve: There is a prosthetic mitral valve present. Echo findings are consistent with normal mitral valve prosthesis structure and function. There is a mitral valve bioprosthesis. There is mild mitral valve regurgitation. Tricuspid Valve: The tricuspid valve is structurally normal. There is mild to moderate tricuspid regurgitation. Pulmonic Valve: The pulmonic valve is structurally normal. There is no indication of pulmonic valve regurgitation. Pericardium: There is no pericardial effusion noted. Aorta: The aortic root is normal. Pulmonary Artery: The tricuspid regurgitant velocity is 2.47 m/s, and with an estimated right atrial pressure of 3 mmHg, the estimated pulmonary artery pressure is normal with the RVSP at 27.4 mmHg. Pulmonary Veins: The pulmonary veins appear normal and return normally to the left atrium. Systemic Veins: The inferior vena cava appears to be of normal size. There is IVC inspiratory collapse greater than 50%.  CONCLUSIONS:  1. Left ventricular systolic function is normal with a 60-65% estimated ejection fraction.  2. Spectral Doppler shows a pseudonormal pattern of left ventricular diastolic filling.  3. Mild to moderate tricuspid regurgitation visualized.  4. Normal appearance and function of prsothetic mitral valve. Mean gradient 4 mm Hg.  5. Normal estimated PASP, CVP. QUANTITATIVE DATA SUMMARY: 2D MEASUREMENTS:                          Normal Ranges: Ao Root d:     2.80 cm   (2.0-3.7cm) LAs:           2.60 cm   (2.7-4.0cm) IVSd:          0.96 cm   (0.6-1.1cm) LVPWd:         0.89 cm   (0.6-1.1cm) LVIDd:         4.49 cm   (3.9-5.9cm) LVIDs:         2.86 cm LV Mass Index: 84.7 g/m2 LV %  FS        36.3 % LA VOLUME:                               Normal Ranges: LA Vol A4C:        31.2 ml    (22+/-6mL/m2) LA Vol A2C:        28.1 ml LA Vol BP:         30.0 ml LA Vol Index A4C:  19.3ml/m2 LA Vol Index A2C:  17.3 ml/m2 LA Vol Index BP:   18.5 ml/m2 LA Area A4C:       13.0 cm2 LA Area A2C:       12.2 cm2 LA Major Axis A4C: 4.6 cm LA Major Axis A2C: 4.5 cm LA Volume Index:   17.0 ml/m2 RA VOLUME BY A/L METHOD:                       Normal Ranges: RA Area A4C: 10.0 cm2 RA Area A2C: 3.9 cm2 M-MODE MEASUREMENTS:                  Normal Ranges: Ao Root: 2.70 cm (2.0-3.7cm) LAs:     3.10 cm (2.7-4.0cm) AORTA MEASUREMENTS:                    Normal Ranges: Asc Ao, d: 2.70 cm (2.1-3.4cm) LV SYSTOLIC FUNCTION BY 2D PLANIMETRY (MOD):                     Normal Ranges: EF-A4C View: 66.4 % (>=55%) EF-A2C View: 65.8 % EF-Biplane:  66.0 % LV DIASTOLIC FUNCTION:                             Normal Ranges: MV Peak E:      1.63 m/s    (0.7-1.2 m/s) MV Peak A:      0.86 m/s    (0.42-0.7 m/s) E/A Ratio:      1.89        (1.0-2.2) MV e'           0.09 m/s    (>8.0) MV lateral e'   0.12 m/s MV medial e'    0.06 m/s MV A Dur:       145.00 msec E/e' Ratio:     18.11       (<8.0) PulmV Sys Sp:  38.20 cm/s PulmV Woo Sp: 47.20 cm/s PulmV S/D Sp:  0.80 MITRAL VALVE:                       Normal Ranges: MV Vmax:    2.01 m/s  (<=1.3m/s) MV peak P.2 mmHg (<5mmHg) MV mean P.0 mmHg  (<2mmHg) MV DT:      343 msec  (150-240msec) AORTIC VALVE:                         Normal Ranges: AoV Vmax:      1.48 m/s (<=1.7m/s) AoV Peak P.8 mmHg (<20mmHg) LVOT Max Sp:  1.26 m/s (<=1.1m/s) LVOT VTI:      27.80 cm LVOT Diameter: 1.90 cm  (1.8-2.4cm) AoV Area,Vmax: 2.41 cm2 (2.5-4.5cm2)  RIGHT VENTRICLE: RV Basal 2.90 cm RV Mid   2.80 cm RV Major 6.1 cm TAPSE:   12.7 mm RV s'    0.09 m/s TRICUSPID VALVE/RVSP:                             Normal Ranges: Peak TR Velocity: 2.47 m/s RV Syst Pressure: 27.4 mmHg (< 30mmHg) IVC Diam:          1.60 cm PULMONIC VALVE:                      Normal Ranges: PV Max Sp: 1.1 m/s  (0.6-0.9m/s) PV Max P.0 mmHg Pulmonary Veins: PulmV Woo Sp: 47.20 cm/s PulmV S/D Sp:  0.80 PulmV Sys Sp:  38.20 cm/s  [id number] Noel Buckner MD Electronically signed on 2024 at 6:29:08 PM  ** Final **     XR chest 1 view    Result Date: 2024  Interpreted By:  Regine Choudhury, STUDY: XR CHEST 1 VIEW; ;  2024 7:28 pm   INDICATION: Signs/Symptoms:cp, sob.   COMPARISON: 2023   ACCESSION NUMBER(S): [id number]   ORDERING CLINICIAN: KITTY CABRALES   TECHNIQUE: Portable chest   FINDINGS: Intact sternotomy wires. Mediastinal clips, likely from CABG. Lungs are clear. Heart size and mediastinal contours are within normal limits. No evidence of pleural effusion or pneumothorax. Bones are unremarkable.       No acute findings.     Signed by: Regine Choudhury 2024 7:57 PM Dictation workstation:   [id number]       ASSESSMENT:     PSYCHIATRIC RISK ASSESSMENT  Violence Risk Factors:  none  Acute Risk of Harm to Others is Considered: Low  Suicide Risk Factors: ; /[place] native, chronic medical illness, chronic pain, and anxious ruminations  Protective Factors: child-related concerns/living with child < 18 yrs age and positive family relationships  Acute Risk of Harm to Self is Considered: Low    DIAGNOSIS  MDD   NATO  Insomnia   R/o somatic symptom disorder   Stimulant Use disorder (cocaine)- A UDS was not done as part of initial workup.  Symptoms may be related to drug use.     BASIA Leigh is a 43-year-old female with extensive past medical and psychiatric history.  She has multiple visits to the emergency room, multiple hospitalizations for medical concerns.  Patient denies any increase in psychosocial stressors or increase in depressive symptoms recently.  Pt does seem to devote significant time and energy to symptoms and these symptoms do seem to negatively impact her daily life, and a  somatic symptom disorder is certainly possible.   The presence of somatization does not exclude the presence of an organic medical condition, however; and therefore, medical conditions must constantly be considered, even in patients with somatization. Discussed with patient that she would benefit from psychotherapy to aid with anxiety symptoms and she is agreeable. She plans to discuss with her outpatient  provider at her follow up appt in a couple of weeks.   PLAN/ RECOMMENDATIONS:   SAFETY  - Patient does not currently meet criteria for inpatient psychiatric admission.     MEDICATIONS  -Continue aripiprazole 17mg daily  -Continue prozac 40mg daily per last note from  provider  -Continue ambien 10mg at bedtime for sleep     Pt was counseled on the dangers of cocaine use, especially with her heart condition.  She is pre contemplative at this point.  Understands the risks.   Continue follow up with [facility] medication provider. Pt reports next appt is next month.  She is pursuing therapy.     -Thank you for allowing us to participate in the care of this patient.  Pt does not feel adjustments need to be made to medication regimen. She follows outpatient.   - Psychiatry will sign off.

## 2024-02-21 NOTE — PROGRESS NOTES
Lu Fall is a 43 y.o. female on day 0 of admission presenting with Stroke-like symptoms.      Subjective   Pt seen and examined. Pt is resting in bed A&Ox3. She believes her Left sided weakness is getting better since last night but her dizziness still persists. She complains of having a head and some right sided abdominal pain and would like something else for he pain level which she rates a 10/10.       Objective     Last Recorded Vitals  /69 (BP Location: Right leg, Patient Position: Sitting)   Pulse 83   Temp 36.6 °C (97.9 °F) (Temporal)   Resp 17   Wt 59 kg (130 lb 1.1 oz)   SpO2 97%   Intake/Output last 3 Shifts:    Intake/Output Summary (Last 24 hours) at 2/21/2024 0937  Last data filed at 2/20/2024 2339  Gross per 24 hour   Intake 1550 ml   Output --   Net 1550 ml       Admission Weight  Weight: 59 kg (130 lb 1.1 oz) (02/20/24 1841)    Daily Weight  02/20/24 : 59 kg (130 lb 1.1 oz)    Image Results  ECG 12 lead  Normal sinus rhythm  Incomplete right bundle branch block  T wave abnormality, consider inferior ischemia  T wave abnormality, consider anterolateral ischemia  Prolonged QT  Abnormal ECG  When compared with ECG of 17-FEB-2024 23:15, (unconfirmed)  Incomplete right bundle branch block is now Present      Physical Exam  Constitutional:       Appearance: Normal appearance.   HENT:      Head: Normocephalic.      Nose: Nose normal.      Mouth/Throat:      Mouth: Mucous membranes are dry.   Eyes:      Pupils: Pupils are equal, round, and reactive to light.   Cardiovascular:      Rate and Rhythm: Normal rate and regular rhythm.      Heart sounds: Normal heart sounds.   Pulmonary:      Effort: Pulmonary effort is normal.      Breath sounds: Normal breath sounds.   Abdominal:      General: Abdomen is flat. Bowel sounds are normal.      Palpations: Abdomen is soft.      Tenderness: There is abdominal tenderness in the right upper quadrant. There is no right CVA tenderness or left CVA  tenderness.   Musculoskeletal:         General: Normal range of motion.      Cervical back: Normal range of motion.   Skin:     General: Skin is warm and dry.   Neurological:      Mental Status: She is alert and oriented to person, place, and time.   Psychiatric:         Mood and Affect: Mood normal.       Relevant Results  Scheduled medications  ARIPiprazole, 15 mg, oral, Daily  ARIPiprazole, 2 mg, oral, Nightly  aspirin, 81 mg, oral, Daily  desmopressin, 1 spray, One Nostril, Nightly  enoxaparin, 40 mg, subcutaneous, q24h  FLUoxetine, 10 mg, oral, Daily  hydrocortisone sodium succinate, 50 mg, intravenous, q8h  levETIRAcetam, 750 mg, oral, BID  levothyroxine, 112 mcg, oral, Daily before breakfast  lidocaine, 1 patch, transdermal, Daily  meropenem, 1 g, intravenous, q8h  midodrine, 10 mg, oral, TID with meals  pantoprazole, 40 mg, oral, Daily  pregabalin, 100 mg, oral, BID    PRN medications: acetaminophen, zolpidem   Results for orders placed or performed during the hospital encounter of 02/20/24 (from the past 24 hour(s))   CBC and Auto Differential   Result Value Ref Range    WBC 8.5 4.4 - 11.3 x10*3/uL    nRBC 0.0 0.0 - 0.0 /100 WBCs    RBC 5.02 4.00 - 5.20 x10*6/uL    Hemoglobin 12.7 12.0 - 16.0 g/dL    Hematocrit 39.6 36.0 - 46.0 %    MCV 79 (L) 80 - 100 fL    MCH 25.3 (L) 26.0 - 34.0 pg    MCHC 32.1 32.0 - 36.0 g/dL    RDW 17.2 (H) 11.5 - 14.5 %    Platelets 214 150 - 450 x10*3/uL    Neutrophils % 47.8 40.0 - 80.0 %    Immature Granulocytes %, Automated 0.1 0.0 - 0.9 %    Lymphocytes % 41.5 13.0 - 44.0 %    Monocytes % 4.7 2.0 - 10.0 %    Eosinophils % 4.8 0.0 - 6.0 %    Basophils % 1.1 0.0 - 2.0 %    Neutrophils Absolute 4.06 1.20 - 7.70 x10*3/uL    Immature Granulocytes Absolute, Automated 0.01 0.00 - 0.70 x10*3/uL    Lymphocytes Absolute 3.52 1.20 - 4.80 x10*3/uL    Monocytes Absolute 0.40 0.10 - 1.00 x10*3/uL    Eosinophils Absolute 0.41 0.00 - 0.70 x10*3/uL    Basophils Absolute 0.09 0.00 - 0.10  x10*3/uL   Comprehensive metabolic panel   Result Value Ref Range    Glucose 134 (H) 65 - 99 mg/dL    Sodium 137 133 - 145 mmol/L    Potassium 4.3 3.4 - 5.1 mmol/L    Chloride 105 97 - 107 mmol/L    Bicarbonate 22 (L) 24 - 31 mmol/L    Urea Nitrogen 13 8 - 25 mg/dL    Creatinine 0.70 0.40 - 1.60 mg/dL    eGFR >90 >60 mL/min/1.73m*2    Calcium 8.9 8.5 - 10.4 mg/dL    Albumin 4.0 3.5 - 5.0 g/dL    Alkaline Phosphatase 101 35 - 125 U/L    Total Protein 7.4 5.9 - 7.9 g/dL    AST 24 5 - 40 U/L    Bilirubin, Total 0.4 0.1 - 1.2 mg/dL    ALT 9 5 - 40 U/L    Anion Gap 10 <=19 mmol/L   Troponin T, High Sensitivity   Result Value Ref Range    Troponin T, High Sensitivity <6 <=14 ng/L   Protime-INR   Result Value Ref Range    Protime 11.6 9.3 - 12.7 seconds    INR 1.1 0.9 - 1.2   APTT   Result Value Ref Range    aPTT 29.6 22.0 - 32.5 seconds   ECG 12 lead   Result Value Ref Range    Ventricular Rate 81 BPM    Atrial Rate 81 BPM    CT Interval 162 ms    QRS Duration 100 ms    QT Interval 456 ms    QTC Calculation(Bazett) 529 ms    P Axis 17 degrees    R Axis 78 degrees    T Axis 141 degrees    QRS Count 13 beats    Q Onset 222 ms    P Onset 141 ms    P Offset 182 ms    T Offset 450 ms    QTC Fredericia 503 ms   POCT GLUCOSE   Result Value Ref Range    POCT Glucose 120 (H) 74 - 99 mg/dL   Urinalysis with Reflex Microscopic   Result Value Ref Range    Color, Urine Yellow Light-Yellow, Yellow, Dark-Yellow    Appearance, Urine Clear Clear    Specific Gravity, Urine 1.033 1.005 - 1.035    pH, Urine 5.0 5.0, 5.5, 6.0, 6.5, 7.0, 7.5, 8.0    Protein, Urine 20 (TRACE) NEGATIVE, 10 (TRACE), 20 (TRACE) mg/dL    Glucose, Urine Normal Normal mg/dL    Blood, Urine NEGATIVE NEGATIVE    Ketones, Urine NEGATIVE NEGATIVE mg/dL    Bilirubin, Urine NEGATIVE NEGATIVE    Urobilinogen, Urine Normal Normal mg/dL    Nitrite, Urine NEGATIVE NEGATIVE    Leukocyte Esterase, Urine 500 Cesar/µL (A) NEGATIVE   Microscopic Only, Urine   Result Value Ref Range     WBC, Urine >50 (A) 1-5, NONE /HPF    RBC, Urine 3-5 NONE, 1-2, 3-5 /HPF    Squamous Epithelial Cells, Urine 1-9 (SPARSE) Reference range not established. /HPF    Transitional Epithelial Cells, Urine 1-2 (FEW) Reference range not established. /HPF    Bacteria, Urine 1+ (A) NONE SEEN /HPF    Mucus, Urine FEW Reference range not established. /LPF    Hyaline Casts, Urine 3+ (A) NONE /LPF   Type And Screen   Result Value Ref Range    ABO TYPE A     Rh TYPE POS     ANTIBODY SCREEN NEG    Protime-INR   Result Value Ref Range    Protime 11.7 9.3 - 12.7 seconds    INR 1.1 0.9 - 1.2   NT-PROBNP   Result Value Ref Range    PROBNP 278 (H) 0 - 178 pg/mL   Hemoglobin A1C   Result Value Ref Range    Hemoglobin A1C 5.4 See below %    Estimated Average Glucose 108 Not Established mg/dL   Magnesium   Result Value Ref Range    Magnesium 2.10 1.60 - 3.10 mg/dL   Serial Troponin, Initial ([facility])   Result Value Ref Range    Troponin T, High Sensitivity <6 <=14 ng/L   Creatine Kinase   Result Value Ref Range    Creatine Kinase 81 24 - 195 U/L   Blood Culture    Specimen: Peripheral Venipuncture; Blood culture   Result Value Ref Range    Blood Culture Loaded on Instrument - Culture in progress    CBC   Result Value Ref Range    WBC 7.2 4.4 - 11.3 x10*3/uL    nRBC 0.0 0.0 - 0.0 /100 WBCs    RBC 4.55 4.00 - 5.20 x10*6/uL    Hemoglobin 11.4 (L) 12.0 - 16.0 g/dL    Hematocrit 36.1 36.0 - 46.0 %    MCV 79 (L) 80 - 100 fL    MCH 25.1 (L) 26.0 - 34.0 pg    MCHC 31.6 (L) 32.0 - 36.0 g/dL    RDW 16.7 (H) 11.5 - 14.5 %    Platelets 178 150 - 450 x10*3/uL   Comprehensive metabolic panel   Result Value Ref Range    Glucose 146 (H) 65 - 99 mg/dL    Sodium 141 133 - 145 mmol/L    Potassium 4.0 3.4 - 5.1 mmol/L    Chloride 109 (H) 97 - 107 mmol/L    Bicarbonate 22 (L) 24 - 31 mmol/L    Urea Nitrogen 10 8 - 25 mg/dL    Creatinine 0.70 0.40 - 1.60 mg/dL    eGFR >90 >60 mL/min/1.73m*2    Calcium 8.8 8.5 - 10.4 mg/dL    Albumin 3.9 3.5 - 5.0 g/dL     Alkaline Phosphatase 100 35 - 125 U/L    Total Protein 7.0 5.9 - 7.9 g/dL    AST 19 5 - 40 U/L    Bilirubin, Total 0.2 0.1 - 1.2 mg/dL    ALT 9 5 - 40 U/L    Anion Gap 10 <=19 mmol/L   Serial Troponin, 2 Hour ([facility])   Result Value Ref Range    Troponin T, High Sensitivity <6 <=14 ng/L   Serial Troponin, 6 Hour ([facility])   Result Value Ref Range    Troponin T, High Sensitivity <6 <=14 ng/L    ECG 12 lead    Result Date: 2/21/2024  Normal sinus rhythm Incomplete right bundle branch block T wave abnormality, consider inferior ischemia T wave abnormality, consider anterolateral ischemia Prolonged QT Abnormal ECG When compared with ECG of 17-FEB-2024 23:15, (unconfirmed) Incomplete right bundle branch block is now Present    XR chest 1 view    Result Date: 2/20/2024  STUDY: Chest Radiograph;  2/20/2024, 8:50PM INDICATION: Altered mental status. COMPARISON: 2/18/2024 XR Chest ACCESSION NUMBER(S): [id number] ORDERING CLINICIAN: OMAR TOLEDO TECHNIQUE:  Frontal chest was obtained at 20:49 hours. FINDINGS: CARDIOMEDIASTINAL SILHOUETTE: Cardiomediastinal silhouette is normal in size and configuration. Patient status post median sternotomy.  LUNGS: Lungs are clear.  ABDOMEN: No remarkable upper abdominal findings.  BONES: No acute osseous changes.    No acute thoracic findings. Signed by Omar Tinsley II, MD    CT brain attack head wo IV contrast    Result Date: 2/20/2024  Interpreted By:  Star Borja, STUDY: CT BRAIN ATTACK HEAD WO IV CONTRAST; 2/20/2024 6:57 pm   INDICATION: Signs/Symptoms:Stroke Evaluation. Left-sided weakness. Facial droop. History of previous brain surgery.   COMPARISON: 11/21/2023   ACCESSION NUMBER(S): [id number]   ORDERING CLINICIAN: OMAR TOLEDO   TECHNIQUE: A helical acquisition data was obtained.   One or more of the following dose reduction techniques were used: Automated exposure control Adjustment of the mA and/or kV according to patient size, and/or use of iterative reconstruction  technique.   FINDINGS: Intracranial findings: There is no evidence for intracranial hemorrhage or mass effect. Patient is status post right temporal craniotomy.   Paranasal sinuses and temporal bone findings: There is a lobulated soft tissue within the sphenoid sinus. Anterior wall of the sphenoid sinus has been resected. The remainder of the visualized portions of the paranasal sinuses, mastoid air cells, and middle ear cavities are clear.   Orbital findings: The visualized portions of the orbits are unremarkable.       No acute intracranial pathologic findings are identified. Right temporal craniotomy. Lobulated soft tissue mass within the sphenoid sinus along with postsurgical appearance of the sphenoid sinus. Findings may be secondary to the presence of polypoid mass within the sphenoid sinus versus mucosal thickening. Please correlate with previous surgical history. The referring physician was contacted at the time of this interpretation 2/20/2024 7:01 pm.   MACRO: none   Signed by: Star Borja 2/20/2024 7:02 PM Dictation workstation:   [id number]    ECG 12 lead    Result Date: 2/19/2024  Normal sinus rhythm Rightward axis ST & T wave abnormality, consider inferior ischemia ST & T wave abnormality, consider anterolateral ischemia Prolonged QT Abnormal ECG When compared with ECG of 09-FEB-2024 21:35, T wave inversion now evident in Inferior leads          Assessment/Plan      Principal Problem:    Stroke-like symptoms  Active Problems:    TIA (transient ischemic attack)    Left-sided weakness   -PT and OT eval  -Neurology consult  -Follow up MRI brain  -CT brain no acute findings    Urinary tract infection   -Continue antibiotic therapy   - urine cx pending    Iatrogenic hypopituitarism   -Continue replacement hormones    Abnormal EKG   -Cardiology saw pt, no further cardiac work up     Hypotension   -Continue midodrine  -Continue fluid replacement     History of mental health issues   -psych  consult    Plan  Awaiting input from neurology and MRI          TAISHA NEAL

## 2024-02-21 NOTE — CARE PLAN
Problem: Balance  Goal: dynamic  Description: Pt will perform DGI and score >/= 22/24 to identify low falls risk    Outcome: Progressing     Problem: Mobility  Goal: LTG - Patient will navigate 4-6 steps with rails/device  Outcome: Progressing  Goal: ambulation  Description: Pt will amb >  250 ft with LRAD and mod independence   Outcome: Progressing     Problem: Safety  Goal: LTG - Patient will utilize safety techniques  Outcome: Progressing     Problem: Transfers  Goal: sit to stand  Description: Pt will perform sit to stand transfer with LRAD and mod independence.   Outcome: Progressing

## 2024-02-21 NOTE — H&P
"History Of Present Illness  Lu Fall is a 43 y.o. female presenting with left-sided weakness and generalized weakness.  This patient woke up today at about 11:00 and felt that she is dizzy which she has happened to her before but she also felt some kind of left-sided weakness which was new.  She waited several hours for things to get better but they did not so finally her parents dropped her off at a local urgent care center at about 5 PM after quick evaluation there she was directed to the emergency room where she arrived at 5:30 PM due to concerns for left-sided weakness.  Case was initiated as brain attack CAT scan was negative for acute findings Case discussed with telestroke neurology and as her INH was only 3 according to the ER physician no CT angio was done and no intervention was recommended.  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  She denies any fever chills shortness of breath cough abdominal pain nausea vomiting urinary complaints frequency back pain skin rashes Raynaud's symptoms     Past Medical History  She has a past medical history of COPD (chronic obstructive pulmonary disease) (CMS/HCC), Coronary artery disease, Personal history of other endocrine, nutritional and metabolic disease, Personal history of other mental and behavioral disorders, Personal history of other specified conditions, and Unspecified convulsions (CMS/[facility]).  Review  Surgical History  She has a past surgical history that includes Other surgical history (08/17/2013); MR angio head wo IV contrast (7/15/2021); MR angio neck wo IV contrast (7/15/2021); MR angio head wo IV contrast (11/28/2017); and MR angio head wo IV contrast (3/13/2018).  Reviewed  Social History  She reports that she has never smoked. She has never used smokeless tobacco. She reports current drug use. Drug: \"Crack\" cocaine. She reports that she does not drink " alcohol.  Reviewed  Family History  No family history on file.     Allergies  Adhesive, Hydrocodone-acetaminophen, Ondansetron, and Hydromorphone    ROS  Review of Systems   Constitutional: Negative.    HENT: Negative.     Eyes: Negative.    Respiratory: Negative.     Cardiovascular: Negative.    Gastrointestinal: Negative.    Endocrine: Negative.    Genitourinary: Negative.    Musculoskeletal: Negative.    Skin: Negative.    Allergic/Immunologic: Negative.    Neurological:  Positive for dizziness and weakness.   Hematological: Negative.    Psychiatric/Behavioral: Negative.     All other systems reviewed and are negative.       Last Recorded Vitals  /72   Pulse 75   Temp 37.7 °C (99.8 °F) (Tympanic)   Resp 17   Wt 59 kg (130 lb 1.1 oz)   SpO2 100%     Physical Exam  I says patient emergency room bed #7 this is a  female who is alert oriented x 3 she is in no distress answers all questions she is cooperative with the exam.  Normocephalic atraumatic EOMI PERRLA  Neck supple no JVD  Chest clear bilaterally  Heart regular S1-S2 distant  Abdomen soft nontender negative CVA tenderness no rebound tenderness no guarding benign exam positive bowel sounds  Extremities nonperforating good pedal pulses she has an IV in her right calf #20  Neurologic exam her speech is normal her cranial nerves are normal there is no nuchal rigidity there is some overall muscle weakness but no sensory level weakness when tested left compared to right the left leg and the left arm seem to be weaker however I really feel that this is effort related.  For example, her left arm will fall down but will avoid her face and she seems to be moving it better if I am not testing it  Psych no obvious psychosis or delirium  Skin no rash    Relevant Results  Reviewed    ASSESSMENT/PLAN  Assessment/Plan   Principal Problem:    Stroke-like symptoms  Active Problems:    TIA (transient ischemic attack)    Impression is for:  #1 question  left-sided weakness.  I feel her exam is very consistent with focal symptoms.  He have an MRI however very complicated history agree with MRI plans neurology evaluation continue aspirin check lipid panel.  2.  Overall weakness which may be related to her hypotension, consider acute on chronic, potential sepsis likely from UTI or other unclear source, or other etiology and for that reason I would rather hold her cholesterol medications for now and check CK level  3.  Urinary tract infection broad-spectrum biotics cultures check renal ultrasound to rule out complicated  4.  Iatrogenic hypopituitarism will continue replacement hormones, she was able actually to give me a very good outline including with correct doses of all of her hormones will continue high-dose steroids for now  5.  Abnormal EKG cycle troponins cardiology consultation I do not believe she is in failure  6.  Hypotension which may be chronic she was adequately volume resuscitated will continue midodrine for now  7.  History of mental health issues psych consult  8.  I offered her to place an IV in her neck and to remove the right calf IV but she declined       Rohith Mills MD

## 2024-02-21 NOTE — TELECONSULT
HPI:  43 y.o. female with history of prior neurosurgery for benign tumor presents with blurred vision and L sided weakness as well as a sensation of disequilibrium     Last known Well: 1100  NIH Stroke Scale Reported: 3    Prior Functional Status (Modified [place] Scale):  0 No symptoms at all     Imaging Results:  Head CT: No evidence of hemorrhage, prior surgical changes seen     Assessment:   Working Diagnosis:   Ischemic Stroke       Recommendations:   IV tPA is not recommended. Rationale: Out of window     Patient is NOT a candidate for endovascular treatment.  Rationale: symptoms not consistent with LVO     Additional Recommendations:  Admission for MRI/ stroke workup     Consult completed by:  TELEPHONE communication was used to provide this telehealth service.  Time includes consultation with ED provider and extensive review of data- history, medical records, test results, and neuroimaging studies: 11 - 20 mins was spent in consultation

## 2024-02-21 NOTE — CONSULTS
"Reason For Consult  Left-sided weakness    History Of Present Illness  Lu Fall is a 43 y.o. female presenting with past medical history of COPD, behavior disorder presented to the ED with dizziness, lightheadedness and left-sided weakness which she woke up morning at 11 AM.  States she felt unsteady and seemed to be falling to the left however she did not fall, hit her head or lost consciousness stop she went to the urgent care was transferred to the hospital for further care .  In the ED she was noted to have hypotension blood pressure 97/51, mild headache NIHSS score 3 for left arm and leg weakness stop she had a CT scan of the brain without contrast which showed no acute intracranial pathology or postsurgical changes consistent with her known history of sphenoid sinus.  She was evaluated by telestroke sitter at her ordered IV tPA/TNK candidate as out of window, not an interventional candidate as no LVO.  Telestroke recommended stroke workup and MRI brain without contrast.  Neurology was consulted for further recommendation.     Past Medical History  She has a past medical history of COPD (chronic obstructive pulmonary disease) (CMS/[facility]), Coronary artery disease, Personal history of other endocrine, nutritional and metabolic disease, Personal history of other mental and behavioral disorders, Personal history of other specified conditions, and Unspecified convulsions (CMS/[facility]).    Surgical History  She has a past surgical history that includes Other surgical history (08/17/2013); MR angio head wo IV contrast (7/15/2021); MR angio neck wo IV contrast (7/15/2021); MR angio head wo IV contrast (11/28/2017); and MR angio head wo IV contrast (3/13/2018).     Social History  She reports that she has never smoked. She has never used smokeless tobacco. She reports current drug use. Drug: \"Crack\" cocaine. She reports that she does not drink alcohol.    Family History  No family history on file.   " "  Allergies  Adhesive, Hydrocodone-acetaminophen, Ondansetron, and Hydromorphone    Review of Systems  Review of Systems   All other systems reviewed and are negative.         Physical Exam  Lying comfortably in bed in no acute distress  Alert oriented x 3  Cranial nerves II to XII grossly intact  Noted to move all extremities spontaneously  Strength 5/5 in upper and lower extremities bilaterally     Last Recorded Vitals  Blood pressure 145/63, pulse 76, temperature 36.5 °C (97.7 °F), temperature source Temporal, resp. rate 18, height 1.651 m (5' 5\"), weight 59 kg (130 lb 1.1 oz), SpO2 99 %.    Relevant Results  CT head without contrast  IMPRESSION:  No acute intracranial pathologic findings are identified.  Right temporal craniotomy.  Lobulated soft tissue mass within the sphenoid sinus along with  postsurgical appearance of the sphenoid sinus. Findings may be  secondary to the presence of polypoid mass within the sphenoid sinus  versus mucosal thickening. Please correlate with previous surgical  history. The referring physician was contacted at the time of this  interpretation 2/20/2024 7:01 pm.     Assessment/Plan   Dizziness, lightheadedness, left-sided weakness, etiology unclear could be TIA/stroke versus hypotension versus UTI vs functional overlay  Patient was evaluated by telestroke NIHSS score 3, CT head without contrast showed no acute intracranial pathology, was not a IV tPA/intervention candidate due to being out of window and no LVO  Patient received aspirin 324 mg x 1, she is on aspirin 81 mg at home and Lipitor 80 mg  Has history of convulsion disorder takes Keppra 750 mg twice daily is also on Ambilify  MRI brain without contrast ordered pending  Patient started on ceftriaxone for UTI treatment  HbA1c, lipid panel 2D echo with bubble study ordered part of stroke workup  PT/OT/SLP      I spent 60 minutes in the professional and overall care of this patient.      Francia Hsu MD    "

## 2024-02-22 ENCOUNTER — APPOINTMENT (OUTPATIENT)
Dept: RADIOLOGY | Facility: HOSPITAL | Age: 44
DRG: 948 | End: 2024-02-22
Payer: MEDICARE

## 2024-02-22 VITALS
BODY MASS INDEX: 23.47 KG/M2 | SYSTOLIC BLOOD PRESSURE: 133 MMHG | WEIGHT: 140.87 LBS | DIASTOLIC BLOOD PRESSURE: 61 MMHG | HEIGHT: 65 IN | TEMPERATURE: 96.6 F | OXYGEN SATURATION: 97 % | HEART RATE: 62 BPM | RESPIRATION RATE: 15 BRPM

## 2024-02-22 PROBLEM — G45.9 TIA (TRANSIENT ISCHEMIC ATTACK): Status: RESOLVED | Noted: 2024-02-20 | Resolved: 2024-02-22

## 2024-02-22 PROBLEM — R29.90 STROKE-LIKE SYMPTOMS: Status: RESOLVED | Noted: 2024-02-20 | Resolved: 2024-02-22

## 2024-02-22 LAB
ATRIAL RATE: 62 BPM
P AXIS: 3 DEGREES
P OFFSET: 168 MS
P ONSET: 138 MS
PR INTERVAL: 160 MS
Q ONSET: 218 MS
QRS COUNT: 11 BEATS
QRS DURATION: 102 MS
QT INTERVAL: 410 MS
QTC CALCULATION(BAZETT): 416 MS
QTC FREDERICIA: 414 MS
R AXIS: 86 DEGREES
T AXIS: 134 DEGREES
T OFFSET: 423 MS
TROPONIN T SERPL-MCNC: <6 NG/L
VENTRICULAR RATE: 62 BPM

## 2024-02-22 PROCEDURE — 2500000002 HC RX 250 W HCPCS SELF ADMINISTERED DRUGS (ALT 637 FOR MEDICARE OP, ALT 636 FOR OP/ED): Performed by: NURSE PRACTITIONER

## 2024-02-22 PROCEDURE — G0378 HOSPITAL OBSERVATION PER HR: HCPCS

## 2024-02-22 PROCEDURE — 84484 ASSAY OF TROPONIN QUANT: CPT | Performed by: INTERNAL MEDICINE

## 2024-02-22 PROCEDURE — 2500000001 HC RX 250 WO HCPCS SELF ADMINISTERED DRUGS (ALT 637 FOR MEDICARE OP)

## 2024-02-22 PROCEDURE — 1200000002 HC GENERAL ROOM WITH TELEMETRY DAILY

## 2024-02-22 PROCEDURE — 93010 ELECTROCARDIOGRAM REPORT: CPT | Performed by: INTERNAL MEDICINE

## 2024-02-22 PROCEDURE — 2500000004 HC RX 250 GENERAL PHARMACY W/ HCPCS (ALT 636 FOR OP/ED): Performed by: INTERNAL MEDICINE

## 2024-02-22 PROCEDURE — 99232 SBSQ HOSP IP/OBS MODERATE 35: CPT | Performed by: INTERNAL MEDICINE

## 2024-02-22 PROCEDURE — 2500000001 HC RX 250 WO HCPCS SELF ADMINISTERED DRUGS (ALT 637 FOR MEDICARE OP): Performed by: INTERNAL MEDICINE

## 2024-02-22 PROCEDURE — 99231 SBSQ HOSP IP/OBS SF/LOW 25: CPT | Performed by: NURSE PRACTITIONER

## 2024-02-22 PROCEDURE — 36415 COLL VENOUS BLD VENIPUNCTURE: CPT | Performed by: INTERNAL MEDICINE

## 2024-02-22 PROCEDURE — 2500000001 HC RX 250 WO HCPCS SELF ADMINISTERED DRUGS (ALT 637 FOR MEDICARE OP): Performed by: NURSE PRACTITIONER

## 2024-02-22 PROCEDURE — 2500000004 HC RX 250 GENERAL PHARMACY W/ HCPCS (ALT 636 FOR OP/ED): Performed by: NURSE PRACTITIONER

## 2024-02-22 PROCEDURE — 2500000002 HC RX 250 W HCPCS SELF ADMINISTERED DRUGS (ALT 637 FOR MEDICARE OP, ALT 636 FOR OP/ED): Performed by: INTERNAL MEDICINE

## 2024-02-22 PROCEDURE — 2500000005 HC RX 250 GENERAL PHARMACY W/O HCPCS: Performed by: INTERNAL MEDICINE

## 2024-02-22 PROCEDURE — 71045 X-RAY EXAM CHEST 1 VIEW: CPT

## 2024-02-22 RX ORDER — KETOROLAC TROMETHAMINE 30 MG/ML
15 INJECTION, SOLUTION INTRAMUSCULAR; INTRAVENOUS ONCE
Status: COMPLETED | OUTPATIENT
Start: 2024-02-22 | End: 2024-02-22

## 2024-02-22 RX ORDER — CEFUROXIME AXETIL 250 MG/1
250 TABLET ORAL 2 TIMES DAILY
Qty: 4 TABLET | Refills: 0 | Status: SHIPPED | OUTPATIENT
Start: 2024-02-22 | End: 2024-02-24

## 2024-02-22 RX ADMIN — ZOLPIDEM TARTRATE 10 MG: 5 TABLET ORAL at 00:26

## 2024-02-22 RX ADMIN — LEVOTHYROXINE SODIUM 112 MCG: 0.11 TABLET ORAL at 06:52

## 2024-02-22 RX ADMIN — HYDROCORTISONE 5 MG: 5 TABLET ORAL at 13:17

## 2024-02-22 RX ADMIN — MIDODRINE HYDROCHLORIDE 10 MG: 10 TABLET ORAL at 13:17

## 2024-02-22 RX ADMIN — LEVETIRACETAM 750 MG: 750 TABLET, FILM COATED ORAL at 08:21

## 2024-02-22 RX ADMIN — ARIPIPRAZOLE 15 MG: 5 TABLET ORAL at 08:20

## 2024-02-22 RX ADMIN — HYDROCORTISONE 10 MG: 5 TABLET ORAL at 08:20

## 2024-02-22 RX ADMIN — FLUOXETINE 40 MG: 20 CAPSULE ORAL at 08:21

## 2024-02-22 RX ADMIN — PREGABALIN 100 MG: 100 CAPSULE ORAL at 08:21

## 2024-02-22 RX ADMIN — PANTOPRAZOLE SODIUM 40 MG: 40 TABLET, DELAYED RELEASE ORAL at 06:52

## 2024-02-22 RX ADMIN — LIDOCAINE 1 PATCH: 4 PATCH TOPICAL at 08:21

## 2024-02-22 RX ADMIN — AMANTADINE HYDROCHLORIDE 100 MG: 100 CAPSULE ORAL at 08:20

## 2024-02-22 RX ADMIN — CEFTRIAXONE SODIUM 1 G: 1 INJECTION, SOLUTION INTRAVENOUS at 09:58

## 2024-02-22 RX ADMIN — MIDODRINE HYDROCHLORIDE 10 MG: 10 TABLET ORAL at 08:20

## 2024-02-22 RX ADMIN — ASPIRIN 81 MG: 81 TABLET, CHEWABLE ORAL at 08:20

## 2024-02-22 RX ADMIN — KETOROLAC TROMETHAMINE 15 MG: 30 INJECTION INTRAMUSCULAR; INTRAVENOUS at 06:50

## 2024-02-22 ASSESSMENT — PAIN - FUNCTIONAL ASSESSMENT: PAIN_FUNCTIONAL_ASSESSMENT: 0-10

## 2024-02-22 ASSESSMENT — COGNITIVE AND FUNCTIONAL STATUS - GENERAL
MOBILITY SCORE: 24
DAILY ACTIVITIY SCORE: 24

## 2024-02-22 ASSESSMENT — PAIN SCALES - GENERAL: PAINLEVEL_OUTOF10: 3

## 2024-02-22 ASSESSMENT — PAIN SCALES - WONG BAKER: WONGBAKER_NUMERICALRESPONSE: NO HURT

## 2024-02-22 NOTE — TREATMENT PLAN
Patient MRI brain without IV contrast shows no acute intracranial finding.  Hb 1 AC if 5.4.  Lipid panel pending stop on ceftriaxone for UTI treatment .2D echo done on 1/29/2024 showed EF 60 to 65%. Continue home medication consisting of aspirin 81 mg and Lipitor 80 mg and Keppra 750 mg twice daily for conversion disorder.    Would benefit from psych consult to rule out malingering disorder as patient complains of multiple symptoms during her admission including weakness, chest pain but testing and examination has been unremarkable  No further recommendation from neurology at this time, please call neurology with further question.

## 2024-02-22 NOTE — SIGNIFICANT EVENT
Patient known to me from admission from 24 hours ago.  Nurse called me to evaluate after she complained of midsternal chest pain after coughing.  Vital signs are completely stable please refer to charted by the nurse vital signs.  She is alert oriented x 3 looking at her phone.  There are bilateral breath sounds heart is regular S1-S2 clear EKG reviewed no acute new changes.  Will give Toradol check chest x-ray troponins and will sign out to the dayshift hospitalist

## 2024-02-22 NOTE — DISCHARGE SUMMARY
Discharge Diagnosis  Stroke-like symptoms    Issues Requiring Follow-Up      Discharge Meds     Your medication list        ASK your doctor about these medications        Instructions Last Dose Given Next Dose Due   acetaminophen 500 mg tablet  Commonly known as: Tylenol           Ajovy Syringe 225 mg/1.5 mL prefilled syringe  Generic drug: fremanezumab           alum-mag hydroxide-simeth 200-200-20 mg/5 mL oral suspension  Commonly known as: Mylanta      Take 30 mL by mouth 4 times a day as needed for indigestion or heartburn.       amantadine 100 mg capsule  Commonly known as: Symmetrel           ARIPiprazole 15 mg tablet  Commonly known as: Abilify           ARIPiprazole 2 mg tablet  Commonly known as: Abilify           aspirin 81 mg chewable tablet           atorvastatin 80 mg tablet  Commonly known as: Lipitor           busPIRone 15 mg tablet  Commonly known as: Buspar           cetirizine 10 mg tablet  Commonly known as: ZyrTEC           desmopressin 10 mcg/spray (0.1 mL)  Commonly known as: DDAVP           ezetimibe 10 mg tablet  Commonly known as: Zetia           FLUoxetine 10 mg capsule  Commonly known as: PROzac           hydrocortisone 5 mg tablet  Commonly known as: Cortef           levETIRAcetam 750 mg tablet  Commonly known as: Keppra           levothyroxine 112 mcg tablet  Commonly known as: Synthroid, Levoxyl           lidocaine 4 % patch      Place 1 patch over 12 hours on the skin once daily. Remove & discard patch within 12 hours or as directed by MD. Do not start before January 30, 2024.       meclizine 25 mg tablet  Commonly known as: Antivert           methocarbamol 500 mg tablet  Commonly known as: Robaxin           metoclopramide 10 mg tablet  Commonly known as: Reglan           midodrine 5 mg tablet  Commonly known as: Proamatine           pantoprazole 40 mg EC tablet  Commonly known as: ProtoNix           pregabalin 100 mg capsule  Commonly known as: Lyrica           promethazine 25 mg  tablet  Commonly known as: Phenergan      Take 0.5 tablets (12.5 mg) by mouth every 6 hours if needed for nausea or vomiting for up to 10 doses.       ubrogepant 100 mg tablet tablet  Commonly known as: Ubrelvy           zolpidem 10 mg tablet  Commonly known as: Ambien                    Test Results Pending At Discharge  Pending Labs       Order Current Status    Troponin T Series, High Sensitivity (0, 2HR, 6HR) In process    Blood Culture Preliminary result    Blood Culture Preliminary result            Hospital Course    HPI from admission  Lu Fall is a 43 y.o. female presenting with left-sided weakness and generalized weakness.  This patient woke up today at about 11:00 and felt that she is dizzy which she has happened to her before but she also felt some kind of left-sided weakness which was new.  She waited several hours for things to get better but they did not so finally her parents dropped her off at a local urgent care center at about 5 PM after quick evaluation there she was directed to the emergency room where she arrived at 5:30 PM due to concerns for left-sided weakness.  Case was initiated as brain attack CAT scan was negative for acute findings Case discussed with telestroke neurology and as her INH was only 3 according to the ER physician no CT angio was done and no intervention was recommended.  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  She denies any fever chills shortness of breath cough abdominal pain nausea vomiting urinary complaints frequency back pain skin rashes Raynaud's symptoms      During hospital course patient was seen by neurology.  She had an MRI of her brain that showed no acute intracranial process.  She had a CT that showed no acute intracranial process.  She was seen by psych and will continue follow-up with her current psychiatric services on an outpatient basis.  Psych NP  discussed crack/cocaine abuse and gave patient outpatient resources. she was seen by cardiology for abnormal EKG.  Cardiology has cleared her for discharge she will follow-up with her outpatient cardiologist.  Patient will be discharged in stable condition    Above Case and plan discussed with collaborating physician    Pertinent Physical Exam At Time of Discharge  Physical Exam  Constitutional:       General: She is not in acute distress.     Appearance: She is not toxic-appearing.   HENT:      Head: Normocephalic and atraumatic.   Cardiovascular:      Rate and Rhythm: Normal rate and regular rhythm.      Heart sounds: No murmur heard.     No gallop.   Pulmonary:      Effort: No respiratory distress.      Breath sounds: Normal breath sounds.   Abdominal:      General: Bowel sounds are normal.      Palpations: Abdomen is soft.      Tenderness: There is no abdominal tenderness.   Musculoskeletal:         General: No swelling.   Skin:     General: Skin is warm and dry.      Capillary Refill: Capillary refill takes less than 2 seconds.   Neurological:      Mental Status: She is alert and oriented to person, place, and time.   Psychiatric:         Mood and Affect: Mood normal.         Behavior: Behavior normal.         Outpatient Follow-Up  No future appointments.      Doris Diaz, APRN-CNP

## 2024-02-22 NOTE — CARE PLAN
Problem: Pain  Goal: My pain/discomfort is manageable  Outcome: Progressing     Problem: Safety  Goal: Patient will be injury free during hospitalization  Outcome: Progressing  Goal: I will remain free of falls  Outcome: Progressing     Problem: Daily Care  Goal: Daily care needs are met  Outcome: Progressing     Problem: Psychosocial Needs  Goal: Demonstrates ability to cope with hospitalization/illness  Outcome: Progressing  Goal: Collaborate with me, my family, and caregiver to identify my specific goals  Outcome: Progressing     Problem: Discharge Barriers  Goal: My discharge needs are met  Outcome: Progressing   The patient's goals for the shift include Use call light when ambulates    The clinical goals for the shift include Neuro Checks    Over the shift, the patient did not make progress toward the following goals. Barriers to progression include . Recommendations to address these barriers include .

## 2024-02-22 NOTE — PROGRESS NOTES
Subjective Data:  The patient had some atypical chest pain earlier this morning.    Overnight Events:    As described below.     Objective Data:  Last Recorded Vitals:  Vitals:    02/21/24 1553 02/21/24 1916 02/22/24 0032 02/22/24 0426   BP: 123/63 112/53 118/63 138/76   BP Location: Right arm Right arm Right arm Right arm   Patient Position: Lying Lying Sitting Lying   Pulse: 83 77 72 65   Resp: 19 16 18 17   Temp: 36.2 °C (97.2 °F) 36.5 °C (97.7 °F) 36.4 °C (97.5 °F) 36.4 °C (97.5 °F)   TempSrc: Temporal Temporal Temporal Temporal   SpO2: 99% 97% 99% 99%   Weight:    63.9 kg (140 lb 14 oz)   Height:           Last Labs:  CBC - 2/21/2024:  5:28 AM  7.2 11.4 178    36.1      CMP - 2/21/2024:  5:29 AM  8.8 7.0 19 --- 0.2   _ 3.9 9 100      PTT - 2/20/2024:  6:50 PM  1.1   11.7 29.6     TROPHS   Date/Time Value Ref Range Status   02/18/2024 12:33 AM 3 0 - 13 ng/L Final   02/17/2024 11:34 PM 3 0 - 13 ng/L Final   02/09/2024 09:44 PM 6 0 - 13 ng/L Final     BNP   Date/Time Value Ref Range Status   02/09/2024 08:24 PM 85 0 - 99 pg/mL Final   01/28/2024 07:22 PM 31 0 - 99 pg/mL Final     HGBA1C   Date/Time Value Ref Range Status   02/21/2024 12:57 AM 5.4 See below % Final   01/29/2024 05:22 AM 5.0 see below % Final     LDLCALC   Date/Time Value Ref Range Status   01/29/2024 05:22 AM 84 <=99 mg/dL Final     Comment:                                 Near   Borderline      AGE      Desirable  Optimal    High     High     Very High     0-19 Y     0 - 109     ---    110-129   >/= 130     ----    20-24 Y     0 - 119     ---    120-159   >/= 160     ----      >24 Y     0 -  99   100-129  130-159   160-189     >/=190     01/06/2020 01:52 AM 72 65 - 130 MG/DL Final   05/22/2018 08:11 AM 51 65 - 130 MG/DL Final   03/13/2018 01:53 AM  65 - 130 MG/DL Final    Unable to report calculated LDL due to increased triglycerides. Direct     Comment:      LDL to be run.     VLDL   Date/Time Value Ref Range Status   01/29/2024 05:22 AM 26 0 -  40 mg/dL Final   04/22/2023 05:27 AM 72 0 - 40 mg/dL Final   05/11/2022 06:05 AM 48 0 - 40 mg/dL Final      Last I/O:  I/O last 3 completed shifts:  In: 1700 (26.6 mL/kg) [IV Piggyback:1700]  Out: - (0 mL/kg)   Weight: 63.9 kg     Past Cardiology Tests (Last 3 Years):  EKG:  ECG 12 lead 02/20/2024      ECG 12 lead 02/19/2024      ECG 12 lead 02/09/2024      ECG 12 lead 02/09/2024      ECG 12 lead 02/05/2024      ECG 12 lead 01/29/2024      ECG 12 lead Serial 0, 2, 4       ECG 12 lead Serial 0, 2, 4 01/29/2024      ECG 12 lead       ECG 12 lead       ECG 12 lead 12/04/2023 (Preliminary)      ECG 12 lead 12/01/2023 (Preliminary)      ECG 12 Lead       ECG 12 Lead       ECG 12 Lead     Echo:  Transthoracic Echo (TTE) Complete 01/29/2024    Ejection Fractions:  EF   Date/Time Value Ref Range Status   01/29/2024 01:00 PM 66 %      Cath:  No results found for this or any previous visit from the past 1095 days.    Stress Test:  Nuclear Stress Test 01/12/2022    Cardiac Imaging:  No results found for this or any previous visit from the past 1095 days.      Inpatient Medications:  Scheduled medications   Medication Dose Route Frequency    amantadine  100 mg oral BID    ARIPiprazole  15 mg oral Daily    ARIPiprazole  2 mg oral Nightly    aspirin  81 mg oral Daily    atorvastatin  80 mg oral Nightly    cefTRIAXone  1 g intravenous Daily    desmopressin  1 spray One Nostril Nightly    enoxaparin  40 mg subcutaneous q24h    FLUoxetine  40 mg oral Daily    hydrocortisone  10 mg oral Daily with breakfast    hydrocortisone  5 mg oral BID AC    levETIRAcetam  750 mg oral BID    levothyroxine  112 mcg oral Daily before breakfast    lidocaine  1 patch transdermal Daily    midodrine  10 mg oral TID with meals    pantoprazole  40 mg oral Daily    pregabalin  100 mg oral BID     PRN medications   Medication    acetaminophen    busPIRone    zolpidem     Continuous Medications   Medication Dose Last Rate       Physical Exam:  The patient  is a mildly overweight early middle-aged white female lying in bed in no overt distress.  She is awake alert conversant.  JVP not elevated carotid and pulses 2+ no bruit no palpable thyroid enlargement  Chest fair air movement breath sounds are clear  Cardiac rhythm is regular no premature beats S1-S2 normal minimal systolic murmur no gallop rub  Abdomen is soft and benign no focal tenderness  No peripheral edema pedal pulses present     Assessment/Plan     2/21: This unfortunate 43-year-old white female has a very extensive past history including coronary artery disease necessitating PCI and stent deployment to LAD in 2018. She also had a history of rheumatic heart disease and in 2022 required CABG x 1 with LIMA to LAD with a bioprosthetic mitral valve replacement for severe mitral valve stenosis along with a tricuspid valve repair for tricuspid valve insufficiency. She did have a recent surveillance echocardiogram on 1/29/2024 demonstrating a preserved LV ejection fraction and normally functioning mitral valve replacement and 1-2+ tricuspid valve regurgitation. 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 She unfortunately has a history of smoking and substance abuse including cocaine. Patient admitted with perceived left-sided weakness not currently reproducible. Initial evaluation negative for acute CVA. Systolic blood pressures are currently in the lower 100 mmHg range. Telemetry monitor shows a stable sinus rhythm. The patient is at risk for developing atrial fibrillation but this has not been documented. From a cardiac perspective would continue her previously prescribed therapies including in part low-dose aspirin 81 mg daily midodrine 10 mg 3 times daily along with atorvastatin 80 mg daily ezetimibe 10 mg daily. The patient does have a abnormal EKG with T wave inversions in the  precordial plane and to a lesser extent in the inferior leads. Review of previous EKGs indicate that the T wave abnormalities are not new and are presumably residual to her rheumatic heart disease and CAD. No indication at this point of an acute coronary syndrome.     2/22: The patient had an episode of vague atypical chest pain earlier this morning.  Repeat EKG was essentially unchanged with some residual nonspecific ST-T abnormalities in the precordial leads seen on multiple prior tracings.  She had a repeat high-sensitivity troponin that again was less than 6.  The patient was seen by neurology yesterday and her initial complaints of dizziness lightheadedness and left-sided weakness thought to have unclear etiology?  TIA versus hypotension versus UTI versus functional overlay.  Patient is on ceftriaxone for urinary tract infection.  MRI of the brain yesterday showed no acute findings.  Portable chest x-ray today was also unremarkable.        Peripheral IV 02/20/24 Distal;Lower;Right;Anterior Ankle (Active)   Site Assessment Clean;Dry;Intact 02/21/24 2040   Dressing Status Clean;Dry;Occlusive 02/21/24 2040   Number of days: 2       Code Status:  Full Code    I spent 20 minutes in the professional and overall care of this patient.        Charles Randhawa MD

## 2024-02-22 NOTE — PROGRESS NOTES
02/22/24 1058   Discharge Planning   Home or Post Acute Services None   Patient expects to be discharged to: Patient to return home with no needs   Does the patient need discharge transport arranged? No     Patient to return home with no needs.  RN TCC following.

## 2024-02-22 NOTE — CARE PLAN
The patient's goals for the shift include Use call light when ambulates    The clinical goals for the shift include administer antibiotics, monitor pain.      Problem: Safety  Goal: Patient will be injury free during hospitalization  Outcome: Progressing  Goal: I will remain free of falls  Outcome: Progressing     Problem: Daily Care  Goal: Daily care needs are met  Outcome: Progressing     Problem: Psychosocial Needs  Goal: Demonstrates ability to cope with hospitalization/illness  Outcome: Progressing  Goal: Collaborate with me, my family, and caregiver to identify my specific goals  Outcome: Progressing     Problem: Discharge Barriers  Goal: My discharge needs are met  Outcome: Progressing        Problem: Pain  Goal: My pain/discomfort is manageable  Outcome: Not Progressing

## 2024-02-25 LAB
BACTERIA BLD CULT: NORMAL
BACTERIA BLD CULT: NORMAL

## 2024-04-08 ENCOUNTER — APPOINTMENT (OUTPATIENT)
Dept: CARDIOLOGY | Facility: HOSPITAL | Age: 44
End: 2024-04-08
Payer: MEDICARE

## 2024-04-08 ENCOUNTER — HOSPITAL ENCOUNTER (EMERGENCY)
Facility: HOSPITAL | Age: 44
Discharge: HOME | End: 2024-04-09
Attending: STUDENT IN AN ORGANIZED HEALTH CARE EDUCATION/TRAINING PROGRAM
Payer: MEDICARE

## 2024-04-08 ENCOUNTER — APPOINTMENT (OUTPATIENT)
Dept: RADIOLOGY | Facility: HOSPITAL | Age: 44
End: 2024-04-08
Payer: MEDICARE

## 2024-04-08 DIAGNOSIS — R07.9 CHEST PAIN, UNSPECIFIED TYPE: Primary | ICD-10-CM

## 2024-04-08 LAB
ALBUMIN SERPL-MCNC: 4.2 G/DL (ref 3.5–5)
ALP BLD-CCNC: 96 U/L (ref 35–125)
ALT SERPL-CCNC: 8 U/L (ref 5–40)
ANION GAP SERPL CALC-SCNC: 10 MMOL/L
AST SERPL-CCNC: 24 U/L (ref 5–40)
BASOPHILS # BLD AUTO: 0.09 X10*3/UL (ref 0–0.1)
BASOPHILS NFR BLD AUTO: 1.5 %
BILIRUB SERPL-MCNC: 0.4 MG/DL (ref 0.1–1.2)
BUN SERPL-MCNC: 5 MG/DL (ref 8–25)
CALCIUM SERPL-MCNC: 9.5 MG/DL (ref 8.5–10.4)
CHLORIDE SERPL-SCNC: 97 MMOL/L (ref 97–107)
CO2 SERPL-SCNC: 28 MMOL/L (ref 24–31)
CREAT SERPL-MCNC: 0.8 MG/DL (ref 0.4–1.6)
EGFRCR SERPLBLD CKD-EPI 2021: >90 ML/MIN/1.73M*2
EOSINOPHIL # BLD AUTO: 0.26 X10*3/UL (ref 0–0.7)
EOSINOPHIL NFR BLD AUTO: 4.4 %
ERYTHROCYTE [DISTWIDTH] IN BLOOD BY AUTOMATED COUNT: 15.9 % (ref 11.5–14.5)
GLUCOSE SERPL-MCNC: 77 MG/DL (ref 65–99)
HCT VFR BLD AUTO: 38.3 % (ref 36–46)
HGB BLD-MCNC: 12.8 G/DL (ref 12–16)
IMM GRANULOCYTES # BLD AUTO: 0.01 X10*3/UL (ref 0–0.7)
IMM GRANULOCYTES NFR BLD AUTO: 0.2 % (ref 0–0.9)
LYMPHOCYTES # BLD AUTO: 3.31 X10*3/UL (ref 1.2–4.8)
LYMPHOCYTES NFR BLD AUTO: 55.9 %
MAGNESIUM SERPL-MCNC: 2.4 MG/DL (ref 1.6–3.1)
MCH RBC QN AUTO: 25.8 PG (ref 26–34)
MCHC RBC AUTO-ENTMCNC: 33.4 G/DL (ref 32–36)
MCV RBC AUTO: 77 FL (ref 80–100)
MONOCYTES # BLD AUTO: 0.38 X10*3/UL (ref 0.1–1)
MONOCYTES NFR BLD AUTO: 6.4 %
NEUTROPHILS # BLD AUTO: 1.87 X10*3/UL (ref 1.2–7.7)
NEUTROPHILS NFR BLD AUTO: 31.6 %
NRBC BLD-RTO: 0 /100 WBCS (ref 0–0)
PLATELET # BLD AUTO: 228 X10*3/UL (ref 150–450)
POTASSIUM SERPL-SCNC: 3.2 MMOL/L (ref 3.4–5.1)
PROT SERPL-MCNC: 6.8 G/DL (ref 5.9–7.9)
RBC # BLD AUTO: 4.97 X10*6/UL (ref 4–5.2)
SODIUM SERPL-SCNC: 135 MMOL/L (ref 133–145)
TROPONIN T SERPL-MCNC: <6 NG/L
WBC # BLD AUTO: 5.9 X10*3/UL (ref 4.4–11.3)

## 2024-04-08 PROCEDURE — 71046 X-RAY EXAM CHEST 2 VIEWS: CPT | Performed by: RADIOLOGY

## 2024-04-08 PROCEDURE — 84075 ASSAY ALKALINE PHOSPHATASE: CPT | Performed by: STUDENT IN AN ORGANIZED HEALTH CARE EDUCATION/TRAINING PROGRAM

## 2024-04-08 PROCEDURE — 36415 COLL VENOUS BLD VENIPUNCTURE: CPT | Performed by: STUDENT IN AN ORGANIZED HEALTH CARE EDUCATION/TRAINING PROGRAM

## 2024-04-08 PROCEDURE — 83735 ASSAY OF MAGNESIUM: CPT

## 2024-04-08 PROCEDURE — 93005 ELECTROCARDIOGRAM TRACING: CPT

## 2024-04-08 PROCEDURE — 85025 COMPLETE CBC W/AUTO DIFF WBC: CPT | Performed by: STUDENT IN AN ORGANIZED HEALTH CARE EDUCATION/TRAINING PROGRAM

## 2024-04-08 PROCEDURE — 84484 ASSAY OF TROPONIN QUANT: CPT | Performed by: STUDENT IN AN ORGANIZED HEALTH CARE EDUCATION/TRAINING PROGRAM

## 2024-04-08 PROCEDURE — 71046 X-RAY EXAM CHEST 2 VIEWS: CPT

## 2024-04-08 PROCEDURE — 96375 TX/PRO/DX INJ NEW DRUG ADDON: CPT

## 2024-04-08 PROCEDURE — 99284 EMERGENCY DEPT VISIT MOD MDM: CPT | Mod: 25

## 2024-04-08 PROCEDURE — 80053 COMPREHEN METABOLIC PANEL: CPT | Performed by: STUDENT IN AN ORGANIZED HEALTH CARE EDUCATION/TRAINING PROGRAM

## 2024-04-08 PROCEDURE — 96374 THER/PROPH/DIAG INJ IV PUSH: CPT

## 2024-04-08 PROCEDURE — 2500000002 HC RX 250 W HCPCS SELF ADMINISTERED DRUGS (ALT 637 FOR MEDICARE OP, ALT 636 FOR OP/ED)

## 2024-04-08 PROCEDURE — 2500000004 HC RX 250 GENERAL PHARMACY W/ HCPCS (ALT 636 FOR OP/ED)

## 2024-04-08 RX ORDER — POTASSIUM CHLORIDE 20 MEQ/1
40 TABLET, EXTENDED RELEASE ORAL ONCE
Status: COMPLETED | OUTPATIENT
Start: 2024-04-08 | End: 2024-04-08

## 2024-04-08 RX ORDER — NAPROXEN SODIUM 220 MG/1
324 TABLET, FILM COATED ORAL ONCE
Status: DISCONTINUED | OUTPATIENT
Start: 2024-04-08 | End: 2024-04-09 | Stop reason: HOSPADM

## 2024-04-08 RX ORDER — KETOROLAC TROMETHAMINE 30 MG/ML
15 INJECTION, SOLUTION INTRAMUSCULAR; INTRAVENOUS ONCE
Status: COMPLETED | OUTPATIENT
Start: 2024-04-08 | End: 2024-04-08

## 2024-04-08 RX ORDER — METOCLOPRAMIDE HYDROCHLORIDE 5 MG/ML
10 INJECTION INTRAMUSCULAR; INTRAVENOUS ONCE
Status: COMPLETED | OUTPATIENT
Start: 2024-04-08 | End: 2024-04-08

## 2024-04-08 RX ORDER — ACETAMINOPHEN 325 MG/1
975 TABLET ORAL ONCE
Status: COMPLETED | OUTPATIENT
Start: 2024-04-09 | End: 2024-04-09

## 2024-04-08 RX ADMIN — POTASSIUM CHLORIDE 40 MEQ: 1500 TABLET, EXTENDED RELEASE ORAL at 22:50

## 2024-04-08 RX ADMIN — KETOROLAC TROMETHAMINE 15 MG: 30 INJECTION, SOLUTION INTRAMUSCULAR at 23:30

## 2024-04-08 RX ADMIN — METOCLOPRAMIDE 10 MG: 5 INJECTION, SOLUTION INTRAMUSCULAR; INTRAVENOUS at 23:30

## 2024-04-09 VITALS
WEIGHT: 123.68 LBS | RESPIRATION RATE: 15 BRPM | SYSTOLIC BLOOD PRESSURE: 119 MMHG | OXYGEN SATURATION: 98 % | HEART RATE: 67 BPM | DIASTOLIC BLOOD PRESSURE: 68 MMHG | TEMPERATURE: 97.5 F | BODY MASS INDEX: 22.76 KG/M2 | HEIGHT: 62 IN

## 2024-04-09 LAB
ATRIAL RATE: 76 BPM
P AXIS: 26 DEGREES
P OFFSET: 181 MS
P ONSET: 148 MS
PR INTERVAL: 140 MS
Q ONSET: 218 MS
QRS COUNT: 12 BEATS
QRS DURATION: 94 MS
QT INTERVAL: 428 MS
QTC CALCULATION(BAZETT): 481 MS
QTC FREDERICIA: 462 MS
R AXIS: 75 DEGREES
T AXIS: 244 DEGREES
T OFFSET: 432 MS
TROPONIN T SERPL-MCNC: <6 NG/L
VENTRICULAR RATE: 76 BPM

## 2024-04-09 RX ORDER — MECLIZINE HYDROCHLORIDE 25 MG/1
25 TABLET ORAL ONCE
Status: DISCONTINUED | OUTPATIENT
Start: 2024-04-09 | End: 2024-04-09 | Stop reason: HOSPADM

## 2024-04-09 RX ADMIN — ACETAMINOPHEN 975 MG: 325 TABLET ORAL at 00:18

## 2024-04-09 ASSESSMENT — HEART SCORE
ECG: NORMAL
TROPONIN: LESS THAN OR EQUAL TO NORMAL LIMIT
RISK FACTORS: >2 RISK FACTORS OR HX OF ATHEROSCLEROTIC DISEASE
HISTORY: SLIGHTLY SUSPICIOUS
HEART SCORE: 2
AGE: <45

## 2024-04-09 ASSESSMENT — PAIN - FUNCTIONAL ASSESSMENT: PAIN_FUNCTIONAL_ASSESSMENT: 0-10

## 2024-04-09 ASSESSMENT — PAIN SCALES - GENERAL: PAINLEVEL_OUTOF10: 0 - NO PAIN

## 2024-04-09 ASSESSMENT — PAIN DESCRIPTION - PROGRESSION: CLINICAL_PROGRESSION: RESOLVED

## 2024-04-09 NOTE — DISCHARGE INSTRUCTIONS
He was seen in the emergency department today for evaluation of chest pain.  Diagnostic labs and imaging came back unremarkable.  We recommend you follow-up with your primary care physician outpatient within the next 1 to 2 days as well as cardiology.  Return to the emergency department anytime with any new or worsening symptoms.

## 2024-04-09 NOTE — ED PROVIDER NOTES
HPI   Chief Complaint   Patient presents with    Chest Pain     Pt. Presents to the ED with chest pain that started tonight shortly before arrival. EMS states that she was given aspirin and nitroglycerin with no relief.       Patient is a 43-year-old female with a history of COPD and coronary artery disease presenting to the emergency department for evaluation of chest pain.  Patient states chest pain started around 6 PM.  She describes it as a constant aching pain and a heaviness in the middle of her chest that radiates to her left shoulder.  Nothing makes it better or worse.  She admits to having a heart attack 7 years ago and states that she did have a stent placed.  She denies shortness of breath, fever, chills, nausea, vomiting, abdominal pain, recent travel, recent illness, cough, congestion, headaches, numbness, tingling, hematuria, dysuria, constipation, diarrhea.                          Edgar Coma Scale Score: 15   HEART Score: 2       NIH Stroke Scale: 0             Patient History   Past Medical History:   Diagnosis Date    COPD (chronic obstructive pulmonary disease) (CMS/HCC)     Coronary artery disease     Personal history of other endocrine, nutritional and metabolic disease     History of hypopituitarism    Personal history of other mental and behavioral disorders     History of depression    Personal history of other specified conditions     History of brain tumor    Unspecified convulsions (CMS/HCC)     Convulsion     Past Surgical History:   Procedure Laterality Date    MR HEAD ANGIO WO IV CONTRAST  7/15/2021    MR HEAD ANGIO WO IV CONTRAST 7/15/2021 [facility]    MR HEAD ANGIO WO IV CONTRAST  11/28/2017    MR HEAD ANGIO WO IV CONTRAST [facility]    MR HEAD ANGIO WO IV CONTRAST  3/13/2018    MR HEAD ANGIO WO IV CONTRAST [facility]    MR NECK ANGIO WO IV CONTRAST  7/15/2021    MR NECK ANGIO WO IV CONTRAST 7/15/2021 [facility] EM[facility]GENCY [facility]GAC[facility]    OTHER SURGICAL HISTORY   "08/17/2013    Craniotomy Tumor Removal - Complete     No family history on file.  Social History     Tobacco Use    Smoking status: Never    Smokeless tobacco: Never   Vaping Use    Vaping Use: Never used   Substance Use Topics    Alcohol use: Never    Drug use: Yes     Types: \"Crack\" cocaine     Comment: last used 4 weeks ago       Physical Exam   ED Triage Vitals [04/08/24 2054]   Temperature Heart Rate Respirations BP   36.9 °C (98.4 °F) 78 16 (!) 127/96      Pulse Ox Temp Source Heart Rate Source Patient Position   98 % Temporal -- --      BP Location FiO2 (%)     -- --       Physical Exam  Vitals and nursing note reviewed.   Constitutional:       General: She is not in acute distress.     Appearance: She is well-developed. She is not ill-appearing or toxic-appearing.   HENT:      Head: Normocephalic and atraumatic.   Eyes:      Pupils: Pupils are equal, round, and reactive to light.   Cardiovascular:      Rate and Rhythm: Normal rate and regular rhythm.      Pulses:           Radial pulses are 2+ on the right side and 2+ on the left side.      Heart sounds: Normal heart sounds. No murmur heard.     No friction rub. No gallop.   Pulmonary:      Effort: Pulmonary effort is normal.      Breath sounds: No decreased breath sounds, wheezing, rhonchi or rales.   Abdominal:      Palpations: Abdomen is soft.      Tenderness: There is no abdominal tenderness. There is no guarding or rebound.   Musculoskeletal:         General: Normal range of motion.      Cervical back: Normal range of motion.   Skin:     General: Skin is warm and dry.   Neurological:      General: No focal deficit present.      Mental Status: She is alert and oriented to person, place, and time.   Psychiatric:         Mood and Affect: Mood normal.         Behavior: Behavior normal.         ED Course & MDM   ED Course as of 04/09/24 0023   Mon Apr 08, 2024 2057 EKG on my interpretation shows normal sinus rhythm with rate of 76 beats minute.  Normal " axis.  QTc is 481 ms, RI interval 140.  There are T wave inversions in the inferior leads, septal and anterolateral leads.  No ST elevation.  Question minimal ST depression in aVF.  No STEMI. [NT]      ED Course User Index  [NT] Pankaj Gonzalez DO         Diagnoses as of 04/09/24 0023   Chest pain, unspecified type       Medical Decision Making  **Disclaimer parts of this chart have been completed using voice recognition software. Please excuse any errors of transcription.     Patient seen in conjunction with attending physician Dr. Oneill    HPI: Detailed above.    Exam: A medically appropriate exam performed, outlined above, given the known history and presentation.    History obtained from: Patient    EKG: Reviewed and interpreted by my attending physician    Labs/Diagnostics:  Labs Reviewed   COMPREHENSIVE METABOLIC PANEL - Abnormal       Result Value    Glucose 77      Sodium 135      Potassium 3.2 (*)     Chloride 97      Bicarbonate 28      Urea Nitrogen 5 (*)     Creatinine 0.80      eGFR >90      Calcium 9.5      Albumin 4.2      Alkaline Phosphatase 96      Total Protein 6.8      AST 24      Bilirubin, Total 0.4      ALT 8      Anion Gap 10     CBC WITH AUTO DIFFERENTIAL - Abnormal    WBC 5.9      nRBC 0.0      RBC 4.97      Hemoglobin 12.8      Hematocrit 38.3      MCV 77 (*)     MCH 25.8 (*)     MCHC 33.4      RDW 15.9 (*)     Platelets 228      Neutrophils % 31.6      Immature Granulocytes %, Automated 0.2      Lymphocytes % 55.9      Monocytes % 6.4      Eosinophils % 4.4      Basophils % 1.5      Neutrophils Absolute 1.87      Immature Granulocytes Absolute, Automated 0.01      Lymphocytes Absolute 3.31      Monocytes Absolute 0.38      Eosinophils Absolute 0.26      Basophils Absolute 0.09     SERIAL TROPONIN, INITIAL ([facility]) - Normal    Troponin T, High Sensitivity <6     MAGNESIUM - Normal    Magnesium 2.40     SERIAL TROPONIN,  2 HOUR ([facility]) - Normal    Troponin T, High Sensitivity <6      TROPONIN T SERIES, HIGH SENSITIVITY (0, 2 HR, 6 HR)    Narrative:     The following orders were created for panel order Troponin T Series, High Sensitivity (0, 2HR, 6HR).  Procedure                               Abnormality         Status                     ---------                               -----------         ------                     Serial Troponin, Initial...[[id number]]  Normal              Final result               Serial Troponin, 2 Hour ...[[id number]]  Normal              Final result               Serial Troponin, 6 Hour ...[[id number]]                                                   Please view results for these tests on the individual orders.   SERIAL TROPONIN, 6 HOUR ([facility])     XR chest 2 views   Final Result   No acute cardiopulmonary process.        MACRO:   None        Signed by: Ronnie Padilla 4/8/2024 11:10 PM   Dictation workstation:   [id number]        EMERGENCY DEPARTMENT COURSE and DIFFERENTIAL DIAGNOSIS/MDM:  Patient is a 43-year-old female presenting to the emergency department for evaluation of chest pain.  On physical exam vital signs remarkable for hypertension but otherwise stable patient is in no acute distress.  Cardiac workup was initiated.  Initial and repeat troponin less than 6.  CMP showed a potassium of 3.2 but otherwise no electrolyte abnormalities.  Magnesium normal.  CBC showed no leukocytosis or anemia.  Chest x-ray showed no acute cardiopulmonary process.  Patient was then complaining of some dizziness and therefore meclizine was ordered.  Patient was recently admitted to the hospital for dizziness and had an MRI done which showed no abnormalities.  Patient is feeling better and was able to ambulate appropriately without assistance.  She was discharged in stable condition.  She was advised to follow-up with primary care physician as well as cardiology outpatient within the next 1 to 2 days.  She will return to the emergency department any new or worsening symptoms.  " Heart score is 2 NIH is 0.      Vitals:    Vitals:    04/08/24 2054   BP: (!) 127/96   Pulse: 78   Resp: 16   Temp: 36.9 °C (98.4 °F)   TempSrc: Temporal   SpO2: 98%   Weight: 56.1 kg (123 lb 10.9 oz)   Height: 1.575 m (5' 2\")     History Limited by:    None    Independent history obtained from:    None      External records reviewed:    Inpatient Notes/Discharge Summary from his hospitalization in which patient was admitted for dizziness and had an extensive workup including an MRI of the brain.      Diagnostics interpreted by me:    Xrays - see my independent interpretation in MDM      Discussions with other clinicians:    None      Chronic conditions impacting care:    COPD and Heart Disease      Social determinants of health affecting care:    None    Diagnostic tests considered but not performed: None    ED Medications managed:    Medications   aspirin chewable tablet 324 mg (324 mg oral Not Given 4/8/24 2120)   meclizine (Antivert) tablet 25 mg (has no administration in time range)   potassium chloride CR (Klor-Con M20) ER tablet 40 mEq (40 mEq oral Given 4/8/24 2250)   ketorolac (Toradol) injection 15 mg (15 mg intravenous Given 4/8/24 2330)   metoclopramide (Reglan) injection 10 mg (10 mg intravenous Given 4/8/24 2330)   acetaminophen (Tylenol) tablet 975 mg (975 mg oral Given 4/9/24 0018)         Prescription drugs considered:    None      Procedure  Procedures     Ev Muhammad PA-C  04/09/24 0023    "

## 2024-04-18 ENCOUNTER — HOSPITAL ENCOUNTER (EMERGENCY)
Facility: HOSPITAL | Age: 44
Discharge: HOME | End: 2024-04-18
Payer: MEDICARE

## 2024-04-18 ENCOUNTER — APPOINTMENT (OUTPATIENT)
Dept: RADIOLOGY | Facility: HOSPITAL | Age: 44
End: 2024-04-18
Payer: MEDICARE

## 2024-04-18 VITALS
TEMPERATURE: 98.5 F | HEART RATE: 71 BPM | DIASTOLIC BLOOD PRESSURE: 97 MMHG | BODY MASS INDEX: 23 KG/M2 | SYSTOLIC BLOOD PRESSURE: 123 MMHG | WEIGHT: 125 LBS | RESPIRATION RATE: 18 BRPM | HEIGHT: 62 IN | OXYGEN SATURATION: 94 %

## 2024-04-18 DIAGNOSIS — R10.31 RIGHT LOWER QUADRANT ABDOMINAL PAIN: ICD-10-CM

## 2024-04-18 DIAGNOSIS — B34.9 VIRAL ILLNESS: Primary | ICD-10-CM

## 2024-04-18 LAB
ALBUMIN SERPL BCP-MCNC: 4.2 G/DL (ref 3.4–5)
ALP SERPL-CCNC: 79 U/L (ref 33–110)
ALT SERPL W P-5'-P-CCNC: 14 U/L (ref 7–45)
ANION GAP SERPL CALC-SCNC: 11 MMOL/L (ref 10–20)
APPEARANCE UR: CLEAR
AST SERPL W P-5'-P-CCNC: 37 U/L (ref 9–39)
BASOPHILS # BLD AUTO: 0.06 X10*3/UL (ref 0–0.1)
BASOPHILS NFR BLD AUTO: 1.1 %
BILIRUB SERPL-MCNC: 0.4 MG/DL (ref 0–1.2)
BILIRUB UR STRIP.AUTO-MCNC: NEGATIVE MG/DL
BUN SERPL-MCNC: 11 MG/DL (ref 6–23)
CALCIUM SERPL-MCNC: 9.3 MG/DL (ref 8.6–10.3)
CHLORIDE SERPL-SCNC: 101 MMOL/L (ref 98–107)
CO2 SERPL-SCNC: 27 MMOL/L (ref 21–32)
COLOR UR: COLORLESS
CREAT SERPL-MCNC: 0.81 MG/DL (ref 0.5–1.05)
EGFRCR SERPLBLD CKD-EPI 2021: >90 ML/MIN/1.73M*2
EOSINOPHIL # BLD AUTO: 0.27 X10*3/UL (ref 0–0.7)
EOSINOPHIL NFR BLD AUTO: 4.9 %
ERYTHROCYTE [DISTWIDTH] IN BLOOD BY AUTOMATED COUNT: 15.9 % (ref 11.5–14.5)
FLUAV RNA RESP QL NAA+PROBE: NOT DETECTED
FLUBV RNA RESP QL NAA+PROBE: NOT DETECTED
GLUCOSE SERPL-MCNC: 75 MG/DL (ref 74–99)
GLUCOSE UR STRIP.AUTO-MCNC: NORMAL MG/DL
HCG UR QL IA.RAPID: NEGATIVE
HCT VFR BLD AUTO: 41.6 % (ref 36–46)
HGB BLD-MCNC: 13.5 G/DL (ref 12–16)
IMM GRANULOCYTES # BLD AUTO: 0.01 X10*3/UL (ref 0–0.7)
IMM GRANULOCYTES NFR BLD AUTO: 0.2 % (ref 0–0.9)
KETONES UR STRIP.AUTO-MCNC: NEGATIVE MG/DL
LEUKOCYTE ESTERASE UR QL STRIP.AUTO: NEGATIVE
LIPASE SERPL-CCNC: 14 U/L (ref 9–82)
LYMPHOCYTES # BLD AUTO: 2.64 X10*3/UL (ref 1.2–4.8)
LYMPHOCYTES NFR BLD AUTO: 47.7 %
MAGNESIUM SERPL-MCNC: 2.38 MG/DL (ref 1.6–2.4)
MCH RBC QN AUTO: 25.5 PG (ref 26–34)
MCHC RBC AUTO-ENTMCNC: 32.5 G/DL (ref 32–36)
MCV RBC AUTO: 79 FL (ref 80–100)
MONOCYTES # BLD AUTO: 0.31 X10*3/UL (ref 0.1–1)
MONOCYTES NFR BLD AUTO: 5.6 %
NEUTROPHILS # BLD AUTO: 2.24 X10*3/UL (ref 1.2–7.7)
NEUTROPHILS NFR BLD AUTO: 40.5 %
NITRITE UR QL STRIP.AUTO: NEGATIVE
NRBC BLD-RTO: 0 /100 WBCS (ref 0–0)
PH UR STRIP.AUTO: 5 [PH]
PLATELET # BLD AUTO: 160 X10*3/UL (ref 150–450)
POTASSIUM SERPL-SCNC: 4.8 MMOL/L (ref 3.5–5.3)
PROT SERPL-MCNC: 7.3 G/DL (ref 6.4–8.2)
PROT UR STRIP.AUTO-MCNC: NEGATIVE MG/DL
RBC # BLD AUTO: 5.29 X10*6/UL (ref 4–5.2)
RBC # UR STRIP.AUTO: NEGATIVE /UL
S PYO DNA THROAT QL NAA+PROBE: NOT DETECTED
SARS-COV-2 RNA RESP QL NAA+PROBE: NOT DETECTED
SODIUM SERPL-SCNC: 134 MMOL/L (ref 136–145)
SP GR UR STRIP.AUTO: 1.01
UROBILINOGEN UR STRIP.AUTO-MCNC: NORMAL MG/DL
WBC # BLD AUTO: 5.5 X10*3/UL (ref 4.4–11.3)

## 2024-04-18 PROCEDURE — 96375 TX/PRO/DX INJ NEW DRUG ADDON: CPT

## 2024-04-18 PROCEDURE — 85025 COMPLETE CBC W/AUTO DIFF WBC: CPT

## 2024-04-18 PROCEDURE — 96374 THER/PROPH/DIAG INJ IV PUSH: CPT | Mod: 59

## 2024-04-18 PROCEDURE — 36415 COLL VENOUS BLD VENIPUNCTURE: CPT

## 2024-04-18 PROCEDURE — 74177 CT ABD & PELVIS W/CONTRAST: CPT | Mod: FOREIGN READ | Performed by: RADIOLOGY

## 2024-04-18 PROCEDURE — 83735 ASSAY OF MAGNESIUM: CPT

## 2024-04-18 PROCEDURE — 96361 HYDRATE IV INFUSION ADD-ON: CPT

## 2024-04-18 PROCEDURE — 2550000001 HC RX 255 CONTRASTS: Mod: SE

## 2024-04-18 PROCEDURE — 96376 TX/PRO/DX INJ SAME DRUG ADON: CPT

## 2024-04-18 PROCEDURE — 80053 COMPREHEN METABOLIC PANEL: CPT

## 2024-04-18 PROCEDURE — 74177 CT ABD & PELVIS W/CONTRAST: CPT

## 2024-04-18 PROCEDURE — 99284 EMERGENCY DEPT VISIT MOD MDM: CPT | Mod: 25

## 2024-04-18 PROCEDURE — 2500000004 HC RX 250 GENERAL PHARMACY W/ HCPCS (ALT 636 FOR OP/ED): Mod: SE

## 2024-04-18 PROCEDURE — 81025 URINE PREGNANCY TEST: CPT

## 2024-04-18 PROCEDURE — 83690 ASSAY OF LIPASE: CPT

## 2024-04-18 PROCEDURE — 87651 STREP A DNA AMP PROBE: CPT | Performed by: EMERGENCY MEDICINE

## 2024-04-18 PROCEDURE — 87636 SARSCOV2 & INF A&B AMP PRB: CPT | Performed by: EMERGENCY MEDICINE

## 2024-04-18 PROCEDURE — 81003 URINALYSIS AUTO W/O SCOPE: CPT

## 2024-04-18 RX ORDER — KETOROLAC TROMETHAMINE 15 MG/ML
15 INJECTION, SOLUTION INTRAMUSCULAR; INTRAVENOUS ONCE
Status: COMPLETED | OUTPATIENT
Start: 2024-04-18 | End: 2024-04-18

## 2024-04-18 RX ORDER — IBUPROFEN 600 MG/1
600 TABLET ORAL EVERY 6 HOURS PRN
Qty: 20 TABLET | Refills: 0 | Status: SHIPPED | OUTPATIENT
Start: 2024-04-18 | End: 2024-04-25

## 2024-04-18 RX ORDER — MORPHINE SULFATE 4 MG/ML
4 INJECTION, SOLUTION INTRAMUSCULAR; INTRAVENOUS ONCE
Status: COMPLETED | OUTPATIENT
Start: 2024-04-18 | End: 2024-04-18

## 2024-04-18 RX ORDER — METOCLOPRAMIDE HYDROCHLORIDE 5 MG/ML
5 INJECTION INTRAMUSCULAR; INTRAVENOUS ONCE
Status: COMPLETED | OUTPATIENT
Start: 2024-04-18 | End: 2024-04-18

## 2024-04-18 RX ORDER — METOCLOPRAMIDE HYDROCHLORIDE 5 MG/ML
10 INJECTION INTRAMUSCULAR; INTRAVENOUS ONCE
Status: COMPLETED | OUTPATIENT
Start: 2024-04-18 | End: 2024-04-18

## 2024-04-18 RX ADMIN — METOCLOPRAMIDE 5 MG: 5 INJECTION, SOLUTION INTRAMUSCULAR; INTRAVENOUS at 20:50

## 2024-04-18 RX ADMIN — KETOROLAC TROMETHAMINE 15 MG: 15 INJECTION INTRAMUSCULAR; INTRAVENOUS at 20:10

## 2024-04-18 RX ADMIN — MORPHINE SULFATE 4 MG: 4 INJECTION, SOLUTION INTRAMUSCULAR; INTRAVENOUS at 20:50

## 2024-04-18 RX ADMIN — SODIUM CHLORIDE 1000 ML: 9 INJECTION, SOLUTION INTRAVENOUS at 20:10

## 2024-04-18 RX ADMIN — METOCLOPRAMIDE HYDROCHLORIDE 10 MG: 5 INJECTION INTRAMUSCULAR; INTRAVENOUS at 20:10

## 2024-04-18 RX ADMIN — IOHEXOL 75 ML: 350 INJECTION, SOLUTION INTRAVENOUS at 21:10

## 2024-04-18 ASSESSMENT — PAIN DESCRIPTION - LOCATION
LOCATION: OTHER (COMMENT)
LOCATION: HEAD
LOCATION: ABDOMEN

## 2024-04-18 ASSESSMENT — PAIN SCALES - GENERAL
PAINLEVEL_OUTOF10: 3
PAINLEVEL_OUTOF10: 5 - MODERATE PAIN
PAINLEVEL_OUTOF10: 9

## 2024-04-18 ASSESSMENT — PAIN - FUNCTIONAL ASSESSMENT
PAIN_FUNCTIONAL_ASSESSMENT: 0-10

## 2024-04-18 ASSESSMENT — PAIN DESCRIPTION - FREQUENCY: FREQUENCY: CONSTANT/CONTINUOUS

## 2024-04-18 ASSESSMENT — PAIN DESCRIPTION - ORIENTATION: ORIENTATION: RIGHT

## 2024-04-18 ASSESSMENT — PAIN DESCRIPTION - PAIN TYPE: TYPE: ACUTE PAIN

## 2024-04-18 ASSESSMENT — PAIN DESCRIPTION - DESCRIPTORS: DESCRIPTORS: ACHING

## 2024-04-19 NOTE — ED PROVIDER NOTES
HPI   Chief Complaint   Patient presents with    Flank Pain    Flu Symptoms     Patient states she is having sore throat, headache, body aches, nausea, vomiting, diarrhea and right flank pain for about 2 or 3 days.        Patient is a 43-year-old female with significant PMH of CABG history COPD, history of brain tumor presents to the ED with cc of not feeling well times a couple days.  Patient denies any contacts with similar symptoms.  Patient Dors is congestion and cough pharyngitis.  States cough is nonproductive.  Patient is also endorsing right lower quadrant abdominal pain and right flank pain.  Patient denies any history of abdominal surgeries.  Patient denies any injury to the area.  Patient denies any dysuria hematuria.  Patient denies any history of kidney stones.  Patient states she feels hot and cold but is unsure if she has had a fever.  Patient denies any chest pain shortness of breath.  Patient does endorse nausea and vomiting had 2 episodes of emesis.  Patient endorses diarrhea endorses greater than 10 episodes daily describes as watery and light brown.  Patient denies any melena or hematochezia. Endorses body aches.  Patient denies any tobacco or alcohol abuse.  Patient does state that she uses crack and her last use was a week ago.                          Edgar Coma Scale Score: 15                     Patient History   Past Medical History:   Diagnosis Date    COPD (chronic obstructive pulmonary disease) (Multi)     Coronary artery disease     Personal history of other endocrine, nutritional and metabolic disease     History of hypopituitarism    Personal history of other mental and behavioral disorders     History of depression    Personal history of other specified conditions     History of brain tumor    Unspecified convulsions (Multi)     Convulsion     Past Surgical History:   Procedure Laterality Date    MR HEAD ANGIO WO IV CONTRAST  7/15/2021    MR HEAD ANGIO WO IV CONTRAST [facility]/2021 [facility]    MR HEAD ANGIO WO IV CONTRAST  11/28/2017    MR HEAD ANGIO WO IV CONTRAST [facility]    MR HEAD ANGIO WO IV CONTRAST  3/13/2018    MR HEAD ANGIO WO IV CONTRAST [facility]    MR NECK ANGIO WO IV CONTRAST  7/15/2021    MR NECK ANGIO WO IV CONTRAST 7/15/2021 [facility]    OTHER SURGICAL HISTORY  08/17/2013    Craniotomy Tumor Removal - Complete     No family history on file.  Social History     Tobacco Use    Smoking status: Never    Smokeless tobacco: Never   Vaping Use    Vaping status: Never Used   Substance Use Topics    Alcohol use: Never    Drug use: Yes     Types: \"Crack\" cocaine     Comment: last used 4 weeks ago       Physical Exam   ED Triage Vitals [04/18/24 1837]   Temperature Heart Rate Respirations BP   36.9 °C (98.5 °F) (!) 102 16 98/72      Pulse Ox Temp Source Heart Rate Source Patient Position   98 % Oral Monitor Sitting      BP Location FiO2 (%)     Left arm --       Physical Exam  HENT:      Head: Normocephalic.      Nose: Nose normal.   Eyes:      Extraocular Movements: Extraocular movements intact.      Pupils: Pupils are equal, round, and reactive to light.   Cardiovascular:      Rate and Rhythm: Normal rate and regular rhythm.      Pulses: Normal pulses.      Heart sounds: Normal heart sounds.   Pulmonary:      Effort: Pulmonary effort is normal.      Breath sounds: Normal breath sounds.   Abdominal:      Palpations: Abdomen is soft.      Tenderness: There is abdominal tenderness (rlq). There is no guarding or rebound.   Musculoskeletal:         General: Normal range of motion.      Cervical back: Normal range of motion.   Neurological:      General: No focal deficit present.      Mental Status: She is alert and oriented to person, place, and time. Mental status is at baseline.         ED Course & MDM   Diagnoses as of 04/18/24 2054   Viral illness   Right lower quadrant abdominal pain       Medical Decision Making  Medical Decision " Making:  Patient presented as described in HPI. Patient case including ROS, PE, and treatment and plan discussed with ED attending if attached as cosigner. Due to patients presentation orders completed include as documented.  Presents to the ED with cc of cold symptoms and abdominal pain.  Patient denies any injury to the area.  Patient denies any history of abdominal surgeries.  Patient has had vomiting and diarrhea.  States she has had less p.o. intake and decreased appetite.  Patient states she is still getting fluids and well.  Patient is nontoxic, abdomen is soft and tender to the right lower quadrant no CVA tenderness lung sounds are clear bilaterally.  Pending labs and imaging.  Given fluids and Reglan and Toradol for symptoms.  Patient is wishing for more pain medication.  Patient given morphine and half of Reglan for symptoms.  CMP lipase magnesium CBC hCG COVID flu UA and strep all within normal limits.  Pending imaging.  Patient's care continued by ER attending and will be dispo once imaging returns.      This note has been transcribed using voice recognition and may contain grammatical errors, misplaced words, incorrect words, incorrect phrases or other errors.          Procedure  Procedures     Ryanne Garcia PA-C  04/18/24 2054

## 2024-05-03 ENCOUNTER — HOSPITAL ENCOUNTER (EMERGENCY)
Facility: HOSPITAL | Age: 44
Discharge: HOME | End: 2024-05-03
Attending: EMERGENCY MEDICINE
Payer: MEDICARE

## 2024-05-03 ENCOUNTER — APPOINTMENT (OUTPATIENT)
Dept: RADIOLOGY | Facility: HOSPITAL | Age: 44
End: 2024-05-03
Payer: MEDICARE

## 2024-05-03 VITALS
SYSTOLIC BLOOD PRESSURE: 127 MMHG | DIASTOLIC BLOOD PRESSURE: 90 MMHG | BODY MASS INDEX: 23 KG/M2 | HEART RATE: 64 BPM | TEMPERATURE: 98.1 F | RESPIRATION RATE: 18 BRPM | OXYGEN SATURATION: 100 % | WEIGHT: 125 LBS | HEIGHT: 62 IN

## 2024-05-03 DIAGNOSIS — R51.9 ACUTE NONINTRACTABLE HEADACHE, UNSPECIFIED HEADACHE TYPE: Primary | ICD-10-CM

## 2024-05-03 PROCEDURE — 96361 HYDRATE IV INFUSION ADD-ON: CPT

## 2024-05-03 PROCEDURE — 96375 TX/PRO/DX INJ NEW DRUG ADDON: CPT

## 2024-05-03 PROCEDURE — 2500000004 HC RX 250 GENERAL PHARMACY W/ HCPCS (ALT 636 FOR OP/ED): Mod: SE | Performed by: EMERGENCY MEDICINE

## 2024-05-03 PROCEDURE — 99284 EMERGENCY DEPT VISIT MOD MDM: CPT | Mod: 25

## 2024-05-03 PROCEDURE — 2500000004 HC RX 250 GENERAL PHARMACY W/ HCPCS (ALT 636 FOR OP/ED): Mod: SE

## 2024-05-03 PROCEDURE — 96365 THER/PROPH/DIAG IV INF INIT: CPT

## 2024-05-03 PROCEDURE — 70450 CT HEAD/BRAIN W/O DYE: CPT | Performed by: RADIOLOGY

## 2024-05-03 PROCEDURE — 70450 CT HEAD/BRAIN W/O DYE: CPT

## 2024-05-03 RX ORDER — KETOROLAC TROMETHAMINE 15 MG/ML
15 INJECTION, SOLUTION INTRAMUSCULAR; INTRAVENOUS ONCE
Status: COMPLETED | OUTPATIENT
Start: 2024-05-03 | End: 2024-05-03

## 2024-05-03 RX ORDER — ACETAMINOPHEN 325 MG/1
325 TABLET ORAL ONCE
Status: COMPLETED | OUTPATIENT
Start: 2024-05-03 | End: 2024-05-03

## 2024-05-03 RX ORDER — ACETAMINOPHEN 325 MG/1
TABLET ORAL
Status: COMPLETED
Start: 2024-05-03 | End: 2024-05-03

## 2024-05-03 RX ORDER — KETOROLAC TROMETHAMINE 15 MG/ML
INJECTION, SOLUTION INTRAMUSCULAR; INTRAVENOUS
Status: COMPLETED
Start: 2024-05-03 | End: 2024-05-03

## 2024-05-03 RX ORDER — DIPHENHYDRAMINE HYDROCHLORIDE 50 MG/ML
INJECTION INTRAMUSCULAR; INTRAVENOUS
Status: COMPLETED
Start: 2024-05-03 | End: 2024-05-03

## 2024-05-03 RX ORDER — PROMETHAZINE HYDROCHLORIDE 25 MG/ML
INJECTION, SOLUTION INTRAMUSCULAR; INTRAVENOUS
Status: COMPLETED
Start: 2024-05-03 | End: 2024-05-03

## 2024-05-03 RX ORDER — DIPHENHYDRAMINE HYDROCHLORIDE 50 MG/ML
25 INJECTION INTRAMUSCULAR; INTRAVENOUS ONCE
Status: COMPLETED | OUTPATIENT
Start: 2024-05-03 | End: 2024-05-03

## 2024-05-03 RX ADMIN — SODIUM CHLORIDE, POTASSIUM CHLORIDE, SODIUM LACTATE AND CALCIUM CHLORIDE 1000 ML: 600; 310; 30; 20 INJECTION, SOLUTION INTRAVENOUS at 08:45

## 2024-05-03 RX ADMIN — ACETAMINOPHEN 325 MG: 325 TABLET ORAL at 10:24

## 2024-05-03 RX ADMIN — DIPHENHYDRAMINE HYDROCHLORIDE 25 MG: 50 INJECTION INTRAMUSCULAR; INTRAVENOUS at 08:43

## 2024-05-03 RX ADMIN — KETOROLAC TROMETHAMINE 15 MG: 15 INJECTION INTRAMUSCULAR; INTRAVENOUS at 08:44

## 2024-05-03 RX ADMIN — KETOROLAC TROMETHAMINE 15 MG: 15 INJECTION, SOLUTION INTRAMUSCULAR; INTRAVENOUS at 08:44

## 2024-05-03 RX ADMIN — PROMETHAZINE HYDROCHLORIDE 25 MG: 25 INJECTION INTRAMUSCULAR; INTRAVENOUS at 08:44

## 2024-05-03 ASSESSMENT — PAIN DESCRIPTION - LOCATION
LOCATION: HEAD

## 2024-05-03 ASSESSMENT — PAIN DESCRIPTION - PAIN TYPE
TYPE: ACUTE PAIN
TYPE: ACUTE PAIN

## 2024-05-03 ASSESSMENT — PAIN SCALES - GENERAL
PAINLEVEL_OUTOF10: 7
PAINLEVEL_OUTOF10: 8
PAINLEVEL_OUTOF10: 10 - WORST POSSIBLE PAIN

## 2024-05-03 ASSESSMENT — PAIN - FUNCTIONAL ASSESSMENT
PAIN_FUNCTIONAL_ASSESSMENT: 0-10

## 2024-05-03 ASSESSMENT — PAIN DESCRIPTION - ORIENTATION: ORIENTATION: RIGHT

## 2024-05-03 NOTE — ED PROVIDER NOTES
"[facility]   Chief Complaint   Patient presents with    Migraine     Pt reports she has a hx of migraines, migraine started Wednesday with blurred vision, pt took Naproxen and tylenol 1 hrs ago no relief. Patient reports she has a hx of brain tumors that why she came in        [facility]                    Edgar Coma Scale Score: 15                     Patient History   Past Medical History:   Diagnosis Date    COPD (chronic obstructive pulmonary disease) (Multi)     Coronary artery disease     Personal history of other endocrine, nutritional and metabolic disease     History of hypopituitarism    Personal history of other mental and behavioral disorders     History of depression    Personal history of other specified conditions     History of brain tumor    Unspecified convulsions (Multi)     Convulsion     Past Surgical History:   Procedure Laterality Date    MR HEAD ANGIO WO IV CONTRAST  7/15/2021    MR HEAD ANGIO WO IV CONTRAST 7/15/2021 [facility]    MR HEAD ANGIO WO IV CONTRAST  11/28/2017    MR HEAD ANGIO WO IV CONTRAST [facility]    MR HEAD ANGIO WO IV CONTRAST  3/13/2018    MR HEAD ANGIO WO IV CONTRAST [facility]    MR NECK ANGIO WO IV CONTRAST  7/15/2021    MR NECK ANGIO WO IV CONTRAST 7/15/2021 [facility]    OTHER SURGICAL HISTORY  08/17/2013    Craniotomy Tumor Removal - Complete     No family history on file.  Social History     Tobacco Use    Smoking status: Never    Smokeless tobacco: Never   Vaping Use    Vaping status: Never Used   Substance Use Topics    Alcohol use: Never    Drug use: Yes     Types: \"Crack\" cocaine     Comment: last time was 5 days ago       Physical Exam   ED Triage Vitals [05/03/24 0800]   Temperature Heart Rate Respirations BP   36.7 °C (98.1 °F) 66 18 134/85      Pulse Ox Temp Source Heart Rate Source Patient Position   100 % Temporal Monitor --      BP Location FiO2 (%)     -- --       Physical Exam  Constitutional:       General: She is not in " acute distress.     Appearance: Normal appearance. She is not toxic-appearing.   HENT:      Head: Normocephalic and atraumatic.      Right Ear: Tympanic membrane normal.      Left Ear: Tympanic membrane normal.      Mouth/Throat:      Mouth: Mucous membranes are moist.      Pharynx: Oropharynx is clear.   Eyes:      Conjunctiva/sclera: Conjunctivae normal.      Pupils: Pupils are equal, round, and reactive to light.   Cardiovascular:      Rate and Rhythm: Normal rate and regular rhythm.      Pulses: Normal pulses.      Heart sounds: Normal heart sounds.   Pulmonary:      Effort: Pulmonary effort is normal. No respiratory distress.      Breath sounds: Normal breath sounds. No wheezing.   Abdominal:      General: Bowel sounds are normal.      Palpations: Abdomen is soft.      Tenderness: There is no abdominal tenderness. There is no guarding or rebound.   Musculoskeletal:         General: Normal range of motion.      Cervical back: Normal range of motion.   Skin:     General: Skin is warm and dry.   Neurological:      General: No focal deficit present.      Mental Status: She is alert and oriented to person, place, and time.         ED Course & MDM   Diagnoses as of 05/03/24 1104   Acute nonintractable headache, unspecified headache type       Medical Decision Making  43-year-old female presented to the ER with chief complaint of a migraine. Patient reports that this is her typical migraines that she gets patient denies any fever or chills. Patient reports that her typical medications are working for this reason she came to the ED. To be cautious did obtain a head CT which was negative for any new findings. No intracranial bleed. I checked on the patient she reports that she does feel better. Patient will be discharged home to follow up with PCP as needed.        Procedure  Procedures     Fracisco Prince,   05/03/24 1104

## 2024-05-05 ENCOUNTER — APPOINTMENT (OUTPATIENT)
Dept: CARDIOLOGY | Facility: HOSPITAL | Age: 44
End: 2024-05-05
Payer: MEDICARE

## 2024-05-05 ENCOUNTER — HOSPITAL ENCOUNTER (EMERGENCY)
Facility: HOSPITAL | Age: 44
Discharge: HOME | End: 2024-05-05
Attending: EMERGENCY MEDICINE
Payer: MEDICARE

## 2024-05-05 ENCOUNTER — APPOINTMENT (OUTPATIENT)
Dept: RADIOLOGY | Facility: HOSPITAL | Age: 44
End: 2024-05-05
Payer: MEDICARE

## 2024-05-05 VITALS
SYSTOLIC BLOOD PRESSURE: 129 MMHG | TEMPERATURE: 97.6 F | DIASTOLIC BLOOD PRESSURE: 85 MMHG | BODY MASS INDEX: 23 KG/M2 | HEART RATE: 72 BPM | WEIGHT: 125 LBS | OXYGEN SATURATION: 91 % | RESPIRATION RATE: 10 BRPM | HEIGHT: 62 IN

## 2024-05-05 DIAGNOSIS — R07.9 CHEST PAIN, UNSPECIFIED TYPE: Primary | ICD-10-CM

## 2024-05-05 LAB
ALBUMIN SERPL BCP-MCNC: 4.2 G/DL (ref 3.4–5)
ALP SERPL-CCNC: 72 U/L (ref 33–110)
ALT SERPL W P-5'-P-CCNC: 20 U/L (ref 7–45)
ANION GAP SERPL CALC-SCNC: 13 MMOL/L (ref 10–20)
AST SERPL W P-5'-P-CCNC: 38 U/L (ref 9–39)
B-HCG SERPL-ACNC: <2 MIU/ML
BASOPHILS # BLD AUTO: 0.12 X10*3/UL (ref 0–0.1)
BASOPHILS NFR BLD AUTO: 1.9 %
BILIRUB SERPL-MCNC: 0.5 MG/DL (ref 0–1.2)
BUN SERPL-MCNC: 14 MG/DL (ref 6–23)
CALCIUM SERPL-MCNC: 9.5 MG/DL (ref 8.6–10.3)
CARDIAC TROPONIN I PNL SERPL HS: <3 NG/L (ref 0–13)
CARDIAC TROPONIN I PNL SERPL HS: <3 NG/L (ref 0–13)
CHLORIDE SERPL-SCNC: 101 MMOL/L (ref 98–107)
CO2 SERPL-SCNC: 25 MMOL/L (ref 21–32)
CREAT SERPL-MCNC: 0.91 MG/DL (ref 0.5–1.05)
CRP SERPL-MCNC: 1.51 MG/DL
D DIMER PPP FEU-MCNC: 745 NG/ML FEU
EGFRCR SERPLBLD CKD-EPI 2021: 80 ML/MIN/1.73M*2
EOSINOPHIL # BLD AUTO: 0.3 X10*3/UL (ref 0–0.7)
EOSINOPHIL NFR BLD AUTO: 4.8 %
ERYTHROCYTE [DISTWIDTH] IN BLOOD BY AUTOMATED COUNT: 16.8 % (ref 11.5–14.5)
ERYTHROCYTE [SEDIMENTATION RATE] IN BLOOD BY WESTERGREN METHOD: 17 MM/H (ref 0–20)
GLUCOSE SERPL-MCNC: 90 MG/DL (ref 74–99)
HCT VFR BLD AUTO: 39.2 % (ref 36–46)
HGB BLD-MCNC: 12.9 G/DL (ref 12–16)
IMM GRANULOCYTES # BLD AUTO: 0.02 X10*3/UL (ref 0–0.7)
IMM GRANULOCYTES NFR BLD AUTO: 0.3 % (ref 0–0.9)
LYMPHOCYTES # BLD AUTO: 3.03 X10*3/UL (ref 1.2–4.8)
LYMPHOCYTES NFR BLD AUTO: 48.6 %
MAGNESIUM SERPL-MCNC: 2.28 MG/DL (ref 1.6–2.4)
MCH RBC QN AUTO: 25.9 PG (ref 26–34)
MCHC RBC AUTO-ENTMCNC: 32.9 G/DL (ref 32–36)
MCV RBC AUTO: 79 FL (ref 80–100)
MONOCYTES # BLD AUTO: 0.37 X10*3/UL (ref 0.1–1)
MONOCYTES NFR BLD AUTO: 5.9 %
NEUTROPHILS # BLD AUTO: 2.4 X10*3/UL (ref 1.2–7.7)
NEUTROPHILS NFR BLD AUTO: 38.5 %
NRBC BLD-RTO: 0 /100 WBCS (ref 0–0)
PLATELET # BLD AUTO: 197 X10*3/UL (ref 150–450)
POTASSIUM SERPL-SCNC: 3.5 MMOL/L (ref 3.5–5.3)
PROT SERPL-MCNC: 7.4 G/DL (ref 6.4–8.2)
RBC # BLD AUTO: 4.98 X10*6/UL (ref 4–5.2)
SODIUM SERPL-SCNC: 135 MMOL/L (ref 136–145)
WBC # BLD AUTO: 6.2 X10*3/UL (ref 4.4–11.3)

## 2024-05-05 PROCEDURE — 84484 ASSAY OF TROPONIN QUANT: CPT | Performed by: EMERGENCY MEDICINE

## 2024-05-05 PROCEDURE — 96376 TX/PRO/DX INJ SAME DRUG ADON: CPT | Mod: 59

## 2024-05-05 PROCEDURE — 96375 TX/PRO/DX INJ NEW DRUG ADDON: CPT | Mod: 59

## 2024-05-05 PROCEDURE — 85652 RBC SED RATE AUTOMATED: CPT | Performed by: EMERGENCY MEDICINE

## 2024-05-05 PROCEDURE — 99285 EMERGENCY DEPT VISIT HI MDM: CPT | Mod: 25

## 2024-05-05 PROCEDURE — 96374 THER/PROPH/DIAG INJ IV PUSH: CPT | Mod: 59

## 2024-05-05 PROCEDURE — 85025 COMPLETE CBC W/AUTO DIFF WBC: CPT | Performed by: EMERGENCY MEDICINE

## 2024-05-05 PROCEDURE — 93005 ELECTROCARDIOGRAM TRACING: CPT

## 2024-05-05 PROCEDURE — 83735 ASSAY OF MAGNESIUM: CPT | Performed by: EMERGENCY MEDICINE

## 2024-05-05 PROCEDURE — 36415 COLL VENOUS BLD VENIPUNCTURE: CPT | Performed by: EMERGENCY MEDICINE

## 2024-05-05 PROCEDURE — 2500000004 HC RX 250 GENERAL PHARMACY W/ HCPCS (ALT 636 FOR OP/ED): Mod: SE | Performed by: EMERGENCY MEDICINE

## 2024-05-05 PROCEDURE — 84484 ASSAY OF TROPONIN QUANT: CPT | Mod: 91 | Performed by: EMERGENCY MEDICINE

## 2024-05-05 PROCEDURE — 2550000001 HC RX 255 CONTRASTS: Mod: SE | Performed by: EMERGENCY MEDICINE

## 2024-05-05 PROCEDURE — 93005 ELECTROCARDIOGRAM TRACING: CPT | Mod: 76

## 2024-05-05 PROCEDURE — 86140 C-REACTIVE PROTEIN: CPT | Performed by: EMERGENCY MEDICINE

## 2024-05-05 PROCEDURE — 84702 CHORIONIC GONADOTROPIN TEST: CPT | Performed by: EMERGENCY MEDICINE

## 2024-05-05 PROCEDURE — 2500000001 HC RX 250 WO HCPCS SELF ADMINISTERED DRUGS (ALT 637 FOR MEDICARE OP): Mod: SE | Performed by: EMERGENCY MEDICINE

## 2024-05-05 PROCEDURE — 71275 CT ANGIOGRAPHY CHEST: CPT | Performed by: RADIOLOGY

## 2024-05-05 PROCEDURE — 85379 FIBRIN DEGRADATION QUANT: CPT | Performed by: EMERGENCY MEDICINE

## 2024-05-05 PROCEDURE — 71275 CT ANGIOGRAPHY CHEST: CPT

## 2024-05-05 PROCEDURE — 80053 COMPREHEN METABOLIC PANEL: CPT | Performed by: EMERGENCY MEDICINE

## 2024-05-05 RX ORDER — MORPHINE SULFATE 4 MG/ML
4 INJECTION, SOLUTION INTRAMUSCULAR; INTRAVENOUS ONCE
Status: COMPLETED | OUTPATIENT
Start: 2024-05-05 | End: 2024-05-05

## 2024-05-05 RX ORDER — OXYCODONE AND ACETAMINOPHEN 10; 325 MG/1; MG/1
1 TABLET ORAL EVERY 6 HOURS PRN
Qty: 20 TABLET | Refills: 0 | Status: SHIPPED | OUTPATIENT
Start: 2024-05-05 | End: 2024-05-10

## 2024-05-05 RX ORDER — ASPIRIN 325 MG
324 TABLET ORAL ONCE
Status: COMPLETED | OUTPATIENT
Start: 2024-05-05 | End: 2024-05-05

## 2024-05-05 RX ADMIN — MORPHINE SULFATE 4 MG: 4 INJECTION, SOLUTION INTRAMUSCULAR; INTRAVENOUS at 20:10

## 2024-05-05 RX ADMIN — IOHEXOL 75 ML: 350 INJECTION, SOLUTION INTRAVENOUS at 21:32

## 2024-05-05 RX ADMIN — ASPIRIN 325 MG ORAL TABLET 324 MG: 325 PILL ORAL at 19:10

## 2024-05-05 RX ADMIN — MORPHINE SULFATE 4 MG: 4 INJECTION, SOLUTION INTRAMUSCULAR; INTRAVENOUS at 21:50

## 2024-05-05 RX ADMIN — PROMETHAZINE HYDROCHLORIDE 25 MG: 25 INJECTION INTRAMUSCULAR; INTRAVENOUS at 19:35

## 2024-05-05 ASSESSMENT — PAIN DESCRIPTION - ORIENTATION: ORIENTATION: LEFT

## 2024-05-05 ASSESSMENT — PAIN DESCRIPTION - DESCRIPTORS
DESCRIPTORS: ACHING
DESCRIPTORS: SHARP

## 2024-05-05 ASSESSMENT — PAIN - FUNCTIONAL ASSESSMENT
PAIN_FUNCTIONAL_ASSESSMENT: 0-10

## 2024-05-05 ASSESSMENT — PAIN DESCRIPTION - LOCATION
LOCATION: CHEST

## 2024-05-05 ASSESSMENT — PAIN SCALES - GENERAL
PAINLEVEL_OUTOF10: 10 - WORST POSSIBLE PAIN
PAINLEVEL_OUTOF10: 4
PAINLEVEL_OUTOF10: 7
PAINLEVEL_OUTOF10: 9

## 2024-05-05 ASSESSMENT — PAIN DESCRIPTION - FREQUENCY: FREQUENCY: CONSTANT/CONTINUOUS

## 2024-05-05 NOTE — ED PROVIDER NOTES
"HPI   Chief Complaint   Patient presents with    Chest Pain     Chest pain since 8 am       , Who has a cardiac history with a stent, comes in with recurrent chest pain.  This started within the 24-hour period.  She says the pain is dull and radiates into her back.  Movement and palpation do not worsen it but lying flat this.  She says she feels short of breath no diaphoresis but some nausea.  She says this pain feels different from her usual pain she last saw her cardiologist in February.                        Edgar Coma Scale Score: 15                     Patient History   Past Medical History:   Diagnosis Date    COPD (chronic obstructive pulmonary disease) (Multi)     Coronary artery disease     Personal history of other endocrine, nutritional and metabolic disease     History of hypopituitarism    Personal history of other mental and behavioral disorders     History of depression    Personal history of other specified conditions     History of brain tumor    Unspecified convulsions (Multi)     Convulsion     Past Surgical History:   Procedure Laterality Date    MR HEAD ANGIO WO IV CONTRAST  7/15/2021    MR HEAD ANGIO WO IV CONTRAST 7/15/2021 [facility]    MR HEAD ANGIO WO IV CONTRAST  11/28/2017    MR HEAD ANGIO WO IV CONTRAST [facility]    MR HEAD ANGIO WO IV CONTRAST  3/13/2018    MR HEAD ANGIO WO IV CONTRAST [facility]    MR NECK ANGIO WO IV CONTRAST  7/15/2021    MR NECK ANGIO WO IV CONTRAST 7/15/2021 [facility]    OTHER SURGICAL HISTORY  08/17/2013    Craniotomy Tumor Removal - Complete     No family history on file.  Social History     Tobacco Use    Smoking status: Never    Smokeless tobacco: Never   Vaping Use    Vaping status: Never Used   Substance Use Topics    Alcohol use: Never    Drug use: Yes     Types: \"Crack\" cocaine     Comment: last time was 5 days ago       Physical Exam   ED Triage Vitals [05/05/24 1842]   Temperature Heart Rate Respirations BP "   36.4 °C (97.6 °F) 73 16 92/76      Pulse Ox Temp Source Heart Rate Source Patient Position   99 % Tympanic -- Sitting      BP Location FiO2 (%)     Left arm --       Physical Exam  Vitals and nursing note reviewed.   HENT:      Head: Normocephalic and atraumatic.      Right Ear: Tympanic membrane and ear canal normal.      Left Ear: Tympanic membrane and ear canal normal.      Nose: Nose normal.      Mouth/Throat:      Mouth: Mucous membranes are moist.   Cardiovascular:      Rate and Rhythm: Normal rate.   Pulmonary:      Effort: Pulmonary effort is normal.      Breath sounds: Normal breath sounds.   Abdominal:      General: Abdomen is flat.      Palpations: Abdomen is soft.   Musculoskeletal:         General: Normal range of motion.      Cervical back: Normal range of motion.   Skin:     General: Skin is warm and dry.   Neurological:      General: No focal deficit present.      Mental Status: She is alert.         ED Course & MDM   ED Course as of 05/05/24 2250   Sun May 05, 2024   2021 D-Dimer Non VTE, Quant (ng/mL FEU)(!): 745 [KW]      ED Course User Index  [KW] Romel Schuster MD         Diagnoses as of 05/05/24 2250   Chest pain, unspecified type       Medical Decision Making  EKG interpreted by me: Sinus rhythm with a rate of 71.  Intervals and axes are normal.  There are some T wave inversions in V2 through V6 as well as lead II and III.    She had a full workup to rule out cardiac involvement and to rule out PE/pneumonia.  Her troponins were all essentially negative.  CT showed no PE or infiltrate.  She is able to go home but I have provided a few days of pain medication to if she becomes more comfortable.  I have encouraged her to follow-up with her cardiologist or to return to the ED if she starts having increasing pain or shortness of breath.        Procedure  Procedures     Romel Schuster MD  05/05/24 1916       Romel Schuster MD  05/05/24 2250

## 2024-05-07 ENCOUNTER — HOSPITAL ENCOUNTER (EMERGENCY)
Facility: HOSPITAL | Age: 44
Discharge: HOME | End: 2024-05-07
Attending: STUDENT IN AN ORGANIZED HEALTH CARE EDUCATION/TRAINING PROGRAM
Payer: MEDICARE

## 2024-05-07 ENCOUNTER — APPOINTMENT (OUTPATIENT)
Dept: CARDIOLOGY | Facility: HOSPITAL | Age: 44
End: 2024-05-07
Payer: MEDICARE

## 2024-05-07 ENCOUNTER — APPOINTMENT (OUTPATIENT)
Dept: RADIOLOGY | Facility: HOSPITAL | Age: 44
End: 2024-05-07
Payer: MEDICARE

## 2024-05-07 VITALS
SYSTOLIC BLOOD PRESSURE: 74 MMHG | WEIGHT: 143.74 LBS | HEART RATE: 69 BPM | DIASTOLIC BLOOD PRESSURE: 60 MMHG | BODY MASS INDEX: 25.47 KG/M2 | TEMPERATURE: 98.1 F | OXYGEN SATURATION: 95 % | HEIGHT: 63 IN | RESPIRATION RATE: 20 BRPM

## 2024-05-07 DIAGNOSIS — R07.9 CHRONIC CHEST PAIN: Primary | ICD-10-CM

## 2024-05-07 DIAGNOSIS — G89.29 CHRONIC CHEST PAIN: Primary | ICD-10-CM

## 2024-05-07 LAB
ALBUMIN SERPL-MCNC: 3.9 G/DL (ref 3.5–5)
ALP BLD-CCNC: 84 U/L (ref 35–125)
ALT SERPL-CCNC: 16 U/L (ref 5–40)
ANION GAP SERPL CALC-SCNC: 11 MMOL/L
AST SERPL-CCNC: 32 U/L (ref 5–40)
ATRIAL RATE: 77 BPM
BASOPHILS # BLD AUTO: 0.1 X10*3/UL (ref 0–0.1)
BASOPHILS NFR BLD AUTO: 1.5 %
BILIRUB SERPL-MCNC: 0.5 MG/DL (ref 0.1–1.2)
BUN SERPL-MCNC: 9 MG/DL (ref 8–25)
CALCIUM SERPL-MCNC: 8.8 MG/DL (ref 8.5–10.4)
CHLORIDE SERPL-SCNC: 97 MMOL/L (ref 97–107)
CO2 SERPL-SCNC: 27 MMOL/L (ref 24–31)
CREAT SERPL-MCNC: 0.9 MG/DL (ref 0.4–1.6)
EGFRCR SERPLBLD CKD-EPI 2021: 81 ML/MIN/1.73M*2
EOSINOPHIL # BLD AUTO: 0.34 X10*3/UL (ref 0–0.7)
EOSINOPHIL NFR BLD AUTO: 5.2 %
ERYTHROCYTE [DISTWIDTH] IN BLOOD BY AUTOMATED COUNT: 17.1 % (ref 11.5–14.5)
GLUCOSE SERPL-MCNC: 88 MG/DL (ref 65–99)
HCT VFR BLD AUTO: 41.1 % (ref 36–46)
HGB BLD-MCNC: 13.4 G/DL (ref 12–16)
IMM GRANULOCYTES # BLD AUTO: 0.01 X10*3/UL (ref 0–0.7)
IMM GRANULOCYTES NFR BLD AUTO: 0.2 % (ref 0–0.9)
LIPASE SERPL-CCNC: 17 U/L (ref 16–63)
LYMPHOCYTES # BLD AUTO: 3.07 X10*3/UL (ref 1.2–4.8)
LYMPHOCYTES NFR BLD AUTO: 46.8 %
MCH RBC QN AUTO: 25.5 PG (ref 26–34)
MCHC RBC AUTO-ENTMCNC: 32.6 G/DL (ref 32–36)
MCV RBC AUTO: 78 FL (ref 80–100)
MONOCYTES # BLD AUTO: 0.41 X10*3/UL (ref 0.1–1)
MONOCYTES NFR BLD AUTO: 6.3 %
NEUTROPHILS # BLD AUTO: 2.63 X10*3/UL (ref 1.2–7.7)
NEUTROPHILS NFR BLD AUTO: 40 %
NRBC BLD-RTO: 0 /100 WBCS (ref 0–0)
P AXIS: 44 DEGREES
P OFFSET: 175 MS
P ONSET: 138 MS
PLATELET # BLD AUTO: 179 X10*3/UL (ref 150–450)
POTASSIUM SERPL-SCNC: 3.5 MMOL/L (ref 3.4–5.1)
PR INTERVAL: 160 MS
PROT SERPL-MCNC: 7.1 G/DL (ref 5.9–7.9)
Q ONSET: 218 MS
QRS COUNT: 12 BEATS
QRS DURATION: 102 MS
QT INTERVAL: 422 MS
QTC CALCULATION(BAZETT): 477 MS
QTC FREDERICIA: 458 MS
R AXIS: 85 DEGREES
RBC # BLD AUTO: 5.25 X10*6/UL (ref 4–5.2)
SODIUM SERPL-SCNC: 135 MMOL/L (ref 133–145)
T AXIS: 247 DEGREES
T OFFSET: 429 MS
TROPONIN T SERPL-MCNC: <6 NG/L
TROPONIN T SERPL-MCNC: <6 NG/L
VENTRICULAR RATE: 77 BPM
WBC # BLD AUTO: 6.6 X10*3/UL (ref 4.4–11.3)

## 2024-05-07 PROCEDURE — 80053 COMPREHEN METABOLIC PANEL: CPT | Performed by: STUDENT IN AN ORGANIZED HEALTH CARE EDUCATION/TRAINING PROGRAM

## 2024-05-07 PROCEDURE — 93005 ELECTROCARDIOGRAM TRACING: CPT

## 2024-05-07 PROCEDURE — 85025 COMPLETE CBC W/AUTO DIFF WBC: CPT | Performed by: STUDENT IN AN ORGANIZED HEALTH CARE EDUCATION/TRAINING PROGRAM

## 2024-05-07 PROCEDURE — 36415 COLL VENOUS BLD VENIPUNCTURE: CPT | Performed by: STUDENT IN AN ORGANIZED HEALTH CARE EDUCATION/TRAINING PROGRAM

## 2024-05-07 PROCEDURE — 2500000001 HC RX 250 WO HCPCS SELF ADMINISTERED DRUGS (ALT 637 FOR MEDICARE OP): Performed by: STUDENT IN AN ORGANIZED HEALTH CARE EDUCATION/TRAINING PROGRAM

## 2024-05-07 PROCEDURE — 84484 ASSAY OF TROPONIN QUANT: CPT | Performed by: STUDENT IN AN ORGANIZED HEALTH CARE EDUCATION/TRAINING PROGRAM

## 2024-05-07 PROCEDURE — 71045 X-RAY EXAM CHEST 1 VIEW: CPT

## 2024-05-07 PROCEDURE — 83690 ASSAY OF LIPASE: CPT | Performed by: STUDENT IN AN ORGANIZED HEALTH CARE EDUCATION/TRAINING PROGRAM

## 2024-05-07 PROCEDURE — 71045 X-RAY EXAM CHEST 1 VIEW: CPT | Performed by: RADIOLOGY

## 2024-05-07 PROCEDURE — 99283 EMERGENCY DEPT VISIT LOW MDM: CPT | Mod: 25

## 2024-05-07 RX ORDER — OXYCODONE AND ACETAMINOPHEN 5; 325 MG/1; MG/1
1 TABLET ORAL ONCE
Status: COMPLETED | OUTPATIENT
Start: 2024-05-07 | End: 2024-05-07

## 2024-05-07 RX ORDER — MONTELUKAST SODIUM 10 MG/1
10 TABLET ORAL NIGHTLY
COMMUNITY

## 2024-05-07 RX ADMIN — OXYCODONE AND ACETAMINOPHEN 1 TABLET: 5; 325 TABLET ORAL at 10:31

## 2024-05-07 ASSESSMENT — HEART SCORE
HISTORY: SLIGHTLY SUSPICIOUS
RISK FACTORS: >2 RISK FACTORS OR HX OF ATHEROSCLEROTIC DISEASE
ECG: NON-SPECIFIC REPOLARIZATION DISTURBANCE
AGE: <45
TROPONIN: LESS THAN OR EQUAL TO NORMAL LIMIT
HEART SCORE: 3

## 2024-05-07 ASSESSMENT — PAIN SCALES - GENERAL
PAINLEVEL_OUTOF10: 10 - WORST POSSIBLE PAIN
PAINLEVEL_OUTOF10: 6

## 2024-05-07 ASSESSMENT — PAIN - FUNCTIONAL ASSESSMENT
PAIN_FUNCTIONAL_ASSESSMENT: 0-10
PAIN_FUNCTIONAL_ASSESSMENT: 0-10

## 2024-05-07 ASSESSMENT — PAIN DESCRIPTION - PROGRESSION: CLINICAL_PROGRESSION: NOT CHANGED

## 2024-05-07 NOTE — ED PROVIDER NOTES
HPI   Chief Complaint   Patient presents with    Chest Pain     Pt bib EMS from doctors office for chest pain. Pt states she was seen at [facility] 2 days ago with chest pain and had a normal EKG and normal cardiac markers. States pain is crushing pressure, cardiac hx, CABG done in 2022.        This is a 43-year-old female presenting the emergency department for evaluation of chest pain.  She states that the pain has been going on for about a week now and is in the upper chest.  She does have chronic sternal pain but feels that this pain is different.  She was seen at the [facility] 2 days ago for assessment of this at which time she had a completely unremarkable workup.  She is feels that the pain has gotten worse so she came here today for another workup.  She states that she has prescribed Percocet as an outpatient for treatment of her chronic sternal pain which she has not taken because she says she has not picked up the prescription yet.  She denies any shortness of breath, any fevers or chills, cough or congestion, any traumatic injury to the chest prior to her pain.        History provided by:  Patient   used: No                        [place] Coma Scale Score: 15   HEART Score: 3                   Patient History   Past Medical History:   Diagnosis Date    COPD (chronic obstructive pulmonary disease) (Multi)     Coronary artery disease     Personal history of other endocrine, nutritional and metabolic disease     History of hypopituitarism    Personal history of other mental and behavioral disorders     History of depression    Personal history of other specified conditions     History of brain tumor    Unspecified convulsions (Multi)     Convulsion     Past Surgical History:   Procedure Laterality Date    MR HEAD ANGIO WO IV CONTRAST  7/15/2021    MR HEAD ANGIO WO IV CONTRAST 7/15/2021 [facility]Y [facility]GAC[facility]    MR HEAD ANGIO WO IV CONTRAST  11/28/2017    MR HEAD ANGIO WO IV CONTRAST  [facility]    MR HEAD ANGIO WO IV CONTRAST  3/13/2018    MR HEAD ANGIO WO IV CONTRAST [facility]    MR NECK ANGIO WO IV CONTRAST  7/15/2021    MR NECK ANGIO WO IV CONTRAST 7/15/2021 [facility]    OTHER SURGICAL HISTORY  08/17/2013    Craniotomy Tumor Removal - Complete     No family history on file.  Social History     Tobacco Use    Smoking status: Never    Smokeless tobacco: Never   Vaping Use    Vaping status: Never Used   Substance Use Topics    Alcohol use: Never    Drug use: Yes     Types: \"Crack\" cocaine     Comment: last time was 5 days ago       Physical Exam   ED Triage Vitals   Temperature Heart Rate Respirations BP   05/07/24 0903 05/07/24 0903 05/07/24 0903 05/07/24 0903   36.7 °C (98 °F) 75 18 (!) 145/97      Pulse Ox Temp Source Heart Rate Source Patient Position   05/07/24 0903 05/07/24 0903 05/07/24 1127 --   97 % Tympanic Monitor       BP Location FiO2 (%)     05/07/24 0903 --     Right arm        Physical Exam  General: well developed, thin adult female who is awake and alert, in no apparent distress  Eyes: sclera clear bilaterally  HENT: normocephalic, atraumatic.   CV: regular rate and rhythm, no murmur, no gallops, or rubs. radial and dorsalis pedis pulses +2/4 bilaterally  Resp: clear to ascultation bilaterally, no wheezes, rales, or rhonchi  GI: abdomen soft, nontender without rigidity or guarding, no peritoneal signs, abdomen is nondistended, no masses palpated  MSK: No midline spinal tenderness, no reproducible chest wall tenderness.  Strength +5/5 to upper and lower extremities bilaterally, no swelling of the extremities.  Neuro: Sensation fully intact.  Psych: appropriate mood and affect, cooperative with exam  Skin: warm, dry, without evidence of rash or abrasions    ED Course & MDM   Diagnoses as of 05/07/24 1647   Chronic chest pain       Medical Decision Making  PMH: Mitral valve replacement, anxiety and depression, chronic chest pain  History obtained: " directly from patient   Social factors affecting disposition: none    She is in no acute distress in emergency department, she is mildly hypertensive on arrival.  Vitals otherwise normal.  Her chief complaint is chest pain, cardiac workup was ordered and was completely unremarkable here.  I did review her cardiac workup from 2 days ago at an outside ED which showed normal troponins, she had a CT angiogram of the chest to exclude PE and aortic pathology which was negative as well.  I do not feel there is an emergent need to repeat her CT given she has had persistent symptoms and its only been 2 days since her last imaging study was done.  She has brisk palpable pulses in the upper and lower extremities bilaterally.  She is not tachycardic.  PERC criteria can be used to exclude pulmonary embolism.  She has no neurologic symptoms including numbness, weakness, she has no radiation of the pain into the arms or into the abdomen.  I have very low suspicion for aortic dissection.    Patient was given a dose of Percocet in the emergency department with some improvement of her pain, she reports this is what she takes at home for her chronic sternal pain.  I did review previous cardiac testing in the system including an echocardiogram from January of this year which showed normal ejection fraction and normal function of her prosthetic mitral valve.  She has no murmur on exam concerning for acute complication of her mitral valve.  She otherwise is stable on reassessment after her workup performed in the ED.    She was referred to follow-up with her cardiologist regarding multiple repeat visits to the emergency department for chest pain over the past few months all with unremarkable workups.  She was discharged in stable condition.    EKG on my interpretation at 915 shows normal sinus rhythm with rate of 77 beats minute.  Normal axis.  QTc is 477 ms, RI interval 160.  No ST elevation or depression, no acute ischemic pattern.  No  "STEMI.  She does have some diffuse T wave inversions in the inferior, anterolateral leads which appear chronic when compared with prior EKGs from earlier this month.    Heart Score for Major Cardiac Event  ----------------------------------------------------------  0-3 Points with 2 sets of negative cardiac markers <1% risk of major adverse cardiac event in 30 days.  ----------------------------------------------------------    I did discuss the heart score results with the patient.  We did discuss the risk stratification. I did discuss that the patient is low-risk, but they still have some risk of coronary artery disease. The patient is comfortable being discharged home and following up with the appropriate physicians. This is shared decision making. They're to return to the nearest emergency room for any new or worsening symptoms.    I utilized an evidence-based risk rating tool (CMT) along with my training and experience to weigh the risk of discharge against the risks of further testing, imaging, or hospitalization. At this time I estimate the risks of additional testing, imaging, or hospitalization to be equal to or greater than the risk of discharge. I discussed my risk assessment with the patient and the patient consents to the risk of discharge as well as the risk of uncertainty in estimating outcomes.    The patient's HEART Score is <4. In rare cases, I give patients with HEART Score of 4 the option of discharge, but only when they meet criteria for \"Low 4,\" meaning that HST was used, and the 4 is not from a highly suspicious story, highly suspicious EKG, or positive cardiac enzymes. In these selected cases, the risk of a \"Low 4\" is still most likely lower than the risk of admission and further testing/imaging. [id number]         Procedure  Procedures     Pankaj Gonzalez, DO  05/07/24 1649    "

## 2024-05-10 LAB
ATRIAL RATE: 63 BPM
ATRIAL RATE: 71 BPM
P AXIS: 36 DEGREES
P AXIS: 51 DEGREES
P OFFSET: 169 MS
P OFFSET: 179 MS
P ONSET: 137 MS
P ONSET: 142 MS
PR INTERVAL: 158 MS
PR INTERVAL: 172 MS
Q ONSET: 221 MS
Q ONSET: 223 MS
QRS COUNT: 10 BEATS
QRS COUNT: 11 BEATS
QRS DURATION: 104 MS
QRS DURATION: 98 MS
QT INTERVAL: 422 MS
QT INTERVAL: 446 MS
QTC CALCULATION(BAZETT): 456 MS
QTC CALCULATION(BAZETT): 458 MS
QTC FREDERICIA: 446 MS
QTC FREDERICIA: 453 MS
R AXIS: 95 DEGREES
R AXIS: 97 DEGREES
T AXIS: -81 DEGREES
T AXIS: 261 DEGREES
T OFFSET: 432 MS
T OFFSET: 446 MS
VENTRICULAR RATE: 63 BPM
VENTRICULAR RATE: 71 BPM

## 2024-06-25 ENCOUNTER — HOSPITAL ENCOUNTER (EMERGENCY)
Facility: HOSPITAL | Age: 44
Discharge: HOME | End: 2024-06-26
Attending: EMERGENCY MEDICINE
Payer: MEDICARE

## 2024-06-25 DIAGNOSIS — R07.9 CHEST PAIN, UNSPECIFIED TYPE: Primary | ICD-10-CM

## 2024-06-25 PROCEDURE — 84484 ASSAY OF TROPONIN QUANT: CPT | Performed by: EMERGENCY MEDICINE

## 2024-06-25 PROCEDURE — 83036 HEMOGLOBIN GLYCOSYLATED A1C: CPT

## 2024-06-25 PROCEDURE — 99284 EMERGENCY DEPT VISIT MOD MDM: CPT | Mod: 25 | Performed by: EMERGENCY MEDICINE

## 2024-06-25 PROCEDURE — 96374 THER/PROPH/DIAG INJ IV PUSH: CPT | Performed by: EMERGENCY MEDICINE

## 2024-06-25 PROCEDURE — 36415 COLL VENOUS BLD VENIPUNCTURE: CPT | Performed by: EMERGENCY MEDICINE

## 2024-06-25 PROCEDURE — 82374 ASSAY BLOOD CARBON DIOXIDE: CPT | Performed by: EMERGENCY MEDICINE

## 2024-06-25 PROCEDURE — 85025 COMPLETE CBC W/AUTO DIFF WBC: CPT | Performed by: EMERGENCY MEDICINE

## 2024-06-25 ASSESSMENT — PAIN SCALES - GENERAL: PAINLEVEL_OUTOF10: 10 - WORST POSSIBLE PAIN

## 2024-06-25 ASSESSMENT — PAIN - FUNCTIONAL ASSESSMENT: PAIN_FUNCTIONAL_ASSESSMENT: 0-10

## 2024-06-25 ASSESSMENT — COLUMBIA-SUICIDE SEVERITY RATING SCALE - C-SSRS
1. IN THE PAST MONTH, HAVE YOU WISHED YOU WERE DEAD OR WISHED YOU COULD GO TO SLEEP AND NOT WAKE UP?: NO
6. HAVE YOU EVER DONE ANYTHING, STARTED TO DO ANYTHING, OR PREPARED TO DO ANYTHING TO END YOUR LIFE?: NO
6. HAVE YOU EVER DONE ANYTHING, STARTED TO DO ANYTHING, OR PREPARED TO DO ANYTHING TO END YOUR LIFE?: NO
2. HAVE YOU ACTUALLY HAD ANY THOUGHTS OF KILLING YOURSELF?: NO
2. HAVE YOU ACTUALLY HAD ANY THOUGHTS OF KILLING YOURSELF?: NO
1. IN THE PAST MONTH, HAVE YOU WISHED YOU WERE DEAD OR WISHED YOU COULD GO TO SLEEP AND NOT WAKE UP?: NO

## 2024-06-25 ASSESSMENT — PAIN DESCRIPTION - LOCATION: LOCATION: CHEST

## 2024-06-25 ASSESSMENT — PAIN DESCRIPTION - PAIN TYPE: TYPE: ACUTE PAIN

## 2024-06-26 VITALS
HEIGHT: 62 IN | DIASTOLIC BLOOD PRESSURE: 74 MMHG | WEIGHT: 129 LBS | TEMPERATURE: 98 F | BODY MASS INDEX: 23.74 KG/M2 | HEART RATE: 81 BPM | OXYGEN SATURATION: 100 % | RESPIRATION RATE: 15 BRPM | SYSTOLIC BLOOD PRESSURE: 96 MMHG

## 2024-06-26 LAB
ANION GAP SERPL CALC-SCNC: 9 MMOL/L
BASOPHILS # BLD AUTO: 0.09 X10*3/UL (ref 0–0.1)
BASOPHILS NFR BLD AUTO: 1.7 %
BUN SERPL-MCNC: 10 MG/DL (ref 8–25)
CALCIUM SERPL-MCNC: 9.3 MG/DL (ref 8.5–10.4)
CHLORIDE SERPL-SCNC: 103 MMOL/L (ref 97–107)
CO2 SERPL-SCNC: 27 MMOL/L (ref 24–31)
CREAT SERPL-MCNC: 0.9 MG/DL (ref 0.4–1.6)
EGFRCR SERPLBLD CKD-EPI 2021: 81 ML/MIN/1.73M*2
EOSINOPHIL # BLD AUTO: 0.26 X10*3/UL (ref 0–0.7)
EOSINOPHIL NFR BLD AUTO: 5 %
ERYTHROCYTE [DISTWIDTH] IN BLOOD BY AUTOMATED COUNT: 15.9 % (ref 11.5–14.5)
GLUCOSE SERPL-MCNC: 100 MG/DL (ref 65–99)
HCT VFR BLD AUTO: 38.4 % (ref 36–46)
HGB BLD-MCNC: 12.4 G/DL (ref 12–16)
IMM GRANULOCYTES # BLD AUTO: 0.01 X10*3/UL (ref 0–0.7)
IMM GRANULOCYTES NFR BLD AUTO: 0.2 % (ref 0–0.9)
LYMPHOCYTES # BLD AUTO: 2.69 X10*3/UL (ref 1.2–4.8)
LYMPHOCYTES NFR BLD AUTO: 51.8 %
MCH RBC QN AUTO: 25.8 PG (ref 26–34)
MCHC RBC AUTO-ENTMCNC: 32.3 G/DL (ref 32–36)
MCV RBC AUTO: 80 FL (ref 80–100)
MONOCYTES # BLD AUTO: 0.3 X10*3/UL (ref 0.1–1)
MONOCYTES NFR BLD AUTO: 5.8 %
NEUTROPHILS # BLD AUTO: 1.84 X10*3/UL (ref 1.2–7.7)
NEUTROPHILS NFR BLD AUTO: 35.5 %
NRBC BLD-RTO: 0 /100 WBCS (ref 0–0)
PLATELET # BLD AUTO: 133 X10*3/UL (ref 150–450)
POTASSIUM SERPL-SCNC: 3.7 MMOL/L (ref 3.4–5.1)
RBC # BLD AUTO: 4.8 X10*6/UL (ref 4–5.2)
SODIUM SERPL-SCNC: 139 MMOL/L (ref 133–145)
TROPONIN T SERPL-MCNC: <6 NG/L
WBC # BLD AUTO: 5.2 X10*3/UL (ref 4.4–11.3)

## 2024-06-26 PROCEDURE — 2500000004 HC RX 250 GENERAL PHARMACY W/ HCPCS (ALT 636 FOR OP/ED): Performed by: EMERGENCY MEDICINE

## 2024-06-26 RX ORDER — PROCHLORPERAZINE EDISYLATE 5 MG/ML
10 INJECTION INTRAMUSCULAR; INTRAVENOUS ONCE
Status: COMPLETED | OUTPATIENT
Start: 2024-06-26 | End: 2024-06-26

## 2024-06-26 ASSESSMENT — HEART SCORE
HEART SCORE: 2
AGE: <45
ECG: NORMAL
HISTORY: SLIGHTLY SUSPICIOUS
TROPONIN: LESS THAN OR EQUAL TO NORMAL LIMIT
RISK FACTORS: >2 RISK FACTORS OR HX OF ATHEROSCLEROTIC DISEASE

## 2024-06-26 NOTE — ED PROVIDER NOTES
HPI   Chief Complaint   Patient presents with    Chest Pain     Patient presents with chest pain that started at 9am. Patient states constant pain. Was just seen here for same issue and had complete work up including stress test. Denies other symptoms.        HPI  43 female presents complaint of chest pain.  Patient states she started having chest pain in the morning.  It has been constant.  Has gotten this pain fairly frequently in the past.  She admitted June 5 for the same had a negative stress test at that time.  She denies taking medicine specifically for the pain.  Nothing seems to make it better or worse.  Not short of breath or dizzy or lightheaded.  No other complaints.                  [place] Coma Scale Score: 15   HEART Score: 2                   Patient History   Past Medical History:   Diagnosis Date    COPD (chronic obstructive pulmonary disease) (Multi)     Coronary artery disease     Personal history of other endocrine, nutritional and metabolic disease     History of hypopituitarism    Personal history of other mental and behavioral disorders     History of depression    Personal history of other specified conditions     History of brain tumor    Unspecified convulsions (Multi)     Convulsion     Past Surgical History:   Procedure Laterality Date    MR HEAD ANGIO WO IV CONTRAST  7/15/2021    MR HEAD ANGIO WO IV CONTRAST 7/15/2021 [facility]    MR HEAD ANGIO WO IV CONTRAST  11/28/2017    MR HEAD ANGIO WO IV CONTRAST [facility]    MR HEAD ANGIO WO IV CONTRAST  3/13/2018    MR HEAD ANGIO WO IV CONTRAST [facility]    MR NECK ANGIO WO IV CONTRAST  7/15/2021    MR NECK ANGIO WO IV CONTRAST 7/15/2021 [facility]    OTHER SURGICAL HISTORY  08/17/2013    Craniotomy Tumor Removal - Complete     No family history on file.  Social History     Tobacco Use    Smoking status: Never    Smokeless tobacco: Never   Vaping Use    Vaping status: Never Used   Substance Use Topics  "   Alcohol use: Never    Drug use: Yes     Types: \"Crack\" cocaine     Comment: last time was 5 days ago       Physical Exam   ED Triage Vitals [06/25/24 2342]   Temperature Heart Rate Respirations BP   36.7 °C (98 °F) 74 15 102/75      Pulse Ox Temp Source Heart Rate Source Patient Position   98 % Oral Monitor Lying      BP Location FiO2 (%)     Left arm --       Physical Exam  General:  Awake, alert, no acute distress.  Head: Normocephalic, Atraumatic  Neck: Supple, trachea midline, no stridor  Skin: Warm and dry, no rashes   Lungs: Clear to auscultation bilaterally no acute respiratory distress, speaking in full sentences without difficulty  CV: Regular Rate Rhythm with no obvious murmurs gallops rubs noted, no jugular venous distention, no pedal edema   Abdomen: Soft, nontender, nondistended, positive bowel sounds, no peritoneal signs  Neuro:  No gross focal neurologic deficits, NIH is 0  Musculoskeletal:  Full range of motion in all 4 extremities  Psychiatric:  Alert oriented x 3, Good insight into condition.  ED Course & MDM   ED Course as of 06/26/24 0044   Tue Jun 25, 2024 2339 My EKG interpretation:  Sinus rhythm 71 bpm normal axis MD interval 184 QTc 469 no ectopy or acute ischemic changes noted [KW]      ED Course User Index  [KW] Yosvany Wallace DO         Diagnoses as of 06/26/24 0044   Chest pain, unspecified type       Medical Decision Making  Patient's workup in the emerged part is unremarkable.  Patient's heart score is 2.  I reviewed her previous visits and her stress test that she had June 5 which were negative.  At this point I have very low suspicion for acute coronary syndrome for her etiology of her chest pain.  I do not suspect pulmonary embolism with this patient.  Discussed the finding with her bedside.  I advised her to contact her doctor for follow-up.  She is stable for discharge.    Procedure  Procedures     Yosvany Wallace DO  06/26/24 0045    " fever

## 2024-06-26 NOTE — DISCHARGE INSTRUCTIONS
Thank you for choosing [facility] Emergency Department. It was my pleasure to be involved in your care today.         As of today's visit, based on reasonable likelihood, that it is safe for you to be discharged back to your residence to follow-up as an outpatient for ongoing management of your medical problem. You should follow-up with any referrals / primary provider as soon as possible. The contacts (number, addresses) are listed below.         *Return to us immediately, if you feel you are getting worse, not getting better, or any new symptoms develop.         Make sure your pharmacy and primary doctor is aware of any new medications prescribed today.          It is your responsibility to contact as soon as possible, and follow through with, any referrals you were given today. We do recommend you inform them you are a [facility] follow-up patient, as often they can better accommodate your need to be seen, provided their schedules allow. We will, and have, made every effort to ensure you have access to adequate follow-up specialists available.          All problems may not be able to be fixed in one ER visit. This is why timely ongoing care is important, and this is a responsibility you share in. Further, you are free to follow up with any provider you choose, and this is not limited to our suggestion.          If cultures were obtained today, you will be contacted should anything result that would require further treatment. Please contact the ED at the number provided with questions.          Having trouble affording medications? Try [email or web address]! (This is not a hospital endorsed website, merely a recommendation based on my own personal experiences with [employer])

## 2024-06-28 ENCOUNTER — HOSPITAL ENCOUNTER (OUTPATIENT)
Facility: HOSPITAL | Age: 44
Setting detail: OBSERVATION
Discharge: HOME | End: 2024-06-29
Attending: EMERGENCY MEDICINE | Admitting: INTERNAL MEDICINE
Payer: MEDICARE

## 2024-06-28 ENCOUNTER — APPOINTMENT (OUTPATIENT)
Dept: RADIOLOGY | Facility: HOSPITAL | Age: 44
End: 2024-06-28
Payer: MEDICARE

## 2024-06-28 ENCOUNTER — APPOINTMENT (OUTPATIENT)
Dept: CARDIOLOGY | Facility: HOSPITAL | Age: 44
End: 2024-06-28
Payer: MEDICARE

## 2024-06-28 DIAGNOSIS — R07.9 CHEST PAIN, UNSPECIFIED TYPE: Primary | ICD-10-CM

## 2024-06-28 DIAGNOSIS — I50.32 CHRONIC DIASTOLIC HEART FAILURE (MULTI): ICD-10-CM

## 2024-06-28 DIAGNOSIS — I25.10 CORONARY ARTERY DISEASE INVOLVING NATIVE HEART, UNSPECIFIED VESSEL OR LESION TYPE, UNSPECIFIED WHETHER ANGINA PRESENT: ICD-10-CM

## 2024-06-28 DIAGNOSIS — F14.10 COCAINE USE DISORDER (MULTI): ICD-10-CM

## 2024-06-28 DIAGNOSIS — R07.89 ATYPICAL CHEST PAIN: ICD-10-CM

## 2024-06-28 PROBLEM — E27.40 ADRENAL INSUFFICIENCY (MULTI): Status: ACTIVE | Noted: 2024-06-28

## 2024-06-28 PROBLEM — E78.5 HLD (HYPERLIPIDEMIA): Status: ACTIVE | Noted: 2024-06-28

## 2024-06-28 PROBLEM — K21.9 GASTROESOPHAGEAL REFLUX DISEASE WITHOUT ESOPHAGITIS: Status: ACTIVE | Noted: 2024-06-28

## 2024-06-28 PROBLEM — E03.9 HYPOTHYROID: Status: ACTIVE | Noted: 2024-06-28

## 2024-06-28 PROBLEM — F14.90 COCAINE USE: Status: ACTIVE | Noted: 2024-06-28

## 2024-06-28 PROBLEM — E23.2 DIABETES INSIPIDUS (MULTI): Status: ACTIVE | Noted: 2024-06-28

## 2024-06-28 PROBLEM — G40.909 SEIZURE DISORDER (MULTI): Status: ACTIVE | Noted: 2024-06-28

## 2024-06-28 PROBLEM — I95.9 HYPOTENSION: Status: ACTIVE | Noted: 2024-06-28

## 2024-06-28 PROBLEM — J45.20 MILD INTERMITTENT ASTHMA (HHS-HCC): Status: ACTIVE | Noted: 2024-06-28

## 2024-06-28 PROBLEM — F41.9 ANXIETY: Status: ACTIVE | Noted: 2024-06-28

## 2024-06-28 LAB
ALBUMIN SERPL BCP-MCNC: 4.3 G/DL (ref 3.4–5)
ALP SERPL-CCNC: 65 U/L (ref 33–110)
ALT SERPL W P-5'-P-CCNC: 6 U/L (ref 7–45)
AMPHETAMINES UR QL SCN: ABNORMAL
ANION GAP SERPL CALC-SCNC: 13 MMOL/L (ref 10–20)
APPEARANCE UR: CLEAR
AST SERPL W P-5'-P-CCNC: 23 U/L (ref 9–39)
ATRIAL RATE: 69 BPM
BARBITURATES UR QL SCN: ABNORMAL
BASOPHILS # BLD AUTO: 0.07 X10*3/UL (ref 0–0.1)
BASOPHILS NFR BLD AUTO: 1.4 %
BENZODIAZ UR QL SCN: ABNORMAL
BILIRUB SERPL-MCNC: 0.7 MG/DL (ref 0–1.2)
BILIRUB UR STRIP.AUTO-MCNC: NEGATIVE MG/DL
BNP SERPL-MCNC: 41 PG/ML (ref 0–99)
BODY SURFACE AREA: 1.64 M2
BUN SERPL-MCNC: 11 MG/DL (ref 6–23)
BZE UR QL SCN: ABNORMAL
CALCIUM SERPL-MCNC: 9.6 MG/DL (ref 8.6–10.3)
CANNABINOIDS UR QL SCN: ABNORMAL
CARDIAC TROPONIN I PNL SERPL HS: 4 NG/L (ref 0–13)
CARDIAC TROPONIN I PNL SERPL HS: <3 NG/L (ref 0–13)
CARDIAC TROPONIN I PNL SERPL HS: <3 NG/L (ref 0–13)
CHLORIDE SERPL-SCNC: 103 MMOL/L (ref 98–107)
CO2 SERPL-SCNC: 22 MMOL/L (ref 21–32)
COLOR UR: NORMAL
CREAT SERPL-MCNC: 0.99 MG/DL (ref 0.5–1.05)
D DIMER PPP FEU-MCNC: 630 NG/ML FEU
EGFRCR SERPLBLD CKD-EPI 2021: 72 ML/MIN/1.73M*2
EOSINOPHIL # BLD AUTO: 0.27 X10*3/UL (ref 0–0.7)
EOSINOPHIL NFR BLD AUTO: 5.3 %
ERYTHROCYTE [DISTWIDTH] IN BLOOD BY AUTOMATED COUNT: 16.3 % (ref 11.5–14.5)
FENTANYL+NORFENTANYL UR QL SCN: ABNORMAL
GLUCOSE SERPL-MCNC: 82 MG/DL (ref 74–99)
GLUCOSE UR STRIP.AUTO-MCNC: NORMAL MG/DL
HCG UR QL IA.RAPID: NEGATIVE
HCT VFR BLD AUTO: 39.2 % (ref 36–46)
HGB BLD-MCNC: 12.6 G/DL (ref 12–16)
IMM GRANULOCYTES # BLD AUTO: 0.01 X10*3/UL (ref 0–0.7)
IMM GRANULOCYTES NFR BLD AUTO: 0.2 % (ref 0–0.9)
KETONES UR STRIP.AUTO-MCNC: NEGATIVE MG/DL
LEUKOCYTE ESTERASE UR QL STRIP.AUTO: NEGATIVE
LYMPHOCYTES # BLD AUTO: 2.68 X10*3/UL (ref 1.2–4.8)
LYMPHOCYTES NFR BLD AUTO: 53.1 %
MCH RBC QN AUTO: 26.8 PG (ref 26–34)
MCHC RBC AUTO-ENTMCNC: 32.1 G/DL (ref 32–36)
MCV RBC AUTO: 83 FL (ref 80–100)
METHADONE UR QL SCN: ABNORMAL
MONOCYTES # BLD AUTO: 0.31 X10*3/UL (ref 0.1–1)
MONOCYTES NFR BLD AUTO: 6.1 %
NEUTROPHILS # BLD AUTO: 1.71 X10*3/UL (ref 1.2–7.7)
NEUTROPHILS NFR BLD AUTO: 33.9 %
NITRITE UR QL STRIP.AUTO: NEGATIVE
NRBC BLD-RTO: 0 /100 WBCS (ref 0–0)
OPIATES UR QL SCN: ABNORMAL
OXYCODONE+OXYMORPHONE UR QL SCN: ABNORMAL
P AXIS: 32 DEGREES
P OFFSET: 144 MS
P ONSET: 109 MS
PCP UR QL SCN: ABNORMAL
PH UR STRIP.AUTO: 6.5 [PH]
PLATELET # BLD AUTO: 134 X10*3/UL (ref 150–450)
POTASSIUM SERPL-SCNC: 4.1 MMOL/L (ref 3.5–5.3)
PR INTERVAL: 186 MS
PROT SERPL-MCNC: 7.3 G/DL (ref 6.4–8.2)
PROT UR STRIP.AUTO-MCNC: NEGATIVE MG/DL
Q ONSET: 202 MS
QRS COUNT: 12 BEATS
QRS DURATION: 100 MS
QT INTERVAL: 442 MS
QTC CALCULATION(BAZETT): 473 MS
QTC FREDERICIA: 463 MS
R AXIS: 91 DEGREES
RBC # BLD AUTO: 4.71 X10*6/UL (ref 4–5.2)
RBC # UR STRIP.AUTO: NEGATIVE /UL
SODIUM SERPL-SCNC: 134 MMOL/L (ref 136–145)
SP GR UR STRIP.AUTO: 1.01
T AXIS: 135 DEGREES
T OFFSET: 423 MS
UROBILINOGEN UR STRIP.AUTO-MCNC: NORMAL MG/DL
VENTRICULAR RATE: 69 BPM
WBC # BLD AUTO: 5.1 X10*3/UL (ref 4.4–11.3)

## 2024-06-28 PROCEDURE — G0378 HOSPITAL OBSERVATION PER HR: HCPCS

## 2024-06-28 PROCEDURE — 2500000002 HC RX 250 W HCPCS SELF ADMINISTERED DRUGS (ALT 637 FOR MEDICARE OP, ALT 636 FOR OP/ED): Mod: SE | Performed by: NURSE PRACTITIONER

## 2024-06-28 PROCEDURE — 93308 TTE F-UP OR LMTD: CPT | Performed by: INTERNAL MEDICINE

## 2024-06-28 PROCEDURE — 85379 FIBRIN DEGRADATION QUANT: CPT | Performed by: EMERGENCY MEDICINE

## 2024-06-28 PROCEDURE — 84484 ASSAY OF TROPONIN QUANT: CPT | Mod: 91 | Performed by: EMERGENCY MEDICINE

## 2024-06-28 PROCEDURE — 81003 URINALYSIS AUTO W/O SCOPE: CPT | Performed by: EMERGENCY MEDICINE

## 2024-06-28 PROCEDURE — 96374 THER/PROPH/DIAG INJ IV PUSH: CPT | Mod: 59 | Performed by: INTERNAL MEDICINE

## 2024-06-28 PROCEDURE — 99285 EMERGENCY DEPT VISIT HI MDM: CPT | Mod: 25 | Performed by: EMERGENCY MEDICINE

## 2024-06-28 PROCEDURE — 80307 DRUG TEST PRSMV CHEM ANLYZR: CPT | Performed by: EMERGENCY MEDICINE

## 2024-06-28 PROCEDURE — 96375 TX/PRO/DX INJ NEW DRUG ADDON: CPT | Mod: 59 | Performed by: INTERNAL MEDICINE

## 2024-06-28 PROCEDURE — 94760 N-INVAS EAR/PLS OXIMETRY 1: CPT

## 2024-06-28 PROCEDURE — 84484 ASSAY OF TROPONIN QUANT: CPT | Performed by: EMERGENCY MEDICINE

## 2024-06-28 PROCEDURE — 2550000001 HC RX 255 CONTRASTS: Mod: SE | Performed by: EMERGENCY MEDICINE

## 2024-06-28 PROCEDURE — 93325 DOPPLER ECHO COLOR FLOW MAPG: CPT

## 2024-06-28 PROCEDURE — 2500000002 HC RX 250 W HCPCS SELF ADMINISTERED DRUGS (ALT 637 FOR MEDICARE OP, ALT 636 FOR OP/ED): Mod: SE

## 2024-06-28 PROCEDURE — 36415 COLL VENOUS BLD VENIPUNCTURE: CPT | Performed by: EMERGENCY MEDICINE

## 2024-06-28 PROCEDURE — 2500000001 HC RX 250 WO HCPCS SELF ADMINISTERED DRUGS (ALT 637 FOR MEDICARE OP): Mod: SE

## 2024-06-28 PROCEDURE — 2500000004 HC RX 250 GENERAL PHARMACY W/ HCPCS (ALT 636 FOR OP/ED): Mod: SE

## 2024-06-28 PROCEDURE — 71275 CT ANGIOGRAPHY CHEST: CPT | Mod: FOREIGN READ | Performed by: RADIOLOGY

## 2024-06-28 PROCEDURE — 2500000004 HC RX 250 GENERAL PHARMACY W/ HCPCS (ALT 636 FOR OP/ED): Mod: SE | Performed by: EMERGENCY MEDICINE

## 2024-06-28 PROCEDURE — 83880 ASSAY OF NATRIURETIC PEPTIDE: CPT | Performed by: EMERGENCY MEDICINE

## 2024-06-28 PROCEDURE — 99222 1ST HOSP IP/OBS MODERATE 55: CPT | Performed by: NURSE PRACTITIONER

## 2024-06-28 PROCEDURE — 93005 ELECTROCARDIOGRAM TRACING: CPT

## 2024-06-28 PROCEDURE — 93321 DOPPLER ECHO F-UP/LMTD STD: CPT | Performed by: INTERNAL MEDICINE

## 2024-06-28 PROCEDURE — 85025 COMPLETE CBC W/AUTO DIFF WBC: CPT | Performed by: EMERGENCY MEDICINE

## 2024-06-28 PROCEDURE — 71275 CT ANGIOGRAPHY CHEST: CPT

## 2024-06-28 PROCEDURE — 96376 TX/PRO/DX INJ SAME DRUG ADON: CPT | Mod: 59 | Performed by: INTERNAL MEDICINE

## 2024-06-28 PROCEDURE — 71045 X-RAY EXAM CHEST 1 VIEW: CPT

## 2024-06-28 PROCEDURE — 84484 ASSAY OF TROPONIN QUANT: CPT | Mod: 91

## 2024-06-28 PROCEDURE — 71045 X-RAY EXAM CHEST 1 VIEW: CPT | Mod: FOREIGN READ | Performed by: RADIOLOGY

## 2024-06-28 PROCEDURE — 93325 DOPPLER ECHO COLOR FLOW MAPG: CPT | Performed by: INTERNAL MEDICINE

## 2024-06-28 PROCEDURE — 80053 COMPREHEN METABOLIC PANEL: CPT | Performed by: EMERGENCY MEDICINE

## 2024-06-28 PROCEDURE — 81025 URINE PREGNANCY TEST: CPT | Performed by: EMERGENCY MEDICINE

## 2024-06-28 PROCEDURE — 2500000005 HC RX 250 GENERAL PHARMACY W/O HCPCS: Mod: SE

## 2024-06-28 RX ORDER — PANTOPRAZOLE SODIUM 40 MG/1
40 TABLET, DELAYED RELEASE ORAL
Status: DISCONTINUED | OUTPATIENT
Start: 2024-06-29 | End: 2024-06-29 | Stop reason: HOSPADM

## 2024-06-28 RX ORDER — HYDROCORTISONE 10 MG/1
5 TABLET ORAL 2 TIMES DAILY
Status: DISCONTINUED | OUTPATIENT
Start: 2024-06-28 | End: 2024-06-29 | Stop reason: HOSPADM

## 2024-06-28 RX ORDER — ACETAMINOPHEN 650 MG/1
650 SUPPOSITORY RECTAL EVERY 4 HOURS PRN
Status: DISCONTINUED | OUTPATIENT
Start: 2024-06-28 | End: 2024-06-29 | Stop reason: HOSPADM

## 2024-06-28 RX ORDER — ATORVASTATIN CALCIUM 40 MG/1
80 TABLET, FILM COATED ORAL NIGHTLY
Status: DISCONTINUED | OUTPATIENT
Start: 2024-06-28 | End: 2024-06-29 | Stop reason: HOSPADM

## 2024-06-28 RX ORDER — NAPROXEN SODIUM 220 MG/1
81 TABLET, FILM COATED ORAL DAILY
Status: DISCONTINUED | OUTPATIENT
Start: 2024-06-28 | End: 2024-06-29 | Stop reason: HOSPADM

## 2024-06-28 RX ORDER — GUAIFENESIN/DEXTROMETHORPHAN 100-10MG/5
5 SYRUP ORAL EVERY 4 HOURS PRN
Status: DISCONTINUED | OUTPATIENT
Start: 2024-06-28 | End: 2024-06-29 | Stop reason: HOSPADM

## 2024-06-28 RX ORDER — ACETAMINOPHEN 160 MG/5ML
650 SOLUTION ORAL EVERY 4 HOURS PRN
Status: DISCONTINUED | OUTPATIENT
Start: 2024-06-28 | End: 2024-06-29 | Stop reason: HOSPADM

## 2024-06-28 RX ORDER — ARIPIPRAZOLE 5 MG/1
2.5 TABLET ORAL NIGHTLY
Status: DISCONTINUED | OUTPATIENT
Start: 2024-06-28 | End: 2024-06-29 | Stop reason: HOSPADM

## 2024-06-28 RX ORDER — TRAMADOL HYDROCHLORIDE 50 MG/1
50 TABLET ORAL EVERY 6 HOURS PRN
Status: DISCONTINUED | OUTPATIENT
Start: 2024-06-28 | End: 2024-06-29 | Stop reason: HOSPADM

## 2024-06-28 RX ORDER — METHOCARBAMOL 500 MG/1
500 TABLET, FILM COATED ORAL EVERY 6 HOURS PRN
Status: DISCONTINUED | OUTPATIENT
Start: 2024-06-28 | End: 2024-06-29 | Stop reason: HOSPADM

## 2024-06-28 RX ORDER — ONDANSETRON HYDROCHLORIDE 2 MG/ML
4 INJECTION, SOLUTION INTRAVENOUS ONCE
Status: COMPLETED | OUTPATIENT
Start: 2024-06-28 | End: 2024-06-28

## 2024-06-28 RX ORDER — POLYETHYLENE GLYCOL 3350 17 G/17G
17 POWDER, FOR SOLUTION ORAL DAILY
Status: DISCONTINUED | OUTPATIENT
Start: 2024-06-28 | End: 2024-06-29 | Stop reason: HOSPADM

## 2024-06-28 RX ORDER — LEVOTHYROXINE SODIUM 112 UG/1
112 TABLET ORAL DAILY
Status: DISCONTINUED | OUTPATIENT
Start: 2024-06-28 | End: 2024-06-29 | Stop reason: HOSPADM

## 2024-06-28 RX ORDER — MIDODRINE HYDROCHLORIDE 5 MG/1
10 TABLET ORAL
Status: DISCONTINUED | OUTPATIENT
Start: 2024-06-28 | End: 2024-06-29 | Stop reason: HOSPADM

## 2024-06-28 RX ORDER — ACETAMINOPHEN 325 MG/1
650 TABLET ORAL EVERY 4 HOURS PRN
Status: DISCONTINUED | OUTPATIENT
Start: 2024-06-28 | End: 2024-06-29 | Stop reason: HOSPADM

## 2024-06-28 RX ORDER — EZETIMIBE 10 MG/1
10 TABLET ORAL DAILY
Status: DISCONTINUED | OUTPATIENT
Start: 2024-06-28 | End: 2024-06-29 | Stop reason: HOSPADM

## 2024-06-28 RX ORDER — ALUMINUM HYDROXIDE, MAGNESIUM HYDROXIDE, AND SIMETHICONE 1200; 120; 1200 MG/30ML; MG/30ML; MG/30ML
30 SUSPENSION ORAL 4 TIMES DAILY PRN
Status: DISCONTINUED | OUTPATIENT
Start: 2024-06-28 | End: 2024-06-29 | Stop reason: HOSPADM

## 2024-06-28 RX ORDER — HYDROCORTISONE 10 MG/1
10 TABLET ORAL EVERY MORNING
Status: DISCONTINUED | OUTPATIENT
Start: 2024-06-28 | End: 2024-06-29 | Stop reason: HOSPADM

## 2024-06-28 RX ORDER — ZOLPIDEM TARTRATE 5 MG/1
10 TABLET ORAL NIGHTLY PRN
Status: DISCONTINUED | OUTPATIENT
Start: 2024-06-28 | End: 2024-06-29 | Stop reason: HOSPADM

## 2024-06-28 RX ORDER — AMANTADINE HYDROCHLORIDE 100 MG/1
100 CAPSULE, GELATIN COATED ORAL 2 TIMES DAILY
Status: DISCONTINUED | OUTPATIENT
Start: 2024-06-28 | End: 2024-06-29 | Stop reason: HOSPADM

## 2024-06-28 RX ORDER — TALC
6 POWDER (GRAM) TOPICAL NIGHTLY PRN
Status: DISCONTINUED | OUTPATIENT
Start: 2024-06-28 | End: 2024-06-29 | Stop reason: HOSPADM

## 2024-06-28 RX ORDER — KETOROLAC TROMETHAMINE 15 MG/ML
15 INJECTION, SOLUTION INTRAMUSCULAR; INTRAVENOUS EVERY 6 HOURS PRN
Status: DISCONTINUED | OUTPATIENT
Start: 2024-06-28 | End: 2024-06-29 | Stop reason: HOSPADM

## 2024-06-28 RX ORDER — PREDNISONE 20 MG/1
20 TABLET ORAL DAILY
Status: DISCONTINUED | OUTPATIENT
Start: 2024-06-28 | End: 2024-06-28

## 2024-06-28 RX ORDER — PREGABALIN 100 MG/1
100 CAPSULE ORAL 2 TIMES DAILY
Status: DISCONTINUED | OUTPATIENT
Start: 2024-06-28 | End: 2024-06-29 | Stop reason: HOSPADM

## 2024-06-28 RX ORDER — DESMOPRESSIN ACETATE 0.1 MG/ML
1 SOLUTION NASAL NIGHTLY
Status: DISCONTINUED | OUTPATIENT
Start: 2024-06-28 | End: 2024-06-29 | Stop reason: HOSPADM

## 2024-06-28 RX ORDER — PANTOPRAZOLE SODIUM 40 MG/10ML
40 INJECTION, POWDER, LYOPHILIZED, FOR SOLUTION INTRAVENOUS
Status: DISCONTINUED | OUTPATIENT
Start: 2024-06-29 | End: 2024-06-29 | Stop reason: HOSPADM

## 2024-06-28 RX ORDER — ARIPIPRAZOLE 5 MG/1
15 TABLET ORAL DAILY
Status: DISCONTINUED | OUTPATIENT
Start: 2024-06-28 | End: 2024-06-29 | Stop reason: HOSPADM

## 2024-06-28 RX ORDER — LEVETIRACETAM 500 MG/1
750 TABLET ORAL 2 TIMES DAILY
Status: DISCONTINUED | OUTPATIENT
Start: 2024-06-28 | End: 2024-06-29 | Stop reason: HOSPADM

## 2024-06-28 RX ORDER — MORPHINE SULFATE 4 MG/ML
4 INJECTION, SOLUTION INTRAMUSCULAR; INTRAVENOUS ONCE
Status: COMPLETED | OUTPATIENT
Start: 2024-06-28 | End: 2024-06-28

## 2024-06-28 RX ORDER — LIDOCAINE 560 MG/1
1 PATCH PERCUTANEOUS; TOPICAL; TRANSDERMAL DAILY
Status: DISCONTINUED | OUTPATIENT
Start: 2024-06-28 | End: 2024-06-29 | Stop reason: HOSPADM

## 2024-06-28 RX ORDER — ACETAMINOPHEN 325 MG/1
975 TABLET ORAL EVERY 8 HOURS
Status: DISCONTINUED | OUTPATIENT
Start: 2024-06-28 | End: 2024-06-29 | Stop reason: HOSPADM

## 2024-06-28 RX ORDER — LORATADINE 10 MG/1
10 TABLET ORAL DAILY
Status: DISCONTINUED | OUTPATIENT
Start: 2024-06-28 | End: 2024-06-29 | Stop reason: HOSPADM

## 2024-06-28 RX ORDER — GUAIFENESIN 600 MG/1
600 TABLET, EXTENDED RELEASE ORAL EVERY 12 HOURS PRN
Status: DISCONTINUED | OUTPATIENT
Start: 2024-06-28 | End: 2024-06-29 | Stop reason: HOSPADM

## 2024-06-28 RX ORDER — MONTELUKAST SODIUM 10 MG/1
10 TABLET ORAL NIGHTLY
Status: DISCONTINUED | OUTPATIENT
Start: 2024-06-28 | End: 2024-06-29 | Stop reason: HOSPADM

## 2024-06-28 RX ORDER — FLUOXETINE 10 MG/1
10 CAPSULE ORAL DAILY
Status: DISCONTINUED | OUTPATIENT
Start: 2024-06-28 | End: 2024-06-29 | Stop reason: HOSPADM

## 2024-06-28 SDOH — SOCIAL STABILITY: SOCIAL INSECURITY: ARE YOU OR HAVE YOU BEEN THREATENED OR ABUSED PHYSICALLY, EMOTIONALLY, OR SEXUALLY BY ANYONE?: NO

## 2024-06-28 SDOH — ECONOMIC STABILITY: TRANSPORTATION INSECURITY
IN THE PAST 12 MONTHS, HAS THE LACK OF TRANSPORTATION KEPT YOU FROM MEDICAL APPOINTMENTS OR FROM GETTING MEDICATIONS?: YES

## 2024-06-28 SDOH — ECONOMIC STABILITY: INCOME INSECURITY: HOW HARD IS IT FOR YOU TO PAY FOR THE VERY BASICS LIKE FOOD, HOUSING, MEDICAL CARE, AND HEATING?: NOT VERY HARD

## 2024-06-28 SDOH — ECONOMIC STABILITY: HOUSING INSECURITY: IN THE LAST 12 MONTHS, HOW MANY PLACES HAVE YOU LIVED?: 1

## 2024-06-28 SDOH — SOCIAL STABILITY: SOCIAL INSECURITY: DOES ANYONE TRY TO KEEP YOU FROM HAVING/CONTACTING OTHER FRIENDS OR DOING THINGS OUTSIDE YOUR HOME?: NO

## 2024-06-28 SDOH — SOCIAL STABILITY: SOCIAL INSECURITY: DO YOU FEEL ANYONE HAS EXPLOITED OR TAKEN ADVANTAGE OF YOU FINANCIALLY OR OF YOUR PERSONAL PROPERTY?: NO

## 2024-06-28 SDOH — ECONOMIC STABILITY: HOUSING INSECURITY
IN THE LAST 12 MONTHS, WAS THERE A TIME WHEN YOU DID NOT HAVE A STEADY PLACE TO SLEEP OR SLEPT IN A SHELTER (INCLUDING NOW)?: NO

## 2024-06-28 SDOH — ECONOMIC STABILITY: INCOME INSECURITY: IN THE LAST 12 MONTHS, WAS THERE A TIME WHEN YOU WERE NOT ABLE TO PAY THE MORTGAGE OR RENT ON TIME?: NO

## 2024-06-28 SDOH — SOCIAL STABILITY: SOCIAL INSECURITY: DO YOU FEEL UNSAFE GOING BACK TO THE PLACE WHERE YOU ARE LIVING?: NO

## 2024-06-28 SDOH — SOCIAL STABILITY: SOCIAL INSECURITY: HAVE YOU HAD THOUGHTS OF HARMING ANYONE ELSE?: NO

## 2024-06-28 SDOH — ECONOMIC STABILITY: TRANSPORTATION INSECURITY
IN THE PAST 12 MONTHS, HAS LACK OF TRANSPORTATION KEPT YOU FROM MEETINGS, WORK, OR FROM GETTING THINGS NEEDED FOR DAILY LIVING?: NO

## 2024-06-28 SDOH — SOCIAL STABILITY: SOCIAL INSECURITY: HAS ANYONE EVER THREATENED TO HURT YOUR FAMILY OR YOUR PETS?: NO

## 2024-06-28 SDOH — SOCIAL STABILITY: SOCIAL INSECURITY: HAVE YOU HAD ANY THOUGHTS OF HARMING ANYONE ELSE?: NO

## 2024-06-28 SDOH — SOCIAL STABILITY: SOCIAL INSECURITY: WERE YOU ABLE TO COMPLETE ALL THE BEHAVIORAL HEALTH SCREENINGS?: YES

## 2024-06-28 SDOH — SOCIAL STABILITY: SOCIAL INSECURITY: ABUSE: ADULT

## 2024-06-28 SDOH — SOCIAL STABILITY: SOCIAL INSECURITY: ARE THERE ANY APPARENT SIGNS OF INJURIES/BEHAVIORS THAT COULD BE RELATED TO ABUSE/NEGLECT?: NO

## 2024-06-28 ASSESSMENT — PAIN SCALES - GENERAL
PAINLEVEL_OUTOF10: 6
PAINLEVEL_OUTOF10: 10 - WORST POSSIBLE PAIN
PAINLEVEL_OUTOF10: 3
PAINLEVEL_OUTOF10: 10 - WORST POSSIBLE PAIN
PAINLEVEL_OUTOF10: 0 - NO PAIN
PAINLEVEL_OUTOF10: 10 - WORST POSSIBLE PAIN
PAINLEVEL_OUTOF10: 0 - NO PAIN
PAINLEVEL_OUTOF10: 8
PAINLEVEL_OUTOF10: 8
PAINLEVEL_OUTOF10: 10 - WORST POSSIBLE PAIN
PAINLEVEL_OUTOF10: 6
PAINLEVEL_OUTOF10: 9
PAINLEVEL_OUTOF10: 10 - WORST POSSIBLE PAIN
PAINLEVEL_OUTOF10: 10 - WORST POSSIBLE PAIN

## 2024-06-28 ASSESSMENT — COGNITIVE AND FUNCTIONAL STATUS - GENERAL
PATIENT BASELINE BEDBOUND: NO
MOBILITY SCORE: 24
MOBILITY SCORE: 24
DAILY ACTIVITIY SCORE: 24
DAILY ACTIVITIY SCORE: 24

## 2024-06-28 ASSESSMENT — ENCOUNTER SYMPTOMS
COUGH: 0
DIZZINESS: 0
DIARRHEA: 0
CHILLS: 0
SHORTNESS OF BREATH: 1
NAUSEA: 1
DYSURIA: 0
VOMITING: 0
LIGHT-HEADEDNESS: 0
PALPITATIONS: 0
ABDOMINAL PAIN: 0
FEVER: 0

## 2024-06-28 ASSESSMENT — ACTIVITIES OF DAILY LIVING (ADL)
TOILETING: INDEPENDENT
GROOMING: INDEPENDENT
HEARING - RIGHT EAR: FUNCTIONAL
FEEDING YOURSELF: INDEPENDENT
JUDGMENT_ADEQUATE_SAFELY_COMPLETE_DAILY_ACTIVITIES: YES
WALKS IN HOME: INDEPENDENT
BATHING: INDEPENDENT
PATIENT'S MEMORY ADEQUATE TO SAFELY COMPLETE DAILY ACTIVITIES?: YES
ADEQUATE_TO_COMPLETE_ADL: YES
HEARING - LEFT EAR: FUNCTIONAL
DRESSING YOURSELF: INDEPENDENT

## 2024-06-28 ASSESSMENT — PAIN - FUNCTIONAL ASSESSMENT
PAIN_FUNCTIONAL_ASSESSMENT: 0-10

## 2024-06-28 ASSESSMENT — LIFESTYLE VARIABLES
AUDIT-C TOTAL SCORE: 0
SKIP TO QUESTIONS 9-10: 1
HOW OFTEN DO YOU HAVE A DRINK CONTAINING ALCOHOL: NEVER
AUDIT-C TOTAL SCORE: 0
HOW MANY STANDARD DRINKS CONTAINING ALCOHOL DO YOU HAVE ON A TYPICAL DAY: PATIENT DOES NOT DRINK
HOW OFTEN DO YOU HAVE 6 OR MORE DRINKS ON ONE OCCASION: NEVER
SUBSTANCE_ABUSE_PAST_12_MONTHS: YES

## 2024-06-28 ASSESSMENT — PATIENT HEALTH QUESTIONNAIRE - PHQ9
SUM OF ALL RESPONSES TO PHQ9 QUESTIONS 1 & 2: 0
1. LITTLE INTEREST OR PLEASURE IN DOING THINGS: NOT AT ALL
2. FEELING DOWN, DEPRESSED OR HOPELESS: NOT AT ALL

## 2024-06-28 ASSESSMENT — PAIN SCALES - PAIN ASSESSMENT IN ADVANCED DEMENTIA (PAINAD)
TOTALSCORE: MEDICATION (SEE MAR)
TOTALSCORE: MEDICATION (SEE MAR)

## 2024-06-28 ASSESSMENT — COLUMBIA-SUICIDE SEVERITY RATING SCALE - C-SSRS
1. IN THE PAST MONTH, HAVE YOU WISHED YOU WERE DEAD OR WISHED YOU COULD GO TO SLEEP AND NOT WAKE UP?: NO
6. HAVE YOU EVER DONE ANYTHING, STARTED TO DO ANYTHING, OR PREPARED TO DO ANYTHING TO END YOUR LIFE?: NO
2. HAVE YOU ACTUALLY HAD ANY THOUGHTS OF KILLING YOURSELF?: NO

## 2024-06-28 ASSESSMENT — PAIN DESCRIPTION - LOCATION
LOCATION: CHEST
LOCATION: CHEST

## 2024-06-28 NOTE — CONSULTS
"    Cardiology Consult Note  Patient: Lu Fall  Unit/Bed: 229/229-A  YOB: 1980    Admitting Diagnosis: Chest pain, unspecified type [R07.9]  Coronary artery disease involving native heart, unspecified vessel or lesion type, unspecified whether angina present [I25.10]  Cocaine use disorder (Multi) [F14.10]  Chest pain [R07.9]  Date:  6/28/2024  Hospital Day: 0  Attending: Cardiology consulting at the request of  Domenico Fonseca MD      Chief Complaint   Patient presents with    Chest Pain     CP x 3 days seen at [facility] for same, nausea, 1  nitroglycerin and  324 ASA given EMS no relief, PMH 3 blood clots in heart      History of Present Illness:  44 yo women presents to [facility] ED with complaints of left sided chest pressure radiating into her shoulder blade with associate nausea and SOB. Notes that it has been ongoing/intermittent for the last 2 months. Resolved after being discharged from [facility] 1 months ago after full cardiac work up including LHC without PCI and NM stress negative for inducible ischemia. She states that nothing really makes the pain better except Opiate and IV heparin due to \"the blood clots in her lungs\".     Past medical history significant for  NSTEMI, CAD s/p PCI to LAD 2018, CABG x1 (LIMA -LAD 2022), S/p MVP with bioprosthetic for severe MV stenosis 2022, Tricuspid repair 2022 HTN, HLD, QT prolongation, Seizure disorder, Panhypopituitarism, Craniopharyngioma , Adrenal insufficiency, Diabetes insipidus, Cocaine abuse, Migraines, STEVEN, Anxiety, Mood disorder, GERD, Mild persistent asthma    Primary Cardiology outpatient: Dr. Ferrari      Allergies:  Allergies   Allergen Reactions    Adhesive Hives and Unknown    Hydrocodone-Acetaminophen Other, Unknown and Nausea/vomiting     Other reaction(s): Vomiting    Hydromorphone Itching and Rash    Ondansetron Other and Unknown     prolonged QT    Client is unable to take this medication due  An elongated QT wave   Other " reaction(s): Unknown   Prolonged QT per patient    Prolonged QT per patient      Home Medication:  Medications Prior to Admission   Medication Sig Dispense Refill Last Dose    amantadine (Symmetrel) 100 mg capsule Take 1 capsule (100 mg) by mouth twice a day.   6/27/2024    ARIPiprazole (Abilify) 15 mg tablet Take 1 tablet (15 mg) by mouth once daily.   6/27/2024 at 0900    ARIPiprazole (Abilify) 2 mg tablet Take 1 tablet (2 mg) by mouth once daily at bedtime.   6/27/2024 at 2100    aspirin 81 mg chewable tablet Chew 1 tablet (81 mg) once daily.   6/27/2024    atorvastatin (Lipitor) 80 mg tablet Take 1 tablet (80 mg) by mouth once daily at bedtime.   6/27/2024    cetirizine (ZyrTEC) 10 mg tablet Take 1 tablet (10 mg) by mouth every 12 hours.   6/27/2024    desmopressin (DDAVP) 10 mcg/spray (0.1 mL) Administer 1 spray (10 mcg) into one nostril once daily at bedtime.   6/27/2024    ezetimibe (Zetia) 10 mg tablet Take 1 tablet (10 mg) by mouth once daily.   6/27/2024    FLUoxetine (PROzac) 10 mg capsule Take 1 capsule (10 mg) by mouth once daily.   6/27/2024    hydrocortisone (Cortef) 5 mg tablet Take 1 tablet (5 mg) by mouth.  Take 2 tabs in am, one tablet at lunch, and one tablet at dinner   6/27/2024    levETIRAcetam (Keppra) 750 mg tablet Take 1 tablet (750 mg) by mouth twice a day.   6/27/2024    levothyroxine (Synthroid, Levoxyl) 112 mcg tablet Take 1 tablet (112 mcg) by mouth once daily.   6/27/2024    montelukast (Singulair) 10 mg tablet Take 1 tablet (10 mg) by mouth once daily at bedtime.   6/27/2024    pantoprazole (ProtoNix) 40 mg EC tablet Take 1 tablet (40 mg) by mouth once daily.   6/27/2024    pregabalin (Lyrica) 100 mg capsule Take 1 capsule (100 mg) by mouth 2 times a day.   6/27/2024    zolpidem (Ambien) 10 mg tablet Take 1 tablet (10 mg) by mouth once daily as needed for sleep.   6/27/2024    acetaminophen (Tylenol) 500 mg tablet Take 2 tablets (1,000 mg) by mouth every 6 hours if needed for  moderate pain (4 - 6) or mild pain (1 - 3).   Unknown    alum-mag hydroxide-simeth (Mylanta) 200-200-20 mg/5 mL oral suspension Take 30 mL by mouth 4 times a day as needed for indigestion or heartburn. 355 mL 0 Unknown    busPIRone (Buspar) 15 mg tablet Take 1 tablet (15 mg) by mouth every 12 hours if needed.   Unknown    fremanezumab (Ajovy Syringe) 225 mg/1.5 mL prefilled syringe Inject 1.5 mL (225 mg) under the skin every 30 (thirty) days.   Unknown    lidocaine 4 % patch Place 1 patch over 12 hours on the skin once daily. Remove & discard patch within 12 hours or as directed by MD. Do not start before January 30, 2024. 15 patch 0     methocarbamol (Robaxin) 500 mg tablet Take 1 tablet (500 mg) by mouth every 6 hours if needed.   Unknown    metoclopramide (Reglan) 10 mg tablet Take 1 tablet (10 mg) by mouth every 8 hours if needed.   Unknown    midodrine (Proamatine) 5 mg tablet Take 2 tablets (10 mg) by mouth 3 times a day.   Unknown    promethazine (Phenergan) 25 mg tablet Take 0.5 tablets (12.5 mg) by mouth every 6 hours if needed for nausea or vomiting for up to 10 doses. 5 tablet 0     ubrogepant (Ubrelvy) 100 mg tablet tablet Take 1 tablet (100 mg) by mouth if needed.   Unknown      PMHx:  Past Medical History:   Diagnosis Date    COPD (chronic obstructive pulmonary disease) (Multi)     Coronary artery disease     Personal history of other endocrine, nutritional and metabolic disease     History of hypopituitarism    Personal history of other mental and behavioral disorders     History of depression    Personal history of other specified conditions     History of brain tumor    Unspecified convulsions (Multi)     Convulsion     PSHx:  Past Surgical History:   Procedure Laterality Date    MR HEAD ANGIO WO IV CONTRAST  7/15/2021    MR HEAD ANGIO WO IV CONTRAST 7/15/2021 [facility]    MR HEAD ANGIO WO IV CONTRAST  11/28/2017    MR HEAD ANGIO WO IV CONTRAST [facility]    MR HEAD ANGIO WO IV  "CONTRAST  3/13/2018    MR HEAD ANGIO WO IV CONTRAST [facility]    MR NECK ANGIO WO IV CONTRAST  7/15/2021    MR NECK ANGIO WO IV CONTRAST 7/15/2021 [facility]    OTHER SURGICAL HISTORY  08/17/2013    Craniotomy Tumor Removal - Complete     Social Hx:  Social History     Socioeconomic History    Marital status:      Spouse name: None    Number of children: None    Years of education: None    Highest education level: None   Occupational History    None   Tobacco Use    Smoking status: Never    Smokeless tobacco: Never   Vaping Use    Vaping status: Never Used   Substance and Sexual Activity    Alcohol use: Never    Drug use: Yes     Types: \"Crack\" cocaine, Cocaine     Comment: last week    Sexual activity: None   Other Topics Concern    None   Social History Narrative    None     Social Determinants of Health     Financial Resource Strain: Low Risk  (6/28/2024)    Overall Financial Resource Strain (CARDIA)     Difficulty of Paying Living Expenses: Not very hard   Food Insecurity: Patient Declined (6/17/2024)    Received from [facility]    Hunger Vital Sign     Worried About Running Out of Food in the Last Year: Patient declined     Ran Out of Food in the Last Year: Patient declined   Transportation Needs: Unmet Transportation Needs (6/28/2024)    PRAPARE - Transportation     Lack of Transportation (Medical): Yes     Lack of Transportation (Non-Medical): No   Physical Activity: Unknown (6/17/2024)    Received from [facility]    Exercise Vital Sign     Days of Exercise per Week: 0 days     Minutes of Exercise per Session: Patient declined   Stress: Patient Declined (6/17/2024)    Received from [facility]    [place] [employer] - Occupational Stress Questionnaire     Feeling of Stress : Patient declined   Social Connections: Unknown (6/17/2024)    Received from [facility]    Social Connection and Isolation Panel [NHANES]     Frequency of " Communication with Friends and Family: Patient declined     Frequency of Social Gatherings with Friends and Family: Patient declined     Attends Nondenominational Services: Patient declined     Active Member of Clubs or Organizations: Patient declined     Attends Club or Organization Meetings: Patient declined     Marital Status:    Intimate Partner Violence: Not on file   Housing Stability: Low Risk  (6/28/2024)    Housing Stability Vital Sign     Unable to Pay for Housing in the Last Year: No     Number of Places Lived in the Last Year: 1     Unstable Housing in the Last Year: No       Family Hx:  No family history on file.    Review of Systems:   12 system review of symptoms is negative except as noted above      OBJECTIVE  Vitals:   Visit Vitals  BP (!) 146/93 (BP Location: Left arm, Patient Position: Lying) Comment: tried both arms   Pulse 61   Temp 35.9 °C (96.6 °F) (Temporal)   Resp 17        Wt Readings from Last 5 Encounters:   06/28/24 60.5 kg (133 lb 6.1 oz)   06/25/24 58.5 kg (129 lb)   05/07/24 65.2 kg (143 lb 11.8 oz)   05/05/24 56.7 kg (125 lb)   05/03/24 56.7 kg (125 lb)       Intake / Output:   No intake/output data recorded.     Tele monitoring: SR 60s    Physical Examination:    Vitals reviewed.   Constitutional:       General: Awake.      Appearance: Normal appearance. Well-developed and not in distress.   Eyes:      General: Lids are normal.      Pupils: Pupils are equal, round, and reactive to light.   HENT:      Right Ear: External ear normal.      Left Ear: External ear normal.      Nose: Nose normal.    Mouth/Throat:      Lips: Pink.      Mouth: Mucous membranes are moist.   Neck:      Vascular: JVD normal.   Pulmonary:      Breath sounds: Normal air entry.   Cardiovascular:      PMI at left midclavicular line. Normal rate. Regular rhythm. Normal S1. Normal S2.       Murmurs: There is no murmur.   Edema:     Peripheral edema absent.   Abdominal:      General: Bowel sounds are normal.       Palpations: Abdomen is soft.      Tenderness: There is no abdominal tenderness.   Musculoskeletal: Normal range of motion.      Cervical back: Full passive range of motion without pain, normal range of motion and neck supple. Skin:     General: Skin is warm and dry.   Neurological:      General: No focal deficit present.      Mental Status: Alert, oriented to person, place, and time and oriented to person, place and time. Mental status is at baseline.   Psychiatric:         Attention and Perception: Attention normal.         Mood and Affect: Mood normal.         Speech: Speech normal.          LABS:  CMP:   Recent Labs     06/28/24  0536 06/25/24  2357 05/07/24  0925 05/05/24  1928 04/18/24  1935 04/08/24  2140 02/21/24  0529 02/21/24  0057 02/20/24  1850 02/17/24  2334 02/09/24  2024 02/05/24  1935 01/29/24  0522 01/28/24  1922 10/09/23  1416 09/12/23  1218 08/27/23  1625 06/27/23  0916 06/26/23  0935   * 139 135 135* 134* 135 141  --  137 137 136 137   < > 135*   < > 134* 138   < > 137   K 4.1 3.7 3.5 3.5 4.8 3.2* 4.0  --  4.3 3.5 3.7 4.1   < > 5.0   < > 4.0 4.1   < > 3.8    103 97 101 101 97 109*  --  105 102 103 103   < > 100   < > 99 107   < > 105   CO2 22 27 27 25 27 28 22*  --  22* 23 24 22   < > 28   < > 27 22   < > 24   ANIONGAP 13 9 11 13 11 10 10  --  10 16 13 16   < > 12   < > 12 13   < > 12   BUN 11 10 9 14 11 5* 10  --  13 11 10 9   < > 18   < > 8 15   < > 8   CREATININE 0.99 0.90 0.90 0.91 0.81 0.80 0.70  --  0.70 0.79 0.70 0.77   < > 0.81   < > 0.81 0.67   < > 0.79   EGFR 72 81 81 80 >90 >90 >90  --  >90 >90 >90 >90   < > >90   < >  --   --    < >  --    MG  --   --   --  2.28 2.38 2.40  --  2.10  --   --  2.65* 2.61*  --  2.42*  --  2.32 2.07  --  2.02    < > = values in this interval not displayed.     LIVER ENZYMES:   Recent Labs     06/28/24  0536 05/07/24  0925 05/05/24  1928 04/18/24  1935 04/08/24  2140 02/21/24  0529 02/20/24  1850 02/17/24  2334 11/09/23  1101 10/24/23  2210  07/10/23  1532 07/10/23  1500 06/27/23  0916 06/16/23  1403 06/08/23  0022 04/07/23  0155 02/07/23  2304 12/20/22  1942 12/14/22 1926   ALBUMIN 4.3 3.9 4.2 4.2 4.2 3.9 4.0 4.6   < > 4.6   < >  --  3.9   < >  --    < > 4.6   < > 4.5   ALT 6* 16 20 14 8 9 9 10   < > 16   < >  --  28   < >  --    < > 13   < > 25   AST 23 32 38 37 24 19 24 22   < > 28   < >  --  33   < >  --    < > 22   < > 56*   BILITOT 0.7 0.5 0.5 0.4 0.4 0.2 0.4 0.5   < > 0.5   < >  --  0.5   < >  --    < > 0.5   < > 0.7   LIPASE  --  17  --  14  --   --   --   --   --  8*  --  7* 16  --  12  --  20  --  9    < > = values in this interval not displayed.     CBC:  Recent Labs     06/28/24  0622 06/25/24  2357 05/07/24  0925 05/05/24 1928 04/18/24 1935 04/08/24 2140 02/21/24 0528 02/20/24 1850   WBC 5.1 5.2 6.6 6.2 5.5 5.9 7.2 8.5   HGB 12.6 12.4 13.4 12.9 13.5 12.8 11.4* 12.7   HCT 39.2 38.4 41.1 39.2 41.6 38.3 36.1 39.6   * 133* 179 197 160 228 178 214   MCV 83 80 78* 79* 79* 77* 79* 79*     COAG:   Recent Labs     02/21/24  0056 02/20/24  1850 02/05/24 1935 01/29/24  0522 10/09/23  1416 07/10/23  1500 06/07/23  2321 12/14/22 1926   INR 1.1 1.1 1.2* 1.2* 1.0 1.2* 1.1 1.2*     ABO:   Recent Labs     02/21/24  0056   ABO A     HEME/ENDO:  Recent Labs     02/21/24  0057 01/29/24  0522 11/16/23  1325 07/28/23  1413 05/05/23  0831 02/01/23  1033   FERRITIN  --   --   --   --   --  34   IRONSAT  --   --   --   --   --  16*   TSH  --   --  0.01*  --  0.92  --    HGBA1C 5.4 5.0  --    < >  --   --     < > = values in this interval not displayed.      CARDIAC:   Recent Labs     06/28/24  0622 06/28/24  0536 05/05/24 2021 05/05/24 1928 02/18/24  0033 02/17/24  2334 02/09/24  2144 02/09/24 2024 01/28/24  2054 01/28/24  1922 06/24/23  0120 06/16/23  1403 04/21/23  1137 12/14/22  1926 11/28/22  0947 11/28/22  0827 08/30/22  1338 08/30/22  1211 08/30/22  1210 06/29/22  1044 06/29/22  0925   LDH  --   --   --   --   --   --   --   --   --   --    "--   --   --   --   --   --   --  225  --   --   --    TROPHS <3 <3 <3 <3 3 3 6 6   < > <3   < > 3   < > 3   < > 3   < > 3  --    < > 3   BNP 41  --   --   --   --   --   --  85  --  31  --  34  --  40  --  50  --   --  44  --  340*    < > = values in this interval not displayed.       Lipid Panel:  No results found for: \"HDL\", \"CHHDL\", \"VLDL\", \"TRIG\", \"NHDL\"     Current Medications:   MEDICATIONS  Infusions:     Scheduled:  acetaminophen, 975 mg, q8h  amantadine, 100 mg, BID  ARIPiprazole, 15 mg, Daily  ARIPiprazole, 2.5 mg, Nightly  aspirin, 81 mg, Daily  atorvastatin, 80 mg, Nightly  desmopressin, 1 spray, Nightly  ezetimibe, 10 mg, Daily  FLUoxetine, 10 mg, Daily  hydrocortisone, 10 mg, q AM  hydrocortisone, 5 mg, BID  levETIRAcetam, 750 mg, BID  levothyroxine, 112 mcg, Daily  lidocaine, 1 patch, Daily  loratadine, 10 mg, Daily  midodrine, 10 mg, TID  montelukast, 10 mg, Nightly  [START ON 6/29/2024] pantoprazole, 40 mg, Daily before breakfast   Or  [START ON 6/29/2024] pantoprazole, 40 mg, Daily before breakfast  polyethylene glycol, 17 g, Daily  pregabalin, 100 mg, BID      PRN:  acetaminophen, 650 mg, q4h PRN   Or  acetaminophen, 650 mg, q4h PRN   Or  acetaminophen, 650 mg, q4h PRN  acetaminophen, 650 mg, q4h PRN   Or  acetaminophen, 650 mg, q4h PRN   Or  acetaminophen, 650 mg, q4h PRN  alum-mag hydroxide-simeth, 30 mL, 4x daily PRN  benzocaine-menthol, 1 lozenge, q2h PRN  busPIRone, 15 mg, q12h PRN  dextromethorphan-guaifenesin, 5 mL, q4h PRN  guaiFENesin, 600 mg, q12h PRN  ketorolac, 15 mg, q6h PRN  melatonin, 6 mg, Nightly PRN  methocarbamol, 500 mg, q6h PRN  traMADol, 50 mg, q6h PRN      LAST IMAGING RESULTS   ECG 12 lead  Normal sinus rhythm  Rightward axis  Low voltage QRS  Septal infarct , age undetermined  Abnormal ECG  When compared with ECG of 25-JUN-2024 23:39, (unconfirmed)  Septal infarct is now Present  CT angio chest for pulmonary embolism  Narrative: STUDY:  CT Angiogram of the Chest; " 6/28/2024 at 8:24 AM.  INDICATION:  Elevated D-dimer, chest pain.  COMPARISON:  XR chest 6/28/2024, 4/8/2024; CTA chest 5/5/2024, 2/9/2024.  ACCESSION NUMBER(S):  [id number]  ORDERING CLINICIAN:  LACY KIRBY  TECHNIQUE:  CTA of the chest was performed with intravenous contrast.   Images are reviewed and processed at a workstation according to the CT  angiogram protocol with 3-D and/or MIP post processing imaging  generated.  Omnipaque 350 75 mL was administered intravenously.  Automated mA/kV exposure control was utilized and patient examination  was performed in strict accordance with principles of ALARA.  FINDINGS:  Pulmonary arteries are adequately opacified without acute or chronic  filling defects.  The thoracic aorta is normal in course and caliber  without dissection or aneurysm.  The heart is normal in size without pericardial effusion.  Mild to  moderate coronary artery calcifications.  Thoracic lymph nodes are not  enlarged.  There is no pleural effusion, pleural thickening, or pneumothorax.   The airways are patent.  Mild mosaic perfusion throughout the lungs..  Lungs otherwise clear.  Upper abdomen demonstrates no acute pathology.  There are no acute fractures.  No suspicious bony lesions.  Impression: 1. No pulmonary embolism or other acute intrathoracic process.  2. Mild to moderate coronary artery calcifications.  3. Mild mosaic perfusion throughout the lungs suggestive of air  trapping or small airways disease.  Signed by Dmitriy Springer MD  XR chest 1 view  Narrative: STUDY:  Chest Radiograph;  6/28/2024 6:31AM  INDICATION:  Pain.  COMPARISON:  4/8/2024 XR chest  ACCESSION NUMBER(S):  [id number]  ORDERING CLINICIAN:  AME NAGY  TECHNIQUE:  Frontal chest was obtained at 06:31 hours.  FINDINGS:  CARDIOMEDIASTINAL SILHOUETTE:  Cardiomediastinal silhouette is normal in size and configuration.   Median sternotomy wires     LUNGS:  Lungs are clear.     ABDOMEN:  No remarkable upper  abdominal findings.     BONES:  No acute osseous changes.  Impression: No acute cardiopulmonary process.  Signed by Denis Ward MD    All available ECGs independently reviewed     Cardiovascular studies:    Nm Stress test 2024  CONCLUSIONS:    1. SPECT Perfusion Study: Normal.    2. There is no scintigraphic evidence for inducible ischemia.    3. No evidence of scarred myocardium.    4. Left ventricle is normal in size. The left ventricle systolic   function is normal.    5. Right ventricle is normal in size. The right ventricle systolic   function is normal.    6. This is a low risk scan.             Gated Stress FBP    LVEF % 62     Echocardiogram 2024  CONCLUSIONS:   - Exam indication: S/p CABG, MVR, TV repair   - There is a resting wall motion abnormality in the territory of the RCA.   - The left ventricle is normal in size. Left ventricular systolic function is   mildly decreased. EF = 49 ± 5% (3D)   - The right ventricle is normal in size. Right ventricular systolic function is   normal.   - Post mitral valve replacement. Epic prosthetic mitral valve (size #29). There is    trace mitral valve regurgitation. The peak gradient is 5 mmHg and the mean   gradient is 3 mmHg.   - Post tricuspid valve repair. Contour Band (size #30). There is mild (1+)   tricuspid valve regurgitation. The peak gradient is 1 mmHg and the mean gradient   is 1 mmHg.   - Exam was compared with the prior  echocardiographic exam performed on   2022. LVEF has decreased.     [facility] 6/3/2024  ADDITIONAL PROCEDURES  The LIMA was cannulated and angiography was completed to visualize the target vessel.     LEFT MAIN TRUNK: No Stenosis      LEFT ANTERIOR DESCENDIN% Stenosis     Location: Mid       DIAGONAL #1: Stent Present with Less Than 40% Stenosis    Location: Proximal      LEFT CIRCUMFLEX: Less Than 40% Stenosis    Location: Proximal       MARGINAL #1: No Stenosis      DOMINANT: NO  RIGHT CORONARY: 50% Stenosis     Location: Mid       DOMINANT: YES       POSTERIOR DESCENDING: No Stenosis   POSTERIOR LATERAL: No Stenosis         LIMA GRAFT to the LAD: No Stenosis, several branches of LIMA noted, may cause coronary steal.     Assessment:   42 yo women admitted with Chest pain with significant Cardiac history     CAD s/p PCI 2018 LAD  CABG x1 LIMA-LAD 2022  Severe Mitral stenosis s/p MVR with bioprosthetic 2022  Tricuspid valve repair 2022  Chronic hypotension  DLD  Diabetes insipidus  Adrenal insufficiency  Hx Cocaine abuse    Plan:  Reviewed all recent cardiac testing including Echo, LHC, Nuclear stress test   Pain is atypical and non cardiac with negative HS troponin, no acute/dynamic ECG changes and recent reassuring cardiac work up June 2024  Continue all medication as prescribed   Supportive treatment per primary team  No further cardiac testing at this time, recommend to pursue non cardiac cause of pain  Advise abstaining from illicit drug use    Discussed with Dr. Gaines      Thank you  for the consult   Will sign off   Please call or message with questions or concerns    The case was discussed with Nikki Solorio PA-C          Electronically signed by HEMALATHA Montoya on 6/28/2024 at 1:29 PM

## 2024-06-28 NOTE — PROGRESS NOTES
Emergency Medicine Transition of Care Note.    I received Lu Fall in signout from Dr. Schuster.  Please see the previous ED provider note for all HPI, PE and MDM up to the time of signout at 07:00. This is in addition to the primary record.    In brief Lu Fall is an 43 y.o. female presenting for   Chief Complaint   Patient presents with    Chest Pain     CP x 3 days seen at [facility] for same, nausea, 1  nitroglycerin and  324 ASA given EMS no relief, PMH 3 blood clots in heart     At the time of signout we were awaiting:    Second Troponin  D-Dimer  Observation v. AMA    ED Course as of 06/28/24 0914   Fri Jun 28, 2024   0712 ECG 12 lead  EKG shows sinus rhythm with right axis deviation.  Nonspecific ST-T changes.  No acute injury pattern.  Inferior ischemic changes seen on 5/7/2024 not present on EKG today. [BT]   0724 Troponin I Series, High Sensitivity (0, 1 HR)  Troponin less than 3 with 1 hour repeat of less than 3.  EKG without acute injury pattern.  Doubt acute coronary syndrome. [BT]   0740 D-dimer, quantitative(!)  D-dimer elevated at 630.  Will check CT chest PE study to evaluate for pulmonary embolus. [BT]   0913 CT angio chest for pulmonary embolism  CT chest shows coronary artery calcifications.  No pulmonary embolus. [BT]   0913 Case discussed with medicine hospitalist team BRIANNE Jordan who accepted patient for observation on behalf of Dr. Fonseca for further evaluation management of chest pain in setting of known coronary artery disease, mitral valve replacement. [BT]      ED Course User Index  [BT] Kumar Price DO         Diagnoses as of 06/28/24 0914   Chest pain, unspecified type   Cocaine use disorder (Multi)   Coronary artery disease involving native heart, unspecified vessel or lesion type, unspecified whether angina present       Medical Decision Making      Final diagnoses:   [R07.9] Chest pain, unspecified type   [F14.10] Cocaine use disorder (Multi)   [I25.10] Coronary  artery disease involving native heart, unspecified vessel or lesion type, unspecified whether angina present           Procedure  Procedures    Kumar Price, DO

## 2024-06-28 NOTE — ED PROVIDER NOTES
"HPI   Chief Complaint   Patient presents with    Chest Pain     CP x 3 days seen at [facility] for same, nausea, 1  nitroglycerin and  324 ASA given EMS no relief, PMH 3 blood clots in heart       Patient is 43-year-old woman with significant cardiac history, craniopharyngioma with hypoadrenalism, and significant drug history with cannabis and cocaine.  She said her chest pain started about 3 days ago prompting her to go to [facility][note 3] yesterday where they worked her up and discharged her.  She said they found nothing that needed changed in her regimen and that her chest pain was not cardiac so she was discharged from there and continues to have chest pain so presents here this morning at about 515.  EMS delivered her.  She says with the pain she has felt short of breath nauseated and diaphoretic.  She did have a heart cath done at [place] in February of this year and says that they found \"blood clots in her heart.\"  She was not prescribed any blood thinner.  I read the cath report and there was no mention of any blood clots in the heart.                        No data recorded                     Patient History   Past Medical History:   Diagnosis Date    COPD (chronic obstructive pulmonary disease) (Multi)     Coronary artery disease     Personal history of other endocrine, nutritional and metabolic disease     History of hypopituitarism    Personal history of other mental and behavioral disorders     History of depression    Personal history of other specified conditions     History of brain tumor    Unspecified convulsions (Multi)     Convulsion     Past Surgical History:   Procedure Laterality Date    MR HEAD ANGIO WO IV CONTRAST  7/15/2021    MR HEAD ANGIO WO IV CONTRAST 7/15/2021 [facility]    MR HEAD ANGIO WO IV CONTRAST  11/28/2017    MR HEAD ANGIO WO IV CONTRAST [facility]    MR HEAD ANGIO WO IV CONTRAST  3/13/2018    MR HEAD ANGIO WO IV CONTRAST [facility]    MR NECK ANGIO WO IV " "CONTRAST  7/15/2021    MR NECK ANGIO WO IV CONTRAST 7/15/2021 [facility]    OTHER SURGICAL HISTORY  08/17/2013    Craniotomy Tumor Removal - Complete     No family history on file.  Social History     Tobacco Use    Smoking status: Never    Smokeless tobacco: Never   Vaping Use    Vaping status: Never Used   Substance Use Topics    Alcohol use: Never    Drug use: Yes     Types: \"Crack\" cocaine, Cocaine     Comment: last week       Physical Exam   ED Triage Vitals [06/28/24 0529]   Temperature Heart Rate Respirations BP   36.7 °C (98.1 °F) 74 16 101/85      Pulse Ox Temp Source Heart Rate Source Patient Position   99 % Temporal -- --      BP Location FiO2 (%)     -- --       Physical Exam  Vitals and nursing note reviewed.   HENT:      Head: Normocephalic and atraumatic.      Right Ear: Tympanic membrane and ear canal normal.      Left Ear: Tympanic membrane and ear canal normal.      Nose: Nose normal.      Mouth/Throat:      Mouth: Mucous membranes are moist.   Cardiovascular:      Rate and Rhythm: Normal rate.   Pulmonary:      Effort: Pulmonary effort is normal.      Breath sounds: Normal breath sounds.   Abdominal:      General: Abdomen is flat.      Palpations: Abdomen is soft.   Musculoskeletal:         General: Normal range of motion.      Cervical back: Normal range of motion.   Skin:     General: Skin is warm and dry.   Neurological:      General: No focal deficit present.      Mental Status: She is alert.         ED Course & [facility]   ED Course as of 06/30/24 1916 Fri Jun 28, 2024   0712 ECG 12 lead  EKG shows sinus rhythm with right axis deviation.  Nonspecific ST-T changes.  No acute injury pattern.  Inferior ischemic changes seen on 5/7/2024 not present on EKG today. [BT]   0724 Troponin I Series, High Sensitivity (0, 1 HR)  Troponin less than 3 with 1 hour repeat of less than 3.  EKG without acute injury pattern.  Doubt acute coronary syndrome. [BT]   0740 D-dimer, quantitative(!)  D-dimer " elevated at 630.  Will check CT chest PE study to evaluate for pulmonary embolus. [BT]   0913 CT angio chest for pulmonary embolism  CT chest shows coronary artery calcifications.  No pulmonary embolus. [BT]   0913 Case discussed with medicine hospitalist team BRIANNE Jordan who accepted patient for observation on behalf of Dr. Fonseca for further evaluation management of chest pain in setting of known coronary artery disease, mitral valve replacement. [BT]      ED Course User Index  [BT] Kumar Price,          Diagnoses as of 06/30/24 1916   Chest pain, unspecified type   Cocaine use disorder (Multi)   Coronary artery disease involving native heart, unspecified vessel or lesion type, unspecified whether angina present       Medical Decision Making  EKG interpreted by me: Sinus rhythm with a rightward axis.  Ventricular rate is 69 with normal intervals .    With such as significant history of heart disease, we will check an EKG as well as serial troponins.  Will also check a dimer and if elevated we will scan her chest.  I reviewed her cath report from February of this year and there is no mention of any clots in the bypass graft or in any other parts of the heart.  This would fit with the fact that she is not on any anticoagulant.    I needed to handoff this patient to my oncoming physician who will check lab studies and radiographic studies, and then make final disposition.        Procedure  Procedures     Romel Schuster MD  06/28/24 0534       Romel Schuster MD  06/28/24 0601       Romel Schuster MD  06/30/24 1916

## 2024-06-28 NOTE — DISCHARGE INSTRUCTIONS
Thank you for choosing [facility] for your recent healthcare needs! We hope that you found comfort and confidence in the care that we provided and were able to take advantage of the many unique services that [place] has to offer! We are committed to making sure that you had an exceptional experience and successful discharge transition.   If you need any clarification of your discharge instructions or have any other questions related to your stay, please feel free to call: José (Nurse Manager; [phone]) or Sayda (Assistant Nurse Manager; [phone]) from Monday-Friday 7am-3pm or the Nursing Supervisor during all other times ([phone]).   Best wishes that you will soon be back to doing all the things you love!

## 2024-06-28 NOTE — H&P
"History Of Present Illness  Lu Fall is a 43 y.o. female presenting with chest pain. Patient reports that she developed mid-sternal chest pain 3 nights ago while sleeping; the pain woke her from sleep. She had some associated nausea and shortness of breath but otherwise denies dizziness, lightheadedness, palpitations, and near syncope. The pain radiated around her left side to her back and up to her jaw. Since then, patient reports that she has had continued mid-sternal chest pain. Pain does not change with rest or activity. Patient was seen in [facility] ED yesterday for the same complaint and discharged home. She came to [facility] ED early this morning via EMS for continued chest pain. Patient reports that she has a history of \"blood clots in her heart\". She follows with Dr. Ferrari for cardiology, last seen 5/31/24. There is no mention of history of thrombosis in these notes and patient is not on any anticoagulation. EKG on arrival to the ED showed NSR with rate 69 bpm, no ST elevations. Troponins were negative x 2, repeat pending. D-dimer was 630, CTA chest for PE was negative. Urine drug screen was positive for cocaine and opiates; patient does endorse smoking crack cocaine about a week ago. She was given morphine 4 mg IV x 2 doses and Zofran in the ED. She is admitted to med/surg telemetry with cardiology consulted.      Past Medical History  NSTEMI, s/p CABG (2022)   CAD s/p PCI LAD (2018)  HTN  HLD  Mitral valve stenosis s/p MVR   QT prolongation   Seizure disorder  Panhypopituitarism  Craniopharyngioma   Adrenal insufficiency  Diabetes insipidus  Cocaine abuse   Migraines   STEVEN  Anxiety  Mood disorder  GERD  Mild persistent asthma       Surgical History  Appendectomy  Craniopharyngioma resection x 2   Inguinal hernia repair   CABG  Cardiac stenting   Mitral valve replacement  Tricuspid valve repair         Social History  She reports that she has never smoked. She has never used smokeless tobacco. She " "reports current drug use. Drugs: \"Crack\" cocaine and Cocaine. She reports that she does not drink alcohol.    Reports last use of cocaine was 1 week ago    Family History  No family history on file.     Allergies  Adhesive, Hydrocodone-acetaminophen, Hydromorphone, and Ondansetron    Review of Systems   Constitutional:  Negative for chills and fever.   Respiratory:  Positive for shortness of breath. Negative for cough.    Cardiovascular:  Positive for chest pain. Negative for palpitations and leg swelling.   Gastrointestinal:  Positive for nausea. Negative for abdominal pain, diarrhea and vomiting.   Genitourinary:  Negative for dysuria.   Neurological:  Negative for dizziness, syncope and light-headedness.   All other systems reviewed and are negative.       Physical Exam  Constitutional:       General: She is not in acute distress.     Appearance: She is not toxic-appearing.   HENT:      Head: Normocephalic and atraumatic.      Mouth/Throat:      Mouth: Mucous membranes are moist.   Eyes:      Conjunctiva/sclera: Conjunctivae normal.   Cardiovascular:      Rate and Rhythm: Normal rate and regular rhythm.      Heart sounds: No murmur heard.  Pulmonary:      Effort: No respiratory distress.      Breath sounds: Normal breath sounds.      Comments: On room air   Abdominal:      General: There is no distension.      Palpations: Abdomen is soft.      Tenderness: There is no abdominal tenderness.   Musculoskeletal:         General: No swelling or tenderness (bilateral calves).   Skin:     General: Skin is warm and dry.      Findings: No rash.   Neurological:      Mental Status: She is alert and oriented to person, place, and time.   Psychiatric:      Comments: Anxious          Last Recorded Vitals  Blood pressure (!) 146/93, pulse 61, temperature 35.9 °C (96.6 °F), temperature source Temporal, resp. rate 17, height 1.6 m (5' 3\"), weight 60.5 kg (133 lb 6.1 oz), SpO2 92%.    Relevant Results      Scheduled " medications  acetaminophen, 975 mg, oral, q8h  amantadine, 100 mg, oral, BID  ARIPiprazole, 15 mg, oral, Daily  ARIPiprazole, 2.5 mg, oral, Nightly  aspirin, 81 mg, oral, Daily  atorvastatin, 80 mg, oral, Nightly  desmopressin, 1 spray, One Nostril, Nightly  ezetimibe, 10 mg, oral, Daily  FLUoxetine, 10 mg, oral, Daily  hydrocortisone, 10 mg, oral, q AM  hydrocortisone, 5 mg, oral, BID  levETIRAcetam, 750 mg, oral, BID  levothyroxine, 112 mcg, oral, Daily  lidocaine, 1 patch, transdermal, Daily  loratadine, 10 mg, oral, Daily  midodrine, 10 mg, oral, TID  montelukast, 10 mg, oral, Nightly  [START ON 6/29/2024] pantoprazole, 40 mg, oral, Daily before breakfast   Or  [START ON 6/29/2024] pantoprazole, 40 mg, intravenous, Daily before breakfast  polyethylene glycol, 17 g, oral, Daily  pregabalin, 100 mg, oral, BID      Continuous medications     PRN medications  PRN medications: acetaminophen **OR** acetaminophen **OR** acetaminophen, acetaminophen **OR** acetaminophen **OR** acetaminophen, alum-mag hydroxide-simeth, benzocaine-menthol, busPIRone, dextromethorphan-guaifenesin, guaiFENesin, ketorolac, melatonin, methocarbamol, traMADol    Results for orders placed or performed during the hospital encounter of 06/28/24 (from the past 24 hour(s))   ECG 12 lead   Result Value Ref Range    Ventricular Rate 69 BPM    Atrial Rate 69 BPM    MA Interval 186 ms    QRS Duration 100 ms    QT Interval 442 ms    QTC Calculation(Bazett) 473 ms    P Axis 32 degrees    R Axis 91 degrees    T Axis 135 degrees    QRS Count 12 beats    Q Onset 202 ms    P Onset 109 ms    P Offset 144 ms    T Offset 423 ms    QTC Fredericia 463 ms   Comprehensive Metabolic Panel   Result Value Ref Range    Glucose 82 74 - 99 mg/dL    Sodium 134 (L) 136 - 145 mmol/L    Potassium 4.1 3.5 - 5.3 mmol/L    Chloride 103 98 - 107 mmol/L    Bicarbonate 22 21 - 32 mmol/L    Anion Gap 13 10 - 20 mmol/L    Urea Nitrogen 11 6 - 23 mg/dL    Creatinine 0.99 0.50 - 1.05  mg/dL    eGFR 72 >60 mL/min/1.73m*2    Calcium 9.6 8.6 - 10.3 mg/dL    Albumin 4.3 3.4 - 5.0 g/dL    Alkaline Phosphatase 65 33 - 110 U/L    Total Protein 7.3 6.4 - 8.2 g/dL    AST 23 9 - 39 U/L    Bilirubin, Total 0.7 0.0 - 1.2 mg/dL    ALT 6 (L) 7 - 45 U/L   Troponin I, High Sensitivity, Initial   Result Value Ref Range    Troponin I, High Sensitivity <3 0 - 13 ng/L   CBC with Differential   Result Value Ref Range    WBC 5.1 4.4 - 11.3 x10*3/uL    nRBC 0.0 0.0 - 0.0 /100 WBCs    RBC 4.71 4.00 - 5.20 x10*6/uL    Hemoglobin 12.6 12.0 - 16.0 g/dL    Hematocrit 39.2 36.0 - 46.0 %    MCV 83 80 - 100 fL    MCH 26.8 26.0 - 34.0 pg    MCHC 32.1 32.0 - 36.0 g/dL    RDW 16.3 (H) 11.5 - 14.5 %    Platelets 134 (L) 150 - 450 x10*3/uL    Neutrophils % 33.9 40.0 - 80.0 %    Immature Granulocytes %, Automated 0.2 0.0 - 0.9 %    Lymphocytes % 53.1 13.0 - 44.0 %    Monocytes % 6.1 2.0 - 10.0 %    Eosinophils % 5.3 0.0 - 6.0 %    Basophils % 1.4 0.0 - 2.0 %    Neutrophils Absolute 1.71 1.20 - 7.70 x10*3/uL    Immature Granulocytes Absolute, Automated 0.01 0.00 - 0.70 x10*3/uL    Lymphocytes Absolute 2.68 1.20 - 4.80 x10*3/uL    Monocytes Absolute 0.31 0.10 - 1.00 x10*3/uL    Eosinophils Absolute 0.27 0.00 - 0.70 x10*3/uL    Basophils Absolute 0.07 0.00 - 0.10 x10*3/uL   Brain Natriuretic Peptide   Result Value Ref Range    BNP 41 0 - 99 pg/mL   Troponin, High Sensitivity, 1 Hour   Result Value Ref Range    Troponin I, High Sensitivity <3 0 - 13 ng/L   D-dimer, quantitative   Result Value Ref Range    D-Dimer Non VTE, Quant (ng/mL FEU) 630 (H) <=500 ng/mL FEU   Drug Screen, Urine   Result Value Ref Range    Amphetamine Screen, Urine Presumptive Negative Presumptive Negative    Barbiturate Screen, Urine Presumptive Negative Presumptive Negative    Benzodiazepines Screen, Urine Presumptive Negative Presumptive Negative    Cannabinoid Screen, Urine Presumptive Negative Presumptive Negative    Cocaine Metabolite Screen, Urine  Presumptive Positive (A) Presumptive Negative    Fentanyl Screen, Urine Presumptive Negative Presumptive Negative    Opiate Screen, Urine Presumptive Positive (A) Presumptive Negative    Oxycodone Screen, Urine Presumptive Negative Presumptive Negative    PCP Screen, Urine Presumptive Negative Presumptive Negative    Methadone Screen, Urine Presumptive Negative Presumptive Negative   Urinalysis with Reflex Microscopic   Result Value Ref Range    Color, Urine Light-Yellow Light-Yellow, Yellow, Dark-Yellow    Appearance, Urine Clear Clear    Specific Gravity, Urine 1.014 1.005 - 1.035    pH, Urine 6.5 5.0, 5.5, 6.0, 6.5, 7.0, 7.5, 8.0    Protein, Urine NEGATIVE NEGATIVE, 10 (TRACE), 20 (TRACE) mg/dL    Glucose, Urine Normal Normal mg/dL    Blood, Urine NEGATIVE NEGATIVE    Ketones, Urine NEGATIVE NEGATIVE mg/dL    Bilirubin, Urine NEGATIVE NEGATIVE    Urobilinogen, Urine Normal Normal mg/dL    Nitrite, Urine NEGATIVE NEGATIVE    Leukocyte Esterase, Urine NEGATIVE NEGATIVE   hCG, Urine, Qualitative   Result Value Ref Range    HCG, Urine NEGATIVE NEGATIVE       ECG 12 lead    Result Date: 6/28/2024  Normal sinus rhythm Rightward axis Low voltage QRS Septal infarct , age undetermined Abnormal ECG When compared with ECG of 25-JUN-2024 23:39, (unconfirmed) Septal infarct is now Present    CT angio chest for pulmonary embolism    Result Date: 6/28/2024  STUDY: CT Angiogram of the Chest; 6/28/2024 at 8:24 AM. INDICATION: Elevated D-dimer, chest pain. COMPARISON: XR chest 6/28/2024, 4/8/2024; CTA chest 5/5/2024, 2/9/2024. ACCESSION NUMBER(S): [id number] ORDERING CLINICIAN: LACY KIRBY TECHNIQUE:  CTA of the chest was performed with intravenous contrast. Images are reviewed and processed at a workstation according to the CT angiogram protocol with 3-D and/or MIP post processing imaging generated.  Omnipaque 350 75 mL was administered intravenously. Automated mA/kV exposure control was utilized and patient examination  was performed in strict accordance with principles of ALARA. FINDINGS: Pulmonary arteries are adequately opacified without acute or chronic filling defects.  The thoracic aorta is normal in course and caliber without dissection or aneurysm. The heart is normal in size without pericardial effusion.  Mild to moderate coronary artery calcifications.  Thoracic lymph nodes are not enlarged. There is no pleural effusion, pleural thickening, or pneumothorax. The airways are patent. Mild mosaic perfusion throughout the lungs..  Lungs otherwise clear. Upper abdomen demonstrates no acute pathology. There are no acute fractures.  No suspicious bony lesions.    1. No pulmonary embolism or other acute intrathoracic process. 2. Mild to moderate coronary artery calcifications. 3. Mild mosaic perfusion throughout the lungs suggestive of air trapping or small airways disease. Signed by Dmitriy Springer MD    XR chest 1 view    Result Date: 6/28/2024  STUDY: Chest Radiograph;  6/28/2024 6:31AM INDICATION: Pain. COMPARISON: 4/8/2024 XR chest ACCESSION NUMBER(S): [id number] ORDERING CLINICIAN: AME NAGY TECHNIQUE:  Frontal chest was obtained at 06:31 hours. FINDINGS: CARDIOMEDIASTINAL SILHOUETTE: Cardiomediastinal silhouette is normal in size and configuration. Median sternotomy wires  LUNGS: Lungs are clear.  ABDOMEN: No remarkable upper abdominal findings.  BONES: No acute osseous changes.    No acute cardiopulmonary process. Signed by Denis Ward MD    Nuclear Stress Test    Result Date: 6/6/2024  * * *Final Report* * * DATE OF EXAM: Jun 5 2024 12:51PM   [facility]   0006  -  NM CARDIAC PERF STRESS/PHARM  / ACCESSION #  [id number] PROCEDURE REASON: CAD screening, high CAD risk, not treadmill candidate      * * * * Physician Interpretation * * * * RESULT: Stress Supervisor Report: [facility] Date of service: 6/5/2024 10:47:12 AM Accession #: [id number] Supervising physician: Damon Cartagena MD PATIENT: Name: MS. LEATHA NAIR  JOSÉ MIGUEL MRN: [id number] Age: 43 years Gender: F   The supervising physician was in the department and immediately available. Electronically signed by Damon Cartagena MD on 6/6/2024 at 3:39:48 PM  *  *  *  Final  *  *  * -------------------------------------------------- PATIENT: Name: MS. LEATHA VALDEZ MRN: [id number] Age: 43 years Gender: F CONCLUSIONS:  1. SPECT Perfusion Study: Normal.  2. There is no scintigraphic evidence for inducible ischemia.  3. No evidence of scarred myocardium.  4. Left ventricle is normal in size. The left ventricle systolic function is normal.  5. Right ventricle is normal in size. The right ventricle systolic function is normal.  6. This is a low risk scan.         Gated Stress FBP  LVEF % 62 Prior Study Comparison No prior nuclear cardiology exam available for comparison. Nuclear Med Report:1-Day Gated SPECT Myocardial Perfusion with Regadenoson Stress: Myocardial perfusion imaging was performed at rest 30 minutes following the IV injection of the radiotracer. The patient received 0.4 mg of regadenoson, via rapid IV push, immediately followed by radiotracer IV. Gated post stress tomographic imaging was performed 30 to 60 minutes later. See administered radiotracer and doses below. [facility] Date of service: 6/5/2024 10:47:12 AM Accession #: [id number] Ordering Physician: KITTY ZENG. Requesting Physician: KITTY ZENG Indication: Assessment for suspected CAD Interpreting physician: Sana De La Paz MD Height: 157.48 cm BSA: 1.65 m² Weight: 62.60 kg  BMI: 25.2 kg/m² Imaging Protocol Limitation Reason Breast attenuation. CT Dose-Length Product(DLP): 26.0 mGy * cm. CT Dose Reduction Employed: Yes.        Exam Type:        Rest                          Stress       Radiopharm: Tc-99m Tetrofosmin             Tc-99m Tetrofosmin      Dosage(mCi):        13.7                           34.2 Atten Correction:   not performed                     performed     Stress Agent:                       Regadenoson 0.4mg Supply provided from                                                   [facility] Resting Blood Press: 177/89 mmHg Image Quality The overall study imaging quality was deemed to be fair. The following technical issues were noted: Breast attenuation.   FINDINGS:            Stress IR:3D Gated Stress FBP      LVEF:                    62 % ED Volume:                   101 ml ES Volume:                   38 ml       TID:     0.97 Perfusion Findings Stress IR:3D - Summed Score=0 All segments demonstrate normal perfusion. Rest IR:3D - Summed Score=0 All segments demonstrate normal perfusion.  Stress IR:3D   Rest IR:3D Summed Score=0 Summed Score=0 LEFT VENTRICLE The left ventricle is normal in size. Left ventricular systolic function is normal. Right Ventricle The right ventricle is normal in size. Right ventricle systolic function is normal. Stress Test Findings: There is no scintigraphic evidence for inducible ischemia. There is no evidence of scarring. Electronically signed by Sana De La Paz MD on 6/5/2024 at 1:00:16 PM  *  *  *  Final  *  *  * -------------------------------------------------- NM CTAC Report: [facility] Date of service: 6/5/2024 10:47:12 AM Accession #: [id number] CTAC interpreting physician: Coby Lynch MD PATIENT: Name: MS. LEATHA VALDEZ MRN: [id number] Age: 43 years Gender: F  1. Incidental Findings from limited non-diagnostic CTAC:     - Coronary calcifications visualized. Electronically signed by Coby Lynch MD on 6/5/2024 at 10:05:34 PM  *  *  *  Final  *  *  * -------------------------------------------------- Stress ECG Report: [facility] Date of service: 6/5/2024 10:47:12 AM Accession #: [id number] Ordering physician: KITTY ZENG Exercise specialist: Katarina Fletcher Assistant: Holly Monreal Interpreting physician: Damon Cartagena MD Patient name: MS. LEATHA VALDEZ MRN: [id number] Age: 43 years Gender: F Height: 157.48 cm BSA: 1.65 m² Weight:  62.60 kg  BMI: 25.2 kg/m² Indication: Encounter for screening for cardiovascular disorders Stress ECG Conclusion: Conclusion: Normal with exception due to T wave inversion Stress ECG Summary: The patient's resting heart rate was 59 bpm and blood pressure was 177/89 mmHg. The patient received regadenoson 0.4 mg IVP over approximately 15 seconds followed immediately by injection of nuclear isotope. The test was terminated due to end of protocol. No symptoms provoked during stress. The maximum heart rate was 78 bpm, which is 44% of the predicted heart rate for age. Peak blood pressure was 215/67 mmHg. The double product achieved was 65983. Medications:           Last Used ASPIRIN   1 Days LIPITOR   1 Days ZYRTEC    1 Days ZETIA     1 Days PROZAC    1 Days KEPPRA    1 Days SYNTHROID 1 Days SINGULAIR 1 Days PROTONIX  1 Days ULTRAM    1 Days Resting ECG: Sinus Bradycardia, Rightward Axis, T Wave Inversion and Prolonged QT Interval Symptoms at rest: Chest Discomfort Pharamcologic Protocol: Regadenoson Stress Exercise Table: +------+----------+--+---+---+ Stage Time (min)HRSYSDIA +------+----------+--+---+---+ 1     1.0       65       +------+----------+--+---+---+ 2     2.0       78       +------+----------+--+---+---+ 3     2.2       [id number]  +------+----------+--+---+---+ +-----+--+---+---+      HRSYSDIA +-----+--+---+---+ [id number]  +-----+--+---+---+ Recovery Table: +------+----------+--+---+---+ Stage Time (min)HRSYSDIA +------+----------+--+---+---+ 1     0.5       77       +------+----------+--+---+---+ 2     2.3       [id number]  +------+----------+--+---+---+ 3     3.5       74       +------+----------+--+---+---+ 4     4.5       [id number]  +------+----------+--+---+---+ 5     5.1       73       +------+----------+--+---+---+ Stress Observations: Resting HR: 59 bpm Peak HR: 78 bpm (44% MPHR) Resting BP: 177 / 89 mmHg Peak BP: 215 / 67 mmHg  Rate  Pressure Product (RPP): 37458 Stress Exercise Observations: Reason for test termination: end of protocol, Symptoms during test: No symptoms provoked during stress, Blood pressure response: Resting hypertension and Exercise systolic hypertension, ST segment and T wave changes: No ST changes and Arrhythmias: Unifocal PVCs Comments: Dr. Cartagena providing direct supervision during test Metabolic Exercise Data Variable: Observed value [Expected Range] HGI: 0.6 [>1.06 bpm/mmHg]   Electronically signed by Damon Cartagena MD on 6/6/2024 at 3:39:46 PM  *  *  *  Final  *  *  * RP Transcribed Using Voice Recognition Transcribe Date/Time: Jun 5 2024 10:47A Dictated by: ANH LYONS MD This examination was interpreted and the report reviewed and electronically signed by: ANH LYONS MD on Jun 6 2024  3:39PM  EST    CT head wo IV contrast    Result Date: 6/1/2024  * * *Final Report* * * DATE OF EXAM: Jun 1 2024  7:26PM   [facility]   [id number]  -  CT BRAIN WO IVCON   / ACCESSION #  [id number] PROCEDURE REASON: Cerebral hemorrhage suspected      * * * * Physician Interpretation * * * * RESULT: EXAMINATION: CT BRAIN WO IVCON CLINICAL HISTORY: Headache, anticoagulation. TECHNIQUE:  Serial axial images without IV contrast were obtained from the vertex to the foramen magnum. MQ:  CTBWO_3 CT Radiation dose: Integrated Dose-Length Product (DLP) for this visit =   764  mGy*cm CT Dose Reduction Employed: Automated exposure control(AEC) and iterative recon COMPARISON: MRI pituitary 12/08/2023 RESULT: Post-operative change: Multiple right-sided craniotomies with postsurgical changes in the sellar/suprasellar region.  Postsurgical changes in the posterior nasal passage and paranasal sinuses, presumably related to prior transsphenoidal approach. Acute change: No evidence of an acute infarct or other acute parenchymal process. Hemorrhage: No evidence of acute intracranial hemorrhage. ECASS hemorrhagic transformation score: Not  Applicable Mass Lesion / Mass Effect: There is no evidence of an intracranial mass or extraaxial fluid collection. No significant mass effect. Chronic change: Redemonstrated encephalomalacia and gliosis in the medial right temporal lobe.  Redemonstrated small focus of encephalomalacia and gliosis involving the right frontal operculum. Parenchyma: There is no significant volume loss. The brain parenchyma is otherwise within normal limits for age. Ventricles: The ventricles are within normal limits of size and configuration for age. Paranasal sinuses and skull base: The visualized paranasal sinuses are grossly clear. The skull base and imaged soft tissues are unremarkable. Localizer images: No additional findings.    IMPRESSION: 1.  No evidence of acute intracranial abnormality. 2.  Postsurgical changes as detailed, with unchanged small foci of encephalomalacia and gliosis in the right frontal operculum and medial right temporal lobe. Transcribed Using Voice Recognition Transcribe Date/Time: Jun 1 2024  8:56P Dictated by: LANI LYNCH MD This examination was interpreted and the report reviewed and electronically signed by: LANI LYNCH MD on Jun 1 2024  9:00PM  EST    XR chest 1 view    Result Date: 5/31/2024  * * *Final Report* * * DATE OF EXAM: May 31 2024  5:07PM   [facility]   [id number]  -  XR CHEST 1V FRONTAL PORT  / ACCESSION #  [id number] PROCEDURE REASON: Chest pain      * * * * Physician Interpretation * * * *  EXAM:  XR CHEST 1V FRONTAL PORT CLINICAL HISTORY: Chest pain Chest pain COMPARISON: 11/22/2023 RESULT: Lines, tubes, and devices:  None. Lungs and pleura: Bilateral lung fields are clear. No pneumothorax or suggestion for obvious/significant pleural effusions. Cardiomediastinal silhouette:  Normal cardiomediastinal silhouette. Stable changes of prior sternotomy and CABG.    IMPRESSION: No acute cardiopulmonary process. : GARY   Transcribe Date/Time: May 31 2024  5:17P Dictated by : KELECHI  MD EL This examination was interpreted and the report reviewed and electronically signed by: KELECHI GALLEGOS MD on May 31 2024  5:17PM  EST        Assessment/Plan   Principal Problem:    Chest pain  Active Problems:    Depression    Cocaine use    Hypotension    CAD (coronary artery disease)    HLD (hyperlipidemia)    Seizure disorder (Multi)    Hypothyroid    Adrenal insufficiency (Multi)    Diabetes insipidus (Multi)    Mild intermittent asthma (HHS-HCC)    Gastroesophageal reflux disease without esophagitis    Anxiety      #Chest pain, ACS ruled out   #Cocaine use  #Hypotension, chronic   #CAD, s/p CABG   #HLD  - Patient reports constant mid-sternal chest pain x 3 days with associated nausea and shortness of breath  - Received morphine 4 mg IV x 2 doses in the ED with improvement in chest pain  - Patient had a negative stress test at [facility] on 6/5/24- see report above   - Patient follows with Dr. Ferrari for cardiology, last seen in the office on 5/31/24   - EKG in the ED showed NSR, no STEMI, QTc 473 ms   - BNP 41  - Trop < 3, <3, repeat pending  - D-dimer 630, CT PE negative for PE  - UDS positive for cocaine and opiates, patient endorses recent crack cocaine use   - Lipid panel, A1c pending   - Echo ordered on admission   - Home meds resumed: aspirin, atorvastatin, ezetimibe, midodrine  - Lidocaine patch ordered; apply to chest wall   - Tylenol 975 mg PO q8h scheduled, Toradol PRN for moderate to severe pain, tramadol PRN for breakthrough pain   - Robaxin 500 mg q6h PRN for muscle spasms- home med   - Cardiac diet  - Telemetry monitoring  - Monitor HR and BP   - Stat EKG for worsening chest pain or new symptoms   - Encourage cocaine cessation on discharge; discussed risk for cocaine-induced vasospasm with patient on admission   - Cardiology consulted, appreciate recs given extensive history and recent workup at [facility]     #Seizure disorder  - Continue levetiracetam  - Seizure precautions  ordered    #Hypothyroidism  - Continue levothyroxine    #Adrenal insufficiency  #Diabetes insipidus  - Na 134 on admission  - Home desmopressin nasal spray not stocked by pharmacy  - Continue hydrocortisone TID, midodrine TID   - Daily BMP     #Asthma, not in acute exacerbation   #STEVEN, not on CPAP   - Remains on room air  - Continue loratadine, montelukast  - Monitor SpO2 continuously    #GERD  - Continue PPI  - Mylanta QID PRN for indigestion     #Anxiety  #Depressive disorder, unspecified   - Continue amantadine, aripiprazole, fluoxetine, pregabalin  - Buspar BID PRN for anxiety      DVT PPx: SCDs ordered   GI PPx: Protonix   Diet: Cardiac   Code Status: Full     Disposition: Patient requires inpatient management at this time.     Total accumulated time spent face to face and not face to face preparing to see the patient, obtaining and reviewing separately obtained history; performing a medically appropriate examination and/or evaluation; counseling and educating the patient, family; ordering medications, tests, or procedures; referring and communicating with other health care professionals; documenting clinical information in the patient's medical record; independently interpreting results and communicating the results to the patient, family; and care coordination was 65 minutes.     Nikki Solorio PA-C

## 2024-06-29 VITALS
BODY MASS INDEX: 23.63 KG/M2 | WEIGHT: 133.38 LBS | RESPIRATION RATE: 17 BRPM | OXYGEN SATURATION: 99 % | HEART RATE: 83 BPM | SYSTOLIC BLOOD PRESSURE: 106 MMHG | TEMPERATURE: 96.8 F | DIASTOLIC BLOOD PRESSURE: 89 MMHG | HEIGHT: 63 IN

## 2024-06-29 LAB
ANION GAP SERPL CALC-SCNC: 12 MMOL/L (ref 10–20)
BUN SERPL-MCNC: 11 MG/DL (ref 6–23)
CALCIUM SERPL-MCNC: 9.6 MG/DL (ref 8.6–10.3)
CHLORIDE SERPL-SCNC: 100 MMOL/L (ref 98–107)
CHOLEST SERPL-MCNC: 127 MG/DL (ref 0–199)
CHOLESTEROL/HDL RATIO: 3
CO2 SERPL-SCNC: 27 MMOL/L (ref 21–32)
CREAT SERPL-MCNC: 0.88 MG/DL (ref 0.5–1.05)
EGFRCR SERPLBLD CKD-EPI 2021: 83 ML/MIN/1.73M*2
ERYTHROCYTE [DISTWIDTH] IN BLOOD BY AUTOMATED COUNT: 15.6 % (ref 11.5–14.5)
EST. AVERAGE GLUCOSE BLD GHB EST-MCNC: 100 MG/DL
GLUCOSE SERPL-MCNC: 127 MG/DL (ref 74–99)
HBA1C MFR BLD: 5.1 %
HCT VFR BLD AUTO: 36.5 % (ref 36–46)
HDLC SERPL-MCNC: 41.9 MG/DL
HGB BLD-MCNC: 12 G/DL (ref 12–16)
LDLC SERPL CALC-MCNC: 74 MG/DL
MCH RBC QN AUTO: 26.4 PG (ref 26–34)
MCHC RBC AUTO-ENTMCNC: 32.9 G/DL (ref 32–36)
MCV RBC AUTO: 80 FL (ref 80–100)
NON HDL CHOLESTEROL: 85 MG/DL (ref 0–149)
NRBC BLD-RTO: 0 /100 WBCS (ref 0–0)
PLATELET # BLD AUTO: 183 X10*3/UL (ref 150–450)
POTASSIUM SERPL-SCNC: 4 MMOL/L (ref 3.5–5.3)
RBC # BLD AUTO: 4.55 X10*6/UL (ref 4–5.2)
SODIUM SERPL-SCNC: 135 MMOL/L (ref 136–145)
TRIGL SERPL-MCNC: 57 MG/DL (ref 0–149)
VLDL: 11 MG/DL (ref 0–40)
WBC # BLD AUTO: 7.5 X10*3/UL (ref 4.4–11.3)

## 2024-06-29 PROCEDURE — 36415 COLL VENOUS BLD VENIPUNCTURE: CPT

## 2024-06-29 PROCEDURE — 96376 TX/PRO/DX INJ SAME DRUG ADON: CPT | Performed by: INTERNAL MEDICINE

## 2024-06-29 PROCEDURE — 2500000001 HC RX 250 WO HCPCS SELF ADMINISTERED DRUGS (ALT 637 FOR MEDICARE OP): Mod: SE

## 2024-06-29 PROCEDURE — 85027 COMPLETE CBC AUTOMATED: CPT

## 2024-06-29 PROCEDURE — 2500000004 HC RX 250 GENERAL PHARMACY W/ HCPCS (ALT 636 FOR OP/ED): Mod: SE

## 2024-06-29 PROCEDURE — 80061 LIPID PANEL: CPT

## 2024-06-29 PROCEDURE — G0378 HOSPITAL OBSERVATION PER HR: HCPCS

## 2024-06-29 PROCEDURE — 2500000002 HC RX 250 W HCPCS SELF ADMINISTERED DRUGS (ALT 637 FOR MEDICARE OP, ALT 636 FOR OP/ED): Mod: SE

## 2024-06-29 PROCEDURE — 2500000005 HC RX 250 GENERAL PHARMACY W/O HCPCS: Mod: SE | Performed by: NURSE PRACTITIONER

## 2024-06-29 PROCEDURE — 80048 BASIC METABOLIC PNL TOTAL CA: CPT

## 2024-06-29 RX ORDER — SODIUM CHLORIDE 9 MG/ML
10 INJECTION, SOLUTION INTRAVENOUS CONTINUOUS PRN
Status: DISCONTINUED | OUTPATIENT
Start: 2024-06-29 | End: 2024-06-29 | Stop reason: HOSPADM

## 2024-06-29 ASSESSMENT — COGNITIVE AND FUNCTIONAL STATUS - GENERAL
DAILY ACTIVITIY SCORE: 24
MOBILITY SCORE: 24

## 2024-06-29 ASSESSMENT — PAIN - FUNCTIONAL ASSESSMENT
PAIN_FUNCTIONAL_ASSESSMENT: 0-10
PAIN_FUNCTIONAL_ASSESSMENT: 0-10

## 2024-06-29 ASSESSMENT — PAIN DESCRIPTION - LOCATION: LOCATION: CHEST

## 2024-06-29 ASSESSMENT — PAIN SCALES - GENERAL
PAINLEVEL_OUTOF10: 6
PAINLEVEL_OUTOF10: 0 - NO PAIN
PAINLEVEL_OUTOF10: 0 - NO PAIN

## 2024-06-29 NOTE — CARE PLAN
Problem: Fall/Injury  Goal: Not fall by end of shift  Outcome: Progressing     Problem: Pain  Goal: Takes deep breaths with improved pain control throughout the shift  Outcome: Progressing   The patient's goals for the shift include      The clinical goals for the shift include report decreased pain.    Pain controlled with current regimen. Patient did not sleep overnight. Otherwise uneventful

## 2024-06-29 NOTE — DISCHARGE SUMMARY
Discharge Diagnosis  Chest pain, atypical  Cocaine use  Nocturnal hypoxia    Issues Requiring Follow-Up      Discharge Meds     Your medication list        CONTINUE taking these medications        Instructions Last Dose Given Next Dose Due   acetaminophen 500 mg tablet  Commonly known as: Tylenol           Ajovy Syringe 225 mg/1.5 mL prefilled syringe  Generic drug: fremanezumab           alum-mag hydroxide-simeth 200-200-20 mg/5 mL oral suspension  Commonly known as: Mylanta      Take 30 mL by mouth 4 times a day as needed for indigestion or heartburn.       amantadine 100 mg capsule  Commonly known as: Symmetrel           ARIPiprazole 15 mg tablet  Commonly known as: Abilify           ARIPiprazole 2 mg tablet  Commonly known as: Abilify           aspirin 81 mg chewable tablet           atorvastatin 80 mg tablet  Commonly known as: Lipitor           busPIRone 15 mg tablet  Commonly known as: Buspar           cetirizine 10 mg tablet  Commonly known as: ZyrTEC           desmopressin 10 mcg/spray (0.1 mL)  Commonly known as: DDAVP           ezetimibe 10 mg tablet  Commonly known as: Zetia           FLUoxetine 10 mg capsule  Commonly known as: PROzac           hydrocortisone 5 mg tablet  Commonly known as: Cortef           levETIRAcetam 750 mg tablet  Commonly known as: Keppra           levothyroxine 112 mcg tablet  Commonly known as: Synthroid, Levoxyl           lidocaine 4 % patch      Place 1 patch over 12 hours on the skin once daily. Remove & discard patch within 12 hours or as directed by MD. Do not start before January 30, 2024.       methocarbamol 500 mg tablet  Commonly known as: Robaxin           metoclopramide 10 mg tablet  Commonly known as: Reglan           midodrine 5 mg tablet  Commonly known as: Proamatine           montelukast 10 mg tablet  Commonly known as: Singulair           pantoprazole 40 mg EC tablet  Commonly known as: ProtoNix           pregabalin 100 mg capsule  Commonly known as: Lyrica       "     promethazine 25 mg tablet  Commonly known as: Phenergan      Take 0.5 tablets (12.5 mg) by mouth every 6 hours if needed for nausea or vomiting for up to 10 doses.       ubrogepant 100 mg tablet tablet  Commonly known as: Ubrelvy           zolpidem 10 mg tablet  Commonly known as: Ambien                    Test Results Pending At Discharge  Pending Labs       Order Current Status    Hemoglobin A1c In process        43 y.o. female presenting with chest pain. Patient reports that she developed mid-sternal chest pain 3 nights ago while sleeping; the pain woke her from sleep. She had some associated nausea and shortness of breath but otherwise denies dizziness, lightheadedness, palpitations, and near syncope. The pain radiated around her left side to her back and up to her jaw. Since then, patient reports that she has had continued mid-sternal chest pain. Pain does not change with rest or activity. Patient was seen in [facility] ED yesterday for the same complaint and discharged home. She came to [place] ED early this morning via EMS for continued chest pain. Patient reports that she has a history of \"blood clots in her heart\". She follows with Dr. Ferrari for cardiology, last seen 5/31/24. There is no mention of history of thrombosis in these notes and patient is not on any anticoagulation. EKG on arrival to the ED showed NSR with rate 69 bpm, no ST elevations. Troponins were negative x 2, repeat pending. D-dimer was 630, CTA chest for PE was negative. Urine drug screen was positive for cocaine and opiates; patient does endorse smoking crack cocaine about a week ago. She was given morphine 4 mg IV x 2 doses and Zofran in the ED. She is admitted to med/surg telemetry with cardiology consulted.     Hospital Course    #Chest pain, ACS ruled out   #Cocaine use  #Hypotension, chronic   #CAD, s/p CABG   #HLD  #Chronic Diastolic Heart Failure  - Patient reports constant mid-sternal chest pain x 3 days with associated nausea " and shortness of breath  - Received morphine 4 mg IV x 2 doses in the ED with improvement in chest pain  - Patient had a negative stress test at [place] on 6/5/24- see report above   - Patient follows with Dr. Ferrari for cardiology, last seen in the office on 5/31/24   - EKG in the ED showed NSR, no STEMI, QTc 473 ms   - BNP 41  - Trop < 3, <3, repeat pending  - D-dimer 630, CT PE negative for PE  - UDS positive for cocaine and opiates, patient endorses recent crack cocaine use   - Lipid panel, A1c pending   - Echo ordered on admission   - Home meds resumed: aspirin, atorvastatin, ezetimibe, midodrine  - Lidocaine patch ordered; apply to chest wall   - Tylenol 975 mg PO q8h scheduled, Toradol PRN for moderate to severe pain, tramadol PRN for breakthrough pain   - Robaxin 500 mg q6h PRN for muscle spasms- home med   - Cardiac diet  - Telemetry monitoring  - Monitor HR and BP   - Stat EKG for worsening chest pain or new symptoms   - Encourage cocaine cessation on discharge; discussed risk for cocaine-induced vasospasm with patient on admission   - Cardiology consulted, appreciate recs given extensive history and recent workup at [place]        #Seizure disorder  - Continue levetiracetam  - Seizure precautions ordered     #Hypothyroidism  - Continue levothyroxine     #Adrenal insufficiency  #Diabetes insipidus  - Na 134 on admission  - Home desmopressin nasal spray not stocked by pharmacy  - Continue hydrocortisone TID, midodrine TID   - Daily BMP      #Asthma, not in acute exacerbation   #STEVEN, not on CPAP   - Remains on room air  - Continue loratadine, montelukast  - Monitor SpO2 continuously     #GERD  - Continue PPI  - Mylanta QID PRN for indigestion      #Anxiety  #Depressive disorder, unspecified   - Continue amantadine, aripiprazole, fluoxetine, pregabalin  - Buspar BID PRN for anxiety        DVT PPx: SCDs ordered   GI PPx: Protonix      Code Status: Full      Disposition: Patient was stable to be discharged  to home on oxygen 2lpm at night. Her SpO2 dropped below 88% during the night. She cristiane follow up with her PCP.    Pertinent Physical Exam At Time of Discharge  Physical Exam  Vitals reviewed.   Constitutional:       Appearance: Normal appearance. She is normal weight.   HENT:      Head: Normocephalic and atraumatic.      Right Ear: External ear normal.      Left Ear: External ear normal.      Nose: Nose normal.      Mouth/Throat:      Mouth: Mucous membranes are moist.      Pharynx: Oropharynx is clear.   Eyes:      Conjunctiva/sclera: Conjunctivae normal.      Pupils: Pupils are equal, round, and reactive to light.   Cardiovascular:      Rate and Rhythm: Normal rate and regular rhythm.      Pulses: Normal pulses.      Heart sounds: Normal heart sounds.   Pulmonary:      Effort: Pulmonary effort is normal.      Breath sounds: Normal breath sounds.   Abdominal:      General: Bowel sounds are normal.      Palpations: Abdomen is soft.   Musculoskeletal:         General: Normal range of motion.      Cervical back: Normal range of motion and neck supple.   Skin:     General: Skin is warm and dry.   Neurological:      General: No focal deficit present.      Mental Status: She is alert and oriented to person, place, and time.   Psychiatric:         Mood and Affect: Mood normal.         Behavior: Behavior normal.         Outpatient Follow-Up  No future appointments.      Tara Umanzor, APRN-CNP

## 2024-06-29 NOTE — CARE PLAN
Patient had an overnight pulse ox trend pulled. Patient was on room air till about 0500. Patient desaturated 1 HR 9 minutes and 39 seconds. Patient was longest duration was 19 minutes and 50 seconds. Patient's lowest SpO2 was 80%. Patient qualifies for Oxygen at .       Patients information and script was sent to [facility], but [facility] called back with question about the script and was delivering the Oxygen while on the phone with me. So I called [facility] back and let them know Tabatha had info as well and is currently on their way to deliver the Oxygen.

## 2024-06-29 NOTE — NURSING NOTE
Assumed care of pt, pt denied chest pain, seizure protocol in place, call light with in reach.     Pt had PRN Ambien and Toradol for insomnia and pain, reported effective.

## 2024-07-01 LAB
AORTIC VALVE PEAK VELOCITY: 1.42 M/S
AV PEAK GRADIENT: 8.1 MMHG
EJECTION FRACTION APICAL 4 CHAMBER: 63.7
EJECTION FRACTION: 61 %
LEFT VENTRICLE INTERNAL DIMENSION DIASTOLE: 4.14 CM (ref 3.5–6)
LEFT VENTRICULAR OUTFLOW TRACT DIAMETER: 1.6 CM

## 2024-07-04 ENCOUNTER — HOSPITAL ENCOUNTER (EMERGENCY)
Facility: HOSPITAL | Age: 44
Discharge: HOME | End: 2024-07-04
Attending: EMERGENCY MEDICINE
Payer: MEDICARE

## 2024-07-04 ENCOUNTER — HOSPITAL ENCOUNTER (OUTPATIENT)
Dept: CARDIOLOGY | Facility: HOSPITAL | Age: 44
Discharge: HOME | End: 2024-07-04
Payer: MEDICARE

## 2024-07-04 ENCOUNTER — APPOINTMENT (OUTPATIENT)
Dept: RADIOLOGY | Facility: HOSPITAL | Age: 44
End: 2024-07-04
Payer: MEDICARE

## 2024-07-04 VITALS
WEIGHT: 135 LBS | DIASTOLIC BLOOD PRESSURE: 78 MMHG | SYSTOLIC BLOOD PRESSURE: 116 MMHG | RESPIRATION RATE: 16 BRPM | TEMPERATURE: 97.9 F | BODY MASS INDEX: 23.92 KG/M2 | HEART RATE: 71 BPM | HEIGHT: 63 IN | OXYGEN SATURATION: 98 %

## 2024-07-04 DIAGNOSIS — R07.9 CHEST PAIN, UNSPECIFIED TYPE: Primary | ICD-10-CM

## 2024-07-04 LAB
ALBUMIN SERPL BCP-MCNC: 4.6 G/DL (ref 3.4–5)
ALP SERPL-CCNC: 61 U/L (ref 33–110)
ALT SERPL W P-5'-P-CCNC: 24 U/L (ref 7–45)
ANION GAP SERPL CALC-SCNC: 11 MMOL/L (ref 10–20)
APAP SERPL-MCNC: <10 UG/ML
AST SERPL W P-5'-P-CCNC: 58 U/L (ref 9–39)
BASOPHILS # BLD AUTO: 0.07 X10*3/UL (ref 0–0.1)
BASOPHILS NFR BLD AUTO: 1.3 %
BILIRUB SERPL-MCNC: 0.5 MG/DL (ref 0–1.2)
BNP SERPL-MCNC: 42 PG/ML (ref 0–99)
BUN SERPL-MCNC: 14 MG/DL (ref 6–23)
CALCIUM SERPL-MCNC: 9.3 MG/DL (ref 8.6–10.3)
CARDIAC TROPONIN I PNL SERPL HS: <3 NG/L (ref 0–13)
CARDIAC TROPONIN I PNL SERPL HS: <3 NG/L (ref 0–13)
CHLORIDE SERPL-SCNC: 102 MMOL/L (ref 98–107)
CO2 SERPL-SCNC: 29 MMOL/L (ref 21–32)
CREAT SERPL-MCNC: 0.84 MG/DL (ref 0.5–1.05)
D DIMER PPP FEU-MCNC: 589 NG/ML FEU
EGFRCR SERPLBLD CKD-EPI 2021: 88 ML/MIN/1.73M*2
EOSINOPHIL # BLD AUTO: 0.28 X10*3/UL (ref 0–0.7)
EOSINOPHIL NFR BLD AUTO: 5.1 %
ERYTHROCYTE [DISTWIDTH] IN BLOOD BY AUTOMATED COUNT: 16.2 % (ref 11.5–14.5)
ETHANOL SERPL-MCNC: <10 MG/DL
GLUCOSE SERPL-MCNC: 97 MG/DL (ref 74–99)
HCT VFR BLD AUTO: 39.7 % (ref 36–46)
HGB BLD-MCNC: 13.1 G/DL (ref 12–16)
IMM GRANULOCYTES # BLD AUTO: 0.02 X10*3/UL (ref 0–0.7)
IMM GRANULOCYTES NFR BLD AUTO: 0.4 % (ref 0–0.9)
INR PPP: 1.1 (ref 0.9–1.1)
LYMPHOCYTES # BLD AUTO: 2.64 X10*3/UL (ref 1.2–4.8)
LYMPHOCYTES NFR BLD AUTO: 48 %
MAGNESIUM SERPL-MCNC: 2.76 MG/DL (ref 1.6–2.4)
MCH RBC QN AUTO: 26.6 PG (ref 26–34)
MCHC RBC AUTO-ENTMCNC: 33 G/DL (ref 32–36)
MCV RBC AUTO: 81 FL (ref 80–100)
MONOCYTES # BLD AUTO: 0.35 X10*3/UL (ref 0.1–1)
MONOCYTES NFR BLD AUTO: 6.4 %
NEUTROPHILS # BLD AUTO: 2.14 X10*3/UL (ref 1.2–7.7)
NEUTROPHILS NFR BLD AUTO: 38.8 %
NRBC BLD-RTO: 0 /100 WBCS (ref 0–0)
PLATELET # BLD AUTO: 118 X10*3/UL (ref 150–450)
POTASSIUM SERPL-SCNC: 4.5 MMOL/L (ref 3.5–5.3)
PROT SERPL-MCNC: 7.7 G/DL (ref 6.4–8.2)
PROTHROMBIN TIME: 12.7 SECONDS (ref 9.8–12.8)
RBC # BLD AUTO: 4.93 X10*6/UL (ref 4–5.2)
SALICYLATES SERPL-MCNC: <3 MG/DL
SODIUM SERPL-SCNC: 137 MMOL/L (ref 136–145)
WBC # BLD AUTO: 5.5 X10*3/UL (ref 4.4–11.3)

## 2024-07-04 PROCEDURE — 93005 ELECTROCARDIOGRAM TRACING: CPT

## 2024-07-04 PROCEDURE — 99283 EMERGENCY DEPT VISIT LOW MDM: CPT | Mod: 25

## 2024-07-04 PROCEDURE — 36415 COLL VENOUS BLD VENIPUNCTURE: CPT | Performed by: HEALTH CARE PROVIDER

## 2024-07-04 PROCEDURE — 84484 ASSAY OF TROPONIN QUANT: CPT | Performed by: HEALTH CARE PROVIDER

## 2024-07-04 PROCEDURE — 83735 ASSAY OF MAGNESIUM: CPT | Performed by: HEALTH CARE PROVIDER

## 2024-07-04 PROCEDURE — 80053 COMPREHEN METABOLIC PANEL: CPT | Performed by: HEALTH CARE PROVIDER

## 2024-07-04 PROCEDURE — 71045 X-RAY EXAM CHEST 1 VIEW: CPT | Performed by: RADIOLOGY

## 2024-07-04 PROCEDURE — 85025 COMPLETE CBC W/AUTO DIFF WBC: CPT | Performed by: HEALTH CARE PROVIDER

## 2024-07-04 PROCEDURE — 71045 X-RAY EXAM CHEST 1 VIEW: CPT

## 2024-07-04 PROCEDURE — 85379 FIBRIN DEGRADATION QUANT: CPT | Performed by: HEALTH CARE PROVIDER

## 2024-07-04 PROCEDURE — 85610 PROTHROMBIN TIME: CPT | Performed by: HEALTH CARE PROVIDER

## 2024-07-04 PROCEDURE — 83880 ASSAY OF NATRIURETIC PEPTIDE: CPT | Performed by: HEALTH CARE PROVIDER

## 2024-07-04 PROCEDURE — 80143 DRUG ASSAY ACETAMINOPHEN: CPT | Performed by: HEALTH CARE PROVIDER

## 2024-07-04 RX ORDER — ACETAMINOPHEN 325 MG/1
650 TABLET ORAL ONCE
Status: COMPLETED | OUTPATIENT
Start: 2024-07-04 | End: 2024-07-04

## 2024-07-04 RX ORDER — OXYCODONE HYDROCHLORIDE 5 MG/1
5 TABLET ORAL EVERY 6 HOURS PRN
Status: DISCONTINUED | OUTPATIENT
Start: 2024-07-04 | End: 2024-07-05 | Stop reason: HOSPADM

## 2024-07-04 ASSESSMENT — PAIN DESCRIPTION - LOCATION: LOCATION: CHEST

## 2024-07-04 ASSESSMENT — PAIN DESCRIPTION - PAIN TYPE: TYPE: ACUTE PAIN

## 2024-07-04 ASSESSMENT — PAIN - FUNCTIONAL ASSESSMENT: PAIN_FUNCTIONAL_ASSESSMENT: 0-10

## 2024-07-04 ASSESSMENT — PAIN DESCRIPTION - ORIENTATION: ORIENTATION: LEFT

## 2024-07-04 ASSESSMENT — PAIN DESCRIPTION - DESCRIPTORS: DESCRIPTORS: PRESSURE

## 2024-07-04 ASSESSMENT — PAIN DESCRIPTION - FREQUENCY: FREQUENCY: CONSTANT/CONTINUOUS

## 2024-07-04 ASSESSMENT — PAIN SCALES - GENERAL: PAINLEVEL_OUTOF10: 10 - WORST POSSIBLE PAIN

## 2024-07-04 NOTE — ED PROVIDER NOTES
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  Patient states she started to feel chest pain yesterday morning, denies any associated nausea vomiting, she notes that it transiently radiates to her upper extremity and her back.  She denies any fevers, chills denies any abdominal pain, she denies having any headache, dizziness.    Additional Limitations to History:   External Records Reviewed: I reviewed recent and relevant outside records including   History Obtained From:     Past Medical History: Per HPI  Medications: Reviewed in EMR and with patient  Allergies:  Reviewed in EMR  Past Surgical History:   Social History:     ------------------------------------------------------------------------------------------------------  Physical Exam:  --Vital signs reviewed in nursing triage note, EMR flow sheets, and at patient's bedside  GEN:  A&Ox3, no acute distress, appears comfortable.  Conversational and appropriate.  No confusion or gross mental status changes.  EYES: EOMI, non-injected sclera.  ENT: Moist mucous membranes, no apparent injuries or lesions.   CARDIO: Normal rate and regular rhythm. No murmurs, rubs, or gallops.  2+ equal pulses of the distal extremities.   PULM: Clear to auscultation bilaterally. No rales, rhonchi, or wheezes. Good symmetric chest expansion.  GI: Soft, non-tender, non-distended. No rebound tenderness or guarding.  SKIN: Warm and dry, no rashes or lesions.  MSK: ROM intact the extremities without contractures.   EXT: No peripheral edema, contusions, or wounds.   NEURO: Cranial nerves II-XII grossly intact. Sensation to light touch intact and equal bilaterally in  upper and lower extremities.  Symmetric 5/5 strength in upper and lower extremities.  PSYCH: Appropriate mood and behavior, converses and responds appropriately during exam.  -------------------------------------------------------------------------------------------------------        Differential Diagnoses Considered:   Chronic Medical Conditions Significantly Affecting Care:   Diagnostic testing considered: [PERC, D-Dimer, PECARN, etc.]    - EKG interpreted by myself normal sinus rhythm ventricular rate 75 IN interval 160 normal QRS duration no prolonged QT/QTc no obvious ST elevation, depression or acute ischemic findings.  - I independently interpreted: [CXR, CT, POCUS, etc. including your interpretation]  - Labs notable for     Escalation of Care: Appropriate for  Social Determinants of Health Significantly Affecting Care: [Homelessness, lacking transportation, uninsured, unable to afford medications]  Prescription Drug Consideration: [Antibiotics, antivirals, pain medications, etc.]  Discussion of Management with Other Providers:  I discussed the patient/results with: [admitting team, consultant, radiologist, social work, EPAT, case management, PT/OT, RT, PCP, etc.]      Alejandro Traore PA-C                          No data recorded                   Patient History   Past Medical History:   Diagnosis Date    COPD (chronic obstructive pulmonary disease) (Multi)     Coronary artery disease     Personal history of other endocrine, nutritional and metabolic disease     History of hypopituitarism    Personal history of other mental and behavioral disorders     History of depression    Personal history of other specified conditions     History of brain tumor    Unspecified convulsions (Multi)     Convulsion     Past Surgical History:   Procedure Laterality Date    MR HEAD ANGIO WO IV CONTRAST  7/15/2021    MR HEAD ANGIO WO IV CONTRAST 7/15/2021 [facility]    MR HEAD ANGIO WO IV CONTRAST  11/28/2017    MR  "HEAD ANGIO WO IV CONTRAST [facility]    MR HEAD ANGIO WO IV CONTRAST  3/13/2018    MR HEAD ANGIO WO IV CONTRAST [facility]    MR NECK ANGIO WO IV CONTRAST  7/15/2021    MR NECK ANGIO WO IV CONTRAST 7/15/2021 [facility]    OTHER SURGICAL HISTORY  08/17/2013    Craniotomy Tumor Removal - Complete     No family history on file.  Social History     Tobacco Use    Smoking status: Never    Smokeless tobacco: Never   Vaping Use    Vaping status: Never Used   Substance Use Topics    Alcohol use: Never    Drug use: Yes     Types: \"Crack\" cocaine, Cocaine     Comment: last week       Physical Exam   ED Triage Vitals [07/04/24 1857]   Temperature Heart Rate Respirations BP   36.6 °C (97.9 °F) 69 17 113/75      Pulse Ox Temp Source Heart Rate Source Patient Position   99 % Oral Monitor --      BP Location FiO2 (%)     -- --       Physical Exam    ED Course & MDM   Diagnoses as of 07/05/24 0750   Chest pain, unspecified type       Medical Decision Making  43-year-old female with history of coronary artery disease, CABG, MVR, TVR, presents with chest pain, patient is hemodynamically stable, nontoxic, there does not appear to be any acute ischemic changes on ECG and her laboratory workup appears essentially unremarkable serial troponins were negative, slightly elevated D-dimer however patient has had several CT angio's of her chest last 1 being 6/28/2024 negative for PE, she was given aspirin and nitro by EMS, patient discussed with the oncoming ED provider and turned over for final disposition and pending lab workup.        Procedure  Procedures     Alejandro Traore PA-C  07/04/24 1904       Alejandro Traore PA-C  07/04/24 1954       Alejandro Traore PA-C  07/05/24 0753       Alejandro Traore PA-C  07/05/24 0753    "

## 2024-07-04 NOTE — ED TRIAGE NOTES
Patient arrives by ambulance for chest pain that has been occurring off and on for a month or so. This started again yesterday with pain level 10 out of 10. Describes as a heavy pressure over left and right chest radiating to left flank. Patient states history of CABG and 3 blood clots currently in heart but denies being on any anticoagulants. Squad gave aspirin 324, nitroglycerin, and placed on O2  at 3 lpm NC.

## 2024-07-05 ENCOUNTER — APPOINTMENT (OUTPATIENT)
Dept: CARDIOLOGY | Facility: HOSPITAL | Age: 44
End: 2024-07-05
Payer: MEDICARE

## 2024-07-05 LAB
ATRIAL RATE: 69 BPM
P AXIS: 32 DEGREES
P OFFSET: 144 MS
P ONSET: 109 MS
PR INTERVAL: 186 MS
Q ONSET: 202 MS
QRS COUNT: 12 BEATS
QRS DURATION: 100 MS
QT INTERVAL: 442 MS
QTC CALCULATION(BAZETT): 473 MS
QTC FREDERICIA: 463 MS
R AXIS: 91 DEGREES
T AXIS: 135 DEGREES
T OFFSET: 423 MS
VENTRICULAR RATE: 69 BPM

## 2024-07-05 ASSESSMENT — HEART SCORE
TROPONIN: LESS THAN OR EQUAL TO NORMAL LIMIT
HISTORY: SLIGHTLY SUSPICIOUS
ECG: NORMAL
HEART SCORE: 2
AGE: <45
RISK FACTORS: >2 RISK FACTORS OR HX OF ATHEROSCLEROTIC DISEASE

## 2024-07-13 LAB
ATRIAL RATE: 75 BPM
P AXIS: 40 DEGREES
P OFFSET: 178 MS
P ONSET: 141 MS
PR INTERVAL: 160 MS
Q ONSET: 221 MS
QRS COUNT: 13 BEATS
QRS DURATION: 100 MS
QT INTERVAL: 438 MS
QTC CALCULATION(BAZETT): 489 MS
QTC FREDERICIA: 471 MS
R AXIS: 68 DEGREES
T AXIS: 37 DEGREES
T OFFSET: 440 MS
VENTRICULAR RATE: 75 BPM

## 2024-07-18 ENCOUNTER — APPOINTMENT (OUTPATIENT)
Dept: CARDIOLOGY | Facility: HOSPITAL | Age: 44
End: 2024-07-18
Payer: MEDICARE

## 2024-07-18 ENCOUNTER — APPOINTMENT (OUTPATIENT)
Dept: RADIOLOGY | Facility: HOSPITAL | Age: 44
End: 2024-07-18
Payer: MEDICARE

## 2024-07-18 ENCOUNTER — HOSPITAL ENCOUNTER (EMERGENCY)
Facility: HOSPITAL | Age: 44
Discharge: HOME | End: 2024-07-18
Attending: EMERGENCY MEDICINE
Payer: MEDICARE

## 2024-07-18 VITALS
SYSTOLIC BLOOD PRESSURE: 97 MMHG | TEMPERATURE: 97.3 F | HEART RATE: 70 BPM | OXYGEN SATURATION: 98 % | HEIGHT: 62 IN | WEIGHT: 135 LBS | DIASTOLIC BLOOD PRESSURE: 74 MMHG | BODY MASS INDEX: 24.84 KG/M2 | RESPIRATION RATE: 14 BRPM

## 2024-07-18 DIAGNOSIS — R07.9 CHEST PAIN, UNSPECIFIED TYPE: Primary | ICD-10-CM

## 2024-07-18 LAB
ALBUMIN SERPL BCP-MCNC: 4.3 G/DL (ref 3.4–5)
ALP SERPL-CCNC: 69 U/L (ref 33–110)
ALT SERPL W P-5'-P-CCNC: 8 U/L (ref 7–45)
ANION GAP SERPL CALC-SCNC: 11 MMOL/L (ref 10–20)
AST SERPL W P-5'-P-CCNC: 21 U/L (ref 9–39)
BASOPHILS # BLD AUTO: 0.05 X10*3/UL (ref 0–0.1)
BASOPHILS NFR BLD AUTO: 1 %
BILIRUB SERPL-MCNC: 0.6 MG/DL (ref 0–1.2)
BUN SERPL-MCNC: 12 MG/DL (ref 6–23)
CALCIUM SERPL-MCNC: 9.5 MG/DL (ref 8.6–10.3)
CARDIAC TROPONIN I PNL SERPL HS: <3 NG/L (ref 0–13)
CARDIAC TROPONIN I PNL SERPL HS: <3 NG/L (ref 0–13)
CHLORIDE SERPL-SCNC: 99 MMOL/L (ref 98–107)
CO2 SERPL-SCNC: 28 MMOL/L (ref 21–32)
CREAT SERPL-MCNC: 0.89 MG/DL (ref 0.5–1.05)
D DIMER PPP FEU-MCNC: 536 NG/ML FEU
EGFRCR SERPLBLD CKD-EPI 2021: 82 ML/MIN/1.73M*2
EOSINOPHIL # BLD AUTO: 0.19 X10*3/UL (ref 0–0.7)
EOSINOPHIL NFR BLD AUTO: 3.9 %
ERYTHROCYTE [DISTWIDTH] IN BLOOD BY AUTOMATED COUNT: 15.2 % (ref 11.5–14.5)
GLUCOSE SERPL-MCNC: 81 MG/DL (ref 74–99)
HCT VFR BLD AUTO: 38.7 % (ref 36–46)
HGB BLD-MCNC: 12.8 G/DL (ref 12–16)
IMM GRANULOCYTES # BLD AUTO: 0.01 X10*3/UL (ref 0–0.7)
IMM GRANULOCYTES NFR BLD AUTO: 0.2 % (ref 0–0.9)
LYMPHOCYTES # BLD AUTO: 2.47 X10*3/UL (ref 1.2–4.8)
LYMPHOCYTES NFR BLD AUTO: 51 %
MAGNESIUM SERPL-MCNC: 2.14 MG/DL (ref 1.6–2.4)
MCH RBC QN AUTO: 26.9 PG (ref 26–34)
MCHC RBC AUTO-ENTMCNC: 33.1 G/DL (ref 32–36)
MCV RBC AUTO: 81 FL (ref 80–100)
MONOCYTES # BLD AUTO: 0.3 X10*3/UL (ref 0.1–1)
MONOCYTES NFR BLD AUTO: 6.2 %
NEUTROPHILS # BLD AUTO: 1.82 X10*3/UL (ref 1.2–7.7)
NEUTROPHILS NFR BLD AUTO: 37.7 %
NRBC BLD-RTO: 0 /100 WBCS (ref 0–0)
PLATELET # BLD AUTO: 133 X10*3/UL (ref 150–450)
POTASSIUM SERPL-SCNC: 4.2 MMOL/L (ref 3.5–5.3)
PROT SERPL-MCNC: 7.2 G/DL (ref 6.4–8.2)
RBC # BLD AUTO: 4.76 X10*6/UL (ref 4–5.2)
SODIUM SERPL-SCNC: 134 MMOL/L (ref 136–145)
WBC # BLD AUTO: 4.8 X10*3/UL (ref 4.4–11.3)

## 2024-07-18 PROCEDURE — 71275 CT ANGIOGRAPHY CHEST: CPT | Performed by: RADIOLOGY

## 2024-07-18 PROCEDURE — 96374 THER/PROPH/DIAG INJ IV PUSH: CPT | Mod: 59

## 2024-07-18 PROCEDURE — 93005 ELECTROCARDIOGRAM TRACING: CPT

## 2024-07-18 PROCEDURE — 2500000004 HC RX 250 GENERAL PHARMACY W/ HCPCS (ALT 636 FOR OP/ED): Mod: SE | Performed by: EMERGENCY MEDICINE

## 2024-07-18 PROCEDURE — 85379 FIBRIN DEGRADATION QUANT: CPT | Performed by: EMERGENCY MEDICINE

## 2024-07-18 PROCEDURE — 83735 ASSAY OF MAGNESIUM: CPT | Performed by: EMERGENCY MEDICINE

## 2024-07-18 PROCEDURE — 85025 COMPLETE CBC W/AUTO DIFF WBC: CPT | Performed by: EMERGENCY MEDICINE

## 2024-07-18 PROCEDURE — 80053 COMPREHEN METABOLIC PANEL: CPT | Performed by: EMERGENCY MEDICINE

## 2024-07-18 PROCEDURE — 71275 CT ANGIOGRAPHY CHEST: CPT

## 2024-07-18 PROCEDURE — 36415 COLL VENOUS BLD VENIPUNCTURE: CPT | Performed by: EMERGENCY MEDICINE

## 2024-07-18 PROCEDURE — 84484 ASSAY OF TROPONIN QUANT: CPT | Performed by: EMERGENCY MEDICINE

## 2024-07-18 PROCEDURE — 2550000001 HC RX 255 CONTRASTS: Mod: SE | Performed by: EMERGENCY MEDICINE

## 2024-07-18 PROCEDURE — 2500000001 HC RX 250 WO HCPCS SELF ADMINISTERED DRUGS (ALT 637 FOR MEDICARE OP): Mod: SE | Performed by: EMERGENCY MEDICINE

## 2024-07-18 PROCEDURE — 99285 EMERGENCY DEPT VISIT HI MDM: CPT | Mod: 25

## 2024-07-18 RX ORDER — MORPHINE SULFATE 4 MG/ML
2 INJECTION, SOLUTION INTRAMUSCULAR; INTRAVENOUS ONCE
Status: COMPLETED | OUTPATIENT
Start: 2024-07-18 | End: 2024-07-18

## 2024-07-18 RX ORDER — NAPROXEN SODIUM 220 MG/1
81 TABLET, FILM COATED ORAL ONCE
Status: COMPLETED | OUTPATIENT
Start: 2024-07-18 | End: 2024-07-18

## 2024-07-18 RX ADMIN — ASPIRIN 81 MG CHEWABLE TABLET 81 MG: 81 TABLET CHEWABLE at 19:51

## 2024-07-18 RX ADMIN — IOHEXOL 75 ML: 350 INJECTION, SOLUTION INTRAVENOUS at 20:33

## 2024-07-18 RX ADMIN — MORPHINE SULFATE 2 MG: 4 INJECTION, SOLUTION INTRAMUSCULAR; INTRAVENOUS at 20:47

## 2024-07-18 ASSESSMENT — PAIN DESCRIPTION - PAIN TYPE: TYPE: ACUTE PAIN

## 2024-07-18 ASSESSMENT — PAIN SCALES - GENERAL
PAINLEVEL_OUTOF10: 9
PAINLEVEL_OUTOF10: 5 - MODERATE PAIN

## 2024-07-18 ASSESSMENT — PAIN - FUNCTIONAL ASSESSMENT: PAIN_FUNCTIONAL_ASSESSMENT: 0-10

## 2024-07-18 ASSESSMENT — PAIN DESCRIPTION - DESCRIPTORS
DESCRIPTORS: HEAVINESS
DESCRIPTORS: ACHING

## 2024-07-18 ASSESSMENT — PAIN DESCRIPTION - LOCATION: LOCATION: CHEST

## 2024-07-18 ASSESSMENT — PAIN DESCRIPTION - FREQUENCY: FREQUENCY: CONSTANT/CONTINUOUS

## 2024-07-18 NOTE — ED TRIAGE NOTES
Patient states she started having chest pain this morning that radiates into her neck. No other symptoms

## 2024-07-18 NOTE — ED PROVIDER NOTES
HPI   Chief Complaint   Patient presents with    Chest Pain     Patient states she started having chest pain this morning that radiates into her neck. No other symptoms       HPI  Patient is a 43-year-old female with history of coronary artery disease with previous CABG, presenting to the ED today for chest pain.  Patient states that she started developing chest pain and shortness of breath this morning, chest pain is worse with deep breaths.  She states that the pain radiates into her neck.  She also reports associated nausea.  She otherwise denies any fever, cough, abdominal pain, vomiting, or changes to bowel or bladder habits.  Patient states that she took aspirin 243 mg prior to arrival, but ran out of aspirin to take a fourth.  Patient does have chronic episodes of chest pain, and has been evaluated 7 times in the past 2 months for her chest pain.      Patient History   Past Medical History:   Diagnosis Date    COPD (chronic obstructive pulmonary disease) (Multi)     Coronary artery disease     Personal history of other endocrine, nutritional and metabolic disease     History of hypopituitarism    Personal history of other mental and behavioral disorders     History of depression    Personal history of other specified conditions     History of brain tumor    Unspecified convulsions (Multi)     Convulsion     Past Surgical History:   Procedure Laterality Date    MR HEAD ANGIO WO IV CONTRAST  7/15/2021    MR HEAD ANGIO WO IV CONTRAST 7/15/2021 [facility]    MR HEAD ANGIO WO IV CONTRAST  11/28/2017    MR HEAD ANGIO WO IV CONTRAST [facility]    MR HEAD ANGIO WO IV CONTRAST  3/13/2018    MR HEAD ANGIO WO IV CONTRAST [facility]    MR NECK ANGIO WO IV CONTRAST  7/15/2021    MR NECK ANGIO WO IV CONTRAST 7/15/2021 [facility]    OTHER SURGICAL HISTORY  08/17/2013    Craniotomy Tumor Removal - Complete     No family history on file.  Social History     Tobacco Use    Smoking  "status: Never    Smokeless tobacco: Never   Vaping Use    Vaping status: Never Used   Substance Use Topics    Alcohol use: Never    Drug use: Yes     Types: \"Crack\" cocaine, Cocaine     Comment: last week       Physical Exam   ED Triage Vitals [07/18/24 1850]   Temperature Heart Rate Respirations BP   36.3 °C (97.3 °F) 68 16 114/77      Pulse Ox Temp Source Heart Rate Source Patient Position   100 % Temporal Monitor Lying      BP Location FiO2 (%)     Left arm --       Physical Exam  Vitals and nursing note reviewed.   Constitutional:       General: She is not in acute distress.     Appearance: She is not toxic-appearing.   HENT:      Head: Normocephalic.      Mouth/Throat:      Mouth: Mucous membranes are moist.   Eyes:      Extraocular Movements: Extraocular movements intact.      Conjunctiva/sclera: Conjunctivae normal.   Cardiovascular:      Rate and Rhythm: Normal rate and regular rhythm.   Pulmonary:      Effort: Pulmonary effort is normal. No respiratory distress.      Breath sounds: Normal breath sounds. No wheezing.   Abdominal:      General: There is no distension.      Palpations: Abdomen is soft.      Tenderness: There is no abdominal tenderness.   Musculoskeletal:         General: No swelling.      Cervical back: Neck supple.   Skin:     General: Skin is warm and dry.      Capillary Refill: Capillary refill takes less than 2 seconds.   Neurological:      General: No focal deficit present.      Mental Status: She is alert. Mental status is at baseline.           ED Course & MDM   ED Course as of 07/19/24 0426   Thu Jul 18, 2024   1900 EKG obtained at 1853, interpreted by myself.  Normal sinus rhythm with a ventricular rate of 68, right axis deviation, normal intervals, with no acute ischemic changes [VT]      ED Course User Index  [VT] Malika MISTRY MD         Diagnoses as of 07/19/24 0426   Chest pain, unspecified type                       No data recorded                      Medical Decision " Making  Patient was seen and evaluated for chest pain.  Differential diagnosis includes but is not limited to ACS, PE, AAA, Unstable angina, Aortic Dissection, GERD, Trauma, Viral Infection, MSK Pain, Costochondritis, Chest wall pain, COPD, CHF.  Initial EKG does not show any acute ischemic changes.  Patient is placed on a cardiac monitor with continuous pulse ox.  As patient took aspirin 243 mg prior to arrival, she is administered aspirin 81 mg at this time.  Additional labs and imaging are ordered for further evaluation of the patient's symptoms.    CBC is unremarkable.  CMP is unremarkable.  High-sensitivity troponin is negative, with repeat still negative.  CT angio chest for pulmonary embolism   Final Result   1. No evidence of acute pulmonary embolism.        MACRO:   None        Signed by: Matthias Carrillo 7/18/2024 9:11 PM   Dictation workstation:   [id number]        On reevaluation, patient is resting comfortably in bed. Patient was informed of their lab and imaging results, and all questions and concerns were answered. Discharge planning with close outpatient follow-up was discussed at this time, to which the patient was agreeable. Strict return precautions were given, and patient was discharged home in stable condition.    Procedure  Procedures     Malika MISTRY MD  07/19/24 0427

## 2024-07-19 LAB
ATRIAL RATE: 68 BPM
P AXIS: 21 DEGREES
P OFFSET: 160 MS
P ONSET: 125 MS
PR INTERVAL: 188 MS
Q ONSET: 219 MS
QRS COUNT: 12 BEATS
QRS DURATION: 94 MS
QT INTERVAL: 452 MS
QTC CALCULATION(BAZETT): 480 MS
QTC FREDERICIA: 471 MS
R AXIS: 92 DEGREES
T AXIS: -68 DEGREES
T OFFSET: 445 MS
VENTRICULAR RATE: 68 BPM

## 2024-08-09 ENCOUNTER — APPOINTMENT (OUTPATIENT)
Dept: RADIOLOGY | Facility: HOSPITAL | Age: 44
End: 2024-08-09
Payer: MEDICARE

## 2024-08-09 ENCOUNTER — HOSPITAL ENCOUNTER (EMERGENCY)
Facility: HOSPITAL | Age: 44
Discharge: HOME | End: 2024-08-10
Attending: STUDENT IN AN ORGANIZED HEALTH CARE EDUCATION/TRAINING PROGRAM
Payer: MEDICARE

## 2024-08-09 VITALS
SYSTOLIC BLOOD PRESSURE: 128 MMHG | TEMPERATURE: 98 F | RESPIRATION RATE: 16 BRPM | DIASTOLIC BLOOD PRESSURE: 87 MMHG | OXYGEN SATURATION: 100 % | BODY MASS INDEX: 23.92 KG/M2 | WEIGHT: 135 LBS | HEIGHT: 63 IN | HEART RATE: 78 BPM

## 2024-08-09 DIAGNOSIS — R10.9 ABDOMINAL PAIN, UNSPECIFIED ABDOMINAL LOCATION: Primary | ICD-10-CM

## 2024-08-09 PROCEDURE — 85025 COMPLETE CBC W/AUTO DIFF WBC: CPT | Performed by: STUDENT IN AN ORGANIZED HEALTH CARE EDUCATION/TRAINING PROGRAM

## 2024-08-09 PROCEDURE — 83605 ASSAY OF LACTIC ACID: CPT | Performed by: STUDENT IN AN ORGANIZED HEALTH CARE EDUCATION/TRAINING PROGRAM

## 2024-08-09 PROCEDURE — 81003 URINALYSIS AUTO W/O SCOPE: CPT | Performed by: STUDENT IN AN ORGANIZED HEALTH CARE EDUCATION/TRAINING PROGRAM

## 2024-08-09 PROCEDURE — 2500000004 HC RX 250 GENERAL PHARMACY W/ HCPCS (ALT 636 FOR OP/ED): Mod: SE | Performed by: STUDENT IN AN ORGANIZED HEALTH CARE EDUCATION/TRAINING PROGRAM

## 2024-08-09 PROCEDURE — 84075 ASSAY ALKALINE PHOSPHATASE: CPT | Performed by: STUDENT IN AN ORGANIZED HEALTH CARE EDUCATION/TRAINING PROGRAM

## 2024-08-09 PROCEDURE — 84702 CHORIONIC GONADOTROPIN TEST: CPT | Performed by: STUDENT IN AN ORGANIZED HEALTH CARE EDUCATION/TRAINING PROGRAM

## 2024-08-09 PROCEDURE — 36415 COLL VENOUS BLD VENIPUNCTURE: CPT | Performed by: STUDENT IN AN ORGANIZED HEALTH CARE EDUCATION/TRAINING PROGRAM

## 2024-08-09 PROCEDURE — 96374 THER/PROPH/DIAG INJ IV PUSH: CPT

## 2024-08-09 PROCEDURE — 99284 EMERGENCY DEPT VISIT MOD MDM: CPT | Mod: 25

## 2024-08-09 PROCEDURE — 87086 URINE CULTURE/COLONY COUNT: CPT | Mod: GENLAB | Performed by: STUDENT IN AN ORGANIZED HEALTH CARE EDUCATION/TRAINING PROGRAM

## 2024-08-09 PROCEDURE — 83735 ASSAY OF MAGNESIUM: CPT | Performed by: STUDENT IN AN ORGANIZED HEALTH CARE EDUCATION/TRAINING PROGRAM

## 2024-08-09 PROCEDURE — 74177 CT ABD & PELVIS W/CONTRAST: CPT

## 2024-08-09 RX ORDER — KETOROLAC TROMETHAMINE 15 MG/ML
15 INJECTION, SOLUTION INTRAMUSCULAR; INTRAVENOUS ONCE
Status: COMPLETED | OUTPATIENT
Start: 2024-08-09 | End: 2024-08-09

## 2024-08-09 RX ADMIN — KETOROLAC TROMETHAMINE 15 MG: 15 INJECTION INTRAMUSCULAR; INTRAVENOUS at 23:47

## 2024-08-09 ASSESSMENT — PAIN SCALES - GENERAL: PAINLEVEL_OUTOF10: 9

## 2024-08-09 ASSESSMENT — PAIN - FUNCTIONAL ASSESSMENT: PAIN_FUNCTIONAL_ASSESSMENT: 0-10

## 2024-08-09 ASSESSMENT — PAIN DESCRIPTION - LOCATION: LOCATION: ABDOMEN

## 2024-08-10 LAB
ALBUMIN SERPL BCP-MCNC: 4.3 G/DL (ref 3.4–5)
ALP SERPL-CCNC: 59 U/L (ref 33–110)
ALT SERPL W P-5'-P-CCNC: 13 U/L (ref 7–45)
ANION GAP SERPL CALC-SCNC: 12 MMOL/L (ref 10–20)
APPEARANCE UR: CLEAR
AST SERPL W P-5'-P-CCNC: 30 U/L (ref 9–39)
B-HCG SERPL-ACNC: <2 MIU/ML
BACTERIA #/AREA URNS AUTO: ABNORMAL /HPF
BASOPHILS # BLD AUTO: 0.08 X10*3/UL (ref 0–0.1)
BASOPHILS NFR BLD AUTO: 1.4 %
BILIRUB SERPL-MCNC: 0.6 MG/DL (ref 0–1.2)
BILIRUB UR STRIP.AUTO-MCNC: NEGATIVE MG/DL
BUN SERPL-MCNC: 9 MG/DL (ref 6–23)
CALCIUM SERPL-MCNC: 9.7 MG/DL (ref 8.6–10.3)
CHLORIDE SERPL-SCNC: 101 MMOL/L (ref 98–107)
CO2 SERPL-SCNC: 28 MMOL/L (ref 21–32)
COLOR UR: YELLOW
CREAT SERPL-MCNC: 0.88 MG/DL (ref 0.5–1.05)
EGFRCR SERPLBLD CKD-EPI 2021: 83 ML/MIN/1.73M*2
EOSINOPHIL # BLD AUTO: 0.16 X10*3/UL (ref 0–0.7)
EOSINOPHIL NFR BLD AUTO: 2.9 %
ERYTHROCYTE [DISTWIDTH] IN BLOOD BY AUTOMATED COUNT: 14.5 % (ref 11.5–14.5)
GLUCOSE SERPL-MCNC: 67 MG/DL (ref 74–99)
GLUCOSE UR STRIP.AUTO-MCNC: NORMAL MG/DL
HCT VFR BLD AUTO: 36.2 % (ref 36–46)
HGB BLD-MCNC: 11.7 G/DL (ref 12–16)
IMM GRANULOCYTES # BLD AUTO: 0.01 X10*3/UL (ref 0–0.7)
IMM GRANULOCYTES NFR BLD AUTO: 0.2 % (ref 0–0.9)
KETONES UR STRIP.AUTO-MCNC: NEGATIVE MG/DL
LACTATE SERPL-SCNC: 1.3 MMOL/L (ref 0.4–2)
LEUKOCYTE ESTERASE UR QL STRIP.AUTO: ABNORMAL
LYMPHOCYTES # BLD AUTO: 3.01 X10*3/UL (ref 1.2–4.8)
LYMPHOCYTES NFR BLD AUTO: 54.2 %
MAGNESIUM SERPL-MCNC: 2.26 MG/DL (ref 1.6–2.4)
MCH RBC QN AUTO: 26.7 PG (ref 26–34)
MCHC RBC AUTO-ENTMCNC: 32.3 G/DL (ref 32–36)
MCV RBC AUTO: 83 FL (ref 80–100)
MONOCYTES # BLD AUTO: 0.35 X10*3/UL (ref 0.1–1)
MONOCYTES NFR BLD AUTO: 6.3 %
MUCOUS THREADS #/AREA URNS AUTO: ABNORMAL /LPF
NEUTROPHILS # BLD AUTO: 1.94 X10*3/UL (ref 1.2–7.7)
NEUTROPHILS NFR BLD AUTO: 35 %
NITRITE UR QL STRIP.AUTO: NEGATIVE
NRBC BLD-RTO: 0 /100 WBCS (ref 0–0)
PH UR STRIP.AUTO: 5 [PH]
PLATELET # BLD AUTO: 154 X10*3/UL (ref 150–450)
POTASSIUM SERPL-SCNC: 3.5 MMOL/L (ref 3.5–5.3)
PROT SERPL-MCNC: 6.9 G/DL (ref 6.4–8.2)
PROT UR STRIP.AUTO-MCNC: ABNORMAL MG/DL
RBC # BLD AUTO: 4.38 X10*6/UL (ref 4–5.2)
RBC # UR STRIP.AUTO: NEGATIVE /UL
RBC #/AREA URNS AUTO: ABNORMAL /HPF
SODIUM SERPL-SCNC: 137 MMOL/L (ref 136–145)
SP GR UR STRIP.AUTO: 1.02
SQUAMOUS #/AREA URNS AUTO: ABNORMAL /HPF
UROBILINOGEN UR STRIP.AUTO-MCNC: NORMAL MG/DL
WBC # BLD AUTO: 5.6 X10*3/UL (ref 4.4–11.3)
WBC #/AREA URNS AUTO: ABNORMAL /HPF

## 2024-08-10 PROCEDURE — 74177 CT ABD & PELVIS W/CONTRAST: CPT | Performed by: RADIOLOGY

## 2024-08-10 PROCEDURE — 2550000001 HC RX 255 CONTRASTS: Mod: SE | Performed by: STUDENT IN AN ORGANIZED HEALTH CARE EDUCATION/TRAINING PROGRAM

## 2024-08-10 RX ORDER — ACETAMINOPHEN 325 MG/1
975 TABLET ORAL ONCE
Status: DISCONTINUED | OUTPATIENT
Start: 2024-08-10 | End: 2024-08-10 | Stop reason: HOSPADM

## 2024-08-10 RX ADMIN — IOHEXOL 75 ML: 350 INJECTION, SOLUTION INTRAVENOUS at 00:36

## 2024-08-12 LAB — BACTERIA UR CULT: NORMAL

## 2024-08-16 NOTE — ED PROVIDER NOTES
Chief Complaint: Abdominal pain  HPI: This is a 43-year-old female, presenting to the emergency department for right-sided abdominal pain which began a few days ago and increased in severity today.  She also complains of some nausea however denies any vomiting.  She denies any diarrhea.  She denies any fevers or chills.  She denies any chest pain or shortness of breath.    Past Medical History:   Diagnosis Date    COPD (chronic obstructive pulmonary disease) (Multi)     Coronary artery disease     Personal history of other endocrine, nutritional and metabolic disease     History of hypopituitarism    Personal history of other mental and behavioral disorders     History of depression    Personal history of other specified conditions     History of brain tumor    Unspecified convulsions (Multi)     Convulsion      Past Surgical History:   Procedure Laterality Date    MR HEAD ANGIO WO IV CONTRAST  7/15/2021    MR HEAD ANGIO WO IV CONTRAST 7/15/2021 [facility]    MR HEAD ANGIO WO IV CONTRAST  11/28/2017    MR HEAD ANGIO WO IV CONTRAST [facility]    MR HEAD ANGIO WO IV CONTRAST  3/13/2018    MR HEAD ANGIO WO IV CONTRAST [facility]    MR NECK ANGIO WO IV CONTRAST  7/15/2021    MR NECK ANGIO WO IV CONTRAST 7/15/2021 [facility]    OTHER SURGICAL HISTORY  08/17/2013    Craniotomy Tumor Removal - Complete       Physical Exam  Vitals and nursing note reviewed.   Constitutional:       Appearance: Normal appearance.   HENT:      Head: Normocephalic and atraumatic.      Mouth/Throat:      Mouth: Mucous membranes are moist.   Eyes:      Conjunctiva/sclera: Conjunctivae normal.   Cardiovascular:      Rate and Rhythm: Normal rate.   Pulmonary:      Effort: Pulmonary effort is normal.   Abdominal:      General: Abdomen is flat. Bowel sounds are normal. There is no distension.      Palpations: Abdomen is soft. There is no mass.      Tenderness: There is generalized abdominal tenderness. There is  no guarding or rebound.      Comments: Patient does not have any tenderness with palpation of my stethoscope or with distraction, however on palpation with my hand, the patient states that she has significant tenderness   Musculoskeletal:         General: Normal range of motion.      Cervical back: Normal range of motion.   Skin:     General: Skin is warm and dry.   Neurological:      General: No focal deficit present.      Mental Status: She is alert.   Psychiatric:         Mood and Affect: Mood normal.          ED Course/MDM  Diagnoses as of 08/15/24 6445   Abdominal pain, unspecified abdominal location       This is a 43 y.o. female presenting to the ED for evaluation of abdominal pain on the right side which began a few days ago and increased in severity today.  Patient also complains of some nausea however denies any vomiting, diarrhea, fevers, chills.  On physical exam, the patient is resting comfortably in the bed, no acute distress.  No abdominal tenderness with palpation using my stethoscope and with distraction, however she does have diffuse tenderness with palpation or just my hand.  No rebound, guarding, rigidity. Lab work was obtained, and is overall grossly unremarkable apart from evidence of a urinary tract infection.  CT abdomen pelvis with IV contrast was also obtained, and was nonconcerning for any acute intra-abdominal or pelvic process.  Patient's pain was treated with Toradol.  Patient did repeatedly request narcotic pain medication, and when told that she would not receive narcotic pain medication patient opted to leave without p.o. challenge and without any further intervention.  She was reminded that she can return to the emergency department at anytime for any worsening symptoms, and to follow-up with her primary care provider.  She was discharged home in stable condition.    Final Impression  1.  Abdominal pain  Disposition/Plan: Discharge home  Condition at disposition: Stable.      Millie Brown DO  Emergency Medicine Physician     Millie Brown,   08/15/24 2730

## 2024-08-19 ENCOUNTER — HOSPITAL ENCOUNTER (EMERGENCY)
Facility: HOSPITAL | Age: 44
Discharge: LEFT BEFORE DISCHARGE | End: 2024-08-19
Attending: EMERGENCY MEDICINE
Payer: MEDICARE

## 2024-08-19 ENCOUNTER — APPOINTMENT (OUTPATIENT)
Dept: RADIOLOGY | Facility: HOSPITAL | Age: 44
End: 2024-08-19
Payer: MEDICARE

## 2024-08-19 ENCOUNTER — APPOINTMENT (OUTPATIENT)
Dept: CARDIOLOGY | Facility: HOSPITAL | Age: 44
End: 2024-08-19
Payer: MEDICARE

## 2024-08-19 VITALS
TEMPERATURE: 97.1 F | HEIGHT: 62 IN | BODY MASS INDEX: 23.92 KG/M2 | RESPIRATION RATE: 19 BRPM | OXYGEN SATURATION: 99 % | WEIGHT: 130 LBS | SYSTOLIC BLOOD PRESSURE: 106 MMHG | HEART RATE: 75 BPM | DIASTOLIC BLOOD PRESSURE: 79 MMHG
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
## 2024-08-19 DIAGNOSIS — R11.0 NAUSEA: ICD-10-CM

## 2024-08-19 DIAGNOSIS — R19.7 DIARRHEA, UNSPECIFIED TYPE: ICD-10-CM

## 2024-08-19 DIAGNOSIS — F14.10 COCAINE USE DISORDER (MULTI): ICD-10-CM

## 2024-08-19 DIAGNOSIS — R55 NEAR SYNCOPE: Primary | ICD-10-CM

## 2024-08-19 DIAGNOSIS — M54.50 LUMBAR BACK PAIN: ICD-10-CM

## 2024-08-19 LAB
ALBUMIN SERPL BCP-MCNC: 4.4 G/DL (ref 3.4–5)
ALP SERPL-CCNC: 64 U/L (ref 33–110)
ALT SERPL W P-5'-P-CCNC: 28 U/L (ref 7–45)
AMPHETAMINES UR QL SCN: ABNORMAL
ANION GAP SERPL CALC-SCNC: 13 MMOL/L (ref 10–20)
APAP SERPL-MCNC: <10 UG/ML
AST SERPL W P-5'-P-CCNC: 57 U/L (ref 9–39)
BARBITURATES UR QL SCN: ABNORMAL
BASOPHILS # BLD AUTO: 0.06 X10*3/UL (ref 0–0.1)
BASOPHILS NFR BLD AUTO: 1.1 %
BENZODIAZ UR QL SCN: ABNORMAL
BILIRUB SERPL-MCNC: 0.5 MG/DL (ref 0–1.2)
BUN SERPL-MCNC: 8 MG/DL (ref 6–23)
BZE UR QL SCN: ABNORMAL
CALCIUM SERPL-MCNC: 9.4 MG/DL (ref 8.6–10.3)
CANNABINOIDS UR QL SCN: ABNORMAL
CARDIAC TROPONIN I PNL SERPL HS: 3 NG/L (ref 0–13)
CARDIAC TROPONIN I PNL SERPL HS: 3 NG/L (ref 0–13)
CHLORIDE SERPL-SCNC: 103 MMOL/L (ref 98–107)
CO2 SERPL-SCNC: 25 MMOL/L (ref 21–32)
CREAT SERPL-MCNC: 0.83 MG/DL (ref 0.5–1.05)
EGFRCR SERPLBLD CKD-EPI 2021: 89 ML/MIN/1.73M*2
EOSINOPHIL # BLD AUTO: 0.26 X10*3/UL (ref 0–0.7)
EOSINOPHIL NFR BLD AUTO: 4.9 %
ERYTHROCYTE [DISTWIDTH] IN BLOOD BY AUTOMATED COUNT: 14.6 % (ref 11.5–14.5)
ETHANOL SERPL-MCNC: <10 MG/DL
FENTANYL+NORFENTANYL UR QL SCN: ABNORMAL
GLUCOSE BLD MANUAL STRIP-MCNC: 84 MG/DL (ref 74–99)
GLUCOSE SERPL-MCNC: 83 MG/DL (ref 74–99)
HCG UR QL IA.RAPID: NEGATIVE
HCT VFR BLD AUTO: 40.1 % (ref 36–46)
HGB BLD-MCNC: 13 G/DL (ref 12–16)
IMM GRANULOCYTES # BLD AUTO: 0.01 X10*3/UL (ref 0–0.7)
IMM GRANULOCYTES NFR BLD AUTO: 0.2 % (ref 0–0.9)
LYMPHOCYTES # BLD AUTO: 2.99 X10*3/UL (ref 1.2–4.8)
LYMPHOCYTES NFR BLD AUTO: 56.8 %
MAGNESIUM SERPL-MCNC: 2.39 MG/DL (ref 1.6–2.4)
MCH RBC QN AUTO: 26.7 PG (ref 26–34)
MCHC RBC AUTO-ENTMCNC: 32.4 G/DL (ref 32–36)
MCV RBC AUTO: 82 FL (ref 80–100)
METHADONE UR QL SCN: ABNORMAL
MONOCYTES # BLD AUTO: 0.32 X10*3/UL (ref 0.1–1)
MONOCYTES NFR BLD AUTO: 6.1 %
NEUTROPHILS # BLD AUTO: 1.62 X10*3/UL (ref 1.2–7.7)
NEUTROPHILS NFR BLD AUTO: 30.9 %
NRBC BLD-RTO: 0 /100 WBCS (ref 0–0)
OPIATES UR QL SCN: ABNORMAL
OXYCODONE+OXYMORPHONE UR QL SCN: ABNORMAL
PCP UR QL SCN: ABNORMAL
PLATELET # BLD AUTO: 153 X10*3/UL (ref 150–450)
POTASSIUM SERPL-SCNC: 3.5 MMOL/L (ref 3.5–5.3)
PROT SERPL-MCNC: 7.4 G/DL (ref 6.4–8.2)
RBC # BLD AUTO: 4.87 X10*6/UL (ref 4–5.2)
SALICYLATES SERPL-MCNC: <3 MG/DL
SODIUM SERPL-SCNC: 137 MMOL/L (ref 136–145)
WBC # BLD AUTO: 5.3 X10*3/UL (ref 4.4–11.3)

## 2024-08-19 PROCEDURE — 93005 ELECTROCARDIOGRAM TRACING: CPT

## 2024-08-19 PROCEDURE — 85025 COMPLETE CBC W/AUTO DIFF WBC: CPT | Performed by: EMERGENCY MEDICINE

## 2024-08-19 PROCEDURE — 96374 THER/PROPH/DIAG INJ IV PUSH: CPT | Mod: 59

## 2024-08-19 PROCEDURE — 72125 CT NECK SPINE W/O DYE: CPT | Performed by: RADIOLOGY

## 2024-08-19 PROCEDURE — 84484 ASSAY OF TROPONIN QUANT: CPT | Performed by: EMERGENCY MEDICINE

## 2024-08-19 PROCEDURE — 80143 DRUG ASSAY ACETAMINOPHEN: CPT | Performed by: EMERGENCY MEDICINE

## 2024-08-19 PROCEDURE — 71260 CT THORAX DX C+: CPT | Performed by: RADIOLOGY

## 2024-08-19 PROCEDURE — 81025 URINE PREGNANCY TEST: CPT | Performed by: EMERGENCY MEDICINE

## 2024-08-19 PROCEDURE — 82947 ASSAY GLUCOSE BLOOD QUANT: CPT

## 2024-08-19 PROCEDURE — 99285 EMERGENCY DEPT VISIT HI MDM: CPT | Mod: 25

## 2024-08-19 PROCEDURE — 96361 HYDRATE IV INFUSION ADD-ON: CPT

## 2024-08-19 PROCEDURE — 70450 CT HEAD/BRAIN W/O DYE: CPT | Performed by: RADIOLOGY

## 2024-08-19 PROCEDURE — 83735 ASSAY OF MAGNESIUM: CPT | Performed by: EMERGENCY MEDICINE

## 2024-08-19 PROCEDURE — 36415 COLL VENOUS BLD VENIPUNCTURE: CPT | Performed by: EMERGENCY MEDICINE

## 2024-08-19 PROCEDURE — 70450 CT HEAD/BRAIN W/O DYE: CPT

## 2024-08-19 PROCEDURE — 80053 COMPREHEN METABOLIC PANEL: CPT | Performed by: EMERGENCY MEDICINE

## 2024-08-19 PROCEDURE — 74177 CT ABD & PELVIS W/CONTRAST: CPT | Performed by: RADIOLOGY

## 2024-08-19 PROCEDURE — 74177 CT ABD & PELVIS W/CONTRAST: CPT

## 2024-08-19 PROCEDURE — 72125 CT NECK SPINE W/O DYE: CPT

## 2024-08-19 PROCEDURE — 80307 DRUG TEST PRSMV CHEM ANLYZR: CPT | Performed by: EMERGENCY MEDICINE

## 2024-08-19 PROCEDURE — 2550000001 HC RX 255 CONTRASTS: Mod: SE | Performed by: EMERGENCY MEDICINE

## 2024-08-19 PROCEDURE — 2500000004 HC RX 250 GENERAL PHARMACY W/ HCPCS (ALT 636 FOR OP/ED): Mod: SE | Performed by: EMERGENCY MEDICINE

## 2024-08-19 RX ORDER — MORPHINE SULFATE 4 MG/ML
4 INJECTION, SOLUTION INTRAMUSCULAR; INTRAVENOUS ONCE
Status: DISCONTINUED | OUTPATIENT
Start: 2024-08-19 | End: 2024-08-19 | Stop reason: HOSPADM

## 2024-08-19 RX ORDER — METOCLOPRAMIDE HYDROCHLORIDE 5 MG/ML
10 INJECTION INTRAMUSCULAR; INTRAVENOUS ONCE
Status: COMPLETED | OUTPATIENT
Start: 2024-08-19 | End: 2024-08-19

## 2024-08-19 ASSESSMENT — PAIN DESCRIPTION - LOCATION: LOCATION: BACK

## 2024-08-19 ASSESSMENT — COLUMBIA-SUICIDE SEVERITY RATING SCALE - C-SSRS
2. HAVE YOU ACTUALLY HAD ANY THOUGHTS OF KILLING YOURSELF?: NO
6. HAVE YOU EVER DONE ANYTHING, STARTED TO DO ANYTHING, OR PREPARED TO DO ANYTHING TO END YOUR LIFE?: NO
1. IN THE PAST MONTH, HAVE YOU WISHED YOU WERE DEAD OR WISHED YOU COULD GO TO SLEEP AND NOT WAKE UP?: NO
1. IN THE PAST MONTH, HAVE YOU WISHED YOU WERE DEAD OR WISHED YOU COULD GO TO SLEEP AND NOT WAKE UP?: NO
6. HAVE YOU EVER DONE ANYTHING, STARTED TO DO ANYTHING, OR PREPARED TO DO ANYTHING TO END YOUR LIFE?: NO
2. HAVE YOU ACTUALLY HAD ANY THOUGHTS OF KILLING YOURSELF?: NO

## 2024-08-19 ASSESSMENT — PAIN SCALES - GENERAL: PAINLEVEL_OUTOF10: 6

## 2024-08-19 ASSESSMENT — PAIN - FUNCTIONAL ASSESSMENT: PAIN_FUNCTIONAL_ASSESSMENT: 0-10

## 2024-08-19 ASSESSMENT — PAIN DESCRIPTION - ORIENTATION: ORIENTATION: LOWER

## 2024-08-19 ASSESSMENT — PAIN DESCRIPTION - PAIN TYPE: TYPE: ACUTE PAIN

## 2024-08-19 NOTE — ED PROVIDER NOTES
HPI   Chief Complaint   Patient presents with    near syncope     Pt. States she has had nausea and diarrhea for the past two days. Pt. States she got dizzy this morning like she was going to pass out and fell onto her lower middle back and left rib area.  Denies LOC, arrives in a c-collar from EMS       43-year-old female with history of rheumatic heart disease, coronary disease status post mitral valve replacement and CABG presents for evaluation of lightheadedness/near syncope and fall.  Patient states she was walking to the front door of her house and she became lightheaded.  She fell to the ground.  She injured her back and ribs.  She did not strike her head.  She does not take blood thinners.  No loss of conscious.  States she has been nauseous and has had diarrhea for a few days.  Nonbloody loose watery stools.      History provided by:  Patient and medical records          Patient History   Past Medical History:   Diagnosis Date    COPD (chronic obstructive pulmonary disease) (Multi)     Coronary artery disease     Personal history of other endocrine, nutritional and metabolic disease     History of hypopituitarism    Personal history of other mental and behavioral disorders     History of depression    Personal history of other specified conditions     History of brain tumor    Unspecified convulsions (Multi)     Convulsion     Past Surgical History:   Procedure Laterality Date    MR HEAD ANGIO WO IV CONTRAST  7/15/2021    MR HEAD ANGIO WO IV CONTRAST 7/15/2021 [facility]    MR HEAD ANGIO WO IV CONTRAST  11/28/2017    MR HEAD ANGIO WO IV CONTRAST [facility]    MR HEAD ANGIO WO IV CONTRAST  3/13/2018    MR HEAD ANGIO WO IV CONTRAST [facility]    MR NECK ANGIO WO IV CONTRAST  7/15/2021    MR NECK ANGIO WO IV CONTRAST 7/15/2021 [facility]    OTHER SURGICAL HISTORY  08/17/2013    Craniotomy Tumor Removal - Complete     No family history on file.  Social History  "    Tobacco Use    Smoking status: Never    Smokeless tobacco: Never   Vaping Use    Vaping status: Never Used   Substance Use Topics    Alcohol use: Never    Drug use: Yes     Types: \"Crack\" cocaine, Cocaine     Comment: last week       Physical Exam   ED Triage Vitals [08/19/24 1121]   Temperature Heart Rate Respirations BP   36.2 °C (97.1 °F) 66 16 126/86      Pulse Ox Temp Source Heart Rate Source Patient Position   99 % Temporal Monitor Lying      BP Location FiO2 (%)     Right arm --       Physical Exam  Vitals and nursing note reviewed.   Constitutional:       General: She is not in acute distress.     Appearance: She is well-developed.   HENT:      Head: Normocephalic and atraumatic.   Eyes:      Conjunctiva/sclera: Conjunctivae normal.   Cardiovascular:      Rate and Rhythm: Normal rate and regular rhythm.      Heart sounds: No murmur heard.  Pulmonary:      Effort: Pulmonary effort is normal. No respiratory distress.      Breath sounds: Normal breath sounds.   Abdominal:      Palpations: Abdomen is soft.      Tenderness: There is no abdominal tenderness.   Musculoskeletal:         General: Tenderness present. No swelling or deformity.      Cervical back: Neck supple.      Comments: Thoracic and lumbar midline tenderness without step-offs or deformities.   Skin:     General: Skin is warm and dry.      Capillary Refill: Capillary refill takes less than 2 seconds.   Neurological:      General: No focal deficit present.      Mental Status: She is alert and oriented to person, place, and time.      Cranial Nerves: No cranial nerve deficit.      Sensory: No sensory deficit.      Motor: No weakness.      Coordination: Coordination normal.   Psychiatric:         Mood and Affect: Mood normal.           ED Course & MDM   ED Course as of 08/19/24 1444   Mon Aug 19, 2024   1133 43-year-old female presents with nausea, fall, near syncope, back pain, rib pain.  Labs saline bolus and orthostatic vitals.  EKG.  CT head " and cervical spine.  CT chest abdomen and pelvis to evaluate for traumatic injuries [BT]   1205 ECG 12 lead  Rate = 69 sinus rhythm.  Right axis.  Incomplete right bundle branch block.  Nonspecific ST-T changes.  No acute injury pattern. [BT]   1420 Urine drug screen positive for cocaine.  Laboratory studies otherwise unremarkable. [BT]   1420 Patient requesting additional pain medication.  She was given morphine 4 mg IV. [BT]   1420 Care transferred over to Dr. Brown at shift change pending imaging results. [BT]   1421 From lying to standing blood pressure dropped from 127 systolic to 106 systolic.  Heart rate increased from 62-74.  Will give second liter of saline. [BT]   1444 ECG 12 lead  EKG shows sinus rhythm with rate of 62.  PACs.  Incomplete right bundle branch block.  Nonspecific ST-T changes.  Unchanged from prior. [BT]   1444 Troponin negative x 2.  Doubt acute coronary syndrome. [BT]      ED Course User Index  [BT] Kumar Price DO         Diagnoses as of 08/19/24 1444   Near syncope   Diarrhea, unspecified type   Nausea   Fall, initial encounter   Lumbar back pain   Cocaine use disorder (Multi)                 No data recorded     [place] Coma Scale Score: 15 (08/19/24 1126 : Amy Naidu, FATOU)                           Medical Decision Making      Procedure  Procedures     Kumar Price,   08/19/24 1422       Kumar Price DO  08/19/24 1444     [facility] Counseling:  I discussed with the patient the risks of golimumab including but not limited to myelosuppression, immunosuppression, autoimmune hepatitis, demyelinating diseases, lymphoma, and serious infections.  The patient understands that monitoring is required including a PPD at baseline and must alert us or the primary physician if symptoms of infection or other concerning signs are noted.

## 2024-08-23 LAB
ATRIAL RATE: 62 BPM
ATRIAL RATE: 69 BPM
P AXIS: 41 DEGREES
P AXIS: 8 DEGREES
P OFFSET: 170 MS
P OFFSET: 175 MS
P ONSET: 128 MS
P ONSET: 140 MS
PR INTERVAL: 160 MS
PR INTERVAL: 190 MS
Q ONSET: 220 MS
Q ONSET: 223 MS
QRS COUNT: 10 BEATS
QRS COUNT: 11 BEATS
QRS DURATION: 104 MS
QRS DURATION: 106 MS
QT INTERVAL: 454 MS
QT INTERVAL: 464 MS
QTC CALCULATION(BAZETT): 470 MS
QTC CALCULATION(BAZETT): 486 MS
QTC FREDERICIA: 469 MS
QTC FREDERICIA: 475 MS
R AXIS: 89 DEGREES
R AXIS: 91 DEGREES
T AXIS: 254 DEGREES
T AXIS: 266 DEGREES
T OFFSET: 447 MS
T OFFSET: 455 MS
VENTRICULAR RATE: 62 BPM
VENTRICULAR RATE: 69 BPM

## 2024-08-29 NOTE — ED PROVIDER NOTES
"HPI   Chief Complaint   Patient presents with    Chest Pain     Started yesterday       HPI        Patient History   Past Medical History:   Diagnosis Date    COPD (chronic obstructive pulmonary disease) (Multi)     Coronary artery disease     Personal history of other endocrine, nutritional and metabolic disease     History of hypopituitarism    Personal history of other mental and behavioral disorders     History of depression    Personal history of other specified conditions     History of brain tumor    Unspecified convulsions (Multi)     Convulsion     Past Surgical History:   Procedure Laterality Date    MR HEAD ANGIO WO IV CONTRAST  7/15/2021    MR HEAD ANGIO WO IV CONTRAST 7/15/2021 [facility]    MR HEAD ANGIO WO IV CONTRAST  11/28/2017    MR HEAD ANGIO WO IV CONTRAST [facility]    MR HEAD ANGIO WO IV CONTRAST  3/13/2018    MR HEAD ANGIO WO IV CONTRAST [facility]    MR NECK ANGIO WO IV CONTRAST  7/15/2021    MR NECK ANGIO WO IV CONTRAST 7/15/2021 [facility]    OTHER SURGICAL HISTORY  08/17/2013    Craniotomy Tumor Removal - Complete     No family history on file.  Social History     Tobacco Use    Smoking status: Never    Smokeless tobacco: Never   Vaping Use    Vaping status: Never Used   Substance Use Topics    Alcohol use: Never    Drug use: Yes     Types: \"Crack\" cocaine, Cocaine     Comment: last week       Physical Exam   ED Triage Vitals [07/04/24 1857]   Temperature Heart Rate Respirations BP   36.6 °C (97.9 °F) 69 17 113/75      Pulse Ox Temp Source Heart Rate Source Patient Position   99 % Oral Monitor --      BP Location FiO2 (%)     -- --       Physical Exam      ED Course & MDM   Diagnoses as of 08/29/24 1418   Chest pain, unspecified type                 No data recorded     [place] Coma Scale Score: 15 (07/04/24 2130 : Carly Deleon RN) HEART Score: 2 (07/05/24 0753 : Alejandro Traore PA-C)                         Medical Decision " Making      Procedure  Procedures     Romel Schuster MD  08/29/24 7880

## 2024-08-29 NOTE — PROGRESS NOTES
Emergency Medicine Transition of Care Note.    I received Lu Fall in signout from   Kevin Traore PA-C.Please see the previous ED provider note for all HPI, PE and MDM up to the time of signout at 2100. This is in addition to the primary record.    In brief Lu Fall is an 43 y.o. female presenting for   Chief Complaint   Patient presents with    Chest Pain     Started yesterday     At the time of signout we were awaiting: final lab studies and disposition    Diagnoses as of 08/29/24 1426   Chest pain, unspecified type       Medical Decision Making  Cardiac indices were normal.     Her dimer was slightly up, but she just had a CTA of the chest within the past week, and her vitals  during this visit were all normal and tending toward the lower range of normal. This pain today is not due to a cardiac cause or VTE. Perhaps MSK, but is something that can be evaluated as an OPT.    Final diagnoses:   [R07.9] Chest pain, unspecified type           Procedure  Procedures    Romel Schuster MD

## 2024-09-02 ENCOUNTER — HOSPITAL ENCOUNTER (EMERGENCY)
Facility: HOSPITAL | Age: 44
Discharge: HOME | End: 2024-09-02
Attending: EMERGENCY MEDICINE
Payer: MEDICARE

## 2024-09-02 ENCOUNTER — APPOINTMENT (OUTPATIENT)
Dept: RADIOLOGY | Facility: HOSPITAL | Age: 44
End: 2024-09-02
Payer: MEDICARE

## 2024-09-02 VITALS
TEMPERATURE: 97.7 F | BODY MASS INDEX: 23.92 KG/M2 | HEIGHT: 62 IN | RESPIRATION RATE: 10 BRPM | OXYGEN SATURATION: 95 % | WEIGHT: 130 LBS | HEART RATE: 61 BPM | DIASTOLIC BLOOD PRESSURE: 80 MMHG | SYSTOLIC BLOOD PRESSURE: 103 MMHG

## 2024-09-02 DIAGNOSIS — I25.10 CORONARY ARTERY DISEASE INVOLVING NATIVE HEART, UNSPECIFIED VESSEL OR LESION TYPE, UNSPECIFIED WHETHER ANGINA PRESENT: ICD-10-CM

## 2024-09-02 DIAGNOSIS — R07.9 NONSPECIFIC CHEST PAIN: Primary | ICD-10-CM

## 2024-09-02 LAB
ALBUMIN SERPL BCP-MCNC: 4.7 G/DL (ref 3.4–5)
ALP SERPL-CCNC: 68 U/L (ref 33–110)
ALT SERPL W P-5'-P-CCNC: 8 U/L (ref 7–45)
ANION GAP SERPL CALC-SCNC: 12 MMOL/L (ref 10–20)
AST SERPL W P-5'-P-CCNC: 24 U/L (ref 9–39)
BASOPHILS # BLD AUTO: 0.1 X10*3/UL (ref 0–0.1)
BASOPHILS NFR BLD AUTO: 1.6 %
BILIRUB SERPL-MCNC: 0.6 MG/DL (ref 0–1.2)
BUN SERPL-MCNC: 10 MG/DL (ref 6–23)
CALCIUM SERPL-MCNC: 9.8 MG/DL (ref 8.6–10.3)
CARDIAC TROPONIN I PNL SERPL HS: 10 NG/L (ref 0–13)
CARDIAC TROPONIN I PNL SERPL HS: 9 NG/L (ref 0–13)
CHLORIDE SERPL-SCNC: 97 MMOL/L (ref 98–107)
CO2 SERPL-SCNC: 28 MMOL/L (ref 21–32)
CREAT SERPL-MCNC: 0.88 MG/DL (ref 0.5–1.05)
EGFRCR SERPLBLD CKD-EPI 2021: 83 ML/MIN/1.73M*2
EOSINOPHIL # BLD AUTO: 0.3 X10*3/UL (ref 0–0.7)
EOSINOPHIL NFR BLD AUTO: 4.7 %
ERYTHROCYTE [DISTWIDTH] IN BLOOD BY AUTOMATED COUNT: 14.4 % (ref 11.5–14.5)
GLUCOSE SERPL-MCNC: 78 MG/DL (ref 74–99)
HCT VFR BLD AUTO: 40.3 % (ref 36–46)
HGB BLD-MCNC: 13.4 G/DL (ref 12–16)
IMM GRANULOCYTES # BLD AUTO: 0.01 X10*3/UL (ref 0–0.7)
IMM GRANULOCYTES NFR BLD AUTO: 0.2 % (ref 0–0.9)
LYMPHOCYTES # BLD AUTO: 2.95 X10*3/UL (ref 1.2–4.8)
LYMPHOCYTES NFR BLD AUTO: 46.1 %
MCH RBC QN AUTO: 26.8 PG (ref 26–34)
MCHC RBC AUTO-ENTMCNC: 33.3 G/DL (ref 32–36)
MCV RBC AUTO: 81 FL (ref 80–100)
MONOCYTES # BLD AUTO: 0.36 X10*3/UL (ref 0.1–1)
MONOCYTES NFR BLD AUTO: 5.6 %
NEUTROPHILS # BLD AUTO: 2.68 X10*3/UL (ref 1.2–7.7)
NEUTROPHILS NFR BLD AUTO: 41.8 %
NRBC BLD-RTO: 0 /100 WBCS (ref 0–0)
PLATELET # BLD AUTO: 201 X10*3/UL (ref 150–450)
POTASSIUM SERPL-SCNC: 3.9 MMOL/L (ref 3.5–5.3)
PROT SERPL-MCNC: 7.7 G/DL (ref 6.4–8.2)
RBC # BLD AUTO: 5 X10*6/UL (ref 4–5.2)
SARS-COV-2 RNA RESP QL NAA+PROBE: NOT DETECTED
SODIUM SERPL-SCNC: 133 MMOL/L (ref 136–145)
WBC # BLD AUTO: 6.4 X10*3/UL (ref 4.4–11.3)

## 2024-09-02 PROCEDURE — 84484 ASSAY OF TROPONIN QUANT: CPT

## 2024-09-02 PROCEDURE — 36415 COLL VENOUS BLD VENIPUNCTURE: CPT

## 2024-09-02 PROCEDURE — 87635 SARS-COV-2 COVID-19 AMP PRB: CPT

## 2024-09-02 PROCEDURE — 71045 X-RAY EXAM CHEST 1 VIEW: CPT

## 2024-09-02 PROCEDURE — 96374 THER/PROPH/DIAG INJ IV PUSH: CPT

## 2024-09-02 PROCEDURE — 96375 TX/PRO/DX INJ NEW DRUG ADDON: CPT

## 2024-09-02 PROCEDURE — 2500000004 HC RX 250 GENERAL PHARMACY W/ HCPCS (ALT 636 FOR OP/ED): Mod: SE | Performed by: EMERGENCY MEDICINE

## 2024-09-02 PROCEDURE — 85025 COMPLETE CBC W/AUTO DIFF WBC: CPT

## 2024-09-02 PROCEDURE — 80053 COMPREHEN METABOLIC PANEL: CPT

## 2024-09-02 PROCEDURE — 71045 X-RAY EXAM CHEST 1 VIEW: CPT | Performed by: RADIOLOGY

## 2024-09-02 PROCEDURE — 93005 ELECTROCARDIOGRAM TRACING: CPT

## 2024-09-02 PROCEDURE — 99284 EMERGENCY DEPT VISIT MOD MDM: CPT | Mod: 25

## 2024-09-02 RX ORDER — MORPHINE SULFATE 4 MG/ML
4 INJECTION, SOLUTION INTRAMUSCULAR; INTRAVENOUS ONCE
Status: COMPLETED | OUTPATIENT
Start: 2024-09-02 | End: 2024-09-02

## 2024-09-02 RX ORDER — METOCLOPRAMIDE HYDROCHLORIDE 5 MG/ML
10 INJECTION INTRAMUSCULAR; INTRAVENOUS ONCE
Status: COMPLETED | OUTPATIENT
Start: 2024-09-02 | End: 2024-09-02

## 2024-09-02 ASSESSMENT — PAIN DESCRIPTION - DESCRIPTORS
DESCRIPTORS: PRESSURE
DESCRIPTORS: TIGHTNESS
DESCRIPTORS: HEAVINESS

## 2024-09-02 ASSESSMENT — PAIN - FUNCTIONAL ASSESSMENT
PAIN_FUNCTIONAL_ASSESSMENT: 0-10
PAIN_FUNCTIONAL_ASSESSMENT: 0-10

## 2024-09-02 ASSESSMENT — HEART SCORE
ECG: NON-SPECIFIC REPOLARIZATION DISTURBANCE
RISK FACTORS: >2 RISK FACTORS OR HX OF ATHEROSCLEROTIC DISEASE
AGE: <45
HISTORY: SLIGHTLY SUSPICIOUS
TROPONIN: LESS THAN OR EQUAL TO NORMAL LIMIT
HEART SCORE: 3

## 2024-09-02 ASSESSMENT — PAIN SCALES - GENERAL
PAINLEVEL_OUTOF10: 2
PAINLEVEL_OUTOF10: 10 - WORST POSSIBLE PAIN
PAINLEVEL_OUTOF10: 4

## 2024-09-02 ASSESSMENT — PAIN DESCRIPTION - PAIN TYPE: TYPE: ACUTE PAIN

## 2024-09-02 ASSESSMENT — PAIN DESCRIPTION - ORIENTATION: ORIENTATION: LEFT;UPPER

## 2024-09-02 ASSESSMENT — PAIN DESCRIPTION - LOCATION: LOCATION: CHEST

## 2024-09-02 ASSESSMENT — PAIN DESCRIPTION - FREQUENCY: FREQUENCY: CONSTANT/CONTINUOUS

## 2024-09-03 ENCOUNTER — APPOINTMENT (OUTPATIENT)
Dept: CARDIOLOGY | Facility: HOSPITAL | Age: 44
End: 2024-09-03
Payer: MEDICARE

## 2024-09-03 LAB
ATRIAL RATE: 69 BPM
P AXIS: 16 DEGREES
P OFFSET: 175 MS
P ONSET: 125 MS
PR INTERVAL: 192 MS
Q ONSET: 221 MS
QRS COUNT: 11 BEATS
QRS DURATION: 102 MS
QT INTERVAL: 524 MS
QTC CALCULATION(BAZETT): 561 MS
QTC FREDERICIA: 549 MS
R AXIS: 114 DEGREES
T AXIS: 193 DEGREES
T OFFSET: 483 MS
VENTRICULAR RATE: 69 BPM

## 2024-09-03 NOTE — ED TRIAGE NOTES
Pt here with complaints of chest pain and shortness of breath that started thismorning. Rates pain 10/10.

## 2024-09-03 NOTE — ED PROVIDER NOTES
HPI   Chief Complaint   Patient presents with    Chest Pain     Pt here with complaints of chest pain and shortness of breath that started thismorning. Rates pain 10/10.     Shortness of Breath       43-year-old female presents for evaluation of chest pain.  History of COPD, coronary artery disease, hypopituitarism presents for evaluation of chest pain.  Complaining of constant left-sided chest pain that goes into the left jaw and across her chest that has been present throughout the day today.  She took aspirin and Tylenol at home without relief.  No aggravating or alleviating factors.  No falls or syncopal episodes.  Also nauseous without vomiting.      History provided by:  Patient and medical records          Patient History   Past Medical History:   Diagnosis Date    COPD (chronic obstructive pulmonary disease) (Multi)     Coronary artery disease     Personal history of other endocrine, nutritional and metabolic disease     History of hypopituitarism    Personal history of other mental and behavioral disorders     History of depression    Personal history of other specified conditions     History of brain tumor    Unspecified convulsions (Multi)     Convulsion     Past Surgical History:   Procedure Laterality Date    MR HEAD ANGIO WO IV CONTRAST  7/15/2021    MR HEAD ANGIO WO IV CONTRAST 7/15/2021 [facility]    MR HEAD ANGIO WO IV CONTRAST  11/28/2017    MR HEAD ANGIO WO IV CONTRAST [facility]    MR HEAD ANGIO WO IV CONTRAST  3/13/2018    MR HEAD ANGIO WO IV CONTRAST [facility]    MR NECK ANGIO WO IV CONTRAST  7/15/2021    MR NECK ANGIO WO IV CONTRAST 7/15/2021 [facility]    OTHER SURGICAL HISTORY  08/17/2013    Craniotomy Tumor Removal - Complete     No family history on file.  Social History     Tobacco Use    Smoking status: Never    Smokeless tobacco: Never   Vaping Use    Vaping status: Never Used   Substance Use Topics    Alcohol use: Never    Drug use: Yes      "Types: \"Crack\" cocaine, Cocaine     Comment: 4 days ago       Physical Exam   ED Triage Vitals [09/02/24 2035]   Temperature Heart Rate Respirations BP   36.5 °C (97.7 °F) 66 20 105/78      Pulse Ox Temp Source Heart Rate Source Patient Position   100 % Temporal Monitor --      BP Location FiO2 (%)     -- --       Physical Exam  Vitals and nursing note reviewed.   Constitutional:       General: She is not in acute distress.     Appearance: She is well-developed.   HENT:      Head: Normocephalic and atraumatic.   Eyes:      Conjunctiva/sclera: Conjunctivae normal.   Cardiovascular:      Rate and Rhythm: Normal rate and regular rhythm.      Pulses:           Radial pulses are 2+ on the right side and 2+ on the left side.        Dorsalis pedis pulses are 2+ on the right side and 2+ on the left side.   Pulmonary:      Effort: Pulmonary effort is normal. No respiratory distress.      Breath sounds: Normal breath sounds.   Abdominal:      Palpations: Abdomen is soft.      Tenderness: There is no abdominal tenderness.   Musculoskeletal:         General: No swelling.      Cervical back: Neck supple.   Skin:     General: Skin is warm and dry.      Capillary Refill: Capillary refill takes less than 2 seconds.   Neurological:      Mental Status: She is alert.   Psychiatric:         Mood and Affect: Mood normal.           ED Course & MDM   ED Course as of 09/02/24 2303   Mon Sep 02, 2024   2040 ECG 12 lead  EKG shows sinus rhythm with rate of 69.  Right axis deviation.  Diffuse T wave inversions.  No acute injury pattern.  Unchanged from prior. [BT]   1632 43-year-old female with coronary artery disease status post CABG, mitral valve repair, STEMI, PCI with stent placement presents with chest pain.  EKG shows sinus rhythm with diffuse T wave inversions.  Unchanged from prior.  She already took aspirin at home.  Morphine and Reglan for pain and nausea. [BT]   5764 Troponin I Series, High Sensitivity (0, 1 HR)  Troponin 9 with " repeat of 10.  EKG with nonspecific ST-T changes.  No acute injury pattern.  Doubt acute coronary syndrome. [BT]   2302 Heart score is 3.  Exact cause of chest pain unclear.  Despite underlying known coronary artery disease, patient is safe for discharge home.  She was referred to PCP, cardiology follow-up.  Advised to return anytime with worsening chest pain or other concerns.  Patient agreeable with plan and verbalized understanding.  Discharged home. [BT]      ED Course User Index  [BT] Kumar Price DO         Diagnoses as of 09/02/24 2303   Nonspecific chest pain   Coronary artery disease involving native heart, unspecified vessel or lesion type, unspecified whether angina present                 No data recorded     [place] Coma Scale Score: 15 (09/02/24 2045 : Waleska Vora RN) HEART Score: 3 (09/02/24 2250 : Kumar Price DO)                         Medical Decision Making      Procedure  Procedures     Kumar Price DO  09/02/24 2303

## 2024-09-29 ENCOUNTER — HOSPITAL ENCOUNTER (EMERGENCY)
Facility: HOSPITAL | Age: 44
Discharge: HOME | End: 2024-09-29
Payer: MEDICARE

## 2024-09-29 ENCOUNTER — APPOINTMENT (OUTPATIENT)
Dept: RADIOLOGY | Facility: HOSPITAL | Age: 44
End: 2024-09-29
Payer: MEDICARE

## 2024-09-29 ENCOUNTER — APPOINTMENT (OUTPATIENT)
Dept: CARDIOLOGY | Facility: HOSPITAL | Age: 44
End: 2024-09-29
Payer: MEDICARE

## 2024-09-29 VITALS
HEIGHT: 62 IN | OXYGEN SATURATION: 95 % | BODY MASS INDEX: 23 KG/M2 | WEIGHT: 125 LBS | HEART RATE: 63 BPM | SYSTOLIC BLOOD PRESSURE: 113 MMHG | TEMPERATURE: 97.4 F | DIASTOLIC BLOOD PRESSURE: 79 MMHG | RESPIRATION RATE: 20 BRPM

## 2024-09-29 DIAGNOSIS — R07.9 CHEST PAIN, UNSPECIFIED TYPE: Primary | ICD-10-CM

## 2024-09-29 LAB
ALBUMIN SERPL BCP-MCNC: 4.7 G/DL (ref 3.4–5)
ALP SERPL-CCNC: 68 U/L (ref 33–110)
ALT SERPL W P-5'-P-CCNC: 7 U/L (ref 7–45)
ANION GAP SERPL CALC-SCNC: 14 MMOL/L (ref 10–20)
AST SERPL W P-5'-P-CCNC: 37 U/L (ref 9–39)
B-HCG SERPL-ACNC: <2 MIU/ML
BASOPHILS # BLD AUTO: 0.07 X10*3/UL (ref 0–0.1)
BASOPHILS NFR BLD AUTO: 1.4 %
BILIRUB SERPL-MCNC: 0.3 MG/DL (ref 0–1.2)
BUN SERPL-MCNC: 6 MG/DL (ref 6–23)
CALCIUM SERPL-MCNC: 9.6 MG/DL (ref 8.6–10.3)
CARDIAC TROPONIN I PNL SERPL HS: <3 NG/L (ref 0–13)
CARDIAC TROPONIN I PNL SERPL HS: <3 NG/L (ref 0–13)
CHLORIDE SERPL-SCNC: 100 MMOL/L (ref 98–107)
CO2 SERPL-SCNC: 24 MMOL/L (ref 21–32)
CREAT SERPL-MCNC: 0.82 MG/DL (ref 0.5–1.05)
EGFRCR SERPLBLD CKD-EPI 2021: >90 ML/MIN/1.73M*2
EOSINOPHIL # BLD AUTO: 0.2 X10*3/UL (ref 0–0.7)
EOSINOPHIL NFR BLD AUTO: 4.1 %
ERYTHROCYTE [DISTWIDTH] IN BLOOD BY AUTOMATED COUNT: 14.6 % (ref 11.5–14.5)
GLUCOSE BLD MANUAL STRIP-MCNC: 94 MG/DL (ref 74–99)
GLUCOSE SERPL-MCNC: 67 MG/DL (ref 74–99)
HCT VFR BLD AUTO: 41.2 % (ref 36–46)
HGB BLD-MCNC: 13.4 G/DL (ref 12–16)
IMM GRANULOCYTES # BLD AUTO: 0.03 X10*3/UL (ref 0–0.7)
IMM GRANULOCYTES NFR BLD AUTO: 0.6 % (ref 0–0.9)
LYMPHOCYTES # BLD AUTO: 2.67 X10*3/UL (ref 1.2–4.8)
LYMPHOCYTES NFR BLD AUTO: 55.1 %
MAGNESIUM SERPL-MCNC: 2.59 MG/DL (ref 1.6–2.4)
MCH RBC QN AUTO: 26.9 PG (ref 26–34)
MCHC RBC AUTO-ENTMCNC: 32.5 G/DL (ref 32–36)
MCV RBC AUTO: 83 FL (ref 80–100)
MONOCYTES # BLD AUTO: 0.26 X10*3/UL (ref 0.1–1)
MONOCYTES NFR BLD AUTO: 5.4 %
NEUTROPHILS # BLD AUTO: 1.62 X10*3/UL (ref 1.2–7.7)
NEUTROPHILS NFR BLD AUTO: 33.4 %
NRBC BLD-RTO: 0 /100 WBCS (ref 0–0)
PLATELET # BLD AUTO: 162 X10*3/UL (ref 150–450)
POTASSIUM SERPL-SCNC: 4.5 MMOL/L (ref 3.5–5.3)
PROT SERPL-MCNC: 7.9 G/DL (ref 6.4–8.2)
RBC # BLD AUTO: 4.99 X10*6/UL (ref 4–5.2)
SODIUM SERPL-SCNC: 133 MMOL/L (ref 136–145)
WBC # BLD AUTO: 4.9 X10*3/UL (ref 4.4–11.3)

## 2024-09-29 PROCEDURE — 85025 COMPLETE CBC W/AUTO DIFF WBC: CPT | Performed by: PHYSICIAN ASSISTANT

## 2024-09-29 PROCEDURE — 71045 X-RAY EXAM CHEST 1 VIEW: CPT | Performed by: STUDENT IN AN ORGANIZED HEALTH CARE EDUCATION/TRAINING PROGRAM

## 2024-09-29 PROCEDURE — 82947 ASSAY GLUCOSE BLOOD QUANT: CPT

## 2024-09-29 PROCEDURE — 36415 COLL VENOUS BLD VENIPUNCTURE: CPT | Performed by: PHYSICIAN ASSISTANT

## 2024-09-29 PROCEDURE — 84702 CHORIONIC GONADOTROPIN TEST: CPT | Performed by: PHYSICIAN ASSISTANT

## 2024-09-29 PROCEDURE — 83735 ASSAY OF MAGNESIUM: CPT | Performed by: PHYSICIAN ASSISTANT

## 2024-09-29 PROCEDURE — 2500000004 HC RX 250 GENERAL PHARMACY W/ HCPCS (ALT 636 FOR OP/ED): Mod: SE | Performed by: PHYSICIAN ASSISTANT

## 2024-09-29 PROCEDURE — 80053 COMPREHEN METABOLIC PANEL: CPT | Performed by: PHYSICIAN ASSISTANT

## 2024-09-29 PROCEDURE — 96361 HYDRATE IV INFUSION ADD-ON: CPT

## 2024-09-29 PROCEDURE — 2500000004 HC RX 250 GENERAL PHARMACY W/ HCPCS (ALT 636 FOR OP/ED): Mod: SE

## 2024-09-29 PROCEDURE — 99284 EMERGENCY DEPT VISIT MOD MDM: CPT | Mod: 25

## 2024-09-29 PROCEDURE — 84484 ASSAY OF TROPONIN QUANT: CPT | Performed by: PHYSICIAN ASSISTANT

## 2024-09-29 PROCEDURE — 96374 THER/PROPH/DIAG INJ IV PUSH: CPT

## 2024-09-29 PROCEDURE — 71045 X-RAY EXAM CHEST 1 VIEW: CPT

## 2024-09-29 PROCEDURE — 93005 ELECTROCARDIOGRAM TRACING: CPT

## 2024-09-29 RX ORDER — ONDANSETRON HYDROCHLORIDE 2 MG/ML
4 INJECTION, SOLUTION INTRAVENOUS ONCE
Status: COMPLETED | OUTPATIENT
Start: 2024-09-29 | End: 2024-09-29

## 2024-09-29 RX ORDER — ONDANSETRON HYDROCHLORIDE 2 MG/ML
INJECTION, SOLUTION INTRAVENOUS
Status: COMPLETED
Start: 2024-09-29 | End: 2024-09-29

## 2024-09-29 ASSESSMENT — HEART SCORE
AGE: <45
RISK FACTORS: 1-2 RISK FACTORS
TROPONIN: LESS THAN OR EQUAL TO NORMAL LIMIT
HISTORY: SLIGHTLY SUSPICIOUS
ECG: NORMAL
HEART SCORE: 1

## 2024-09-29 ASSESSMENT — PAIN DESCRIPTION - DESCRIPTORS: DESCRIPTORS: HEAVINESS

## 2024-09-29 ASSESSMENT — PAIN DESCRIPTION - LOCATION: LOCATION: CHEST

## 2024-09-29 ASSESSMENT — PAIN DESCRIPTION - ORIENTATION: ORIENTATION: LEFT

## 2024-09-29 ASSESSMENT — PAIN SCALES - GENERAL
PAINLEVEL_OUTOF10: 10 - WORST POSSIBLE PAIN
PAINLEVEL_OUTOF10: 10 - WORST POSSIBLE PAIN

## 2024-09-29 ASSESSMENT — PAIN - FUNCTIONAL ASSESSMENT: PAIN_FUNCTIONAL_ASSESSMENT: 0-10

## 2024-09-29 NOTE — ED TRIAGE NOTES
Pt states having intermittent L sided CP since Friday. Pt also states feeling SOB and nauseous. Pt does have cardiac hx, has had CABG and hx clots

## 2024-09-29 NOTE — ED NOTES
Patient ready for discharge, given discharge instructions and verbalizes understanding. IV removed, vitals stable. Pt called her daughter who states she will be here to pick her up soon. Pt walked with steady gait to waiting room to wait on ride.     Vivian Christiansen RN  09/29/24 0372

## 2024-09-29 NOTE — ED PROVIDER NOTES
HPI   Chief Complaint   Patient presents with    Chest Pain     Pt states having intermittent L sided CP since Friday. Pt also states feeling SOB and nauseous. Pt does have cardiac hx, has had CABG and hx clots       Is a 43-year-old female coming in for chest pain.  She states that she has had left-sided chest pain for the last 2 days.  Today it has been constant worse than the previous days it was intermittent.  She does have a cardiac history.  She has had a CABG done at the [facility] multiple years ago.  She denies SOB with this. No worsening symptoms on exertion. She denies any fevers or chills.  No cough congestion URI symptoms.  She has had nausea but denies any vomiting.  No diaphoretic episodes.  She did take aspirin at home for symptoms and states it did not help her pain.  Patient does report that she gets similar pain to this frequently and has seen both her PCP and has an upcoming cardiology appointment however has seen them before in the past for the same type of issue and they did not find a reason for her discomfort.      History provided by:  Patient and EMS personnel          Patient History   Past Medical History:   Diagnosis Date    COPD (chronic obstructive pulmonary disease) (Multi)     Coronary artery disease     Personal history of other endocrine, nutritional and metabolic disease     History of hypopituitarism    Personal history of other mental and behavioral disorders     History of depression    Personal history of other specified conditions     History of brain tumor    Unspecified convulsions (Multi)     Convulsion     Past Surgical History:   Procedure Laterality Date    MR HEAD ANGIO WO IV CONTRAST  7/15/2021    MR HEAD ANGIO WO IV CONTRAST 7/15/2021 [facility]    MR HEAD ANGIO WO IV CONTRAST  11/28/2017    MR HEAD ANGIO WO IV CONTRAST [facility]    MR HEAD ANGIO WO IV CONTRAST  3/13/2018    MR HEAD ANGIO WO IV CONTRAST [facility]    MR NECK ANGIO  "WO IV CONTRAST  7/15/2021    MR NECK ANGIO WO IV CONTRAST 7/15/2021 [facility]    OTHER SURGICAL HISTORY  08/17/2013    Craniotomy Tumor Removal - Complete     No family history on file.  Social History     Tobacco Use    Smoking status: Never    Smokeless tobacco: Never   Vaping Use    Vaping status: Never Used   Substance Use Topics    Alcohol use: Never    Drug use: Yes     Types: \"Crack\" cocaine, Cocaine       Physical Exam   ED Triage Vitals [09/29/24 1531]   Temperature Heart Rate Respirations BP   36.3 °C (97.4 °F) 60 17 142/88      Pulse Ox Temp Source Heart Rate Source Patient Position   100 % Temporal -- --      BP Location FiO2 (%)     -- --       Physical Exam  Vitals and nursing note reviewed.   Constitutional:       General: She is not in acute distress.     Appearance: Normal appearance. She is underweight. She is not toxic-appearing.   HENT:      Head: Normocephalic and atraumatic.      Nose: Nose normal.      Mouth/Throat:      Mouth: Mucous membranes are moist.      Pharynx: Oropharynx is clear.   Eyes:      Extraocular Movements: Extraocular movements intact.      Conjunctiva/sclera: Conjunctivae normal.      Pupils: Pupils are equal, round, and reactive to light.   Cardiovascular:      Rate and Rhythm: Regular rhythm.      Pulses: Normal pulses.      Heart sounds: Normal heart sounds.   Pulmonary:      Effort: Pulmonary effort is normal. No respiratory distress.      Breath sounds: Normal breath sounds.   Abdominal:      General: Abdomen is flat. Bowel sounds are normal.      Palpations: Abdomen is soft.      Tenderness: There is no abdominal tenderness.   Musculoskeletal:         General: Normal range of motion.      Cervical back: Normal range of motion and neck supple.   Skin:     General: Skin is warm and dry.      Coloration: Skin is not jaundiced or pale.      Findings: No bruising.   Neurological:      General: No focal deficit present.      Mental Status: She is alert and " oriented to person, place, and time. Mental status is at baseline.   Psychiatric:         Mood and Affect: Mood normal.         Behavior: Behavior normal.           ED Course & MDM   ED Course as of 09/29/24 1844   Sun Sep 29, 2024   1535 EKG completed at 1530 shows on my interpretation sinus bradycardia with a ventricular rate of 58 bpm.  T wave inversion in various leads present on prior EKG.  QTc 463 ms. [RS]   1842 EKG completed at 1737 on my interpretation shows sinus bradycardia with a ventricular rate of 58 bpm.  No signs of STEMI.  No changes compared to prior.  QTc 469 ms [RS]      ED Course User Index  [RS] Dorian Gannon PA-C         Diagnoses as of 09/29/24 1844   Chest pain, unspecified type                 No data recorded     Edgar Coma Scale Score: 15 (09/29/24 1533 : Ira Simmons RN)                           Medical Decision Making  Summary:  Medical Decision Making:   Patient presented as described in HPI. Patient case including ROS, PE, and treatment and plan discussed with ED attending if attached as cosigner. Results from labs and or imaging included below if completed. Lu Fall  is a 43 y.o. coming in for Patient presents with:  Chest Pain: Pt states having intermittent L sided CP since Friday. Pt also states feeling SOB and nauseous. Pt does have cardiac hx, has had CABG and hx clots  .  Due to patient's complaint lab work as well as chest x-ray was performed.  Patient has 2 negative troponins.  Chest x-ray is unremarkable.  She was given IV hydration.  She does have hypomagnesemia.  Magnesium is 2.59.  Glucose was slightly low at 67.  She was given a meal to eat.  She reports that she does feel much better.  Patient will be discharged advised close follow-up with cardiologist as well as PCP.  She agrees with treatment plan and states that she will comply. Heart score of 1.       Disposition is completed with shared decision making with the patient or guardian present with the  patient. They were advised to follow up with PCP or recommended provider in 2-3 days for another evaluation and exam. I advised the patient to return or go to closest emergency room immediately if symptoms change, get worse, or new symptoms develop prior to follow up. I explained the plan and treatment course. Patient/guardian is in agreement with plan, treatment course, and follow up and state that they will comply.    Labs Reviewed  CBC WITH AUTO DIFFERENTIAL - Abnormal     WBC                           4.9                    nRBC                          0.0                    RBC                           4.99                   Hemoglobin                    13.4                   Hematocrit                    41.2                   MCV                           83                     MCH                           26.9                   MCHC                          32.5                   RDW                           14.6 (*)               Platelets                     162                    Neutrophils %                 33.4                   Immature Granulocytes %, Automated   0.6                    Lymphocytes %                 55.1                   Monocytes %                   5.4                    Eosinophils %                 4.1                    Basophils %                   1.4                    Neutrophils Absolute          1.62                   Immature Granulocytes Absolute, Au*   0.03                   Lymphocytes Absolute          2.67                   Monocytes Absolute            0.26                   Eosinophils Absolute          0.20                   Basophils Absolute            0.07                COMPREHENSIVE METABOLIC PANEL - Abnormal     Glucose                       67 (*)                 Sodium                        133 (*)                Potassium                     4.5                    Chloride                      100                    Bicarbonate                   24                      Anion Gap                     14                     Urea Nitrogen                 6                      Creatinine                    0.82                   eGFR                          >90                    Calcium                       9.6                    Albumin                       4.7                    Alkaline Phosphatase          68                     Total Protein                 7.9                    AST                           37                     Bilirubin, Total              0.3                    ALT                           7                   MAGNESIUM - Abnormal     Magnesium                     2.59 (*)            HUMAN CHORIONIC GONADOTROPIN, SERUM QUANTITATIVE - Normal     HCG, Beta-Quantitative        <2                       Narrative:  Total HCG measurement is performed using the [employer] Access                 Immunoassay which detects intact HCG and free beta HCG subunit.                  This test is not indicated for use as a tumor marker.                 HCG testing is performed using a different test methodology at [facility] than other [facility]. Direct result comparison                 should only be made within the same method.                    SERIAL TROPONIN-INITIAL - Normal     Troponin I, High Sensitivity   <3                       Narrative: Less than 99th percentile of normal range cutoff-                Female and children under 18 years old <14 ng/L; Male <21 ng/L: Negative                Repeat testing should be performed if clinically indicated.                                 Female and children under 18 years old 14-50 ng/L; Male 21-50 ng/L:                Consistent with possible cardiac damage and possible increased clinical                 risk. Serial measurements may help to assess extent of myocardial damage.                                 >50 ng/L: Consistent with cardiac damage,  increased clinical risk and                myocardial infarction. Serial measurements may help assess extent of                 myocardial damage.                                  NOTE: Children less than 1 year old may have higher baseline troponin                 levels and results should be interpreted in conjunction with the overall                 clinical context.                                 NOTE: Troponin I testing is performed using a different                 testing methodology at [facility] than at other                 [facility]. Direct result comparisons should only                 be made within the same method.  SERIAL TROPONIN, 1 HOUR - Normal     Troponin I, High Sensitivity   <3                       Narrative: Less than 99th percentile of normal range cutoff-                Female and children under 18 years old <14 ng/L; Male <21 ng/L: Negative                Repeat testing should be performed if clinically indicated.                                 Female and children under 18 years old 14-50 ng/L; Male 21-50 ng/L:                Consistent with possible cardiac damage and possible increased clinical                 risk. Serial measurements may help to assess extent of myocardial damage.                                 >50 ng/L: Consistent with cardiac damage, increased clinical risk and                myocardial infarction. Serial measurements may help assess extent of                 myocardial damage.                                  NOTE: Children less than 1 year old may have higher baseline troponin                 levels and results should be interpreted in conjunction with the overall                 clinical context.                                 NOTE: Troponin I testing is performed using a different                 testing methodology at [facility] than at other                 [facility]. Direct result comparisons should only                  be made within the same method.  POCT GLUCOSE - Normal     POCT Glucose                  94                  TROPONIN SERIES- (INITIAL, 1 HR)       Narrative: The following orders were created for panel order Troponin I Series, High Sensitivity (0, 1 HR).                Procedure                               Abnormality         Status                                   ---------                               -----------         ------                                   Troponin I, High Sensiti...[[id number]]  Normal              Final result                             Troponin, High Sensitivi...[[id number]]  Normal              Final result                                             Please view results for these tests on the individual orders.  POCT GLUCOSE METER   XR chest 1 view   Final Result    1.  No evidence of acute cardiopulmonary process.                      MACRO:    None          Signed by: Javi Carrillo 9/29/2024 5:47 PM    Dictation workstation:   [id number]                            Tests/Medications/Escalations of Care considered but not given: As in [facility]    Patient care discussed with: N/A  Social Determinants affecting care: N/A    Final diagnosis and disposition as documented     ED Course as of 09/29/24 1727  ------------------------------------------------------------  Time: 09/29 1535  Comment: EKG completed at 1530 shows on my interpretation sinus bradycardia with a ventricular rate of 58 bpm.  T wave inversion in various leads present on prior EKG.  QTc 463 ms.  By: Dorian Gannon PA-C       Shared decision making was completed and determined that patient will be discharged. I discussed the differential; results and discharge plan with the patient and/or family/friend/caregiver if present.  I emphasized the importance of follow-up with the physician I referred them to in the timeframe recommended.  I explained reasons for the patient to return to the Emergency Department. They  agreed that if they feel their condition is worsening or if they have any other concern they should call 911 immediately for further assistance. I gave the patient an opportunity to ask all questions they had and answered all of them accordingly. They understand return precautions and discharge instructions. The patient and/or family/friend/caregiver expressed understanding verbally and that they would comply.     Disposition: Discharge      This note has been transcribed using voice recognition and may contain grammatical errors, misplaced words, incorrect words, incorrect phrases or other errors.         Procedure  Procedures     Dorian Gannon PA-C  09/29/24 0507

## 2024-10-01 LAB
ATRIAL RATE: 58 BPM
ATRIAL RATE: 58 BPM
P AXIS: 29 DEGREES
P AXIS: 48 DEGREES
P OFFSET: 164 MS
P OFFSET: 173 MS
P ONSET: 128 MS
P ONSET: 130 MS
PR INTERVAL: 178 MS
PR INTERVAL: 184 MS
Q ONSET: 219 MS
Q ONSET: 220 MS
QRS COUNT: 10 BEATS
QRS COUNT: 9 BEATS
QRS DURATION: 100 MS
QRS DURATION: 98 MS
QT INTERVAL: 472 MS
QT INTERVAL: 478 MS
QTC CALCULATION(BAZETT): 463 MS
QTC CALCULATION(BAZETT): 469 MS
QTC FREDERICIA: 466 MS
QTC FREDERICIA: 472 MS
R AXIS: 69 DEGREES
R AXIS: 93 DEGREES
T AXIS: 43 DEGREES
T AXIS: 56 DEGREES
T OFFSET: 456 MS
T OFFSET: 458 MS
VENTRICULAR RATE: 58 BPM
VENTRICULAR RATE: 58 BPM

## 2024-10-22 ENCOUNTER — APPOINTMENT (OUTPATIENT)
Dept: CARDIOLOGY | Facility: HOSPITAL | Age: 44
End: 2024-10-22
Payer: MEDICARE

## 2024-10-22 ENCOUNTER — HOSPITAL ENCOUNTER (EMERGENCY)
Facility: HOSPITAL | Age: 44
Discharge: HOME | End: 2024-10-22
Attending: STUDENT IN AN ORGANIZED HEALTH CARE EDUCATION/TRAINING PROGRAM
Payer: MEDICARE

## 2024-10-22 VITALS
DIASTOLIC BLOOD PRESSURE: 80 MMHG | SYSTOLIC BLOOD PRESSURE: 114 MMHG | OXYGEN SATURATION: 99 % | RESPIRATION RATE: 16 BRPM | BODY MASS INDEX: 23 KG/M2 | HEART RATE: 62 BPM | WEIGHT: 125 LBS | HEIGHT: 62 IN | TEMPERATURE: 97.5 F

## 2024-10-22 PROCEDURE — 93005 ELECTROCARDIOGRAM TRACING: CPT

## 2024-10-22 PROCEDURE — 99283 EMERGENCY DEPT VISIT LOW MDM: CPT

## 2024-10-22 PROCEDURE — 2500000001 HC RX 250 WO HCPCS SELF ADMINISTERED DRUGS (ALT 637 FOR MEDICARE OP): Mod: SE | Performed by: STUDENT IN AN ORGANIZED HEALTH CARE EDUCATION/TRAINING PROGRAM

## 2024-10-22 RX ORDER — IBUPROFEN 600 MG/1
600 TABLET ORAL ONCE
Status: COMPLETED | OUTPATIENT
Start: 2024-10-22 | End: 2024-10-22

## 2024-10-22 RX ORDER — MECLIZINE HCL 12.5 MG 12.5 MG/1
25 TABLET ORAL ONCE
Status: COMPLETED | OUTPATIENT
Start: 2024-10-22 | End: 2024-10-22

## 2024-10-22 ASSESSMENT — PAIN - FUNCTIONAL ASSESSMENT: PAIN_FUNCTIONAL_ASSESSMENT: 0-10

## 2024-10-22 ASSESSMENT — PAIN SCALES - GENERAL: PAINLEVEL_OUTOF10: 9

## 2024-10-22 ASSESSMENT — PAIN DESCRIPTION - LOCATION: LOCATION: CHEST

## 2024-10-22 NOTE — ED TRIAGE NOTES
Pt states having dizziness x 1 week, also CP that started today and some nausea. Pt states she fell this AM and injured her shoulder

## 2024-10-23 LAB
ATRIAL RATE: 69 BPM
P AXIS: 64 DEGREES
P OFFSET: 170 MS
P ONSET: 134 MS
PR INTERVAL: 170 MS
Q ONSET: 219 MS
QRS COUNT: 11 BEATS
QRS DURATION: 98 MS
QT INTERVAL: 428 MS
QTC CALCULATION(BAZETT): 458 MS
QTC FREDERICIA: 448 MS
R AXIS: 99 DEGREES
T AXIS: -21 DEGREES
T OFFSET: 433 MS
VENTRICULAR RATE: 69 BPM

## 2024-10-23 NOTE — ED PROVIDER NOTES
Chief Complaint: Multiple medical complaints  HPI: This is a 43-year-old female, presenting to the emergency department for evaluation of multiple medical complaints including lightheadedness, chest pain, shortness of breath.  Patient states that she has been having the symptoms for a  week, and was just now able to get a ride to the emergency department.  She denies any abdominal pain, nausea, vomiting, diarrhea.  She denies any injury or trauma.    Past Medical History:   Diagnosis Date    COPD (chronic obstructive pulmonary disease) (Multi)     Coronary artery disease     Personal history of other endocrine, nutritional and metabolic disease     History of hypopituitarism    Personal history of other mental and behavioral disorders     History of depression    Personal history of other specified conditions     History of brain tumor    Unspecified convulsions (Multi)     Convulsion      Past Surgical History:   Procedure Laterality Date    MR HEAD ANGIO WO IV CONTRAST  7/15/2021    MR HEAD ANGIO WO IV CONTRAST 7/15/2021 [facility]    MR HEAD ANGIO WO IV CONTRAST  11/28/2017    MR HEAD ANGIO WO IV CONTRAST [facility]    MR HEAD ANGIO WO IV CONTRAST  3/13/2018    MR HEAD ANGIO WO IV CONTRAST [facility]    MR NECK ANGIO WO IV CONTRAST  7/15/2021    MR NECK ANGIO WO IV CONTRAST 7/15/2021 [facility]    OTHER SURGICAL HISTORY  08/17/2013    Craniotomy Tumor Removal - Complete       Physical Exam  Vitals and nursing note reviewed.   Constitutional:       Appearance: Normal appearance.   HENT:      Head: Normocephalic and atraumatic.      Mouth/Throat:      Mouth: Mucous membranes are moist.   Eyes:      Conjunctiva/sclera: Conjunctivae normal.   Cardiovascular:      Rate and Rhythm: Normal rate.   Pulmonary:      Effort: Pulmonary effort is normal.   Abdominal:      General: Abdomen is flat.      Palpations: Abdomen is soft.   Musculoskeletal:         General: Normal range of  motion.      Cervical back: Normal range of motion.   Skin:     General: Skin is warm and dry.   Neurological:      General: No focal deficit present.      Mental Status: She is alert.   Psychiatric:         Mood and Affect: Mood normal.            ED Course/MDM         This is a 43 y.o. female presenting to the ED for evaluation of multiple medical complaints including dizziness, chest pain, shortness of breath.  On physical exam, the patient is resting comfortably in the bed, no acute distress.  Heart is regular rate and rhythm, lungs are clear to auscultation bilaterally.  Neuroexam is nonfocal.  Patient did request pain medication for her chest pain, and was given ibuprofen.  She was given meclizine for her dizziness, and before any lab work, imaging, or EKG could be done, the patient eloped from the department.    Final Impression  1.  Chest pain  2.  Dizziness  Disposition/Plan: Left without treatment complete  Condition at disposition: Stable.     Millie Brown DO  Emergency Medicine Physician     Millie Brown DO  10/24/24 4275

## 2024-12-14 ENCOUNTER — APPOINTMENT (OUTPATIENT)
Dept: RADIOLOGY | Facility: HOSPITAL | Age: 44
End: 2024-12-14
Payer: MEDICARE

## 2024-12-14 ENCOUNTER — HOSPITAL ENCOUNTER (EMERGENCY)
Facility: HOSPITAL | Age: 44
Discharge: HOME | End: 2024-12-14
Attending: FAMILY MEDICINE
Payer: MEDICARE

## 2024-12-14 ENCOUNTER — APPOINTMENT (OUTPATIENT)
Dept: CARDIOLOGY | Facility: HOSPITAL | Age: 44
End: 2024-12-14
Payer: MEDICARE

## 2024-12-14 VITALS
DIASTOLIC BLOOD PRESSURE: 81 MMHG | WEIGHT: 125 LBS | OXYGEN SATURATION: 100 % | SYSTOLIC BLOOD PRESSURE: 123 MMHG | HEIGHT: 62 IN | BODY MASS INDEX: 23 KG/M2 | TEMPERATURE: 98 F | HEART RATE: 71 BPM | RESPIRATION RATE: 16 BRPM

## 2024-12-14 DIAGNOSIS — R53.1 GENERALIZED WEAKNESS: ICD-10-CM

## 2024-12-14 DIAGNOSIS — R07.9 CHEST PAIN, UNSPECIFIED TYPE: ICD-10-CM

## 2024-12-14 DIAGNOSIS — R06.02 SHORTNESS OF BREATH: Primary | ICD-10-CM

## 2024-12-14 LAB
ALBUMIN SERPL BCP-MCNC: 4.2 G/DL (ref 3.4–5)
ALP SERPL-CCNC: 59 U/L (ref 33–110)
ALT SERPL W P-5'-P-CCNC: 7 U/L (ref 7–45)
ANION GAP SERPL CALC-SCNC: 13 MMOL/L (ref 10–20)
AST SERPL W P-5'-P-CCNC: 24 U/L (ref 9–39)
BASOPHILS # BLD AUTO: 0.1 X10*3/UL (ref 0–0.1)
BASOPHILS NFR BLD AUTO: 1.9 %
BILIRUB SERPL-MCNC: 0.5 MG/DL (ref 0–1.2)
BNP SERPL-MCNC: 25 PG/ML (ref 0–99)
BUN SERPL-MCNC: 15 MG/DL (ref 6–23)
CALCIUM SERPL-MCNC: 9.9 MG/DL (ref 8.6–10.3)
CARDIAC TROPONIN I PNL SERPL HS: <3 NG/L (ref 0–13)
CARDIAC TROPONIN I PNL SERPL HS: <3 NG/L (ref 0–13)
CHLORIDE SERPL-SCNC: 104 MMOL/L (ref 98–107)
CO2 SERPL-SCNC: 25 MMOL/L (ref 21–32)
CREAT SERPL-MCNC: 0.7 MG/DL (ref 0.5–1.05)
D DIMER PPP FEU-MCNC: 704 NG/ML FEU
EGFRCR SERPLBLD CKD-EPI 2021: >90 ML/MIN/1.73M*2
EOSINOPHIL # BLD AUTO: 0.2 X10*3/UL (ref 0–0.7)
EOSINOPHIL NFR BLD AUTO: 3.7 %
ERYTHROCYTE [DISTWIDTH] IN BLOOD BY AUTOMATED COUNT: 14.8 % (ref 11.5–14.5)
FLUAV RNA RESP QL NAA+PROBE: NOT DETECTED
FLUBV RNA RESP QL NAA+PROBE: NOT DETECTED
GLUCOSE SERPL-MCNC: 99 MG/DL (ref 74–99)
HCG UR QL IA.RAPID: NEGATIVE
HCT VFR BLD AUTO: 38.6 % (ref 36–46)
HGB BLD-MCNC: 13.1 G/DL (ref 12–16)
IMM GRANULOCYTES # BLD AUTO: 0.01 X10*3/UL (ref 0–0.7)
IMM GRANULOCYTES NFR BLD AUTO: 0.2 % (ref 0–0.9)
LACTATE SERPL-SCNC: 2 MMOL/L (ref 0.4–2)
LYMPHOCYTES # BLD AUTO: 2.54 X10*3/UL (ref 1.2–4.8)
LYMPHOCYTES NFR BLD AUTO: 47.6 %
MAGNESIUM SERPL-MCNC: 2.29 MG/DL (ref 1.6–2.4)
MCH RBC QN AUTO: 27.3 PG (ref 26–34)
MCHC RBC AUTO-ENTMCNC: 33.9 G/DL (ref 32–36)
MCV RBC AUTO: 80 FL (ref 80–100)
MONOCYTES # BLD AUTO: 0.35 X10*3/UL (ref 0.1–1)
MONOCYTES NFR BLD AUTO: 6.6 %
NEUTROPHILS # BLD AUTO: 2.14 X10*3/UL (ref 1.2–7.7)
NEUTROPHILS NFR BLD AUTO: 40 %
NRBC BLD-RTO: 0 /100 WBCS (ref 0–0)
PLATELET # BLD AUTO: 182 X10*3/UL (ref 150–450)
POTASSIUM SERPL-SCNC: 3.7 MMOL/L (ref 3.5–5.3)
PROT SERPL-MCNC: 7.3 G/DL (ref 6.4–8.2)
RBC # BLD AUTO: 4.8 X10*6/UL (ref 4–5.2)
SARS-COV-2 RNA RESP QL NAA+PROBE: NOT DETECTED
SODIUM SERPL-SCNC: 138 MMOL/L (ref 136–145)
WBC # BLD AUTO: 5.3 X10*3/UL (ref 4.4–11.3)

## 2024-12-14 PROCEDURE — 2500000004 HC RX 250 GENERAL PHARMACY W/ HCPCS (ALT 636 FOR OP/ED)

## 2024-12-14 PROCEDURE — 71275 CT ANGIOGRAPHY CHEST: CPT

## 2024-12-14 PROCEDURE — 83735 ASSAY OF MAGNESIUM: CPT

## 2024-12-14 PROCEDURE — 71046 X-RAY EXAM CHEST 2 VIEWS: CPT

## 2024-12-14 PROCEDURE — 96374 THER/PROPH/DIAG INJ IV PUSH: CPT | Mod: 59

## 2024-12-14 PROCEDURE — 87636 SARSCOV2 & INF A&B AMP PRB: CPT

## 2024-12-14 PROCEDURE — 84484 ASSAY OF TROPONIN QUANT: CPT

## 2024-12-14 PROCEDURE — 85025 COMPLETE CBC W/AUTO DIFF WBC: CPT

## 2024-12-14 PROCEDURE — 83880 ASSAY OF NATRIURETIC PEPTIDE: CPT

## 2024-12-14 PROCEDURE — 83605 ASSAY OF LACTIC ACID: CPT

## 2024-12-14 PROCEDURE — 99285 EMERGENCY DEPT VISIT HI MDM: CPT | Mod: 25 | Performed by: FAMILY MEDICINE

## 2024-12-14 PROCEDURE — 80053 COMPREHEN METABOLIC PANEL: CPT

## 2024-12-14 PROCEDURE — 85379 FIBRIN DEGRADATION QUANT: CPT

## 2024-12-14 PROCEDURE — 36415 COLL VENOUS BLD VENIPUNCTURE: CPT

## 2024-12-14 PROCEDURE — 2500000001 HC RX 250 WO HCPCS SELF ADMINISTERED DRUGS (ALT 637 FOR MEDICARE OP)

## 2024-12-14 PROCEDURE — 71046 X-RAY EXAM CHEST 2 VIEWS: CPT | Mod: FOREIGN READ | Performed by: RADIOLOGY

## 2024-12-14 PROCEDURE — 2550000001 HC RX 255 CONTRASTS

## 2024-12-14 PROCEDURE — 81025 URINE PREGNANCY TEST: CPT

## 2024-12-14 PROCEDURE — 93005 ELECTROCARDIOGRAM TRACING: CPT

## 2024-12-14 PROCEDURE — 96376 TX/PRO/DX INJ SAME DRUG ADON: CPT | Mod: 59

## 2024-12-14 RX ORDER — ACETAMINOPHEN 325 MG/1
650 TABLET ORAL ONCE
Status: COMPLETED | OUTPATIENT
Start: 2024-12-14 | End: 2024-12-14

## 2024-12-14 RX ORDER — KETOROLAC TROMETHAMINE 15 MG/ML
15 INJECTION, SOLUTION INTRAMUSCULAR; INTRAVENOUS ONCE
Status: COMPLETED | OUTPATIENT
Start: 2024-12-14 | End: 2024-12-14

## 2024-12-14 RX ORDER — ACETAMINOPHEN 325 MG/1
TABLET ORAL
Status: COMPLETED
Start: 2024-12-14 | End: 2024-12-14

## 2024-12-14 RX ORDER — KETOROLAC TROMETHAMINE 15 MG/ML
INJECTION, SOLUTION INTRAMUSCULAR; INTRAVENOUS
Status: COMPLETED
Start: 2024-12-14 | End: 2024-12-14

## 2024-12-14 RX ADMIN — KETOROLAC TROMETHAMINE 15 MG: 15 INJECTION INTRAMUSCULAR; INTRAVENOUS at 21:15

## 2024-12-14 RX ADMIN — KETOROLAC TROMETHAMINE 15 MG: 15 INJECTION INTRAMUSCULAR; INTRAVENOUS at 15:16

## 2024-12-14 RX ADMIN — IOHEXOL 75 ML: 350 INJECTION, SOLUTION INTRAVENOUS at 20:30

## 2024-12-14 RX ADMIN — KETOROLAC TROMETHAMINE 15 MG: 15 INJECTION, SOLUTION INTRAMUSCULAR; INTRAVENOUS at 15:16

## 2024-12-14 RX ADMIN — ACETAMINOPHEN 650 MG: 325 TABLET, FILM COATED ORAL at 16:40

## 2024-12-14 RX ADMIN — ACETAMINOPHEN 650 MG: 325 TABLET ORAL at 16:40

## 2024-12-14 ASSESSMENT — HEART SCORE
TROPONIN: LESS THAN OR EQUAL TO NORMAL LIMIT
ECG: NORMAL
HISTORY: SLIGHTLY SUSPICIOUS
HEART SCORE: 2
RISK FACTORS: 1-2 RISK FACTORS
AGE: 45-64

## 2024-12-14 ASSESSMENT — PAIN SCALES - GENERAL
PAINLEVEL_OUTOF10: 2
PAINLEVEL_OUTOF10: 6
PAINLEVEL_OUTOF10: 0 - NO PAIN
PAINLEVEL_OUTOF10: 8
PAINLEVEL_OUTOF10: 0 - NO PAIN

## 2024-12-14 ASSESSMENT — PAIN - FUNCTIONAL ASSESSMENT
PAIN_FUNCTIONAL_ASSESSMENT: 0-10

## 2024-12-14 ASSESSMENT — PAIN DESCRIPTION - FREQUENCY: FREQUENCY: CONSTANT/CONTINUOUS

## 2024-12-14 ASSESSMENT — PAIN DESCRIPTION - DESCRIPTORS: DESCRIPTORS: ACHING

## 2024-12-14 ASSESSMENT — PAIN DESCRIPTION - LOCATION: LOCATION: CHEST

## 2024-12-14 ASSESSMENT — PAIN DESCRIPTION - ORIENTATION: ORIENTATION: MID

## 2024-12-14 ASSESSMENT — PAIN DESCRIPTION - PAIN TYPE
TYPE: ACUTE PAIN
TYPE: CHRONIC PAIN

## 2024-12-14 NOTE — ED TRIAGE NOTES
Pt here via EMS from home with complaints of generalized weakness, dizziness, hot flashes, and shortness of breath. Symptoms started yesterday. Also complains of nausea and diarrhea.

## 2024-12-15 NOTE — ED PROVIDER NOTES
Limitations to history: None  Independent Historians: Family  External Records Reviewed: HIE, OARRS, outpatient notes, inpatient notes, paper charts if needed    History of Present Illness:  Patient is a 44-year-old female with a past medical history of chest pain, seizures, hypothyroidism, cocaine use, coronary artery disease, hyperlipidemia presents to ED chief complaint of weakness, shortness of breath, chest pain.  Patient reports symptoms started yesterday, reports at times she also feels nauseated and has had some mild diarrhea.  Patient denies use of blood thinner.  Denies any other systemic symptoms of fevers, vomiting, chills.  Patient is alert and oriented x 3 upon examination, present ED via EMS.      Denies HA, ABD pain, Nausea, Vomiting, Diarrhea, Weakness, Dizziness, Fever, Chills.    PMFSH:   As per HPI, otherwise nurses notes reviewed in EMR    Physical Exam:  Appearance: Alert, oriented x3, supine on exam table with head elevated, cooperative, in no acute distress. Well nourished & well hydrated.      Skin: Intact, dry skin, no lesions, rash, petechiae or purpura.     Eyes: PERRLA, EOMs intact, Conjunctiva pink with no redness or exudates. No scleral icterus.     Ears: Hearing grossly intact.      Nose: Nares patent, no epistaxis.     Mouth: Dentition without concerning abnormalities. no obstruction of posterior pharynx.     Neck: Supple, without meningismus. Trachea at midline.     Pulmonary: Clear bilaterally with good chest wall excursion. No rales, rhonchi or wheezing. No accessory muscle use or stridor. Talking in full sentences.     Cardiac: Normal S1, S2 without murmur, rub, gallop or extrasystole.     Abdomen: Soft, nontender to light and deep palpation to all quadrants, normoactive bowel sounds.  No palpable organomegaly.  No rebound or guarding.     Genitourinary: Physical exam deferred.     Musculoskeletal: Normal gait. Full range of motion to all extremities. Rest of the exam reveals no  pain on palpation, instability, or deformity. Pulses full and equal. No cyanosis or clubbing. capillary refill <2 seconds to all examined digits.     Neurological:  Cranial nerves II through XII are grossly intact, normal sensation, no weakness, no focal findings identified.      Psychiatric: Appropriate mood and affect.      EKG interpreted by me shows   Ventricular rate of  74 bpm  MA interval   150             ms  QTc     480/532                       ms  No T wave elevation or depression    Labs Reviewed   CBC WITH AUTO DIFFERENTIAL - Abnormal       Result Value    WBC 5.3      nRBC 0.0      RBC 4.80      Hemoglobin 13.1      Hematocrit 38.6      MCV 80      MCH 27.3      MCHC 33.9      RDW 14.8 (*)     Platelets 182      Neutrophils % 40.0      Immature Granulocytes %, Automated 0.2      Lymphocytes % 47.6      Monocytes % 6.6      Eosinophils % 3.7      Basophils % 1.9      Neutrophils Absolute 2.14      Immature Granulocytes Absolute, Automated 0.01      Lymphocytes Absolute 2.54      Monocytes Absolute 0.35      Eosinophils Absolute 0.20      Basophils Absolute 0.10     D-DIMER, VTE EXCLUSION - Abnormal    D-Dimer, Quantitative VTE Exclusion 704 (*)     Narrative:     The VTE Exclusion D-Dimer assay is reported in ng/mL Fibrinogen Equivalent Units (FEU).    Per 's instructions for use, a value of less than 500 ng/mL (FEU) may help to exclude DVT or PE in outpatients when the assay is used with a clinical pretest probability assessment.(A[facility] must utilize and document eCalc 'Wells Score Deep Vein Thrombosis Risk' for DVT exclusion only. Emergency Department should utilize  Guidelines for Emergency Department Use of the VTE Exclusion D-Dimer and Clinical Pretest probability assessment model for DVT or PE exclusion.)   COMPREHENSIVE METABOLIC PANEL - Normal    Glucose 99      Sodium 138      Potassium 3.7      Chloride 104      Bicarbonate 25      Anion Gap 13      Urea Nitrogen 15      Creatinine  0.70      eGFR >90      Calcium 9.9      Albumin 4.2      Alkaline Phosphatase 59      Total Protein 7.3      AST 24      Bilirubin, Total 0.5      ALT 7     MAGNESIUM - Normal    Magnesium 2.29     HCG, URINE, QUALITATIVE - Normal    HCG, Urine NEGATIVE     SARS-COV-2 AND INFLUENZA A/B PCR - Normal    Flu A Result Not Detected      Flu B Result Not Detected      Coronavirus 2019, PCR Not Detected      Narrative:     This assay has received FDA Emergency Use Authorization (EUA) and  is only authorized for the duration of time that circumstances exist to justify the authorization of the emergency use of in vitro diagnostic tests for the detection of SARS-CoV-2 virus and/or diagnosis of COVID-19 infection under section 564(b)(1) of the Act, 21 U.S.C. 360bbb-3(b)(1). Testing for SARS-CoV-2 is only recommended for patients who meet current clinical and/or epidemiological criteria as defined by federal, state, or local public health directives. This assay is an in vitro diagnostic nucleic acid amplification test for the qualitative detection of SARS-CoV-2, Influenza A, and Influenza B from nasopharyngeal specimens and has been validated for use at [facility]. Negative results do not preclude COVID-19 infections or Influenza A/B infections, and should not be used as the sole basis for diagnosis, treatment, or other management decisions. If Influenza A/B and RSV PCR results are negative, testing for Parainfluenza virus, Adenovirus and Metapneumovirus is routinely performed for [facility] pediatric oncology and intensive care inpatients, and is available on other patients by placing an add-on request.    LACTATE - Normal    Lactate 2.0      Narrative:     Venipuncture immediately after or during the administration of Metamizole may lead to falsely low results. Testing should be performed immediately prior to Metamizole dosing.   B-TYPE NATRIURETIC PEPTIDE - Normal    BNP 25      Narrative:        <100 pg/mL  - Heart failure unlikely  100-299 pg/mL - Intermediate probability of acute heart                  failure exacerbation. Correlate with clinical                  context and patient history.    >=300 pg/mL - Heart Failure likely. Correlate with clinical                  context and patient history.    BNP testing is performed using different testing methodology at [facility] than at other [facility]. Direct result comparisons should only be made within the same method.      SERIAL TROPONIN-INITIAL - Normal    Troponin I, High Sensitivity <3      Narrative:     Less than 99th percentile of normal range cutoff-  Female and children under 18 years old <14 ng/L; Male <21 ng/L: Negative  Repeat testing should be performed if clinically indicated.     Female and children under 18 years old 14-50 ng/L; Male 21-50 ng/L:  Consistent with possible cardiac damage and possible increased clinical   risk. Serial measurements may help to assess extent of myocardial damage.     >50 ng/L: Consistent with cardiac damage, increased clinical risk and  myocardial infarction. Serial measurements may help assess extent of   myocardial damage.      NOTE: Children less than 1 year old may have higher baseline troponin   levels and results should be interpreted in conjunction with the overall   clinical context.     NOTE: Troponin I testing is performed using a different   testing methodology at [facility] than at other   [facility]. Direct result comparisons should only   be made within the same method.   SERIAL TROPONIN, 1 HOUR - Normal    Troponin I, High Sensitivity <3      Narrative:     Less than 99th percentile of normal range cutoff-  Female and children under 18 years old <14 ng/L; Male <21 ng/L: Negative  Repeat testing should be performed if clinically indicated.     Female and children under 18 years old 14-50 ng/L; Male 21-50 ng/L:  Consistent with possible cardiac damage and possible  increased clinical   risk. Serial measurements may help to assess extent of myocardial damage.     >50 ng/L: Consistent with cardiac damage, increased clinical risk and  myocardial infarction. Serial measurements may help assess extent of   myocardial damage.      NOTE: Children less than 1 year old may have higher baseline troponin   levels and results should be interpreted in conjunction with the overall   clinical context.     NOTE: Troponin I testing is performed using a different   testing methodology at [facility] than at other   [facility] hospitals. Direct result comparisons should only   be made within the same method.   TROPONIN SERIES- (INITIAL, 1 HR)    Narrative:     The following orders were created for panel order Troponin I Series, High Sensitivity (0, 1 HR).  Procedure                               Abnormality         Status                     ---------                               -----------         ------                     Troponin I, High Sensiti...[[id number]]  Normal              Final result               Troponin, High Sensitivi...[[id number]]  Normal              Final result                 Please view results for these tests on the individual orders.      CT angio chest for pulmonary embolism   Final Result   No CT evidence of pulmonary embolus or other acute intrathoracic   pathology.   Signed by Humphrey Blanco      XR chest 2 views   Final Result   No acute radiographic chest finding.   Signed by Gera Arteaga MD             Repeat Evaluation below    Summary:  Medical Decision Making:   Patient presented as described in HPI. Patient case including ROS, PE, and treatment and plan discussed with ED attending if attached as cosigner. Due to patients presentation orders completed include as documented.  Patient evaluated for complaints of shortness of breath, chest pain, weakness.  Patient is found to be afebrile, nontachycardic, nonhypoxic.  Delta troponin negative.  Patient  found to be influenza, COVID negative.  Serum CBC, CMP within normal limits.  D-dimer elevated 704.  PE study negative for PE.  Patient was given Toradol, Tylenol for intermittent musculoskeletal chest pain.  Patient's vital signs remained stable while in ED.  Patient had total heart score of 2.  Patient aware of all case findings.  Patient will be referred to cardiology for further evaluation and treatment of intermittent chest pain.  Patient also aware to follow-up with primary care provider.  Patient aware to continue taking anti-inflammatories as needed for chest pain.  Patient showed improvement in symptoms with the use of Toradol.  Patient aware to return to ED if she develops worsening signs and or symptoms.  Patient was advised to follow up with PCP or recommended provider in 2-3 days for another evaluation and exam. I advised patient/guardian to return or go to closest emergency room immediately if symptoms change, get worse, new symptoms develop prior to follow up. If there is no improvement in symptoms in the next 24 hours they are advised to return for further evaluation and exam. I also explained the plan and treatment course. Patient/guardian is in agreement with plan, treatment course, and follow up and states verbally that they will comply.    Tests/Medications/Escalations of Care considered but not given:    Patient care discussed with: N/A  Social Determinants affecting care: N/A    Final diagnosis and disposition as documented in impression    Homegoing. I discussed the differential; results and discharge plan with the patient and/or family/friend/caregiver if present.  I emphasized the importance of follow-up with the physician I referred them to in the timeframe recommended.  I explained reasons for the patient to return to the Emergency Department. They agreed that if they feel their condition is worsening or if they have any other concern they should call 911 immediately for further  assistance. I gave the patient an opportunity to ask all questions they had and answered all of them accordingly. They understand return precautions and discharge instructions. The patient and/or family/friend/caregiver expressed understanding verbally and that they would comply.       Disposition:  Discharge       This note has been transcribed using voice recognition and may contain grammatical errors, misplaced words, incorrect words, incorrect phrases or other errors.     Nunu Land, ROSENDO-CNP  12/14/24 [id number]

## 2024-12-17 LAB
ATRIAL RATE: 74 BPM
P AXIS: 54 DEGREES
P OFFSET: 181 MS
P ONSET: 144 MS
PR INTERVAL: 150 MS
Q ONSET: 219 MS
QRS COUNT: 12 BEATS
QRS DURATION: 92 MS
QT INTERVAL: 480 MS
QTC CALCULATION(BAZETT): 532 MS
QTC FREDERICIA: 515 MS
R AXIS: 82 DEGREES
T AXIS: 259 DEGREES
T OFFSET: 459 MS
VENTRICULAR RATE: 74 BPM

## 2024-12-20 ENCOUNTER — APPOINTMENT (OUTPATIENT)
Dept: RADIOLOGY | Facility: HOSPITAL | Age: 44
End: 2024-12-20
Payer: MEDICARE

## 2024-12-20 ENCOUNTER — HOSPITAL ENCOUNTER (EMERGENCY)
Facility: HOSPITAL | Age: 44
Discharge: HOME | End: 2024-12-20
Payer: MEDICARE

## 2024-12-20 VITALS
HEART RATE: 69 BPM | DIASTOLIC BLOOD PRESSURE: 87 MMHG | RESPIRATION RATE: 18 BRPM | HEIGHT: 62 IN | SYSTOLIC BLOOD PRESSURE: 118 MMHG | WEIGHT: 125 LBS | TEMPERATURE: 98.7 F | BODY MASS INDEX: 23 KG/M2 | OXYGEN SATURATION: 97 %

## 2024-12-20 DIAGNOSIS — R10.9 RIGHT FLANK PAIN: ICD-10-CM

## 2024-12-20 DIAGNOSIS — N39.0 ACUTE LOWER URINARY TRACT INFECTION: Primary | ICD-10-CM

## 2024-12-20 LAB
APPEARANCE UR: CLEAR
BILIRUB UR STRIP.AUTO-MCNC: NEGATIVE MG/DL
COLOR UR: ABNORMAL
GLUCOSE UR STRIP.AUTO-MCNC: NORMAL MG/DL
KETONES UR STRIP.AUTO-MCNC: NEGATIVE MG/DL
LEUKOCYTE ESTERASE UR QL STRIP.AUTO: ABNORMAL
MUCOUS THREADS #/AREA URNS AUTO: ABNORMAL /LPF
NITRITE UR QL STRIP.AUTO: NEGATIVE
PH UR STRIP.AUTO: 5 [PH]
PROT UR STRIP.AUTO-MCNC: NEGATIVE MG/DL
RBC # UR STRIP.AUTO: NEGATIVE /UL
RBC #/AREA URNS AUTO: ABNORMAL /HPF
SP GR UR STRIP.AUTO: 1.01
SQUAMOUS #/AREA URNS AUTO: ABNORMAL /HPF
UROBILINOGEN UR STRIP.AUTO-MCNC: NORMAL MG/DL
WBC #/AREA URNS AUTO: ABNORMAL /HPF

## 2024-12-20 PROCEDURE — 2500000001 HC RX 250 WO HCPCS SELF ADMINISTERED DRUGS (ALT 637 FOR MEDICARE OP)

## 2024-12-20 PROCEDURE — 2500000004 HC RX 250 GENERAL PHARMACY W/ HCPCS (ALT 636 FOR OP/ED)

## 2024-12-20 PROCEDURE — 74176 CT ABD & PELVIS W/O CONTRAST: CPT

## 2024-12-20 PROCEDURE — 74176 CT ABD & PELVIS W/O CONTRAST: CPT | Performed by: RADIOLOGY

## 2024-12-20 PROCEDURE — 81001 URINALYSIS AUTO W/SCOPE: CPT

## 2024-12-20 PROCEDURE — 96372 THER/PROPH/DIAG INJ SC/IM: CPT

## 2024-12-20 PROCEDURE — 87086 URINE CULTURE/COLONY COUNT: CPT | Mod: TRILAB

## 2024-12-20 PROCEDURE — 99284 EMERGENCY DEPT VISIT MOD MDM: CPT | Mod: 25

## 2024-12-20 RX ORDER — KETOROLAC TROMETHAMINE 30 MG/ML
15 INJECTION, SOLUTION INTRAMUSCULAR; INTRAVENOUS ONCE
Status: COMPLETED | OUTPATIENT
Start: 2024-12-20 | End: 2024-12-20

## 2024-12-20 RX ORDER — MORPHINE SULFATE 2 MG/ML
2 INJECTION, SOLUTION INTRAMUSCULAR; INTRAVENOUS ONCE
Status: COMPLETED | OUTPATIENT
Start: 2024-12-20 | End: 2024-12-20

## 2024-12-20 RX ORDER — KETOROLAC TROMETHAMINE 30 MG/ML
15 INJECTION, SOLUTION INTRAMUSCULAR; INTRAVENOUS ONCE
Status: DISCONTINUED | OUTPATIENT
Start: 2024-12-20 | End: 2024-12-20

## 2024-12-20 RX ORDER — MORPHINE SULFATE 2 MG/ML
2 INJECTION, SOLUTION INTRAMUSCULAR; INTRAVENOUS ONCE
Status: DISCONTINUED | OUTPATIENT
Start: 2024-12-20 | End: 2024-12-20

## 2024-12-20 RX ORDER — CEPHALEXIN 500 MG/1
500 CAPSULE ORAL 2 TIMES DAILY
Qty: 14 CAPSULE | Refills: 0 | Status: SHIPPED | OUTPATIENT
Start: 2024-12-20 | End: 2024-12-27

## 2024-12-20 RX ORDER — METOCLOPRAMIDE 10 MG/1
10 TABLET ORAL ONCE
Status: COMPLETED | OUTPATIENT
Start: 2024-12-20 | End: 2024-12-20

## 2024-12-20 RX ORDER — CEPHALEXIN 500 MG/1
500 CAPSULE ORAL ONCE
Status: COMPLETED | OUTPATIENT
Start: 2024-12-20 | End: 2024-12-20

## 2024-12-20 RX ADMIN — MORPHINE SULFATE 2 MG: 2 INJECTION, SOLUTION INTRAMUSCULAR; INTRAVENOUS at 19:49

## 2024-12-20 RX ADMIN — CEPHALEXIN 500 MG: 500 CAPSULE ORAL at 20:31

## 2024-12-20 RX ADMIN — KETOROLAC TROMETHAMINE 15 MG: 30 INJECTION, SOLUTION INTRAMUSCULAR at 18:23

## 2024-12-20 RX ADMIN — METOCLOPRAMIDE 10 MG: 10 TABLET ORAL at 18:23

## 2024-12-20 ASSESSMENT — PAIN DESCRIPTION - PAIN TYPE: TYPE: ACUTE PAIN

## 2024-12-20 ASSESSMENT — PAIN DESCRIPTION - LOCATION: LOCATION: BACK

## 2024-12-20 ASSESSMENT — PAIN DESCRIPTION - FREQUENCY: FREQUENCY: CONSTANT/CONTINUOUS

## 2024-12-20 ASSESSMENT — PAIN DESCRIPTION - ORIENTATION: ORIENTATION: RIGHT

## 2024-12-20 ASSESSMENT — PAIN - FUNCTIONAL ASSESSMENT: PAIN_FUNCTIONAL_ASSESSMENT: 0-10

## 2024-12-20 ASSESSMENT — PAIN SCALES - GENERAL
PAINLEVEL_OUTOF10: 8
PAINLEVEL_OUTOF10: 10 - WORST POSSIBLE PAIN
PAINLEVEL_OUTOF10: 10 - WORST POSSIBLE PAIN

## 2024-12-20 ASSESSMENT — PAIN DESCRIPTION - PROGRESSION: CLINICAL_PROGRESSION: NOT CHANGED

## 2024-12-20 NOTE — Clinical Note
Lu Fall was seen and treated in our emergency department on 12/20/2024.  She may return to work on 12/23/2024.       If you have any questions or concerns, please don't hesitate to call.      Magdaleno Evans PA-C

## 2024-12-21 NOTE — DISCHARGE INSTRUCTIONS
Please take antibiotics no completion.  Utilize over-the-counter medications for symptom relief.    Be sure to take all medications, over the counter medications or prescription medications only as directed.    Be sure to follow up as directed in 1-2 days. All of the details of your follow up instructions are detailed in the follow up section of this packet.    If you are being discharged with any pains medications or muscle relaxers (norco, Vicodin, hydrocodone products, Percocet, oxycodone products, flexeril, cyclobenzaprine, robaxin, norflex, brand or generic, or any other pain controlling medications with the exception of Ibuprofen and regular Tylenol, do not drive or operate machinery, climb ladders or participate in any activity that could potentially put yourself or others at risk should you get dizzy, or be/feel impaired at all.    Return to emergency room without delay for ANY new or worsening pains or for any other symptoms or concerns. Return with worsening pains, nausea, vomiting, trouble breathing, palpitations, shortness of breath, inability to pass stool or urine, loss of control of stool or urine, any numbness or tingling (that is not normal for you), uncontrolled fevers, the passing of blood or other material in stool or urine, rashes, pains or for any other symptoms or concerns you may have. You are always welcome to return to the ER at any time for any reason or for any other concerns you may have.

## 2024-12-21 NOTE — ED PROVIDER NOTES
HPI   Chief Complaint   Patient presents with    Flank Pain     Right radiating        Patient is a 44-year-old female with past medical history of 1 kidney, previous brain tumor, COPD presenting via EMS from home with concerns for right flank pain that radiates to her right groin.  She states she has never had kidney stones before.  Does endorse a history of renal disease because she only has 1 kidney.  Endorses pain with mild nausea no vomiting.  States she has prolonged QT syndrome so she cannot take Zofran.  Patient denies fevers, chills, cough, sore throat, runny nose, chest pain, shortness of breath, vomiting, diarrhea or urinary complaints.  Patient denies history of abdominal surgeries.              Patient History   Past Medical History:   Diagnosis Date    COPD (chronic obstructive pulmonary disease) (Multi)     Coronary artery disease     Personal history of other endocrine, nutritional and metabolic disease     History of hypopituitarism    Personal history of other mental and behavioral disorders     History of depression    Personal history of other specified conditions     History of brain tumor    Unspecified convulsions (Multi)     Convulsion     Past Surgical History:   Procedure Laterality Date    BRAIN SURGERY      CARDIAC SURGERY      MR HEAD ANGIO WO IV CONTRAST  07/15/2021    MR HEAD ANGIO WO IV CONTRAST 7/15/2021 [facility]    MR HEAD ANGIO WO IV CONTRAST  11/28/2017    MR HEAD ANGIO WO IV CONTRAST [facility]    MR HEAD ANGIO WO IV CONTRAST  03/13/2018    MR HEAD ANGIO WO IV CONTRAST [facility]    MR NECK ANGIO WO IV CONTRAST  07/15/2021    MR NECK ANGIO WO IV CONTRAST 7/15/2021 [facility]    OTHER SURGICAL HISTORY  08/17/2013    Craniotomy Tumor Removal - Complete     No family history on file.  Social History     Tobacco Use    Smoking status: Never    Smokeless tobacco: Never   Vaping Use    Vaping status: Never Used   Substance Use Topics    Alcohol  "use: Never    Drug use: Yes     Types: \"Crack\" cocaine, Cocaine     Comment: last used 5 days ago       Physical Exam   ED Triage Vitals [12/20/24 1819]   Temperature Heart Rate Respirations BP   37.1 °C (98.7 °F) 69 18 118/87      Pulse Ox Temp Source Heart Rate Source Patient Position   97 % Oral Monitor Sitting      BP Location FiO2 (%)     Left arm --       Physical Exam  Vitals and nursing note reviewed.   Constitutional:       Appearance: She is well-developed.      Comments: Awake, laying in examination bed   HENT:      Head: Normocephalic and atraumatic.      Nose: Nose normal.      Mouth/Throat:      Mouth: Mucous membranes are moist.      Pharynx: Oropharynx is clear.   Eyes:      Extraocular Movements: Extraocular movements intact.      Conjunctiva/sclera: Conjunctivae normal.      Pupils: Pupils are equal, round, and reactive to light.   Cardiovascular:      Rate and Rhythm: Normal rate and regular rhythm.      Pulses: Normal pulses.      Heart sounds: Normal heart sounds. No murmur heard.  Pulmonary:      Effort: Pulmonary effort is normal. No respiratory distress.      Breath sounds: Normal breath sounds.   Abdominal:      General: Abdomen is flat.      Palpations: Abdomen is soft.      Tenderness: There is abdominal tenderness. There is no right CVA tenderness or left CVA tenderness.      Comments: Right flank tenderness without CVA tenderness   Musculoskeletal:         General: No swelling. Normal range of motion.      Cervical back: Normal range of motion and neck supple.   Skin:     General: Skin is warm and dry.      Capillary Refill: Capillary refill takes less than 2 seconds.   Neurological:      General: No focal deficit present.      Mental Status: She is alert and oriented to person, place, and time.   Psychiatric:         Mood and Affect: Mood normal.         Behavior: Behavior normal.           ED Course & MDM   Diagnoses as of 12/21/24 0926   Acute lower urinary tract infection   Right " flank pain                 No data recorded     Edgar Coma Scale Score: 15 (12/20/24 1817 : Alyse Santana)                           Medical Decision Making  Patient is a 44-year-old female with past medical history of 1 kidney, previous brain tumor, COPD presenting via EMS from home with concerns for right flank pain that radiates to her right groin.  Lab work, urine, imaging ordered.  Conditions considered include but are not limited to: UTI, kidney stone.  Toradol ordered initially.    Attending physician was available for consultation on this patient.  Patient recently had labs done and I do not feel necessary to repeat those labs.  UA with micro is concerning for UTI.  CT scan without acute findings for kidney stone.  Toradol was infected  for pain control.  Morphine ordered.  Patient states pain is much improved.  Keflex given while in the emergency department and a prescription has been written.    I believe this patient is at low risk for complication, and a disposition of discharge is acceptable.  Return to the Emergency Department if new or worsening symptoms including headache, fever, chills, chest pain, shortness of breath, syncope, near syncope, abdominal pain, nausea, vomiting,  diarrhea, or worsening pain.  Prescription for Keflex written.  Encourage patient to utilize over-the-counter Tylenol for symptom control.  Encourage patient to take Keflex until completion.  Patient is agreeable to this and to follow with primary care in the next several days.    Portions of this note made with Dragon software, please be mindful of potential grammatical errors.        Medications   ketorolac (Toradol) injection 15 mg (15 mg intramuscular Given 12/20/24 1823)   metoclopramide (Reglan) tablet 10 mg (10 mg oral Given 12/20/24 1823)   morphine injection 2 mg (2 mg intramuscular Given 12/20/24 1949)   cephalexin (Keflex) capsule 500 mg (500 mg oral Given 12/20/24 2031)         Labs Reviewed   URINALYSIS WITH  REFLEX CULTURE AND MICROSCOPIC - Abnormal       Result Value    Color, Urine Light-Yellow      Appearance, Urine Clear      Specific Gravity, Urine 1.010      pH, Urine 5.0      Protein, Urine NEGATIVE      Glucose, Urine Normal      Blood, Urine NEGATIVE      Ketones, Urine NEGATIVE      Bilirubin, Urine NEGATIVE      Urobilinogen, Urine Normal      Nitrite, Urine NEGATIVE      Leukocyte Esterase, Urine 250 Cesar/µL (*)    MICROSCOPIC ONLY, URINE - Abnormal    WBC, Urine 21-50 (*)     RBC, Urine 1-2      Squamous Epithelial Cells, Urine 1-9 (SPARSE)      Mucus, Urine FEW     URINE CULTURE   URINALYSIS WITH REFLEX CULTURE AND MICROSCOPIC    Narrative:     The following orders were created for panel order Urinalysis with Reflex Culture and Microscopic.  Procedure                               Abnormality         Status                     ---------                               -----------         ------                     Urinalysis with Reflex C...[[id number]]  Abnormal            Final result               Extra Urine Gray Tube[[id number]]                                                         Please view results for these tests on the individual orders.   EXTRA URINE GRAY TUBE         CT abdomen pelvis wo IV contrast   Final Result   1. No hydronephrosis, hydroureter or renal/ureteral calculus.        MACRO:   None        Signed by: Kyle Griffin 12/20/2024 8:21 PM   Dictation workstation:   [id number]            Procedure  Procedures     Magdaleno Evans PA-C  12/21/24 0926       Magdaleno Evans PA-C  12/21/24 0956

## 2024-12-22 LAB — BACTERIA UR CULT: NORMAL

## 2024-12-27 ENCOUNTER — HOSPITAL ENCOUNTER (EMERGENCY)
Facility: HOSPITAL | Age: 44
Discharge: HOME | End: 2024-12-27
Payer: MEDICARE

## 2024-12-27 VITALS
WEIGHT: 125 LBS | RESPIRATION RATE: 17 BRPM | DIASTOLIC BLOOD PRESSURE: 75 MMHG | SYSTOLIC BLOOD PRESSURE: 107 MMHG | TEMPERATURE: 98.4 F | HEIGHT: 62 IN | HEART RATE: 74 BPM | BODY MASS INDEX: 23 KG/M2 | OXYGEN SATURATION: 97 %

## 2024-12-27 DIAGNOSIS — M54.50 ACUTE RIGHT-SIDED LOW BACK PAIN WITHOUT SCIATICA: Primary | ICD-10-CM

## 2024-12-27 LAB
ALBUMIN SERPL BCP-MCNC: 4.3 G/DL (ref 3.4–5)
ALP SERPL-CCNC: 58 U/L (ref 33–110)
ALT SERPL W P-5'-P-CCNC: 11 U/L (ref 7–45)
ANION GAP SERPL CALCULATED.3IONS-SCNC: 9 MMOL/L (ref 10–20)
APPEARANCE UR: CLEAR
AST SERPL W P-5'-P-CCNC: 31 U/L (ref 9–39)
BASOPHILS # BLD AUTO: 0.08 X10*3/UL (ref 0–0.1)
BASOPHILS NFR BLD AUTO: 1.5 %
BILIRUB SERPL-MCNC: 0.4 MG/DL (ref 0–1.2)
BILIRUB UR STRIP.AUTO-MCNC: NEGATIVE MG/DL
BUN SERPL-MCNC: 11 MG/DL (ref 6–23)
CALCIUM SERPL-MCNC: 8.8 MG/DL (ref 8.6–10.3)
CHLORIDE SERPL-SCNC: 103 MMOL/L (ref 98–107)
CO2 SERPL-SCNC: 26 MMOL/L (ref 21–32)
COLOR UR: COLORLESS
CREAT SERPL-MCNC: 0.7 MG/DL (ref 0.5–1.05)
EGFRCR SERPLBLD CKD-EPI 2021: >90 ML/MIN/1.73M*2
EOSINOPHIL # BLD AUTO: 0.32 X10*3/UL (ref 0–0.7)
EOSINOPHIL NFR BLD AUTO: 6 %
ERYTHROCYTE [DISTWIDTH] IN BLOOD BY AUTOMATED COUNT: 15.2 % (ref 11.5–14.5)
GLUCOSE SERPL-MCNC: 83 MG/DL (ref 74–99)
GLUCOSE UR STRIP.AUTO-MCNC: NORMAL MG/DL
HCT VFR BLD AUTO: 35.7 % (ref 36–46)
HGB BLD-MCNC: 11.9 G/DL (ref 12–16)
IMM GRANULOCYTES # BLD AUTO: 0.03 X10*3/UL (ref 0–0.7)
IMM GRANULOCYTES NFR BLD AUTO: 0.6 % (ref 0–0.9)
KETONES UR STRIP.AUTO-MCNC: NEGATIVE MG/DL
LEUKOCYTE ESTERASE UR QL STRIP.AUTO: NEGATIVE
LYMPHOCYTES # BLD AUTO: 2.67 X10*3/UL (ref 1.2–4.8)
LYMPHOCYTES NFR BLD AUTO: 50.1 %
MCH RBC QN AUTO: 26.9 PG (ref 26–34)
MCHC RBC AUTO-ENTMCNC: 33.3 G/DL (ref 32–36)
MCV RBC AUTO: 81 FL (ref 80–100)
MONOCYTES # BLD AUTO: 0.42 X10*3/UL (ref 0.1–1)
MONOCYTES NFR BLD AUTO: 7.9 %
NEUTROPHILS # BLD AUTO: 1.81 X10*3/UL (ref 1.2–7.7)
NEUTROPHILS NFR BLD AUTO: 33.9 %
NITRITE UR QL STRIP.AUTO: NEGATIVE
NRBC BLD-RTO: 0 /100 WBCS (ref 0–0)
PH UR STRIP.AUTO: 5 [PH]
PLATELET # BLD AUTO: 190 X10*3/UL (ref 150–450)
POTASSIUM SERPL-SCNC: 3.9 MMOL/L (ref 3.5–5.3)
PROT SERPL-MCNC: 7 G/DL (ref 6.4–8.2)
PROT UR STRIP.AUTO-MCNC: NEGATIVE MG/DL
RBC # BLD AUTO: 4.42 X10*6/UL (ref 4–5.2)
RBC # UR STRIP.AUTO: NEGATIVE /UL
SODIUM SERPL-SCNC: 134 MMOL/L (ref 136–145)
SP GR UR STRIP.AUTO: 1
UROBILINOGEN UR STRIP.AUTO-MCNC: NORMAL MG/DL
WBC # BLD AUTO: 5.3 X10*3/UL (ref 4.4–11.3)

## 2024-12-27 PROCEDURE — 36415 COLL VENOUS BLD VENIPUNCTURE: CPT | Performed by: STUDENT IN AN ORGANIZED HEALTH CARE EDUCATION/TRAINING PROGRAM

## 2024-12-27 PROCEDURE — 80053 COMPREHEN METABOLIC PANEL: CPT | Performed by: STUDENT IN AN ORGANIZED HEALTH CARE EDUCATION/TRAINING PROGRAM

## 2024-12-27 PROCEDURE — 96374 THER/PROPH/DIAG INJ IV PUSH: CPT

## 2024-12-27 PROCEDURE — 2500000004 HC RX 250 GENERAL PHARMACY W/ HCPCS (ALT 636 FOR OP/ED)

## 2024-12-27 PROCEDURE — 85025 COMPLETE CBC W/AUTO DIFF WBC: CPT | Performed by: STUDENT IN AN ORGANIZED HEALTH CARE EDUCATION/TRAINING PROGRAM

## 2024-12-27 PROCEDURE — 81003 URINALYSIS AUTO W/O SCOPE: CPT | Performed by: STUDENT IN AN ORGANIZED HEALTH CARE EDUCATION/TRAINING PROGRAM

## 2024-12-27 PROCEDURE — 99284 EMERGENCY DEPT VISIT MOD MDM: CPT | Mod: 25

## 2024-12-27 RX ORDER — KETOROLAC TROMETHAMINE 30 MG/ML
15 INJECTION, SOLUTION INTRAMUSCULAR; INTRAVENOUS ONCE
Status: COMPLETED | OUTPATIENT
Start: 2024-12-27 | End: 2024-12-27

## 2024-12-27 RX ORDER — LIDOCAINE 50 MG/G
1 PATCH TOPICAL DAILY PRN
Qty: 10 PATCH | Refills: 0 | Status: SHIPPED | OUTPATIENT
Start: 2024-12-27 | End: 2025-01-06

## 2024-12-27 RX ADMIN — KETOROLAC TROMETHAMINE 15 MG: 30 INJECTION, SOLUTION INTRAMUSCULAR at 17:04

## 2024-12-27 ASSESSMENT — PAIN DESCRIPTION - DESCRIPTORS: DESCRIPTORS: DULL;ACHING

## 2024-12-27 ASSESSMENT — PAIN - FUNCTIONAL ASSESSMENT
PAIN_FUNCTIONAL_ASSESSMENT: 0-10
PAIN_FUNCTIONAL_ASSESSMENT: 0-10

## 2024-12-27 ASSESSMENT — PAIN DESCRIPTION - LOCATION
LOCATION: ABDOMEN
LOCATION: ABDOMEN

## 2024-12-27 ASSESSMENT — PAIN DESCRIPTION - PAIN TYPE: TYPE: ACUTE PAIN

## 2024-12-27 ASSESSMENT — PAIN SCALES - GENERAL
PAINLEVEL_OUTOF10: 6
PAINLEVEL_OUTOF10: 6

## 2024-12-27 NOTE — DISCHARGE INSTRUCTIONS
"Utilize the muscle relaxer that you have at home as well as over-the-counter medications and lidocaine patches to help with your pain.  Follow-up with nephrology for your \"kidney pain \".    Be sure to take all medications, over the counter medications or prescription medications only as directed.    Be sure to follow up as directed in 1-2 days. All of the details of your follow up instructions are detailed in the follow up section of this packet.    If you are being discharged with any pains medications or muscle relaxers (norco, Vicodin, hydrocodone products, Percocet, oxycodone products, flexeril, cyclobenzaprine, robaxin, norflex, brand or generic, or any other pain controlling medications with the exception of Ibuprofen and regular Tylenol, do not drive or operate machinery, climb ladders or participate in any activity that could potentially put yourself or others at risk should you get dizzy, or be/feel impaired at all.    Return to emergency room without delay for ANY new or worsening pains or for any other symptoms or concerns. Return with worsening pains, nausea, vomiting, trouble breathing, palpitations, shortness of breath, inability to pass stool or urine, loss of control of stool or urine, any numbness or tingling (that is not normal for you), uncontrolled fevers, the passing of blood or other material in stool or urine, rashes, pains or for any other symptoms or concerns you may have. You are always welcome to return to the ER at any time for any reason or for any other concerns you may have.  "

## 2024-12-27 NOTE — Clinical Note
Lu Fall was seen and treated in our emergency department on 12/27/2024.  She may return to work on 12/31/2024.       If you have any questions or concerns, please don't hesitate to call.      Magdaleno Evans PA-C

## 2024-12-28 LAB — HOLD SPECIMEN: NORMAL

## 2024-12-28 NOTE — ED PROVIDER NOTES
"HPI   Chief Complaint   Patient presents with    Flank Pain     Pt states she has flank pain rated at 9/10 for 1 week. Prescribed antibiotics [facility] ED 12/21 for UTI. Increased Urinary frequency        Patient is a 44-year-old female presenting with kidney pain.  She states that she is having right-sided kidney pain and states she has had kidney failure in the past.  She is asking for pain medications.  I did see her previously and diagnosed her with a UTI.  Antibiotics and pain medication was sent at that time.  CAT scan was done and there were no kidney stones.  She is back 1 week later.  Denying increased urinary frequency.  States that she is only having \"kidney pain \"and is asking for pain medication.  Patient denies fevers, chills, cough, sore throat, runny nose, chest pain, shortness of breath, abdominal pain, nausea, vomiting, diarrhea or urinary complaints.              Patient History   Past Medical History:   Diagnosis Date    COPD (chronic obstructive pulmonary disease) (Multi)     Coronary artery disease     Personal history of other endocrine, nutritional and metabolic disease     History of hypopituitarism    Personal history of other mental and behavioral disorders     History of depression    Personal history of other specified conditions     History of brain tumor    Unspecified convulsions (Multi)     Convulsion     Past Surgical History:   Procedure Laterality Date    BRAIN SURGERY      CARDIAC SURGERY      MR HEAD ANGIO WO IV CONTRAST  07/15/2021    MR HEAD ANGIO WO IV CONTRAST 7/15/2021 [facility]    MR HEAD ANGIO WO IV CONTRAST  11/28/2017    MR HEAD ANGIO WO IV CONTRAST [facility]    MR HEAD ANGIO WO IV CONTRAST  03/13/2018    MR HEAD ANGIO WO IV CONTRAST [facility]    MR NECK ANGIO WO IV CONTRAST  07/15/2021    MR NECK ANGIO WO IV CONTRAST 7/15/2021 [facility]    OTHER SURGICAL HISTORY  08/17/2013    Craniotomy Tumor Removal - Complete     No family " "history on file.  Social History     Tobacco Use    Smoking status: Never    Smokeless tobacco: Never   Vaping Use    Vaping status: Never Used   Substance Use Topics    Alcohol use: Never    Drug use: Yes     Types: \"Crack\" cocaine, Cocaine     Comment: last used 5 days ago       Physical Exam   ED Triage Vitals [12/27/24 1631]   Temperature Heart Rate Respirations BP   36.9 °C (98.4 °F) 74 17 107/75      Pulse Ox Temp src Heart Rate Source Patient Position   97 % -- -- Lying      BP Location FiO2 (%)     Left arm --       Physical Exam  Vitals and nursing note reviewed.   Constitutional:       Appearance: She is well-developed.      Comments: Awake, laying in examination bed   HENT:      Head: Normocephalic and atraumatic.      Nose: Nose normal.      Mouth/Throat:      Mouth: Mucous membranes are moist.      Pharynx: Oropharynx is clear.   Eyes:      Extraocular Movements: Extraocular movements intact.      Conjunctiva/sclera: Conjunctivae normal.      Pupils: Pupils are equal, round, and reactive to light.   Cardiovascular:      Rate and Rhythm: Normal rate and regular rhythm.      Pulses: Normal pulses.      Heart sounds: Normal heart sounds. No murmur heard.  Pulmonary:      Effort: Pulmonary effort is normal. No respiratory distress.      Breath sounds: Normal breath sounds.   Abdominal:      General: Abdomen is flat.      Palpations: Abdomen is soft.      Tenderness: There is no abdominal tenderness.   Musculoskeletal:         General: No swelling. Normal range of motion.      Cervical back: Normal range of motion and neck supple.   Skin:     General: Skin is warm and dry.      Capillary Refill: Capillary refill takes less than 2 seconds.   Neurological:      General: No focal deficit present.      Mental Status: She is alert and oriented to person, place, and time.   Psychiatric:         Mood and Affect: Mood normal.         Behavior: Behavior normal.           ED Course & MDM   ED Course as of 12/27/24 " "2118   Fri Dec 27, 2024   1701 Patient asking for pain medications related to \"kidney pain\". [AR]      ED Course User Index  [AR] Magdaleno Evans PA-C         Diagnoses as of 12/27/24 2118   Acute right-sided low back pain without sciatica                 No data recorded     [place] Coma Scale Score: 15 (12/27/24 1633 : Damon Mc RN)                           Medical Decision Making  Patient is a 44-year-old female presenting with kidney pain.  Lab work, urine ordered.  Condition considered include but are not limited: UTI, BARBIE, dehydration, drug-seeking behavior.    Attending physician was available for consultation on this patient.  CBC is without leukocytosis but does show signs of anemia with hemoglobin of 11.9.  CMP without significant electrolyte abnormality or renal impairment.  UA with micro is without infection.  Patient was given a dose of Toradol in the ED.  She is asking for additional pain medication.  I do not feel comfortable giving her anything stronger at this time as we are not finding anything acute that needs treatment.  I discussed with her that she can utilize muscle relaxers at home as well as lidocaine patch.  She states she does have muscle relaxers.    I believe this patient is at low risk for complication, and a disposition of discharge is acceptable.  Return to the Emergency Department if new or worsening symptoms including headache, fever, chills, chest pain, shortness of breath, syncope, near syncope, abdominal pain, nausea, vomiting,  diarrhea, or worsening pain.  Prescription for lidocaine patches written.  Patient is agreeable to a disposition of discharge and to follow with nephrology in the next several days.  Referral given.    Portions of this note made with Dragon software, please be mindful of potential grammatical errors.        Medications   ketorolac (Toradol) injection 15 mg (15 mg intravenous Given 12/27/24 1704)         Labs Reviewed   CBC WITH AUTO " DIFFERENTIAL - Abnormal       Result Value    WBC 5.3      nRBC 0.0      RBC 4.42      Hemoglobin 11.9 (*)     Hematocrit 35.7 (*)     MCV 81      MCH 26.9      MCHC 33.3      RDW 15.2 (*)     Platelets 190      Neutrophils % 33.9      Immature Granulocytes %, Automated 0.6      Lymphocytes % 50.1      Monocytes % 7.9      Eosinophils % 6.0      Basophils % 1.5      Neutrophils Absolute 1.81      Immature Granulocytes Absolute, Automated 0.03      Lymphocytes Absolute 2.67      Monocytes Absolute 0.42      Eosinophils Absolute 0.32      Basophils Absolute 0.08     COMPREHENSIVE METABOLIC PANEL - Abnormal    Glucose 83      Sodium 134 (*)     Potassium 3.9      Chloride 103      Bicarbonate 26      Anion Gap 9 (*)     Urea Nitrogen 11      Creatinine 0.70      eGFR >90      Calcium 8.8      Albumin 4.3      Alkaline Phosphatase 58      Total Protein 7.0      AST 31      Bilirubin, Total 0.4      ALT 11     URINALYSIS WITH REFLEX CULTURE AND MICROSCOPIC - Abnormal    Color, Urine Colorless (*)     Appearance, Urine Clear      Specific Gravity, Urine 1.002 (*)     pH, Urine 5.0      Protein, Urine NEGATIVE      Glucose, Urine Normal      Blood, Urine NEGATIVE      Ketones, Urine NEGATIVE      Bilirubin, Urine NEGATIVE      Urobilinogen, Urine Normal      Nitrite, Urine NEGATIVE      Leukocyte Esterase, Urine NEGATIVE     URINALYSIS WITH REFLEX CULTURE AND MICROSCOPIC    Narrative:     The following orders were created for panel order Urinalysis with Reflex Culture and Microscopic.  Procedure                               Abnormality         Status                     ---------                               -----------         ------                     Urinalysis with Reflex C...[[id number]]  Abnormal            Final result               Extra Urine Gray Tube[[id number]]                            In process                   Please view results for these tests on the individual orders.   EXTRA URINE GRAY TUBE          Procedure  Procedures     Magdaleno Evans PA-C  12/27/24 2129

## 2025-01-06 ENCOUNTER — APPOINTMENT (OUTPATIENT)
Dept: CARDIOLOGY | Facility: HOSPITAL | Age: 45
End: 2025-01-06
Payer: MEDICARE

## 2025-01-06 ENCOUNTER — HOSPITAL ENCOUNTER (EMERGENCY)
Facility: HOSPITAL | Age: 45
Discharge: HOME | End: 2025-01-06
Attending: EMERGENCY MEDICINE
Payer: MEDICARE

## 2025-01-06 ENCOUNTER — APPOINTMENT (OUTPATIENT)
Dept: RADIOLOGY | Facility: HOSPITAL | Age: 45
End: 2025-01-06
Payer: MEDICARE

## 2025-01-06 VITALS
BODY MASS INDEX: 23 KG/M2 | HEIGHT: 62 IN | WEIGHT: 125 LBS | SYSTOLIC BLOOD PRESSURE: 109 MMHG | HEART RATE: 70 BPM | RESPIRATION RATE: 18 BRPM | DIASTOLIC BLOOD PRESSURE: 84 MMHG | OXYGEN SATURATION: 98 % | TEMPERATURE: 97.1 F

## 2025-01-06 VITALS
HEART RATE: 93 BPM | DIASTOLIC BLOOD PRESSURE: 76 MMHG | RESPIRATION RATE: 20 BRPM | SYSTOLIC BLOOD PRESSURE: 88 MMHG | OXYGEN SATURATION: 97 %

## 2025-01-06 DIAGNOSIS — R06.02 SHORTNESS OF BREATH: Primary | ICD-10-CM

## 2025-01-06 DIAGNOSIS — M54.12 CERVICAL RADICULOPATHY AT C6: Primary | ICD-10-CM

## 2025-01-06 LAB
ALBUMIN SERPL BCP-MCNC: 4.2 G/DL (ref 3.4–5)
ALBUMIN SERPL BCP-MCNC: 4.8 G/DL (ref 3.4–5)
ALP SERPL-CCNC: 73 U/L (ref 33–110)
ALP SERPL-CCNC: 77 U/L (ref 33–110)
ALT SERPL W P-5'-P-CCNC: 15 U/L (ref 7–45)
ALT SERPL W P-5'-P-CCNC: 25 U/L (ref 7–45)
ANION GAP SERPL CALC-SCNC: 10 MMOL/L (ref 10–20)
ANION GAP SERPL CALCULATED.3IONS-SCNC: 14 MMOL/L (ref 10–20)
AST SERPL W P-5'-P-CCNC: 34 U/L (ref 9–39)
AST SERPL W P-5'-P-CCNC: 56 U/L (ref 9–39)
ATRIAL RATE: 73 BPM
BASOPHILS # BLD AUTO: 0.03 X10*3/UL (ref 0–0.1)
BASOPHILS # BLD AUTO: 0.1 X10*3/UL (ref 0–0.1)
BASOPHILS NFR BLD AUTO: 0.3 %
BASOPHILS NFR BLD AUTO: 2 %
BILIRUB SERPL-MCNC: 0.4 MG/DL (ref 0–1.2)
BILIRUB SERPL-MCNC: 0.6 MG/DL (ref 0–1.2)
BNP SERPL-MCNC: 119 PG/ML (ref 0–99)
BUN SERPL-MCNC: 11 MG/DL (ref 6–23)
BUN SERPL-MCNC: 14 MG/DL (ref 6–23)
CALCIUM SERPL-MCNC: 8.7 MG/DL (ref 8.6–10.3)
CALCIUM SERPL-MCNC: 9.3 MG/DL (ref 8.6–10.3)
CARDIAC TROPONIN I PNL SERPL HS: <3 NG/L (ref 0–13)
CHLORIDE SERPL-SCNC: 101 MMOL/L (ref 98–107)
CHLORIDE SERPL-SCNC: 102 MMOL/L (ref 98–107)
CO2 SERPL-SCNC: 21 MMOL/L (ref 21–32)
CO2 SERPL-SCNC: 26 MMOL/L (ref 21–32)
CREAT SERPL-MCNC: 0.81 MG/DL (ref 0.5–1.05)
CREAT SERPL-MCNC: 0.87 MG/DL (ref 0.5–1.05)
EGFRCR SERPLBLD CKD-EPI 2021: 84 ML/MIN/1.73M*2
EGFRCR SERPLBLD CKD-EPI 2021: >90 ML/MIN/1.73M*2
EOSINOPHIL # BLD AUTO: 0 X10*3/UL (ref 0–0.7)
EOSINOPHIL # BLD AUTO: 0.21 X10*3/UL (ref 0–0.7)
EOSINOPHIL NFR BLD AUTO: 0 %
EOSINOPHIL NFR BLD AUTO: 4.2 %
ERYTHROCYTE [DISTWIDTH] IN BLOOD BY AUTOMATED COUNT: 15.6 % (ref 11.5–14.5)
ERYTHROCYTE [DISTWIDTH] IN BLOOD BY AUTOMATED COUNT: 15.8 % (ref 11.5–14.5)
FLUAV RNA RESP QL NAA+PROBE: NOT DETECTED
FLUBV RNA RESP QL NAA+PROBE: NOT DETECTED
GLUCOSE SERPL-MCNC: 207 MG/DL (ref 74–99)
GLUCOSE SERPL-MCNC: 77 MG/DL (ref 74–99)
HCT VFR BLD AUTO: 32.7 % (ref 36–46)
HCT VFR BLD AUTO: 36.2 % (ref 36–46)
HGB BLD-MCNC: 10.8 G/DL (ref 12–16)
HGB BLD-MCNC: 11.9 G/DL (ref 12–16)
IMM GRANULOCYTES # BLD AUTO: 0.01 X10*3/UL (ref 0–0.7)
IMM GRANULOCYTES # BLD AUTO: 0.04 X10*3/UL (ref 0–0.7)
IMM GRANULOCYTES NFR BLD AUTO: 0.2 % (ref 0–0.9)
IMM GRANULOCYTES NFR BLD AUTO: 0.4 % (ref 0–0.9)
INR PPP: 1.1 (ref 0.9–1.1)
LYMPHOCYTES # BLD AUTO: 1.03 X10*3/UL (ref 1.2–4.8)
LYMPHOCYTES # BLD AUTO: 2.54 X10*3/UL (ref 1.2–4.8)
LYMPHOCYTES NFR BLD AUTO: 11.5 %
LYMPHOCYTES NFR BLD AUTO: 50.9 %
MCH RBC QN AUTO: 26.7 PG (ref 26–34)
MCH RBC QN AUTO: 27.2 PG (ref 26–34)
MCHC RBC AUTO-ENTMCNC: 32.9 G/DL (ref 32–36)
MCHC RBC AUTO-ENTMCNC: 33 G/DL (ref 32–36)
MCV RBC AUTO: 81 FL (ref 80–100)
MCV RBC AUTO: 83 FL (ref 80–100)
MONOCYTES # BLD AUTO: 0.08 X10*3/UL (ref 0.1–1)
MONOCYTES # BLD AUTO: 0.35 X10*3/UL (ref 0.1–1)
MONOCYTES NFR BLD AUTO: 0.9 %
MONOCYTES NFR BLD AUTO: 7 %
NEUTROPHILS # BLD AUTO: 1.78 X10*3/UL (ref 1.2–7.7)
NEUTROPHILS # BLD AUTO: 7.8 X10*3/UL (ref 1.2–7.7)
NEUTROPHILS NFR BLD AUTO: 35.7 %
NEUTROPHILS NFR BLD AUTO: 86.9 %
NRBC BLD-RTO: 0 /100 WBCS (ref 0–0)
NRBC BLD-RTO: 0 /100 WBCS (ref 0–0)
P AXIS: 54 DEGREES
P OFFSET: 171 MS
P ONSET: 134 MS
PLATELET # BLD AUTO: 208 X10*3/UL (ref 150–450)
PLATELET # BLD AUTO: 210 X10*3/UL (ref 150–450)
POTASSIUM SERPL-SCNC: 3.4 MMOL/L (ref 3.5–5.3)
POTASSIUM SERPL-SCNC: 3.8 MMOL/L (ref 3.5–5.3)
PR INTERVAL: 176 MS
PROT SERPL-MCNC: 6.6 G/DL (ref 6.4–8.2)
PROT SERPL-MCNC: 7.6 G/DL (ref 6.4–8.2)
PROTHROMBIN TIME: 12.5 SECONDS (ref 9.8–12.8)
Q ONSET: 222 MS
QRS COUNT: 12 BEATS
QRS DURATION: 96 MS
QT INTERVAL: 442 MS
QTC CALCULATION(BAZETT): 486 MS
QTC FREDERICIA: 472 MS
R AXIS: 92 DEGREES
RBC # BLD AUTO: 4.04 X10*6/UL (ref 4–5.2)
RBC # BLD AUTO: 4.37 X10*6/UL (ref 4–5.2)
SARS-COV-2 RNA RESP QL NAA+PROBE: NOT DETECTED
SODIUM SERPL-SCNC: 132 MMOL/L (ref 136–145)
SODIUM SERPL-SCNC: 135 MMOL/L (ref 136–145)
T AXIS: 57 DEGREES
T OFFSET: 443 MS
VENTRICULAR RATE: 73 BPM
WBC # BLD AUTO: 5 X10*3/UL (ref 4.4–11.3)
WBC # BLD AUTO: 9 X10*3/UL (ref 4.4–11.3)

## 2025-01-06 PROCEDURE — 94640 AIRWAY INHALATION TREATMENT: CPT | Mod: 59

## 2025-01-06 PROCEDURE — 2500000002 HC RX 250 W HCPCS SELF ADMINISTERED DRUGS (ALT 637 FOR MEDICARE OP, ALT 636 FOR OP/ED)

## 2025-01-06 PROCEDURE — 71045 X-RAY EXAM CHEST 1 VIEW: CPT | Performed by: SURGERY

## 2025-01-06 PROCEDURE — 2500000004 HC RX 250 GENERAL PHARMACY W/ HCPCS (ALT 636 FOR OP/ED)

## 2025-01-06 PROCEDURE — 72125 CT NECK SPINE W/O DYE: CPT

## 2025-01-06 PROCEDURE — 93005 ELECTROCARDIOGRAM TRACING: CPT

## 2025-01-06 PROCEDURE — 70450 CT HEAD/BRAIN W/O DYE: CPT | Performed by: STUDENT IN AN ORGANIZED HEALTH CARE EDUCATION/TRAINING PROGRAM

## 2025-01-06 PROCEDURE — 80053 COMPREHEN METABOLIC PANEL: CPT | Performed by: EMERGENCY MEDICINE

## 2025-01-06 PROCEDURE — 71045 X-RAY EXAM CHEST 1 VIEW: CPT

## 2025-01-06 PROCEDURE — 72125 CT NECK SPINE W/O DYE: CPT | Performed by: STUDENT IN AN ORGANIZED HEALTH CARE EDUCATION/TRAINING PROGRAM

## 2025-01-06 PROCEDURE — 70450 CT HEAD/BRAIN W/O DYE: CPT

## 2025-01-06 PROCEDURE — 2500000001 HC RX 250 WO HCPCS SELF ADMINISTERED DRUGS (ALT 637 FOR MEDICARE OP): Performed by: EMERGENCY MEDICINE

## 2025-01-06 PROCEDURE — 85610 PROTHROMBIN TIME: CPT | Performed by: EMERGENCY MEDICINE

## 2025-01-06 PROCEDURE — 84075 ASSAY ALKALINE PHOSPHATASE: CPT | Performed by: NURSE PRACTITIONER

## 2025-01-06 PROCEDURE — 85025 COMPLETE CBC W/AUTO DIFF WBC: CPT | Performed by: EMERGENCY MEDICINE

## 2025-01-06 PROCEDURE — 2500000004 HC RX 250 GENERAL PHARMACY W/ HCPCS (ALT 636 FOR OP/ED): Performed by: EMERGENCY MEDICINE

## 2025-01-06 PROCEDURE — 99285 EMERGENCY DEPT VISIT HI MDM: CPT | Mod: 25 | Performed by: EMERGENCY MEDICINE

## 2025-01-06 PROCEDURE — 87636 SARSCOV2 & INF A&B AMP PRB: CPT | Performed by: NURSE PRACTITIONER

## 2025-01-06 PROCEDURE — 96365 THER/PROPH/DIAG IV INF INIT: CPT

## 2025-01-06 PROCEDURE — 85025 COMPLETE CBC W/AUTO DIFF WBC: CPT | Performed by: NURSE PRACTITIONER

## 2025-01-06 PROCEDURE — 83880 ASSAY OF NATRIURETIC PEPTIDE: CPT | Performed by: NURSE PRACTITIONER

## 2025-01-06 PROCEDURE — 96375 TX/PRO/DX INJ NEW DRUG ADDON: CPT

## 2025-01-06 PROCEDURE — 36415 COLL VENOUS BLD VENIPUNCTURE: CPT | Performed by: EMERGENCY MEDICINE

## 2025-01-06 PROCEDURE — 96374 THER/PROPH/DIAG INJ IV PUSH: CPT

## 2025-01-06 PROCEDURE — 84484 ASSAY OF TROPONIN QUANT: CPT | Performed by: NURSE PRACTITIONER

## 2025-01-06 RX ORDER — ACETAMINOPHEN 325 MG/1
975 TABLET ORAL ONCE
Status: COMPLETED | OUTPATIENT
Start: 2025-01-06 | End: 2025-01-06

## 2025-01-06 RX ORDER — IPRATROPIUM BROMIDE AND ALBUTEROL SULFATE 2.5; .5 MG/3ML; MG/3ML
3 SOLUTION RESPIRATORY (INHALATION) ONCE
Status: COMPLETED | OUTPATIENT
Start: 2025-01-06 | End: 2025-01-06

## 2025-01-06 RX ORDER — MECLIZINE HCL 12.5 MG 12.5 MG/1
TABLET ORAL
Status: COMPLETED
Start: 2025-01-06 | End: 2025-01-06

## 2025-01-06 RX ORDER — MECLIZINE HCL 12.5 MG 12.5 MG/1
25 TABLET ORAL ONCE
Status: COMPLETED | OUTPATIENT
Start: 2025-01-06 | End: 2025-01-06

## 2025-01-06 RX ORDER — PROMETHAZINE HYDROCHLORIDE 25 MG/ML
INJECTION, SOLUTION INTRAMUSCULAR; INTRAVENOUS
Status: DISCONTINUED
Start: 2025-01-06 | End: 2025-01-06 | Stop reason: HOSPADM

## 2025-01-06 RX ORDER — PREDNISONE 5 MG/1
TABLET ORAL
Qty: 36 TABLET | Refills: 0 | Status: SHIPPED | OUTPATIENT
Start: 2025-01-06

## 2025-01-06 RX ADMIN — METHYLPREDNISOLONE SODIUM SUCCINATE 125 MG: 125 INJECTION, POWDER, FOR SOLUTION INTRAMUSCULAR; INTRAVENOUS at 19:07

## 2025-01-06 RX ADMIN — MECLIZINE HCL 12.5 MG 25 MG: 12.5 TABLET ORAL at 04:00

## 2025-01-06 RX ADMIN — IPRATROPIUM BROMIDE AND ALBUTEROL SULFATE 3 ML: 2.5; .5 SOLUTION RESPIRATORY (INHALATION) at 19:17

## 2025-01-06 RX ADMIN — MECLIZINE HYDROCHLORIDE 25 MG: 12.5 TABLET ORAL at 04:00

## 2025-01-06 RX ADMIN — PROMETHAZINE HYDROCHLORIDE 12.5 MG: 25 INJECTION INTRAMUSCULAR; INTRAVENOUS at 04:00

## 2025-01-06 RX ADMIN — METHYLPREDNISOLONE SODIUM SUCCINATE 125 MG: 125 INJECTION, POWDER, FOR SOLUTION INTRAMUSCULAR; INTRAVENOUS at 04:00

## 2025-01-06 RX ADMIN — IPRATROPIUM BROMIDE AND ALBUTEROL SULFATE 3 ML: 2.5; .5 SOLUTION RESPIRATORY (INHALATION) at 19:19

## 2025-01-06 RX ADMIN — ACETAMINOPHEN 975 MG: 325 TABLET ORAL at 19:06

## 2025-01-06 ASSESSMENT — PAIN SCALES - GENERAL
PAINLEVEL_OUTOF10: 9
PAINLEVEL_OUTOF10: 8
PAINLEVEL_OUTOF10: 8
PAINLEVEL_OUTOF10: 5 - MODERATE PAIN

## 2025-01-06 ASSESSMENT — PAIN DESCRIPTION - PAIN TYPE: TYPE: ACUTE PAIN

## 2025-01-06 ASSESSMENT — PAIN DESCRIPTION - ORIENTATION
ORIENTATION: RIGHT;LEFT
ORIENTATION: RIGHT

## 2025-01-06 ASSESSMENT — PAIN DESCRIPTION - LOCATION
LOCATION: CHEST
LOCATION: ARM
LOCATION: CHEST

## 2025-01-06 ASSESSMENT — PAIN - FUNCTIONAL ASSESSMENT
PAIN_FUNCTIONAL_ASSESSMENT: 0-10
PAIN_FUNCTIONAL_ASSESSMENT: 0-10

## 2025-01-06 NOTE — ED PROVIDER NOTES
"HPI   Chief Complaint   Patient presents with    Extremity Weakness     Right arm weakness and pain, pt states it feels weird like it is asleep but burning.       Patient comes in with complaint of numbness in the right arm since noon.  No injury.  2 hours after the numbness she developed some headache.  No other complaints of weakness or numbness.  She has on exam no long track signs.  No facial asymmetry.  Will need to check her head CT since she does have a history of \"tumors\" and has this right arm numbness that is not really reproducible on repeated exams.            Patient History   Past Medical History:   Diagnosis Date    COPD (chronic obstructive pulmonary disease) (Multi)     Coronary artery disease     Personal history of other endocrine, nutritional and metabolic disease     History of hypopituitarism    Personal history of other mental and behavioral disorders     History of depression    Personal history of other specified conditions     History of brain tumor    Unspecified convulsions (Multi)     Convulsion     Past Surgical History:   Procedure Laterality Date    BRAIN SURGERY      CARDIAC SURGERY      MR HEAD ANGIO WO IV CONTRAST  07/15/2021    MR HEAD ANGIO WO IV CONTRAST 7/15/2021 [facility]    MR HEAD ANGIO WO IV CONTRAST  11/28/2017    MR HEAD ANGIO WO IV CONTRAST [facility]    MR HEAD ANGIO WO IV CONTRAST  03/13/2018    MR HEAD ANGIO WO IV CONTRAST [facility]    MR NECK ANGIO WO IV CONTRAST  07/15/2021    MR NECK ANGIO WO IV CONTRAST 7/15/2021 [facility]    OTHER SURGICAL HISTORY  08/17/2013    Craniotomy Tumor Removal - Complete     No family history on file.  Social History     Tobacco Use    Smoking status: Former     Types: Cigarettes    Smokeless tobacco: Never   Vaping Use    Vaping status: Never Used   Substance Use Topics    Alcohol use: Never    Drug use: Yes     Types: \"Crack\" cocaine, Cocaine     Comment: last used 5 days ago       Physical " Patient is calling, is on levaquin and has one left, feeling a little better but still has congestion, headache and eye crust in the morning. Would like to know if she can have a refill or get a different antibiotic   "Exam   ED Triage Vitals [01/06/25 0104]   Temperature Heart Rate Respirations BP   36.2 °C (97.1 °F) 72 18 109/77      Pulse Ox Temp Source Heart Rate Source Patient Position   99 % Temporal -- --      BP Location FiO2 (%)     -- --       Physical Exam  Vitals and nursing note reviewed.   HENT:      Head: Normocephalic and atraumatic.      Right Ear: Tympanic membrane and ear canal normal.      Left Ear: Tympanic membrane and ear canal normal.      Nose: Nose normal.      Mouth/Throat:      Mouth: Mucous membranes are moist.   Cardiovascular:      Rate and Rhythm: Normal rate.   Pulmonary:      Effort: Pulmonary effort is normal.      Breath sounds: Normal breath sounds.   Abdominal:      General: Abdomen is flat.      Palpations: Abdomen is soft.   Musculoskeletal:         General: Normal range of motion.      Cervical back: Normal range of motion.   Skin:     General: Skin is warm and dry.   Neurological:      Mental Status: She is alert and oriented to person, place, and time.      Sensory: Sensory deficit present.      Deep Tendon Reflexes: Reflexes abnormal.      Comments: Subjective decreased to sensation of light touch in right upper extremity, mainly forearm.  Deep tendon reflexes over the brachial radialis and biceps are only about 1 out of 4.           ED Course & MDM   Diagnoses as of 01/23/25 1708   Cervical radiculopathy at C6                 No data recorded     [place] Coma Scale Score: 15 (01/06/25 0107 : Katie Peters, RN)       NIH Stroke Scale: 4 (01/06/25 0107 : Katie Peters, FATOU)                   Medical Decision Making  CT head was negative.  Cervical spine CT does show some disc disease from C5-7 but \"mild.\"    EKG interpreted by me: Sinus rhythm with rate of 73.  Rightward axis.        Procedure  Procedures     Romel Schuster MD  01/06/25 0353       Romel Schuster MD  01/23/25 1708    "

## 2025-01-06 NOTE — ED TRIAGE NOTES
TRIAGE NOTE   I saw the patient as the Clinician in Triage and performed a brief history and physical exam, established acuity, and ordered appropriate tests to develop basic plan of care. Patient will be seen by an BRIANNE, resident and/or physician who will independently evaluate the patient. Please see subsequent provider notes for further details and disposition.     Brief HPI: In brief, Lu Fall is a 44 y.o. female that presents for dyspnea.     Focused Physical exam:   Lungs are clear to auscultation, mild increased work of breathing at 26 breaths/min.  Plan/MDM:   Cardiac evaluation chest x-ray, viral swabs  Please see subsequent provider note for further details and disposition

## 2025-01-06 NOTE — ED PROVIDER NOTES
HPI   Chief Complaint   Patient presents with    Shortness of Breath     Bib ems from home with sob worsening over three days, cardiac history. Speaking in full sentences        HPI  44-year-old female has chronic shortness of breath presents complaining shortness of breath.  She had increased shortness of breath for last 3 days.  Has an apparent cardiac history.  She also does not know what her respiratory diagnosis is but she has oxygen at night as needed as she states that her pulse oximetry dropped low.  Had some nausea.  No fevers.  She gets some back pain as well.  States she called her cardiologist who recommended come to the emergency department for evaluation.  No other complaints.      Patient History   Past Medical History:   Diagnosis Date    COPD (chronic obstructive pulmonary disease) (Multi)     Coronary artery disease     Personal history of other endocrine, nutritional and metabolic disease     History of hypopituitarism    Personal history of other mental and behavioral disorders     History of depression    Personal history of other specified conditions     History of brain tumor    Unspecified convulsions (Multi)     Convulsion     Past Surgical History:   Procedure Laterality Date    BRAIN SURGERY      CARDIAC SURGERY      MR HEAD ANGIO WO IV CONTRAST  07/15/2021    MR HEAD ANGIO WO IV CONTRAST 7/15/2021 [facility]    MR HEAD ANGIO WO IV CONTRAST  11/28/2017    MR HEAD ANGIO WO IV CONTRAST [facility]    MR HEAD ANGIO WO IV CONTRAST  03/13/2018    MR HEAD ANGIO WO IV CONTRAST [facility]    MR NECK ANGIO WO IV CONTRAST  07/15/2021    MR NECK ANGIO WO IV CONTRAST 7/15/2021 [facility]    OTHER SURGICAL HISTORY  08/17/2013    Craniotomy Tumor Removal - Complete     No family history on file.  Social History     Tobacco Use    Smoking status: Never    Smokeless tobacco: Never   Vaping Use    Vaping status: Never Used   Substance Use Topics    Alcohol use: Never  "   Drug use: Yes     Types: \"Crack\" cocaine, Cocaine     Comment: last used 5 days ago       Physical Exam   ED Triage Vitals   Temp Pulse Resp BP   -- -- -- --      SpO2 Temp src Heart Rate Source Patient Position   -- -- -- --      BP Location FiO2 (%)     -- --       Physical Exam  General:  Awake, alert, no acute distress.  Head: Normocephalic, Atraumatic  Neck: Supple, trachea midline, no stridor  Skin: Warm and dry, no rashes   Lungs: Clear to auscultation bilaterally no acute respiratory distress, speaking in full sentences without difficulty  CV: Regular Rate Rhythm with no obvious murmurs gallops rubs noted, no jugular venous distention, no pedal edema   Abdomen: Soft, nontender, nondistended, positive bowel sounds, no peritoneal signs  Neuro:  No gross focal neurologic deficits, NIH is 0  Musculoskeletal:  Full range of motion in all 4 extremities  Psychiatric:  Alert oriented x 3, Good insight into condition.    ED Course & MDM   ED Course as of 01/06/25 1930 Mon Jan 06, 2025 1715 I independently interpreted the results of the EKG and it showed normal sinus rhythm at 80 bpm, rightward axis, normal voltage, normal ST segment, and a slight diffuse lightening of the T waves [NG]      ED Course User Index  [NG] Peggy Soto MD         Diagnoses as of 01/06/25 1930   Shortness of breath                 No data recorded     [place] Coma Scale Score: 15 (01/06/25 1654 : Ashley Hills, FATOU)                           Medical Decision Making  Patient's workup in the ED is unremarkable.  She when I examined her she was sleeping laying down in no distress.  Vital signs been stable.  She was actually seen in the emergency department earlier in the day but there was no mention of shortness of breath at that time.  She was treated with Tylenol for pain.  At this point I feel comfortable any emergent etiology been excluded.  I feel she is stable for discharge to home.    Procedure  Procedures     Yosvany Wlalace" DO  01/06/25 1930       Yosvany Wallace, DO  01/06/25 1930

## 2025-01-07 NOTE — DISCHARGE INSTRUCTIONS
Thank you for choosing [facility] Emergency Department. It was my pleasure to be involved in your care today.         As of today's visit, based on reasonable likelihood, that it is safe for you to be discharged back to your residence to follow-up as an outpatient for ongoing management of your medical problem. You should follow-up with any referrals / primary provider as soon as possible. The contacts (number, addresses) are listed below.         Important:  Even though we think it is safe for you to go home, there is always a small chance that we are missing something that could require hospitalization.  Therefore it is very important that if you get worse or develops any new symptoms that you return here as soon as possible to be re-evaluated.  This includes return of symptoms that have resolved such as fainting, chest pain, or symptoms that could be warning signs for stroke important:  Even though we think it is safe for you to go home, there is always a small chance that we are missing something that could require hospitalization.  Therefore it is very important that if you get worse or develops any new symptoms that you return here as soon as possible to be re-evaluated.  This includes return of symptoms that have resolved such as fainting, chest pain, or symptoms that could be warning signs for stroke         Make sure your pharmacy and primary doctor is aware of any new medications prescribed today.          It is your responsibility to contact as soon as possible, and follow through with, any referrals you were given today. We do recommend you inform them you are a [facility] follow-up patient, as often they can better accommodate your need to be seen, provided their schedules allow. We will, and have, made every effort to ensure you have access to adequate follow-up specialists available.          All problems may not be able to be fixed in one ER visit. This is why timely ongoing care is important, and this  is a responsibility you share in. Further, you are free to follow up with any provider you choose, and this is not limited to our suggestion.          If cultures were obtained today, you will be contacted should anything result that would require further treatment. Please contact the ED at the number provided with questions.          Having trouble affording medications? Try [email or web address]! (This is not a hospital endorsed website, merely a recommendation based on my own personal experiences with [employer])

## 2025-01-08 LAB
ATRIAL RATE: 80 BPM
P AXIS: 75 DEGREES
P OFFSET: 176 MS
P ONSET: 139 MS
PR INTERVAL: 158 MS
Q ONSET: 218 MS
QRS COUNT: 13 BEATS
QRS DURATION: 98 MS
QT INTERVAL: 478 MS
QTC CALCULATION(BAZETT): 551 MS
QTC FREDERICIA: 526 MS
R AXIS: 97 DEGREES
T AXIS: 181 DEGREES
T OFFSET: 457 MS
VENTRICULAR RATE: 80 BPM

## 2025-01-14 ENCOUNTER — APPOINTMENT (OUTPATIENT)
Dept: RADIOLOGY | Facility: HOSPITAL | Age: 45
End: 2025-01-14
Payer: MEDICARE

## 2025-01-14 ENCOUNTER — APPOINTMENT (OUTPATIENT)
Dept: CARDIOLOGY | Facility: HOSPITAL | Age: 45
End: 2025-01-14
Payer: MEDICARE

## 2025-01-14 ENCOUNTER — HOSPITAL ENCOUNTER (EMERGENCY)
Facility: HOSPITAL | Age: 45
Discharge: LEFT BEFORE DISCHARGE | End: 2025-01-14
Attending: EMERGENCY MEDICINE
Payer: MEDICARE

## 2025-01-14 VITALS
DIASTOLIC BLOOD PRESSURE: 88 MMHG | BODY MASS INDEX: 23 KG/M2 | WEIGHT: 125 LBS | SYSTOLIC BLOOD PRESSURE: 120 MMHG | HEIGHT: 62 IN | OXYGEN SATURATION: 97 % | HEART RATE: 73 BPM | RESPIRATION RATE: 16 BRPM | TEMPERATURE: 97.2 F

## 2025-01-14 DIAGNOSIS — R07.9 CHEST PAIN, UNSPECIFIED TYPE: Primary | ICD-10-CM

## 2025-01-14 LAB
ALBUMIN SERPL BCP-MCNC: 4.5 G/DL (ref 3.4–5)
ALP SERPL-CCNC: 60 U/L (ref 33–110)
ALT SERPL W P-5'-P-CCNC: 7 U/L (ref 7–45)
ANION GAP SERPL CALC-SCNC: 11 MMOL/L (ref 10–20)
AST SERPL W P-5'-P-CCNC: 15 U/L (ref 9–39)
ATRIAL RATE: 71 BPM
ATRIAL RATE: 78 BPM
BASOPHILS # BLD AUTO: 0.1 X10*3/UL (ref 0–0.1)
BASOPHILS NFR BLD AUTO: 1.4 %
BILIRUB SERPL-MCNC: 0.3 MG/DL (ref 0–1.2)
BUN SERPL-MCNC: 13 MG/DL (ref 6–23)
CALCIUM SERPL-MCNC: 9 MG/DL (ref 8.6–10.3)
CARDIAC TROPONIN I PNL SERPL HS: <3 NG/L (ref 0–13)
CARDIAC TROPONIN I PNL SERPL HS: <3 NG/L (ref 0–13)
CHLORIDE SERPL-SCNC: 104 MMOL/L (ref 98–107)
CO2 SERPL-SCNC: 24 MMOL/L (ref 21–32)
CREAT SERPL-MCNC: 0.79 MG/DL (ref 0.5–1.05)
EGFRCR SERPLBLD CKD-EPI 2021: >90 ML/MIN/1.73M*2
EOSINOPHIL # BLD AUTO: 0.25 X10*3/UL (ref 0–0.7)
EOSINOPHIL NFR BLD AUTO: 3.4 %
ERYTHROCYTE [DISTWIDTH] IN BLOOD BY AUTOMATED COUNT: 15.9 % (ref 11.5–14.5)
FLUAV RNA RESP QL NAA+PROBE: NOT DETECTED
FLUBV RNA RESP QL NAA+PROBE: NOT DETECTED
GLUCOSE SERPL-MCNC: 93 MG/DL (ref 74–99)
HCT VFR BLD AUTO: 35.7 % (ref 36–46)
HGB BLD-MCNC: 11.7 G/DL (ref 12–16)
IMM GRANULOCYTES # BLD AUTO: 0.05 X10*3/UL (ref 0–0.7)
IMM GRANULOCYTES NFR BLD AUTO: 0.7 % (ref 0–0.9)
LYMPHOCYTES # BLD AUTO: 2.31 X10*3/UL (ref 1.2–4.8)
LYMPHOCYTES NFR BLD AUTO: 31.8 %
MAGNESIUM SERPL-MCNC: 2.26 MG/DL (ref 1.6–2.4)
MCH RBC QN AUTO: 27.3 PG (ref 26–34)
MCHC RBC AUTO-ENTMCNC: 32.8 G/DL (ref 32–36)
MCV RBC AUTO: 83 FL (ref 80–100)
MONOCYTES # BLD AUTO: 0.5 X10*3/UL (ref 0.1–1)
MONOCYTES NFR BLD AUTO: 6.9 %
NEUTROPHILS # BLD AUTO: 4.06 X10*3/UL (ref 1.2–7.7)
NEUTROPHILS NFR BLD AUTO: 55.8 %
NRBC BLD-RTO: 0 /100 WBCS (ref 0–0)
P AXIS: -12 DEGREES
P AXIS: -15 DEGREES
P OFFSET: 171 MS
P OFFSET: 172 MS
P ONSET: 136 MS
P ONSET: 136 MS
PLATELET # BLD AUTO: 195 X10*3/UL (ref 150–450)
POTASSIUM SERPL-SCNC: 4 MMOL/L (ref 3.5–5.3)
PR INTERVAL: 168 MS
PR INTERVAL: 170 MS
PROT SERPL-MCNC: 7.2 G/DL (ref 6.4–8.2)
Q ONSET: 220 MS
Q ONSET: 221 MS
QRS COUNT: 12 BEATS
QRS COUNT: 13 BEATS
QRS DURATION: 100 MS
QRS DURATION: 96 MS
QT INTERVAL: 426 MS
QT INTERVAL: 452 MS
QTC CALCULATION(BAZETT): 485 MS
QTC CALCULATION(BAZETT): 491 MS
QTC FREDERICIA: 465 MS
QTC FREDERICIA: 478 MS
R AXIS: 83 DEGREES
R AXIS: 83 DEGREES
RBC # BLD AUTO: 4.28 X10*6/UL (ref 4–5.2)
SARS-COV-2 RNA RESP QL NAA+PROBE: NOT DETECTED
SODIUM SERPL-SCNC: 135 MMOL/L (ref 136–145)
T AXIS: 54 DEGREES
T AXIS: 90 DEGREES
T OFFSET: 433 MS
T OFFSET: 447 MS
VENTRICULAR RATE: 71 BPM
VENTRICULAR RATE: 78 BPM
WBC # BLD AUTO: 7.3 X10*3/UL (ref 4.4–11.3)

## 2025-01-14 PROCEDURE — 99284 EMERGENCY DEPT VISIT MOD MDM: CPT | Mod: 25 | Performed by: EMERGENCY MEDICINE

## 2025-01-14 PROCEDURE — 85025 COMPLETE CBC W/AUTO DIFF WBC: CPT | Performed by: EMERGENCY MEDICINE

## 2025-01-14 PROCEDURE — 84484 ASSAY OF TROPONIN QUANT: CPT | Performed by: EMERGENCY MEDICINE

## 2025-01-14 PROCEDURE — 96374 THER/PROPH/DIAG INJ IV PUSH: CPT

## 2025-01-14 PROCEDURE — 93005 ELECTROCARDIOGRAM TRACING: CPT

## 2025-01-14 PROCEDURE — 36415 COLL VENOUS BLD VENIPUNCTURE: CPT | Performed by: EMERGENCY MEDICINE

## 2025-01-14 PROCEDURE — 87636 SARSCOV2 & INF A&B AMP PRB: CPT | Performed by: EMERGENCY MEDICINE

## 2025-01-14 PROCEDURE — 83735 ASSAY OF MAGNESIUM: CPT | Performed by: EMERGENCY MEDICINE

## 2025-01-14 PROCEDURE — 80053 COMPREHEN METABOLIC PANEL: CPT | Performed by: EMERGENCY MEDICINE

## 2025-01-14 PROCEDURE — 2500000004 HC RX 250 GENERAL PHARMACY W/ HCPCS (ALT 636 FOR OP/ED): Performed by: EMERGENCY MEDICINE

## 2025-01-14 PROCEDURE — 2500000005 HC RX 250 GENERAL PHARMACY W/O HCPCS: Performed by: EMERGENCY MEDICINE

## 2025-01-14 PROCEDURE — 96375 TX/PRO/DX INJ NEW DRUG ADDON: CPT

## 2025-01-14 RX ORDER — DIPHENHYDRAMINE HYDROCHLORIDE 50 MG/ML
25 INJECTION INTRAMUSCULAR; INTRAVENOUS ONCE
Status: COMPLETED | OUTPATIENT
Start: 2025-01-14 | End: 2025-01-14

## 2025-01-14 RX ORDER — KETOROLAC TROMETHAMINE 15 MG/ML
15 INJECTION, SOLUTION INTRAMUSCULAR; INTRAVENOUS ONCE
Status: COMPLETED | OUTPATIENT
Start: 2025-01-14 | End: 2025-01-14

## 2025-01-14 RX ORDER — PROCHLORPERAZINE EDISYLATE 5 MG/ML
10 INJECTION INTRAMUSCULAR; INTRAVENOUS ONCE
Status: COMPLETED | OUTPATIENT
Start: 2025-01-14 | End: 2025-01-14

## 2025-01-14 RX ADMIN — Medication 3 L/MIN: at 10:40

## 2025-01-14 RX ADMIN — KETOROLAC TROMETHAMINE 15 MG: 15 INJECTION, SOLUTION INTRAMUSCULAR; INTRAVENOUS at 10:28

## 2025-01-14 RX ADMIN — PROCHLORPERAZINE EDISYLATE 10 MG: 5 INJECTION INTRAMUSCULAR; INTRAVENOUS at 10:31

## 2025-01-14 RX ADMIN — DIPHENHYDRAMINE HYDROCHLORIDE 25 MG: 50 INJECTION INTRAMUSCULAR; INTRAVENOUS at 10:35

## 2025-01-14 ASSESSMENT — PAIN SCALES - GENERAL
PAINLEVEL_OUTOF10: 9
PAINLEVEL_OUTOF10: 8
PAINLEVEL_OUTOF10: 9
PAINLEVEL_OUTOF10: 9
PAINLEVEL_OUTOF10: 8
PAINLEVEL_OUTOF10: 8

## 2025-01-14 ASSESSMENT — PAIN DESCRIPTION - PAIN TYPE: TYPE: ACUTE PAIN

## 2025-01-14 ASSESSMENT — PAIN - FUNCTIONAL ASSESSMENT: PAIN_FUNCTIONAL_ASSESSMENT: 0-10

## 2025-01-14 NOTE — ED PROVIDER NOTES
"HPI   Chief Complaint   Patient presents with    Chest Pain     Left sided, radiating to right side. Sx started yesterday. Also having dizziness, nausea, diarrhea.       HPI        Patient History   Past Medical History:   Diagnosis Date    COPD (chronic obstructive pulmonary disease) (Multi)     Coronary artery disease     Personal history of other endocrine, nutritional and metabolic disease     History of hypopituitarism    Personal history of other mental and behavioral disorders     History of depression    Personal history of other specified conditions     History of brain tumor    Unspecified convulsions (Multi)     Convulsion     Past Surgical History:   Procedure Laterality Date    BRAIN SURGERY      CARDIAC SURGERY      MR HEAD ANGIO WO IV CONTRAST  07/15/2021    MR HEAD ANGIO WO IV CONTRAST 7/15/2021 [facility]    MR HEAD ANGIO WO IV CONTRAST  11/28/2017    MR HEAD ANGIO WO IV CONTRAST [facility]    MR HEAD ANGIO WO IV CONTRAST  03/13/2018    MR HEAD ANGIO WO IV CONTRAST [facility]    MR NECK ANGIO WO IV CONTRAST  07/15/2021    MR NECK ANGIO WO IV CONTRAST 7/15/2021 [facility]    OTHER SURGICAL HISTORY  08/17/2013    Craniotomy Tumor Removal - Complete     No family history on file.  Social History     Tobacco Use    Smoking status: Former     Types: Cigarettes    Smokeless tobacco: Never   Vaping Use    Vaping status: Never Used   Substance Use Topics    Alcohol use: Never    Drug use: Yes     Types: \"Crack\" cocaine, Cocaine     Comment: last used 5 days ago       Physical Exam   ED Triage Vitals [01/14/25 0855]   Temperature Heart Rate Respirations BP   37.2 °C (98.9 °F) 79 15 137/81      Pulse Ox Temp Source Heart Rate Source Patient Position   98 % Temporal -- --      BP Location FiO2 (%)     -- --       Physical Exam  Vitals and nursing note reviewed.   Constitutional:       General: She is not in acute distress.     Appearance: She is well-developed.   HENT: "      Head: Normocephalic and atraumatic.   Eyes:      Conjunctiva/sclera: Conjunctivae normal.   Cardiovascular:      Rate and Rhythm: Normal rate and regular rhythm.      Heart sounds: No murmur heard.  Pulmonary:      Effort: Pulmonary effort is normal. No respiratory distress.      Breath sounds: Normal breath sounds.   Abdominal:      Palpations: Abdomen is soft.      Tenderness: There is no abdominal tenderness.   Musculoskeletal:         General: No swelling.      Cervical back: Neck supple.   Skin:     General: Skin is warm and dry.      Capillary Refill: Capillary refill takes less than 2 seconds.   Neurological:      Mental Status: She is alert.   Psychiatric:         Mood and Affect: Mood normal.           ED Course & MDM   ED Course as of 01/14/25 1447   e Jan 14, 2025   0908 EKG performed at 908 showing normal sinus rhythm ventricular rate of 78 no ST elevation depression interpreted by me. [KA]      ED Course User Index  [KA] Fracisco Prince DO         Diagnoses as of 01/14/25 1447   Chest pain, unspecified type                 No data recorded                                 Medical Decision Making  44-year-old female presents to the ER with chief complaint of chest pain cough congestion nausea.  Patient has normal physical exam here in the ED.  Patient complains of feeling dizzy also.  And also that chest pain.  Patient has no abdominal tenderness on exam.  Plan was to scan her head and her chest.  However patient reports that she feels back to normal and that is her baseline and she does not want to have these test.  I do believe the patient has full capacity full capability patient is able to make this decision again patient wants to be discharged home.  Bags reported to her and explained to her that we are unable to do a full assessment that would have to sign her out AGAINST MEDICAL ADVICE.  Patient understands and she would like to sign out AGAINST MEDICAL ADVICE.  This conversation was  witnessed by the nurse Pedro.  Again at this time patient is full capacity full capability she does not want to stay she would like to be sent home AGAINST MEDICAL ADVICE.        Procedure  Procedures     Fracisco Prince, DO  01/14/25 144

## 2025-01-14 NOTE — ED NOTES
"Unable to get larger IV placement. Patient does not want \"poked again.\" Provider aware and spoke with patient. Patient leaving AMA.     Waleska Chase RN  01/14/25 5560    "

## 2025-01-15 LAB
ATRIAL RATE: 73 BPM
P AXIS: 54 DEGREES
P OFFSET: 171 MS
P ONSET: 134 MS
PR INTERVAL: 176 MS
Q ONSET: 222 MS
QRS COUNT: 12 BEATS
QRS DURATION: 96 MS
QT INTERVAL: 442 MS
QTC CALCULATION(BAZETT): 486 MS
QTC FREDERICIA: 472 MS
R AXIS: 92 DEGREES
T AXIS: 57 DEGREES
T OFFSET: 443 MS
VENTRICULAR RATE: 73 BPM

## 2025-02-08 ENCOUNTER — HOSPITAL ENCOUNTER (OUTPATIENT)
Dept: CARDIOLOGY | Facility: HOSPITAL | Age: 45
Discharge: HOME | End: 2025-02-08
Payer: MEDICARE

## 2025-02-08 ENCOUNTER — APPOINTMENT (OUTPATIENT)
Dept: RADIOLOGY | Facility: HOSPITAL | Age: 45
End: 2025-02-08
Payer: MEDICARE

## 2025-02-08 ENCOUNTER — OFFICE VISIT (OUTPATIENT)
Dept: URGENT CARE | Age: 45
End: 2025-02-08
Payer: MEDICARE

## 2025-02-08 ENCOUNTER — HOSPITAL ENCOUNTER (EMERGENCY)
Facility: HOSPITAL | Age: 45
Discharge: HOME | End: 2025-02-08
Attending: EMERGENCY MEDICINE
Payer: MEDICARE

## 2025-02-08 VITALS
RESPIRATION RATE: 18 BRPM | HEIGHT: 62 IN | SYSTOLIC BLOOD PRESSURE: 113 MMHG | WEIGHT: 130 LBS | HEART RATE: 66 BPM | BODY MASS INDEX: 23.92 KG/M2 | DIASTOLIC BLOOD PRESSURE: 85 MMHG | TEMPERATURE: 98.2 F | OXYGEN SATURATION: 97 %

## 2025-02-08 VITALS
OXYGEN SATURATION: 100 % | DIASTOLIC BLOOD PRESSURE: 78 MMHG | SYSTOLIC BLOOD PRESSURE: 119 MMHG | RESPIRATION RATE: 22 BRPM | TEMPERATURE: 97.9 F | HEART RATE: 74 BPM

## 2025-02-08 DIAGNOSIS — R07.9 CHEST PAIN, UNSPECIFIED TYPE: Primary | ICD-10-CM

## 2025-02-08 DIAGNOSIS — R06.02 SHORTNESS OF BREATH: ICD-10-CM

## 2025-02-08 LAB
ALBUMIN SERPL BCP-MCNC: 4.9 G/DL (ref 3.4–5)
ALP SERPL-CCNC: 61 U/L (ref 33–110)
ALT SERPL W P-5'-P-CCNC: 7 U/L (ref 7–45)
ANION GAP SERPL CALC-SCNC: 13 MMOL/L (ref 10–20)
AST SERPL W P-5'-P-CCNC: 14 U/L (ref 9–39)
ATRIAL RATE: 71 BPM
ATRIAL RATE: 78 BPM
BASOPHILS # BLD AUTO: 0.1 X10*3/UL (ref 0–0.1)
BASOPHILS NFR BLD AUTO: 1.4 %
BILIRUB SERPL-MCNC: 0.3 MG/DL (ref 0–1.2)
BNP SERPL-MCNC: 76 PG/ML (ref 0–99)
BUN SERPL-MCNC: 21 MG/DL (ref 6–23)
BURR CELLS BLD QL SMEAR: NORMAL
CALCIUM SERPL-MCNC: 9.2 MG/DL (ref 8.6–10.3)
CARDIAC TROPONIN I PNL SERPL HS: <3 NG/L (ref 0–13)
CARDIAC TROPONIN I PNL SERPL HS: <3 NG/L (ref 0–13)
CHLORIDE SERPL-SCNC: 105 MMOL/L (ref 98–107)
CO2 SERPL-SCNC: 25 MMOL/L (ref 21–32)
CREAT SERPL-MCNC: 0.81 MG/DL (ref 0.5–1.05)
D DIMER PPP FEU-MCNC: 229 NG/ML FEU
EGFRCR SERPLBLD CKD-EPI 2021: >90 ML/MIN/1.73M*2
EOSINOPHIL # BLD AUTO: 0.23 X10*3/UL (ref 0–0.7)
EOSINOPHIL NFR BLD AUTO: 3.3 %
ERYTHROCYTE [DISTWIDTH] IN BLOOD BY AUTOMATED COUNT: 16.5 % (ref 11.5–14.5)
FLUAV RNA RESP QL NAA+PROBE: NOT DETECTED
FLUBV RNA RESP QL NAA+PROBE: NOT DETECTED
GLUCOSE SERPL-MCNC: 78 MG/DL (ref 74–99)
HCT VFR BLD AUTO: 37.3 % (ref 36–46)
HGB BLD-MCNC: 11.8 G/DL (ref 12–16)
IMM GRANULOCYTES # BLD AUTO: 0.02 X10*3/UL (ref 0–0.7)
IMM GRANULOCYTES NFR BLD AUTO: 0.3 % (ref 0–0.9)
LYMPHOCYTES # BLD AUTO: 3.38 X10*3/UL (ref 1.2–4.8)
LYMPHOCYTES NFR BLD AUTO: 48.3 %
MCH RBC QN AUTO: 27.6 PG (ref 26–34)
MCHC RBC AUTO-ENTMCNC: 31.6 G/DL (ref 32–36)
MCV RBC AUTO: 87 FL (ref 80–100)
MONOCYTES # BLD AUTO: 0.42 X10*3/UL (ref 0.1–1)
MONOCYTES NFR BLD AUTO: 6 %
NEUTROPHILS # BLD AUTO: 2.85 X10*3/UL (ref 1.2–7.7)
NEUTROPHILS NFR BLD AUTO: 40.7 %
NRBC BLD-RTO: 0 /100 WBCS (ref 0–0)
OVALOCYTES BLD QL SMEAR: NORMAL
P AXIS: -12 DEGREES
P AXIS: -15 DEGREES
P OFFSET: 171 MS
P OFFSET: 172 MS
P ONSET: 136 MS
P ONSET: 136 MS
PLATELET # BLD AUTO: 91 X10*3/UL (ref 150–450)
POTASSIUM SERPL-SCNC: 3.5 MMOL/L (ref 3.5–5.3)
PR INTERVAL: 168 MS
PR INTERVAL: 170 MS
PROT SERPL-MCNC: 7.4 G/DL (ref 6.4–8.2)
Q ONSET: 220 MS
Q ONSET: 221 MS
QRS COUNT: 12 BEATS
QRS COUNT: 13 BEATS
QRS DURATION: 100 MS
QRS DURATION: 96 MS
QT INTERVAL: 426 MS
QT INTERVAL: 452 MS
QTC CALCULATION(BAZETT): 485 MS
QTC CALCULATION(BAZETT): 491 MS
QTC FREDERICIA: 465 MS
QTC FREDERICIA: 478 MS
R AXIS: 83 DEGREES
R AXIS: 83 DEGREES
RBC # BLD AUTO: 4.28 X10*6/UL (ref 4–5.2)
RBC MORPH BLD: NORMAL
SARS-COV-2 RNA RESP QL NAA+PROBE: NOT DETECTED
SCHISTOCYTES BLD QL SMEAR: NORMAL
SODIUM SERPL-SCNC: 139 MMOL/L (ref 136–145)
T AXIS: 54 DEGREES
T AXIS: 90 DEGREES
T OFFSET: 433 MS
T OFFSET: 447 MS
VENTRICULAR RATE: 71 BPM
VENTRICULAR RATE: 78 BPM
WBC # BLD AUTO: 7 X10*3/UL (ref 4.4–11.3)

## 2025-02-08 PROCEDURE — 85379 FIBRIN DEGRADATION QUANT: CPT | Performed by: EMERGENCY MEDICINE

## 2025-02-08 PROCEDURE — 93005 ELECTROCARDIOGRAM TRACING: CPT

## 2025-02-08 PROCEDURE — 80053 COMPREHEN METABOLIC PANEL: CPT | Performed by: EMERGENCY MEDICINE

## 2025-02-08 PROCEDURE — 71045 X-RAY EXAM CHEST 1 VIEW: CPT

## 2025-02-08 PROCEDURE — 83880 ASSAY OF NATRIURETIC PEPTIDE: CPT | Performed by: EMERGENCY MEDICINE

## 2025-02-08 PROCEDURE — 71045 X-RAY EXAM CHEST 1 VIEW: CPT | Mod: FOREIGN READ | Performed by: RADIOLOGY

## 2025-02-08 PROCEDURE — 99284 EMERGENCY DEPT VISIT MOD MDM: CPT | Performed by: EMERGENCY MEDICINE

## 2025-02-08 PROCEDURE — 84484 ASSAY OF TROPONIN QUANT: CPT | Performed by: EMERGENCY MEDICINE

## 2025-02-08 PROCEDURE — 36415 COLL VENOUS BLD VENIPUNCTURE: CPT | Performed by: EMERGENCY MEDICINE

## 2025-02-08 PROCEDURE — 99285 EMERGENCY DEPT VISIT HI MDM: CPT | Mod: 25

## 2025-02-08 PROCEDURE — 87636 SARSCOV2 & INF A&B AMP PRB: CPT | Performed by: EMERGENCY MEDICINE

## 2025-02-08 PROCEDURE — 85025 COMPLETE CBC W/AUTO DIFF WBC: CPT | Performed by: EMERGENCY MEDICINE

## 2025-02-08 RX ORDER — NAPROXEN SODIUM 220 MG/1
324 TABLET, FILM COATED ORAL ONCE
Status: DISCONTINUED | OUTPATIENT
Start: 2025-02-08 | End: 2025-02-08 | Stop reason: HOSPADM

## 2025-02-08 RX ORDER — NITROGLYCERIN 20 MG/100ML
5-200 INJECTION INTRAVENOUS CONTINUOUS
Status: DISCONTINUED | OUTPATIENT
Start: 2025-02-08 | End: 2025-02-08

## 2025-02-08 ASSESSMENT — PAIN DESCRIPTION - PAIN TYPE: TYPE: ACUTE PAIN

## 2025-02-08 ASSESSMENT — PAIN SCALES - GENERAL: PAINLEVEL_OUTOF10: 6

## 2025-02-08 ASSESSMENT — PAIN - FUNCTIONAL ASSESSMENT: PAIN_FUNCTIONAL_ASSESSMENT: 0-10

## 2025-02-08 ASSESSMENT — PAIN DESCRIPTION - LOCATION: LOCATION: CHEST

## 2025-02-08 ASSESSMENT — ENCOUNTER SYMPTOMS: SHORTNESS OF BREATH: 1

## 2025-02-08 NOTE — ED TRIAGE NOTES
Pt to ED via EMS from [facility] c/o SOB that began last night and CP that began this morning, EKG performed shows NSR, pt denies any other symptoms, placed on cardiac monitor and contpulse ox

## 2025-02-08 NOTE — DISCHARGE INSTRUCTIONS
Call your cardiologist Monday morning for outpatient follow-up.    Return for worsening symptoms.    Continue home medications.

## 2025-02-08 NOTE — ED PROVIDER NOTES
[facility]  ED  Provider Note  2/8/2025 10:20 AM  AC05/AC05              Chief Complaint   Patient presents with    Shortness of Breath    Chest Pain         History of Present Illness:   Lu Fall is a 44 y.o. female presenting to the ED for Chest pain, beginning 6 AM.  The complaint has been persistent, moderate in severity, and worsened by nothing.  Patient with history of coronary disease status post bypass surgery and stents.  She is also valvular heart surgery.  She is a cocaine user and States she has not used cocaine for the past 2 weeks.  Sheis having chest pain since 6:00 this morning.  Her cardiologist is at the [facility].  She reports been having chest pain for more than a month.  It is worsened this morning.      Review of Systems:   Pertinent positives and review of systems as noted above.  Remaining 10 review of systems is negative or noncontributory to today's episode of care.  Review of Systems       --------------------------------------------- PAST HISTORY ---------------------------------------------  Past Medical History:   Diagnosis Date    COPD (chronic obstructive pulmonary disease) (Multi)     Coronary artery disease     Personal history of other endocrine, nutritional and metabolic disease     History of hypopituitarism    Personal history of other mental and behavioral disorders     History of depression    Personal history of other specified conditions     History of brain tumor    Unspecified convulsions (Multi)     Convulsion         has a past surgical history that includes Other surgical history (08/17/2013); MR angio head wo IV contrast (07/15/2021); MR angio neck wo IV contrast (07/15/2021); MR angio head wo IV contrast (11/28/2017); MR angio head wo IV contrast (03/13/2018); Brain surgery; and Cardiac surgery.     reports that she has quit smoking. Her smoking use included cigarettes. She has never used smokeless tobacco. She reports current drug use. Drugs:  "\"Crack\" cocaine and Cocaine. She reports that she does not drink alcohol.    family history is not on file. Unless otherwise noted, family history is non contributory    Patient's Medications   New Prescriptions    No medications on file   Previous Medications    ACETAMINOPHEN (TYLENOL) 500 MG TABLET    Take 2 tablets (1,000 mg) by mouth every 6 hours if needed for moderate pain (4 - 6) or mild pain (1 - 3).    ALUM-MAG HYDROXIDE-SIMETH (MYLANTA) 200-200-20 MG/5 ML ORAL SUSPENSION    Take 30 mL by mouth 4 times a day as needed for indigestion or heartburn.    AMANTADINE (SYMMETREL) 100 MG CAPSULE    Take 1 capsule (100 mg) by mouth twice a day.    ARIPIPRAZOLE (ABILIFY) 15 MG TABLET    Take 1 tablet (15 mg) by mouth once daily.    ARIPIPRAZOLE (ABILIFY) 2 MG TABLET    Take 1 tablet (2 mg) by mouth once daily at bedtime.    ASPIRIN 81 MG CHEWABLE TABLET    Chew 1 tablet (81 mg) once daily.    ATORVASTATIN (LIPITOR) 80 MG TABLET    Take 1 tablet (80 mg) by mouth once daily at bedtime.    BUSPIRONE (BUSPAR) 15 MG TABLET    Take 1 tablet (15 mg) by mouth every 12 hours if needed.    CETIRIZINE (ZYRTEC) 10 MG TABLET    Take 1 tablet (10 mg) by mouth every 12 hours.    DESMOPRESSIN (DDAVP) 10 MCG/SPRAY (0.1 ML)    Administer 1 spray (10 mcg) into one nostril once daily at bedtime.    EZETIMIBE (ZETIA) 10 MG TABLET    Take 1 tablet (10 mg) by mouth once daily.    FLUOXETINE (PROZAC) 10 MG CAPSULE    Take 1 capsule (10 mg) by mouth once daily.    FREMANEZUMAB (AJOVY SYRINGE) 225 MG/1.5 ML PREFILLED SYRINGE    Inject 1.5 mL (225 mg) under the skin every 30 (thirty) days.    HYDROCORTISONE (CORTEF) 5 MG TABLET    Take 1 tablet (5 mg) by mouth.  Take 2 tabs in am, one tablet at lunch, and one tablet at dinner    LEVETIRACETAM (KEPPRA) 750 MG TABLET    Take 1 tablet (750 mg) by mouth twice a day.    LEVOTHYROXINE (SYNTHROID, LEVOXYL) 112 MCG TABLET    Take 1 tablet (112 mcg) by mouth once daily.    LIDOCAINE 4 % PATCH    Place " 1 patch over 12 hours on the skin once daily. Remove & discard patch within 12 hours or as directed by MD. Do not start before January 30, 2024.    METHOCARBAMOL (ROBAXIN) 500 MG TABLET    Take 1 tablet (500 mg) by mouth every 6 hours if needed.    METOCLOPRAMIDE (REGLAN) 10 MG TABLET    Take 1 tablet (10 mg) by mouth every 8 hours if needed.    MIDODRINE (PROAMATINE) 5 MG TABLET    Take 2 tablets (10 mg) by mouth 3 times a day.    MONTELUKAST (SINGULAIR) 10 MG TABLET    Take 1 tablet (10 mg) by mouth once daily at bedtime.    PANTOPRAZOLE (PROTONIX) 40 MG EC TABLET    Take 1 tablet (40 mg) by mouth once daily.    PREDNISONE (DELTASONE) 5 MG TABLET    8 p.o. on day 1 and then decrease by 1 pill daily until gone    PREGABALIN (LYRICA) 100 MG CAPSULE    Take 1 capsule (100 mg) by mouth 2 times a day.    PROMETHAZINE (PHENERGAN) 25 MG TABLET    Take 0.5 tablets (12.5 mg) by mouth every 6 hours if needed for nausea or vomiting for up to 10 doses.    UBROGEPANT (UBRELVY) 100 MG TABLET TABLET    Take 1 tablet (100 mg) by mouth if needed.    ZOLPIDEM (AMBIEN) 10 MG TABLET    Take 1 tablet (10 mg) by mouth once daily as needed for sleep.   Modified Medications    No medications on file   Discontinued Medications    No medications on file      The patient’s home medications have been reviewed.    Allergies: Adhesive, Hydrocodone-acetaminophen, Hydromorphone, and Ondansetron    -------------------------------------------------- RESULTS -------------------------------------------------  All laboratory and radiology results have been personally reviewed by myself   LABS:  Labs Reviewed   CBC WITH AUTO DIFFERENTIAL - Abnormal       Result Value    WBC 7.0      nRBC 0.0      RBC 4.28      Hemoglobin 11.8 (*)     Hematocrit 37.3      MCV 87      MCH 27.6      MCHC 31.6 (*)     RDW 16.5 (*)     Platelets 91 (*)     Neutrophils % 40.7      Immature Granulocytes %, Automated 0.3      Lymphocytes % 48.3      Monocytes % 6.0       Eosinophils % 3.3      Basophils % 1.4      Neutrophils Absolute 2.85      Immature Granulocytes Absolute, Automated 0.02      Lymphocytes Absolute 3.38      Monocytes Absolute 0.42      Eosinophils Absolute 0.23      Basophils Absolute 0.10     COMPREHENSIVE METABOLIC PANEL - Normal    Glucose 78      Sodium 139      Potassium 3.5      Chloride 105      Bicarbonate 25      Anion Gap 13      Urea Nitrogen 21      Creatinine 0.81      eGFR >90      Calcium 9.2      Albumin 4.9      Alkaline Phosphatase 61      Total Protein 7.4      AST 14      Bilirubin, Total 0.3      ALT 7     B-TYPE NATRIURETIC PEPTIDE - Normal    BNP 76      Narrative:        <100 pg/mL - Heart failure unlikely  100-299 pg/mL - Intermediate probability of acute heart                  failure exacerbation. Correlate with clinical                  context and patient history.    >=300 pg/mL - Heart Failure likely. Correlate with clinical                  context and patient history.    BNP testing is performed using different testing methodology at [facility] than at other [facility]. Direct result comparisons should only be made within the same method.      SARS-COV-2 AND INFLUENZA A/B PCR - Normal    Flu A Result Not Detected      Flu B Result Not Detected      Coronavirus 2019, PCR Not Detected      Narrative:     This assay is an FDA-cleared, in vitro diagnostic nucleic acid amplification test for the qualitative detection and differentiation of SARS CoV-2/ Influenza A/B from nasopharyngeal specimens collected from individuals with signs and symptoms of respiratory tract infections, and has been validated for use at [facility]. Negative results do not preclude COVID-19/ Influenza A/B infections and should not be used as the sole basis for diagnosis, treatment, or other management decisions. Testing for SARS CoV-2 is recommended only for patients who meet current clinical and/or epidemiological  criteria defined by federal, state, or local public health directives.   SERIAL TROPONIN-INITIAL - Normal    Troponin I, High Sensitivity <3      Narrative:     Less than 99th percentile of normal range cutoff-  Female and children under 18 years old <14 ng/L; Male <21 ng/L: Negative  Repeat testing should be performed if clinically indicated.     Female and children under 18 years old 14-50 ng/L; Male 21-50 ng/L:  Consistent with possible cardiac damage and possible increased clinical   risk. Serial measurements may help to assess extent of myocardial damage.     >50 ng/L: Consistent with cardiac damage, increased clinical risk and  myocardial infarction. Serial measurements may help assess extent of   myocardial damage.      NOTE: Children less than 1 year old may have higher baseline troponin   levels and results should be interpreted in conjunction with the overall   clinical context.     NOTE: Troponin I testing is performed using a different   testing methodology at [facility] than at other   [facility]. Direct result comparisons should only   be made within the same method.   SERIAL TROPONIN, 1 HOUR - Normal    Troponin I, High Sensitivity <3      Narrative:     Less than 99th percentile of normal range cutoff-  Female and children under 18 years old <14 ng/L; Male <21 ng/L: Negative  Repeat testing should be performed if clinically indicated.     Female and children under 18 years old 14-50 ng/L; Male 21-50 ng/L:  Consistent with possible cardiac damage and possible increased clinical   risk. Serial measurements may help to assess extent of myocardial damage.     >50 ng/L: Consistent with cardiac damage, increased clinical risk and  myocardial infarction. Serial measurements may help assess extent of   myocardial damage.      NOTE: Children less than 1 year old may have higher baseline troponin   levels and results should be interpreted in conjunction with the overall   clinical context.      NOTE: Troponin I testing is performed using a different   testing methodology at [facility] than at other   [facility]. Direct result comparisons should only   be made within the same method.   D-DIMER, VTE EXCLUSION - Normal    D-Dimer, Quantitative VTE Exclusion 229      Narrative:     The VTE Exclusion D-Dimer assay is reported in ng/mL Fibrinogen Equivalent Units (FEU).    Per 's instructions for use, a value of less than 500 ng/mL (FEU) may help to exclude DVT or PE in outpatients when the assay is used with a clinical pretest probability assessment.(AE must utilize and document eCalc 'Wells Score Deep Vein Thrombosis Risk' for DVT exclusion only. Emergency Department should utilize [facility] Guidelines for Emergency Department Use of the VTE Exclusion D-Dimer and Clinical Pretest probability assessment model for DVT or PE exclusion.)   TROPONIN SERIES- (INITIAL, 1 HR)    Narrative:     The following orders were created for panel order Troponin I Series, High Sensitivity (0, 1 HR).  Procedure                               Abnormality         Status                     ---------                               -----------         ------                     Troponin I, High Sensiti...[[id number]]  Normal              Final result               Troponin, High Sensitivi...[[id number]]  Normal              Final result                 Please view results for these tests on the individual orders.   TROPONIN SERIES- (INITIAL, 1 HR)    Narrative:     The following orders were created for panel order Troponin I Series, High Sensitivity (0, 1 HR).  Procedure                               Abnormality         Status                     ---------                               -----------         ------                     Troponin I, High Sensiti...[[id number]]                                                 Troponin, High Sensitivi...[[id number]]                                                "    Please view results for these tests on the individual orders.   SERIAL TROPONIN, 1 HOUR   MORPHOLOGY    RBC Morphology See Below      RBC Fragments Few      Ovalocytes Few      Staci Cells Few         EKG: EKG shows a sinus rhythm at 66 bpm, normal axis, QT prolongation at 480 ms corrected.  No acute ST elevations.  Interpreted by SERINA Darby MD   EKG #2, sinus rhythm at 60 bpm, prolonged QT interval, normal axis, no acute ST elevations.  Interpreted by SERINA Darby MD  EKG #3 sinus rhythm at 64 bpm, loss of Antirobe is noted, no acute ST elevations.  Interpreted by SERINA Darby MD    RADIOLOGY:  Interpreted by Radiologist.  XR chest 1 view   Final Result   No acute process.   Signed by Dmitriy Springer MD          ------------------------- NURSING NOTES AND VITALS REVIEWED ---------------------------   The nursing notes within the ED encounter and vital signs as below have been reviewed.   /83   Pulse 66   Temp 36.8 °C (98.2 °F) (Temporal)   Resp 18   Ht 1.575 m (5' 2\")   Wt 59 kg (130 lb)   SpO2 99%   BMI 23.78 kg/m²   Oxygen Saturation Interpretation: Normal      ---------------------------------------------------PHYSICAL EXAM--------------------------------------  Physical Exam   Constitutional/General: Alert and oriented x3, well appearing, non toxic in NAD  Head: Normocephalic and atraumatic  Eyes: PERRL, EOMI, conjunctiva normal, sclera non icteric  Mouth: Oropharynx clear, handling secretions, no trismus, no asymmetry of the posterior oropharynx or uvular edema  Neck: Supple, full ROM, non tender to palpation in the midline, no stridor, no crepitus, no meningeal signs  Respiratory: Lungs clear to auscultation bilaterally, no wheezes, rales, or rhonchi  Cardiovascular:  Regular rate. Regular rhythm. No murmurs, gallops, or rubs. 2+ distal pulses  Chest: No chest wall tenderness  GI:  Abdomen Soft, Non tender, Non distended.  +BS. No organomegaly, no palpable masses,  No rebound, guarding, or " rigidity. No CVAT   Musculoskeletal: Moves all extremities x 4. Warm and well perfused, no clubbing, cyanosis, or edema. Capillary refill <3 seconds  Integument: skin warm and dry. No rashes.   Lymphatic: no lymphadenopathy noted  Neurologic:  No focal deficits, symmetric strength 5/5 in the upper and lower extremities bilaterally  Psychiatric: Normal Affect    Procedures    Diagnoses as of 02/08/25 1358   Chest pain, unspecified type          Medical Decision Making:   Patient is stable for outpatient management.  Her cardiac enzymes are negative x 2.  Her chest x-ray is clear.  D-dimer testing is negative.  She been having pain off and on for a month.  I have asked her to follow-up with her cardiologist for further care and treatment.      Counseling:   The emergency provider has spoken with the patient and discussed today’s results, in addition to providing specific details for the plan of care and counseling regarding the diagnosis and prognosis.  Questions are answered at this time and they are agreeable with the plan.      --------------------------------- IMPRESSION AND DISPOSITION ---------------------------------        IMPRESSION  1. Chest pain, unspecified type        DISPOSITION  Disposition: Discharge to home  Patient condition is fair      Billing Provider Critical Care Time: 0 minutes     Srinivas Darby MD  02/08/25 2178       Srinivas Darby MD  02/08/25 3334

## 2025-02-08 NOTE — PROGRESS NOTES
Subjective   Patient ID: Lu Fall is a 44 y.o. female. They present today with a chief complaint of Chest Pain (This morning), Shortness of Breath (X 2 days), and Arm Pain (Town right arm ).    History of Present Illness  44-year-old female presents urgent care for complaint of chest pain that started at 6 AM this morning while at rest and shortness of breath for the past 2 days.  States the chest pain is on the left side of her chest that is 3/10 in intensity, radiates down her right arm, and does worsen with exertion.  Patient denies any nausea or vomiting, diaphoresis, focal deficits, abdominal pain, calf pain, lower extremity swelling, vision changes, confusion, fevers or chills.  States she does have history of blood clots, brain tumor, asthma, COPD, cardiac history significant for cardiac stent and valve replacement.  States she does have high cholesterol and diabetes insipidus as well.  Denies anticoagulant use.  States she takes baby aspirin.  EKG shows sinus rhythm with heart rate 69 bpm,  ms,  ms,  ms, QRS axis 82 degrees, no significant ST elevation or depression.  Patient's vitals are currently normal and she is nontoxic-appearing.  Patient was taken to the ER via ambulance.      Chest Pain  Associated symptoms: shortness of breath    Shortness of Breath  Associated symptoms: chest pain    Arm Pain  Associated symptoms: chest pain and shortness of breath        Past Medical History  Allergies as of 02/08/2025 - Reviewed 02/08/2025   Allergen Reaction Noted    Adhesive Hives and Unknown 12/18/2017    Hydrocodone-acetaminophen Other, Unknown, and Nausea/vomiting 08/25/2018    Hydromorphone Itching and Rash 10/03/2018    Ondansetron Other and Unknown 03/16/2021       (Not in a hospital admission)       Past Medical History:   Diagnosis Date    COPD (chronic obstructive pulmonary disease) (Multi)     Coronary artery disease     Personal history of other endocrine, nutritional and  "metabolic disease     History of hypopituitarism    Personal history of other mental and behavioral disorders     History of depression    Personal history of other specified conditions     History of brain tumor    Unspecified convulsions (Multi)     Convulsion       Past Surgical History:   Procedure Laterality Date    BRAIN SURGERY      CARDIAC SURGERY      MR HEAD ANGIO WO IV CONTRAST  07/15/2021    MR HEAD ANGIO WO IV CONTRAST 7/15/2021 [facility]    MR HEAD ANGIO WO IV CONTRAST  11/28/2017    MR HEAD ANGIO WO IV CONTRAST [facility]    MR HEAD ANGIO WO IV CONTRAST  03/13/2018    MR HEAD ANGIO WO IV CONTRAST [facility]    MR NECK ANGIO WO IV CONTRAST  07/15/2021    MR NECK ANGIO WO IV CONTRAST 7/15/2021 [facility]    OTHER SURGICAL HISTORY  08/17/2013    Craniotomy Tumor Removal - Complete        reports that she has quit smoking. Her smoking use included cigarettes. She has never used smokeless tobacco. She reports current drug use. Drugs: \"Crack\" cocaine and Cocaine. She reports that she does not drink alcohol.    Review of Systems  Review of Systems   Respiratory:  Positive for shortness of breath.    Cardiovascular:  Positive for chest pain.   All other systems reviewed and are negative.                                 Objective    Vitals:    02/08/25 0957   BP: 119/78   Pulse: 74   Resp: 22   Temp: 36.6 °C (97.9 °F)   TempSrc: Oral   SpO2: 100%     No LMP recorded. Patient is postmenopausal.    Physical Exam  Vitals reviewed.   Constitutional:       General: She is not in acute distress.     Appearance: Normal appearance. She is not ill-appearing, toxic-appearing or diaphoretic.   HENT:      Head: Normocephalic and atraumatic.      Nose: Nose normal.      Mouth/Throat:      Mouth: Mucous membranes are moist.      Pharynx: Oropharynx is clear.   Eyes:      Extraocular Movements: Extraocular movements intact.      Pupils: Pupils are equal, round, and reactive to light. "   Cardiovascular:      Rate and Rhythm: Normal rate and regular rhythm.   Pulmonary:      Effort: Pulmonary effort is normal. No respiratory distress.      Breath sounds: Normal breath sounds. No stridor. No wheezing, rhonchi or rales.   Abdominal:      General: Abdomen is flat.      Palpations: Abdomen is soft.      Tenderness: There is no abdominal tenderness. There is no right CVA tenderness or left CVA tenderness.   Musculoskeletal:      Cervical back: Normal range of motion and neck supple. No rigidity or tenderness.      Comments: No lower extremity edema, no calf tenderness.   Lymphadenopathy:      Cervical: No cervical adenopathy.   Skin:     General: Skin is warm and dry.   Neurological:      General: No focal deficit present.      Mental Status: She is alert and oriented to person, place, and time.      Cranial Nerves: No cranial nerve deficit.      Sensory: No sensory deficit.      Motor: No weakness.      Coordination: Coordination normal.      Gait: Gait normal.   Psychiatric:         Mood and Affect: Mood normal.         Behavior: Behavior normal.         Procedures    Point of Care Test & Imaging Results from this visit  No results found for this visit on 02/08/25.   No results found.    Diagnostic study results (if any) were reviewed by Neeru Galdamez PA-C.    Assessment/Plan   Allergies, medications, history, and pertinent labs/EKGs/Imaging reviewed by Neeru Galdamez PA-C.     Medical Decision Making  44-year-old female presents urgent care for complaint of chest pain that started at 6 AM this morning while at rest and shortness of breath for the past 2 days.  States the chest pain is on the left side of her chest that is 3/10 in intensity, radiates down her right arm, and does worsen with exertion.  Patient denies any nausea or vomiting, diaphoresis, focal deficits, abdominal pain, calf pain, lower extremity swelling, vision changes, confusion, fevers or chills.  States she does have  history of blood clots, brain tumor, asthma, COPD, cardiac history significant for cardiac stent and valve replacement.  States she does have high cholesterol and diabetes insipidus as well.  Denies anticoagulant use.  States she takes baby aspirin.  EKG shows sinus rhythm with heart rate 69 bpm,  ms,  ms,  ms, QRS axis 82 degrees, no significant ST elevation or depression.  Patient's vitals are currently normal and she is nontoxic-appearing.  Patient was taken to the ER via ambulance.    Orders and Diagnoses  There are no diagnoses linked to this encounter.    Medical Admin Record      Patient disposition: ED    Electronically signed by Neeru Galdamez PA-C  9:59 AM

## 2025-02-11 LAB
ATRIAL RATE: 64 BPM
ATRIAL RATE: 66 BPM
P AXIS: -3 DEGREES
P AXIS: 33 DEGREES
P OFFSET: 168 MS
P ONSET: 120 MS
PR INTERVAL: 192 MS
PR INTERVAL: 198 MS
Q ONSET: 219 MS
Q ONSET: 221 MS
QRS COUNT: 10 BEATS
QRS COUNT: 11 BEATS
QRS DURATION: 100 MS
QRS DURATION: 96 MS
QT INTERVAL: 458 MS
QT INTERVAL: 468 MS
QTC CALCULATION(BAZETT): 480 MS
QTC CALCULATION(BAZETT): 482 MS
QTC FREDERICIA: 473 MS
QTC FREDERICIA: 478 MS
R AXIS: 78 DEGREES
R AXIS: 86 DEGREES
T AXIS: 53 DEGREES
T AXIS: 54 DEGREES
T OFFSET: 448 MS
T OFFSET: 455 MS
VENTRICULAR RATE: 64 BPM
VENTRICULAR RATE: 66 BPM

## 2025-03-03 ENCOUNTER — HOSPITAL ENCOUNTER (EMERGENCY)
Facility: HOSPITAL | Age: 45
Discharge: HOME | End: 2025-03-03
Payer: MEDICARE

## 2025-03-03 ENCOUNTER — APPOINTMENT (OUTPATIENT)
Dept: RADIOLOGY | Facility: HOSPITAL | Age: 45
End: 2025-03-03
Payer: MEDICARE

## 2025-03-03 VITALS
DIASTOLIC BLOOD PRESSURE: 87 MMHG | HEIGHT: 62 IN | WEIGHT: 135 LBS | OXYGEN SATURATION: 99 % | BODY MASS INDEX: 24.84 KG/M2 | RESPIRATION RATE: 16 BRPM | HEART RATE: 70 BPM | SYSTOLIC BLOOD PRESSURE: 117 MMHG | TEMPERATURE: 97.7 F

## 2025-03-03 DIAGNOSIS — R10.9 ABDOMINAL PAIN, UNSPECIFIED ABDOMINAL LOCATION: Primary | ICD-10-CM

## 2025-03-03 DIAGNOSIS — K52.9 COLITIS: ICD-10-CM

## 2025-03-03 LAB
ALBUMIN SERPL BCP-MCNC: 4.9 G/DL (ref 3.4–5)
ALP SERPL-CCNC: 58 U/L (ref 33–110)
ALT SERPL W P-5'-P-CCNC: 8 U/L (ref 7–45)
ANION GAP SERPL CALC-SCNC: 11 MMOL/L (ref 10–20)
APPEARANCE UR: CLEAR
AST SERPL W P-5'-P-CCNC: 18 U/L (ref 9–39)
BASOPHILS # BLD AUTO: 0.12 X10*3/UL (ref 0–0.1)
BASOPHILS NFR BLD AUTO: 2.2 %
BILIRUB SERPL-MCNC: 0.5 MG/DL (ref 0–1.2)
BILIRUB UR STRIP.AUTO-MCNC: NEGATIVE MG/DL
BUN SERPL-MCNC: 16 MG/DL (ref 6–23)
CALCIUM SERPL-MCNC: 9.7 MG/DL (ref 8.6–10.3)
CHLORIDE SERPL-SCNC: 96 MMOL/L (ref 98–107)
CO2 SERPL-SCNC: 29 MMOL/L (ref 21–32)
COLOR UR: ABNORMAL
CREAT SERPL-MCNC: 1.02 MG/DL (ref 0.5–1.05)
EGFRCR SERPLBLD CKD-EPI 2021: 69 ML/MIN/1.73M*2
EOSINOPHIL # BLD AUTO: 0.25 X10*3/UL (ref 0–0.7)
EOSINOPHIL NFR BLD AUTO: 4.5 %
ERYTHROCYTE [DISTWIDTH] IN BLOOD BY AUTOMATED COUNT: 14.5 % (ref 11.5–14.5)
GLUCOSE SERPL-MCNC: 78 MG/DL (ref 74–99)
GLUCOSE UR STRIP.AUTO-MCNC: NORMAL MG/DL
HCG UR QL IA.RAPID: NEGATIVE
HCT VFR BLD AUTO: 40.6 % (ref 36–46)
HGB BLD-MCNC: 13.4 G/DL (ref 12–16)
IMM GRANULOCYTES # BLD AUTO: 0.02 X10*3/UL (ref 0–0.7)
IMM GRANULOCYTES NFR BLD AUTO: 0.4 % (ref 0–0.9)
KETONES UR STRIP.AUTO-MCNC: NEGATIVE MG/DL
LACTATE SERPL-SCNC: 0.6 MMOL/L (ref 0.4–2)
LEUKOCYTE ESTERASE UR QL STRIP.AUTO: ABNORMAL
LYMPHOCYTES # BLD AUTO: 2.62 X10*3/UL (ref 1.2–4.8)
LYMPHOCYTES NFR BLD AUTO: 47 %
MCH RBC QN AUTO: 27.3 PG (ref 26–34)
MCHC RBC AUTO-ENTMCNC: 33 G/DL (ref 32–36)
MCV RBC AUTO: 83 FL (ref 80–100)
MONOCYTES # BLD AUTO: 0.37 X10*3/UL (ref 0.1–1)
MONOCYTES NFR BLD AUTO: 6.6 %
MUCOUS THREADS #/AREA URNS AUTO: NORMAL /LPF
NEUTROPHILS # BLD AUTO: 2.19 X10*3/UL (ref 1.2–7.7)
NEUTROPHILS NFR BLD AUTO: 39.3 %
NITRITE UR QL STRIP.AUTO: NEGATIVE
NRBC BLD-RTO: 0 /100 WBCS (ref 0–0)
PH UR STRIP.AUTO: 6 [PH]
PLATELET # BLD AUTO: 174 X10*3/UL (ref 150–450)
POTASSIUM SERPL-SCNC: 3.8 MMOL/L (ref 3.5–5.3)
PROT SERPL-MCNC: 7.7 G/DL (ref 6.4–8.2)
PROT UR STRIP.AUTO-MCNC: NEGATIVE MG/DL
RBC # BLD AUTO: 4.91 X10*6/UL (ref 4–5.2)
RBC # UR STRIP.AUTO: NEGATIVE MG/DL
RBC #/AREA URNS AUTO: NORMAL /HPF
SODIUM SERPL-SCNC: 132 MMOL/L (ref 136–145)
SP GR UR STRIP.AUTO: 1.02
SQUAMOUS #/AREA URNS AUTO: NORMAL /HPF
UROBILINOGEN UR STRIP.AUTO-MCNC: NORMAL MG/DL
WBC # BLD AUTO: 5.6 X10*3/UL (ref 4.4–11.3)
WBC #/AREA URNS AUTO: NORMAL /HPF

## 2025-03-03 PROCEDURE — 76857 US EXAM PELVIC LIMITED: CPT

## 2025-03-03 PROCEDURE — 36415 COLL VENOUS BLD VENIPUNCTURE: CPT | Performed by: PHYSICIAN ASSISTANT

## 2025-03-03 PROCEDURE — 83605 ASSAY OF LACTIC ACID: CPT | Performed by: PHYSICIAN ASSISTANT

## 2025-03-03 PROCEDURE — 96376 TX/PRO/DX INJ SAME DRUG ADON: CPT

## 2025-03-03 PROCEDURE — 76857 US EXAM PELVIC LIMITED: CPT | Performed by: STUDENT IN AN ORGANIZED HEALTH CARE EDUCATION/TRAINING PROGRAM

## 2025-03-03 PROCEDURE — 81001 URINALYSIS AUTO W/SCOPE: CPT | Performed by: EMERGENCY MEDICINE

## 2025-03-03 PROCEDURE — 36415 COLL VENOUS BLD VENIPUNCTURE: CPT | Performed by: EMERGENCY MEDICINE

## 2025-03-03 PROCEDURE — 87086 URINE CULTURE/COLONY COUNT: CPT | Mod: GENLAB | Performed by: EMERGENCY MEDICINE

## 2025-03-03 PROCEDURE — 96375 TX/PRO/DX INJ NEW DRUG ADDON: CPT

## 2025-03-03 PROCEDURE — 85025 COMPLETE CBC W/AUTO DIFF WBC: CPT | Performed by: EMERGENCY MEDICINE

## 2025-03-03 PROCEDURE — 74177 CT ABD & PELVIS W/CONTRAST: CPT

## 2025-03-03 PROCEDURE — 99285 EMERGENCY DEPT VISIT HI MDM: CPT | Mod: 25

## 2025-03-03 PROCEDURE — 2550000001 HC RX 255 CONTRASTS: Performed by: PHYSICIAN ASSISTANT

## 2025-03-03 PROCEDURE — 96374 THER/PROPH/DIAG INJ IV PUSH: CPT | Mod: 59

## 2025-03-03 PROCEDURE — 81025 URINE PREGNANCY TEST: CPT | Performed by: PHYSICIAN ASSISTANT

## 2025-03-03 PROCEDURE — 2500000004 HC RX 250 GENERAL PHARMACY W/ HCPCS (ALT 636 FOR OP/ED): Performed by: PHYSICIAN ASSISTANT

## 2025-03-03 PROCEDURE — 80053 COMPREHEN METABOLIC PANEL: CPT | Performed by: EMERGENCY MEDICINE

## 2025-03-03 RX ORDER — MORPHINE SULFATE 4 MG/ML
4 INJECTION, SOLUTION INTRAMUSCULAR; INTRAVENOUS ONCE
Status: COMPLETED | OUTPATIENT
Start: 2025-03-03 | End: 2025-03-03

## 2025-03-03 RX ORDER — METOCLOPRAMIDE 10 MG/1
10 TABLET ORAL EVERY 6 HOURS
Qty: 28 TABLET | Refills: 0 | Status: SHIPPED | OUTPATIENT
Start: 2025-03-03 | End: 2025-03-10

## 2025-03-03 RX ORDER — METOCLOPRAMIDE HYDROCHLORIDE 5 MG/ML
10 INJECTION INTRAMUSCULAR; INTRAVENOUS ONCE
Status: COMPLETED | OUTPATIENT
Start: 2025-03-03 | End: 2025-03-03

## 2025-03-03 RX ORDER — DICYCLOMINE HYDROCHLORIDE 20 MG/1
20 TABLET ORAL
Qty: 40 TABLET | Refills: 0 | Status: SHIPPED | OUTPATIENT
Start: 2025-03-03 | End: 2025-03-13

## 2025-03-03 RX ORDER — AMOXICILLIN AND CLAVULANATE POTASSIUM 875; 125 MG/1; MG/1
875 TABLET, FILM COATED ORAL EVERY 12 HOURS
Qty: 20 TABLET | Refills: 0 | Status: SHIPPED | OUTPATIENT
Start: 2025-03-03 | End: 2025-03-13

## 2025-03-03 RX ADMIN — IOHEXOL 75 ML: 350 INJECTION, SOLUTION INTRAVENOUS at 13:39

## 2025-03-03 RX ADMIN — MORPHINE SULFATE 4 MG: 4 INJECTION, SOLUTION INTRAMUSCULAR; INTRAVENOUS at 16:05

## 2025-03-03 RX ADMIN — METOCLOPRAMIDE HYDROCHLORIDE 10 MG: 5 INJECTION INTRAMUSCULAR; INTRAVENOUS at 12:46

## 2025-03-03 RX ADMIN — MORPHINE SULFATE 4 MG: 4 INJECTION, SOLUTION INTRAMUSCULAR; INTRAVENOUS at 12:46

## 2025-03-03 ASSESSMENT — PAIN DESCRIPTION - PROGRESSION: CLINICAL_PROGRESSION: NOT CHANGED

## 2025-03-03 ASSESSMENT — PAIN DESCRIPTION - ORIENTATION: ORIENTATION: LOWER

## 2025-03-03 ASSESSMENT — PAIN DESCRIPTION - LOCATION: LOCATION: ABDOMEN

## 2025-03-03 ASSESSMENT — PAIN DESCRIPTION - PAIN TYPE: TYPE: ACUTE PAIN

## 2025-03-03 ASSESSMENT — PAIN DESCRIPTION - DESCRIPTORS: DESCRIPTORS: SHARP;PRESSURE

## 2025-03-03 ASSESSMENT — PAIN - FUNCTIONAL ASSESSMENT: PAIN_FUNCTIONAL_ASSESSMENT: 0-10

## 2025-03-03 ASSESSMENT — PAIN SCALES - GENERAL
PAINLEVEL_OUTOF10: 9
PAINLEVEL_OUTOF10: 10 - WORST POSSIBLE PAIN
PAINLEVEL_OUTOF10: 10 - WORST POSSIBLE PAIN

## 2025-03-03 ASSESSMENT — PAIN DESCRIPTION - FREQUENCY: FREQUENCY: CONSTANT/CONTINUOUS

## 2025-03-03 ASSESSMENT — PAIN DESCRIPTION - ONSET: ONSET: ONGOING

## 2025-03-03 NOTE — ED TRIAGE NOTES
Pt arrives to [facility] via EMS, presenting from home with generalized lower abdominal pain that has been ongoing x1 week. Pt reports nausea, denies any vomiting. Pt A&Ox3, resp easy and unlabored, skin of appropriate color.

## 2025-03-04 NOTE — ED PROVIDER NOTES
HPI   Chief Complaint   Patient presents with   • Abdominal Pain       History of present illness:  44-year-old female presents with complaints of diffuse abdominal pain.  The patient states beginning pain her today throughout her abdomen and states is mostly in her lower abdomen and states that it began close to about 4 weeks ago.  She states the pain is mostly in her lower abdomen below her umbilicus and is cramping and moving from side-to-side.  She states that she has not had any vomiting but does have some nausea with it.  She states that she also has diarrhea but this is normal for her and she usually has 2 loose bowel movements a day which has been going on for years.  She states that she has a past medical history of COPD coronary artery disease as well as hypopituitarism and depression.  She denies any other symptoms this time denies any blood in her stool or any fevers or chills.    Social history: Negative for alcohol and drug use.    Review of systems:   Gen.: No weight loss, fatigue, anorexia, insomnia, fever.   Eyes: No vision loss, double vision, drainage, eye pain.   ENT: No pharyngitis, neck pain  Cardiac: No chest pain, palpitations, syncope, near syncope.   Pulmonary: No shortness of breath, cough, hemoptysis.   Heme/lymph: No swollen glands, fever, bleeding.   GI: No change in bowel habits, melena, hematemesis, hematochezia,  vomiting, diarrhea.   : No discharge, dysuria, frequency, urgency, hematuria.   Musculoskeletal: No limb pain, joint pain, joint swelling.   Skin: No rashes.   Review of systems is otherwise negative unless stated above or in history of present illness.        Physical exam:  General: Vitals noted, no distress. Afebrile.   EENT: No lymphadenopathy appreciated  Cardiac: Regular, rate, rhythm, no murmur.   Pulmonary: Lungs clear bilaterally with good aeration. No adventitious breath sounds.   Abdomen: Soft, nonsurgical.  Tenderness palpation of the left lower quadrant, no  pain to palpation of the right lower quadrant.  No peritoneal signs. Normoactive bowel sounds.   Extremities: No peripheral edema.   Skin: No rash.   Neuro: No focal neurologic deficits      Medical decision making:   Testing: Urinalysis unremarkable pregnancy test negative lactate 0.6 CMP unremarkable CBC unremarkable CT scan abdomen pelvis with contrast showed moderate wall thickening sigmoid descending colon which could represent nonspecific colitis versus underdistention and nonspecific cystic structure in the high pelvis.  Ultrasound the pelvis performed after I spoke with the ultrasound team who was able to perform this without any difficulty.  Radiology reading did not show any acute findings as interpreted by radiology.  Treatment: IV narcotics given for pain control and Reglan given for nausea.  Reevaluation:   Plan: Home-going.  Discussed differential. Will follow-up with gastroenterology in the next 2-3 days. Return if worse. They understand return precautions and discharge instructions. Patient and family/friend/caregiver are in agreement with this plan. 44-year-old female presents with complaints of diffuse abdominal pain.  The patient states beginning pain her today throughout her abdomen and states is mostly in her lower abdomen and states that it began close to about 4 weeks ago.  She states the pain is mostly in her lower abdomen below her umbilicus and is cramping and moving from side-to-side.  She states that she has not had any vomiting but does have some nausea with it.  She states that she also has diarrhea but this is normal for her and she usually has 2 loose bowel movements a day which has been going on for years.  She states that she has a past medical history of COPD coronary artery disease as well as hypopituitarism and depression.  She denies any other symptoms this time denies any blood in her stool or any fevers or chills. Abdomen: Soft, nonsurgical.  Tenderness palpation of the left  "lower quadrant, no pain to palpation of the right lower quadrant.  No peritoneal signs. Normoactive bowel sounds.  I explained to the patient the test results at this time and I felt that she could go home at this time.  Explained to every single with some pain medications as well as medications to cover for colitis.  Explained that would like her to follow-up with gastroenterology in outpatient setting and she was agreeable to this plan.  Impression:   1.  Colitis  2.  Abdominal pain          History provided by:  Patient   used: No            Patient History   Past Medical History:   Diagnosis Date   • COPD (chronic obstructive pulmonary disease) (Multi)    • Coronary artery disease    • Personal history of other endocrine, nutritional and metabolic disease     History of hypopituitarism   • Personal history of other mental and behavioral disorders     History of depression   • Personal history of other specified conditions     History of brain tumor   • Unspecified convulsions (Multi)     Convulsion     Past Surgical History:   Procedure Laterality Date   • BRAIN SURGERY     • CARDIAC SURGERY     • MR HEAD ANGIO WO IV CONTRAST  07/15/2021    MR HEAD ANGIO WO IV CONTRAST 7/15/2021 [facility]   • MR HEAD ANGIO WO IV CONTRAST  11/28/2017    MR HEAD ANGIO WO IV CONTRAST [facility]   • MR HEAD ANGIO WO IV CONTRAST  03/13/2018    MR HEAD ANGIO WO IV CONTRAST [facility]   • MR NECK ANGIO WO IV CONTRAST  07/15/2021    MR NECK ANGIO WO IV CONTRAST 7/15/2021 [facility]   • OTHER SURGICAL HISTORY  08/17/2013    Craniotomy Tumor Removal - Complete     No family history on file.  Social History     Tobacco Use   • Smoking status: Former     Types: Cigarettes   • Smokeless tobacco: Never   Vaping Use   • Vaping status: Never Used   Substance Use Topics   • Alcohol use: Never   • Drug use: Yes     Types: \"Crack\" cocaine, Cocaine     Comment: last used 5 days ago "       Physical Exam   ED Triage Vitals [03/03/25 1059]   Temperature Heart Rate Respirations BP   36.6 °C (97.9 °F) 70 18 128/82      Pulse Ox Temp Source Heart Rate Source Patient Position   97 % Oral Monitor Lying      BP Location FiO2 (%)     Right arm --       Physical Exam      ED Course & MDM   Diagnoses as of 03/03/25 1953   Abdominal pain, unspecified abdominal location   Colitis                 No data recorded     [place] Coma Scale Score: 15 (03/03/25 1104 : Lashaun Arevalo, FATOU)                           Medical Decision Making      Procedure  Procedures     Saulo Osorio PA-C  03/03/25 1953

## 2025-03-05 LAB — BACTERIA UR CULT: NORMAL

## 2025-03-19 ENCOUNTER — APPOINTMENT (OUTPATIENT)
Dept: RADIOLOGY | Facility: HOSPITAL | Age: 45
End: 2025-03-19
Payer: MEDICARE

## 2025-03-19 ENCOUNTER — APPOINTMENT (OUTPATIENT)
Dept: CARDIOLOGY | Facility: HOSPITAL | Age: 45
End: 2025-03-19
Payer: MEDICARE

## 2025-03-19 ENCOUNTER — HOSPITAL ENCOUNTER (EMERGENCY)
Facility: HOSPITAL | Age: 45
Discharge: HOME | End: 2025-03-19
Payer: MEDICARE

## 2025-03-19 VITALS
WEIGHT: 135 LBS | DIASTOLIC BLOOD PRESSURE: 74 MMHG | TEMPERATURE: 97.8 F | SYSTOLIC BLOOD PRESSURE: 120 MMHG | OXYGEN SATURATION: 100 % | HEIGHT: 62 IN | RESPIRATION RATE: 19 BRPM | BODY MASS INDEX: 24.84 KG/M2 | HEART RATE: 74 BPM

## 2025-03-19 DIAGNOSIS — R55 NEAR SYNCOPE: Primary | ICD-10-CM

## 2025-03-19 DIAGNOSIS — R07.9 CHEST PAIN, UNSPECIFIED TYPE: ICD-10-CM

## 2025-03-19 LAB
ALBUMIN SERPL BCP-MCNC: 4.6 G/DL (ref 3.4–5)
ALP SERPL-CCNC: 61 U/L (ref 33–110)
ALT SERPL W P-5'-P-CCNC: 8 U/L (ref 7–45)
ANION GAP SERPL CALC-SCNC: 14 MMOL/L (ref 10–20)
AST SERPL W P-5'-P-CCNC: 25 U/L (ref 9–39)
B-HCG SERPL-ACNC: <2 MIU/ML
BASOPHILS # BLD AUTO: 0.08 X10*3/UL (ref 0–0.1)
BASOPHILS NFR BLD AUTO: 1.3 %
BILIRUB SERPL-MCNC: 0.7 MG/DL (ref 0–1.2)
BNP SERPL-MCNC: 24 PG/ML (ref 0–99)
BUN SERPL-MCNC: 14 MG/DL (ref 6–23)
CALCIUM SERPL-MCNC: 9.5 MG/DL (ref 8.6–10.3)
CARDIAC TROPONIN I PNL SERPL HS: 3 NG/L (ref 0–13)
CARDIAC TROPONIN I PNL SERPL HS: 3 NG/L (ref 0–13)
CHLORIDE SERPL-SCNC: 96 MMOL/L (ref 98–107)
CO2 SERPL-SCNC: 25 MMOL/L (ref 21–32)
CREAT SERPL-MCNC: 0.98 MG/DL (ref 0.5–1.05)
D DIMER PPP FEU-MCNC: 487 NG/ML FEU
EGFRCR SERPLBLD CKD-EPI 2021: 73 ML/MIN/1.73M*2
EOSINOPHIL # BLD AUTO: 0.21 X10*3/UL (ref 0–0.7)
EOSINOPHIL NFR BLD AUTO: 3.4 %
ERYTHROCYTE [DISTWIDTH] IN BLOOD BY AUTOMATED COUNT: 13.7 % (ref 11.5–14.5)
FLUAV RNA RESP QL NAA+PROBE: NOT DETECTED
FLUBV RNA RESP QL NAA+PROBE: NOT DETECTED
GLUCOSE SERPL-MCNC: 126 MG/DL (ref 74–99)
HCT VFR BLD AUTO: 37.7 % (ref 36–46)
HGB BLD-MCNC: 12.6 G/DL (ref 12–16)
IMM GRANULOCYTES # BLD AUTO: 0.01 X10*3/UL (ref 0–0.7)
IMM GRANULOCYTES NFR BLD AUTO: 0.2 % (ref 0–0.9)
LYMPHOCYTES # BLD AUTO: 2.58 X10*3/UL (ref 1.2–4.8)
LYMPHOCYTES NFR BLD AUTO: 41.5 %
MAGNESIUM SERPL-MCNC: 2.25 MG/DL (ref 1.6–2.4)
MCH RBC QN AUTO: 27.2 PG (ref 26–34)
MCHC RBC AUTO-ENTMCNC: 33.4 G/DL (ref 32–36)
MCV RBC AUTO: 81 FL (ref 80–100)
MONOCYTES # BLD AUTO: 0.27 X10*3/UL (ref 0.1–1)
MONOCYTES NFR BLD AUTO: 4.3 %
NEUTROPHILS # BLD AUTO: 3.06 X10*3/UL (ref 1.2–7.7)
NEUTROPHILS NFR BLD AUTO: 49.3 %
NRBC BLD-RTO: 0 /100 WBCS (ref 0–0)
PLATELET # BLD AUTO: 224 X10*3/UL (ref 150–450)
POTASSIUM SERPL-SCNC: 3.7 MMOL/L (ref 3.5–5.3)
PROT SERPL-MCNC: 7.1 G/DL (ref 6.4–8.2)
RBC # BLD AUTO: 4.63 X10*6/UL (ref 4–5.2)
RSV RNA RESP QL NAA+PROBE: NOT DETECTED
SARS-COV-2 RNA RESP QL NAA+PROBE: NOT DETECTED
SODIUM SERPL-SCNC: 131 MMOL/L (ref 136–145)
WBC # BLD AUTO: 6.2 X10*3/UL (ref 4.4–11.3)

## 2025-03-19 PROCEDURE — 71045 X-RAY EXAM CHEST 1 VIEW: CPT | Performed by: RADIOLOGY

## 2025-03-19 PROCEDURE — 84484 ASSAY OF TROPONIN QUANT: CPT

## 2025-03-19 PROCEDURE — 80053 COMPREHEN METABOLIC PANEL: CPT

## 2025-03-19 PROCEDURE — 85379 FIBRIN DEGRADATION QUANT: CPT

## 2025-03-19 PROCEDURE — 87637 SARSCOV2&INF A&B&RSV AMP PRB: CPT

## 2025-03-19 PROCEDURE — 99285 EMERGENCY DEPT VISIT HI MDM: CPT | Mod: 25

## 2025-03-19 PROCEDURE — 96374 THER/PROPH/DIAG INJ IV PUSH: CPT

## 2025-03-19 PROCEDURE — 2500000004 HC RX 250 GENERAL PHARMACY W/ HCPCS (ALT 636 FOR OP/ED)

## 2025-03-19 PROCEDURE — 71045 X-RAY EXAM CHEST 1 VIEW: CPT

## 2025-03-19 PROCEDURE — 2500000001 HC RX 250 WO HCPCS SELF ADMINISTERED DRUGS (ALT 637 FOR MEDICARE OP)

## 2025-03-19 PROCEDURE — 83880 ASSAY OF NATRIURETIC PEPTIDE: CPT

## 2025-03-19 PROCEDURE — 83735 ASSAY OF MAGNESIUM: CPT

## 2025-03-19 PROCEDURE — 93005 ELECTROCARDIOGRAM TRACING: CPT

## 2025-03-19 PROCEDURE — 36415 COLL VENOUS BLD VENIPUNCTURE: CPT

## 2025-03-19 PROCEDURE — 96361 HYDRATE IV INFUSION ADD-ON: CPT

## 2025-03-19 PROCEDURE — 85025 COMPLETE CBC W/AUTO DIFF WBC: CPT

## 2025-03-19 PROCEDURE — 84702 CHORIONIC GONADOTROPIN TEST: CPT

## 2025-03-19 RX ORDER — METOCLOPRAMIDE HYDROCHLORIDE 5 MG/ML
10 INJECTION INTRAMUSCULAR; INTRAVENOUS ONCE
Status: COMPLETED | OUTPATIENT
Start: 2025-03-19 | End: 2025-03-19

## 2025-03-19 RX ORDER — NAPROXEN SODIUM 220 MG/1
324 TABLET, FILM COATED ORAL ONCE
Status: COMPLETED | OUTPATIENT
Start: 2025-03-19 | End: 2025-03-19

## 2025-03-19 RX ADMIN — METOCLOPRAMIDE HYDROCHLORIDE 10 MG: 5 INJECTION INTRAMUSCULAR; INTRAVENOUS at 19:55

## 2025-03-19 RX ADMIN — SODIUM CHLORIDE, SODIUM LACTATE, POTASSIUM CHLORIDE, AND CALCIUM CHLORIDE 1000 ML: .6; .31; .03; .02 INJECTION, SOLUTION INTRAVENOUS at 18:38

## 2025-03-19 RX ADMIN — ASPIRIN 81 MG CHEWABLE TABLET 324 MG: 81 TABLET CHEWABLE at 19:55

## 2025-03-19 ASSESSMENT — HEART SCORE
ECG: NORMAL
HEART SCORE: 3
RISK FACTORS: >2 RISK FACTORS OR HX OF ATHEROSCLEROTIC DISEASE
HISTORY: SLIGHTLY SUSPICIOUS
AGE: 45-64
TROPONIN: LESS THAN OR EQUAL TO NORMAL LIMIT

## 2025-03-19 ASSESSMENT — PAIN DESCRIPTION - PAIN TYPE: TYPE: ACUTE PAIN

## 2025-03-19 ASSESSMENT — PAIN SCALES - GENERAL
PAINLEVEL_OUTOF10: 3
PAINLEVEL_OUTOF10: 10 - WORST POSSIBLE PAIN
PAINLEVEL_OUTOF10: 8

## 2025-03-19 ASSESSMENT — PAIN DESCRIPTION - FREQUENCY: FREQUENCY: CONSTANT/CONTINUOUS

## 2025-03-19 ASSESSMENT — PAIN DESCRIPTION - DESCRIPTORS
DESCRIPTORS: DULL;PRESSURE
DESCRIPTORS: SHARP

## 2025-03-19 ASSESSMENT — PAIN - FUNCTIONAL ASSESSMENT: PAIN_FUNCTIONAL_ASSESSMENT: 0-10

## 2025-03-19 ASSESSMENT — PAIN DESCRIPTION - ORIENTATION: ORIENTATION: LEFT

## 2025-03-19 ASSESSMENT — PAIN DESCRIPTION - ONSET: ONSET: ONGOING

## 2025-03-19 ASSESSMENT — PAIN DESCRIPTION - LOCATION: LOCATION: CHEST

## 2025-03-19 ASSESSMENT — PAIN DESCRIPTION - PROGRESSION: CLINICAL_PROGRESSION: NOT CHANGED

## 2025-03-19 NOTE — ED PROVIDER NOTES
HPI   Chief Complaint   Patient presents with    Dizziness    Chest Pain     Patient states she keeps feeling like she is going to pass out but has not fallen yet. She also states she has been having chest pain since yesterday on her left chest .        Patient is a 44-year-old female with significant PMH of COPD presents to the ED for chest pain that started yesterday.  Patient states it started under her left armpit and radiates to the center.  Patient denies any injury to the chest.  Patient states she has had CABG in the past as well as mitral valve replacement.  Patient states she has no history of blood clots recent travel or surgery.  Patient is not on any blood thinners.  Patient follows with cardiology at [facility].  Patient denies any shortness of breath.  Patient states she has been feeling lightheaded and states she has been near syncope today.  Patient states that this happened to her a year ago when her blood pressure was low.  Patient states she is not on any new medications denies any blood pressure medication.  Patient states she is still tolerating p.o. intake well.  Patient does have some nausea denies any vomiting abdominal pain diarrhea.  Patient denies any tobacco alcohol abuse.  Patient does use cocaine.              Patient History   Past Medical History:   Diagnosis Date    COPD (chronic obstructive pulmonary disease) (Multi)     Coronary artery disease     Personal history of other endocrine, nutritional and metabolic disease     History of hypopituitarism    Personal history of other mental and behavioral disorders     History of depression    Personal history of other specified conditions     History of brain tumor    Unspecified convulsions (Multi)     Convulsion     Past Surgical History:   Procedure Laterality Date    BRAIN SURGERY      CARDIAC SURGERY      MR HEAD ANGIO WO IV CONTRAST  07/15/2021    MR HEAD ANGIO WO IV CONTRAST 7/15/2021 [facility]    MR HEAD ANGIO WO  "IV CONTRAST  11/28/2017    MR HEAD ANGIO WO IV CONTRAST [facility]    MR HEAD ANGIO WO IV CONTRAST  03/13/2018    MR HEAD ANGIO WO IV CONTRAST [facility]    MR NECK ANGIO WO IV CONTRAST  07/15/2021    MR NECK ANGIO WO IV CONTRAST 7/15/2021 [facility]    OTHER SURGICAL HISTORY  08/17/2013    Craniotomy Tumor Removal - Complete     No family history on file.  Social History     Tobacco Use    Smoking status: Former     Types: Cigarettes    Smokeless tobacco: Never   Vaping Use    Vaping status: Never Used   Substance Use Topics    Alcohol use: Never    Drug use: Yes     Types: \"Crack\" cocaine, Cocaine     Comment: last used 5 days ago       Physical Exam   ED Triage Vitals [03/19/25 1812]   Temperature Heart Rate Respirations BP   36.3 °C (97.3 °F) 75 16 90/64      Pulse Ox Temp Source Heart Rate Source Patient Position   97 % Temporal Monitor Lying      BP Location FiO2 (%)     Right arm --       Physical Exam  HENT:      Head: Normocephalic.   Cardiovascular:      Rate and Rhythm: Normal rate and regular rhythm.      Heart sounds: Normal heart sounds.   Pulmonary:      Effort: Pulmonary effort is normal.      Breath sounds: Normal breath sounds. No decreased breath sounds or wheezing.   Chest:      Chest wall: No tenderness.   Abdominal:      Palpations: Abdomen is soft.      Tenderness: There is no abdominal tenderness. There is no guarding or rebound.   Skin:     Findings: No rash.   Neurological:      Mental Status: She is alert.           ED Course & MDM   ED Course as of 03/19/25 2050   Wed Mar 19, 2025   1958 EKG performed at 1755 normal sinus rhythm incomplete left bundle branch block normal axis no acute signs of ischemia ventricular at 76 bpm []      ED Course User Index  [] Ryanne Garcia PA-C         Diagnoses as of 03/19/25 2050   Near syncope   Chest pain, unspecified type                 No data recorded                                 Medical Decision " Making  Medical Decision Making:  Patient presented as described in HPI. Patient case including ROS, PE, and treatment and plan discussed with ED attending if attached as cosigner. Due to patients presentation orders completed include as documented. Patient presents to the ED for chest pain times yesterday.  Patient is nontoxic-appearing abdomen is soft nontender no reproducible pain on palpation to the chest wall lung sounds are clear.  No peripheral edema.  Patient given aspirin and Reglan for symptoms as well as fluids.  Pending labs and imaging. Imaging and labs are unremarkable.  X-ray of the chest is negative.  Patient educated his findings.  Patient eating and drinking well here.  Orthostatics were negative.  Ambulatory trial was negative.  Patient educated close follow-up with primary doctor educated on any worsening symptoms to return.  Patient remained stable and discharged.  Patient was advised to follow up with PCP or recommended provider in 2-3 days for another evaluation and exam. I advised patient/guardian to return or go to closest emergency room immediately if symptoms change, get worse, new symptoms develop prior to follow up. If there is no improvement in symptoms in the next 24 hours they are advised to return for further evaluation and exam. I also explained the plan and treatment course. Patient/guardian is in agreement with plan, treatment course, and follow up and states verbally that they will comply.      Patient care discussed with: N/A  Social Determinants affecting care: N/A    Final diagnosis and disposition as below.  See CI    Homegoing. I discussed the differential; results and discharge plan with the patient and/or family/friend/caregiver if present.  I emphasized the importance of follow-up with the physician I referred them to in the timeframe recommended.  I explained reasons for the patient to return to the Emergency Department. They agreed that if they feel their condition is  worsening or if they have any other concern they should call 911 immediately for further assistance. I gave the patient an opportunity to ask all questions they had and answered all of them accordingly. They understand return precautions and discharge instructions. The patient and/or family/friend/caregiver expressed understanding verbally and that they would comply.       Disposition:  Discharge      This note has been transcribed using voice recognition and may contain grammatical errors, misplaced words, incorrect words, incorrect phrases or other errors.        Labs Reviewed   COMPREHENSIVE METABOLIC PANEL - Abnormal       Result Value    Glucose 126 (*)     Sodium 131 (*)     Potassium 3.7      Chloride 96 (*)     Bicarbonate 25      Anion Gap 14      Urea Nitrogen 14      Creatinine 0.98      eGFR 73      Calcium 9.5      Albumin 4.6      Alkaline Phosphatase 61      Total Protein 7.1      AST 25      Bilirubin, Total 0.7      ALT 8     SARS-COV-2, INFLUENZA A/B AND RSV PCR - Normal    Coronavirus 2019, PCR Not Detected      Flu A Result Not Detected      Flu B Result Not Detected      RSV PCR Not Detected      Narrative:     This assay is an FDA-cleared, in vitro diagnostic nucleic acid amplification test for the qualitative detection and differentiation of SARS CoV-2/ Influenza A/B/ RSV from nasopharyngeal specimens collected from individuals with signs and symptoms of respiratory tract infections, and has been validated for use at [facility]. Negative results do not preclude COVID-19/ Influenza A/B/ RSV infections and should not be used as the sole basis for diagnosis, treatment, or other management decisions. Testing for SARS CoV-2 is recommended only for patients who meet current clinical and/or epidemiological criteria defined by federal, state, or local public health directives.   MAGNESIUM - Normal    Magnesium 2.25     B-TYPE NATRIURETIC PEPTIDE - Normal    BNP 24       Narrative:        <100 pg/mL - Heart failure unlikely  100-299 pg/mL - Intermediate probability of acute heart                  failure exacerbation. Correlate with clinical                  context and patient history.    >=300 pg/mL - Heart Failure likely. Correlate with clinical                  context and patient history.    BNP testing is performed using different testing methodology at [facility] than at other [facility]. Direct result comparisons should only be made within the same method.      D-DIMER, VTE EXCLUSION - Normal    D-Dimer, Quantitative VTE Exclusion 487      Narrative:     The VTE Exclusion D-Dimer assay is reported in ng/mL Fibrinogen Equivalent Units (FEU).    Per 's instructions for use, a value of less than 500 ng/mL (FEU) may help to exclude DVT or PE in outpatients when the assay is used with a clinical pretest probability assessment.(AE must utilize and document eCalc 'Wells Score Deep Vein Thrombosis Risk' for DVT exclusion only. Emergency Department should utilize  Guidelines for Emergency Department Use of the VTE Exclusion D-Dimer and Clinical Pretest probability assessment model for DVT or PE exclusion.)   SERIAL TROPONIN-INITIAL - Normal    Troponin I, High Sensitivity 3      Narrative:     Less than 99th percentile of normal range cutoff-  Female and children under 18 years old <14 ng/L; Male <21 ng/L: Negative  Repeat testing should be performed if clinically indicated.     Female and children under 18 years old 14-50 ng/L; Male 21-50 ng/L:  Consistent with possible cardiac damage and possible increased clinical   risk. Serial measurements may help to assess extent of myocardial damage.     >50 ng/L: Consistent with cardiac damage, increased clinical risk and  myocardial infarction. Serial measurements may help assess extent of   myocardial damage.      NOTE: Children less than 1 year old may have higher baseline troponin   levels and results  should be interpreted in conjunction with the overall   clinical context.     NOTE: Troponin I testing is performed using a different   testing methodology at [facility] than at other   [facility]. Direct result comparisons should only   be made within the same method.   HUMAN CHORIONIC GONADOTROPIN, SERUM QUANTITATIVE - Normal    HCG, Beta-Quantitative <2      Narrative:      Total HCG measurement is performed using the [employer] Access   Immunoassay which detects intact HCG and free beta HCG subunit.    This test is not indicated for use as a tumor marker.   HCG testing is performed using a different test methodology at [facility] than other [facility]. Direct result comparison   should only be made within the same method.       SERIAL TROPONIN, 1 HOUR - Normal    Troponin I, High Sensitivity 3      Narrative:     Less than 99th percentile of normal range cutoff-  Female and children under 18 years old <14 ng/L; Male <21 ng/L: Negative  Repeat testing should be performed if clinically indicated.     Female and children under 18 years old 14-50 ng/L; Male 21-50 ng/L:  Consistent with possible cardiac damage and possible increased clinical   risk. Serial measurements may help to assess extent of myocardial damage.     >50 ng/L: Consistent with cardiac damage, increased clinical risk and  myocardial infarction. Serial measurements may help assess extent of   myocardial damage.      NOTE: Children less than 1 year old may have higher baseline troponin   levels and results should be interpreted in conjunction with the overall   clinical context.     NOTE: Troponin I testing is performed using a different   testing methodology at [facility] than at other   [facility]. Direct result comparisons should only   be made within the same method.   CBC WITH AUTO DIFFERENTIAL    WBC 6.2      nRBC 0.0      RBC 4.63      Hemoglobin 12.6      Hematocrit 37.7       MCV 81      MCH 27.2      MCHC 33.4      RDW 13.7      Platelets 224      Neutrophils % 49.3      Immature Granulocytes %, Automated 0.2      Lymphocytes % 41.5      Monocytes % 4.3      Eosinophils % 3.4      Basophils % 1.3      Neutrophils Absolute 3.06      Immature Granulocytes Absolute, Automated 0.01      Lymphocytes Absolute 2.58      Monocytes Absolute 0.27      Eosinophils Absolute 0.21      Basophils Absolute 0.08     TROPONIN SERIES- (INITIAL, 1 HR)    Narrative:     The following orders were created for panel order Troponin I Series, High Sensitivity (0, 1 HR).  Procedure                               Abnormality         Status                     ---------                               -----------         ------                     Troponin I, High Sensiti...[[id number]]  Normal              Final result               Troponin, High Sensitivi...[[id number]]  Normal              Final result                 Please view results for these tests on the individual orders.      XR chest 1 view   Final Result   1. No acute cardiopulmonary process.        Signed by: Erickson Durand 3/19/2025 7:52 PM   Dictation workstation:   [id number]         Procedure  Procedures     Ryanne Garcia PA-C  03/19/25 2052

## 2025-03-19 NOTE — ED TRIAGE NOTES
Patient states she keeps feeling like she is going to pass out but has not fallen yet. She also states she has been having chest pain since yesterday on her left chest .

## 2025-03-20 LAB
ATRIAL RATE: 76 BPM
P AXIS: 45 DEGREES
P OFFSET: 174 MS
P ONSET: 135 MS
PR INTERVAL: 156 MS
Q ONSET: 213 MS
QRS COUNT: 13 BEATS
QRS DURATION: 108 MS
QT INTERVAL: 446 MS
QTC CALCULATION(BAZETT): 501 MS
QTC FREDERICIA: 482 MS
R AXIS: 81 DEGREES
T AXIS: 249 DEGREES
T OFFSET: 436 MS
VENTRICULAR RATE: 76 BPM

## 2025-03-22 ENCOUNTER — APPOINTMENT (OUTPATIENT)
Dept: RADIOLOGY | Facility: HOSPITAL | Age: 45
End: 2025-03-22
Payer: MEDICARE

## 2025-03-22 ENCOUNTER — HOSPITAL ENCOUNTER (EMERGENCY)
Facility: HOSPITAL | Age: 45
Discharge: LEFT BEFORE DISCHARGE | End: 2025-03-22
Payer: MEDICARE

## 2025-03-22 ENCOUNTER — APPOINTMENT (OUTPATIENT)
Dept: CARDIOLOGY | Facility: HOSPITAL | Age: 45
End: 2025-03-22
Payer: MEDICARE

## 2025-03-22 VITALS
WEIGHT: 135 LBS | HEART RATE: 74 BPM | TEMPERATURE: 97.3 F | RESPIRATION RATE: 18 BRPM | BODY MASS INDEX: 24.84 KG/M2 | OXYGEN SATURATION: 99 % | DIASTOLIC BLOOD PRESSURE: 62 MMHG | HEIGHT: 62 IN | SYSTOLIC BLOOD PRESSURE: 96 MMHG

## 2025-03-22 DIAGNOSIS — R79.89 ELEVATED BRAIN NATRIURETIC PEPTIDE (BNP) LEVEL: ICD-10-CM

## 2025-03-22 DIAGNOSIS — R53.1 GENERALIZED WEAKNESS: Primary | ICD-10-CM

## 2025-03-22 LAB
ALBUMIN SERPL BCP-MCNC: 4.1 G/DL (ref 3.4–5)
ALP SERPL-CCNC: 55 U/L (ref 33–110)
ALT SERPL W P-5'-P-CCNC: 7 U/L (ref 7–45)
AMPHETAMINES UR QL SCN: ABNORMAL
ANION GAP SERPL CALC-SCNC: 14 MMOL/L (ref 10–20)
APAP SERPL-MCNC: <10 UG/ML
APPEARANCE UR: CLEAR
AST SERPL W P-5'-P-CCNC: 23 U/L (ref 9–39)
BARBITURATES UR QL SCN: ABNORMAL
BASOPHILS # BLD AUTO: 0.1 X10*3/UL (ref 0–0.1)
BASOPHILS NFR BLD AUTO: 1.5 %
BENZODIAZ UR QL SCN: ABNORMAL
BILIRUB SERPL-MCNC: 0.7 MG/DL (ref 0–1.2)
BILIRUB UR STRIP.AUTO-MCNC: NEGATIVE MG/DL
BNP SERPL-MCNC: 487 PG/ML (ref 0–99)
BUN SERPL-MCNC: 10 MG/DL (ref 6–23)
BZE UR QL SCN: ABNORMAL
CALCIUM SERPL-MCNC: 9.3 MG/DL (ref 8.6–10.3)
CANNABINOIDS UR QL SCN: ABNORMAL
CARDIAC TROPONIN I PNL SERPL HS: 3 NG/L (ref 0–13)
CARDIAC TROPONIN I PNL SERPL HS: 3 NG/L (ref 0–13)
CHLORIDE SERPL-SCNC: 98 MMOL/L (ref 98–107)
CO2 SERPL-SCNC: 22 MMOL/L (ref 21–32)
COLOR UR: COLORLESS
CREAT SERPL-MCNC: 0.88 MG/DL (ref 0.5–1.05)
D DIMER PPP FEU-MCNC: 571 NG/ML FEU
EGFRCR SERPLBLD CKD-EPI 2021: 83 ML/MIN/1.73M*2
EOSINOPHIL # BLD AUTO: 0.31 X10*3/UL (ref 0–0.7)
EOSINOPHIL NFR BLD AUTO: 4.5 %
ERYTHROCYTE [DISTWIDTH] IN BLOOD BY AUTOMATED COUNT: 14 % (ref 11.5–14.5)
ETHANOL SERPL-MCNC: <10 MG/DL
FENTANYL+NORFENTANYL UR QL SCN: ABNORMAL
GLUCOSE SERPL-MCNC: 103 MG/DL (ref 74–99)
GLUCOSE UR STRIP.AUTO-MCNC: NORMAL MG/DL
HCG UR QL IA.RAPID: NEGATIVE
HCT VFR BLD AUTO: 38.8 % (ref 36–46)
HGB BLD-MCNC: 12.1 G/DL (ref 12–16)
IMM GRANULOCYTES # BLD AUTO: 0.02 X10*3/UL (ref 0–0.7)
IMM GRANULOCYTES NFR BLD AUTO: 0.3 % (ref 0–0.9)
KETONES UR STRIP.AUTO-MCNC: NEGATIVE MG/DL
LACTATE SERPL-SCNC: 1.2 MMOL/L (ref 0.4–2)
LEUKOCYTE ESTERASE UR QL STRIP.AUTO: ABNORMAL
LYMPHOCYTES # BLD AUTO: 2.44 X10*3/UL (ref 1.2–4.8)
LYMPHOCYTES NFR BLD AUTO: 35.6 %
MAGNESIUM SERPL-MCNC: 2.06 MG/DL (ref 1.6–2.4)
MCH RBC QN AUTO: 27.3 PG (ref 26–34)
MCHC RBC AUTO-ENTMCNC: 31.2 G/DL (ref 32–36)
MCV RBC AUTO: 87 FL (ref 80–100)
METHADONE UR QL SCN: ABNORMAL
MONOCYTES # BLD AUTO: 0.38 X10*3/UL (ref 0.1–1)
MONOCYTES NFR BLD AUTO: 5.5 %
MUCOUS THREADS #/AREA URNS AUTO: ABNORMAL /LPF
NEUTROPHILS # BLD AUTO: 3.6 X10*3/UL (ref 1.2–7.7)
NEUTROPHILS NFR BLD AUTO: 52.6 %
NITRITE UR QL STRIP.AUTO: NEGATIVE
NRBC BLD-RTO: 0 /100 WBCS (ref 0–0)
OPIATES UR QL SCN: ABNORMAL
OXYCODONE+OXYMORPHONE UR QL SCN: ABNORMAL
PCP UR QL SCN: ABNORMAL
PH UR STRIP.AUTO: 5.5 [PH]
PLATELET # BLD AUTO: 200 X10*3/UL (ref 150–450)
POTASSIUM SERPL-SCNC: 3.3 MMOL/L (ref 3.5–5.3)
PROT SERPL-MCNC: 6.7 G/DL (ref 6.4–8.2)
PROT UR STRIP.AUTO-MCNC: NEGATIVE MG/DL
RBC # BLD AUTO: 4.44 X10*6/UL (ref 4–5.2)
RBC # UR STRIP.AUTO: NEGATIVE MG/DL
RBC #/AREA URNS AUTO: ABNORMAL /HPF
SALICYLATES SERPL-MCNC: <3 MG/DL
SODIUM SERPL-SCNC: 131 MMOL/L (ref 136–145)
SP GR UR STRIP.AUTO: 1.01
TSH SERPL-ACNC: 2.65 MIU/L (ref 0.44–3.98)
UROBILINOGEN UR STRIP.AUTO-MCNC: NORMAL MG/DL
WBC # BLD AUTO: 6.9 X10*3/UL (ref 4.4–11.3)
WBC #/AREA URNS AUTO: ABNORMAL /HPF

## 2025-03-22 PROCEDURE — 36415 COLL VENOUS BLD VENIPUNCTURE: CPT

## 2025-03-22 PROCEDURE — 80143 DRUG ASSAY ACETAMINOPHEN: CPT

## 2025-03-22 PROCEDURE — 80307 DRUG TEST PRSMV CHEM ANLYZR: CPT

## 2025-03-22 PROCEDURE — 84484 ASSAY OF TROPONIN QUANT: CPT

## 2025-03-22 PROCEDURE — 83735 ASSAY OF MAGNESIUM: CPT

## 2025-03-22 PROCEDURE — 81003 URINALYSIS AUTO W/O SCOPE: CPT

## 2025-03-22 PROCEDURE — 85379 FIBRIN DEGRADATION QUANT: CPT

## 2025-03-22 PROCEDURE — 70450 CT HEAD/BRAIN W/O DYE: CPT

## 2025-03-22 PROCEDURE — 2500000002 HC RX 250 W HCPCS SELF ADMINISTERED DRUGS (ALT 637 FOR MEDICARE OP, ALT 636 FOR OP/ED): Mod: MUE

## 2025-03-22 PROCEDURE — 2500000004 HC RX 250 GENERAL PHARMACY W/ HCPCS (ALT 636 FOR OP/ED)

## 2025-03-22 PROCEDURE — 80053 COMPREHEN METABOLIC PANEL: CPT

## 2025-03-22 PROCEDURE — 72125 CT NECK SPINE W/O DYE: CPT

## 2025-03-22 PROCEDURE — 99285 EMERGENCY DEPT VISIT HI MDM: CPT | Mod: 25

## 2025-03-22 PROCEDURE — 71045 X-RAY EXAM CHEST 1 VIEW: CPT

## 2025-03-22 PROCEDURE — 96361 HYDRATE IV INFUSION ADD-ON: CPT

## 2025-03-22 PROCEDURE — 84443 ASSAY THYROID STIM HORMONE: CPT

## 2025-03-22 PROCEDURE — 85025 COMPLETE CBC W/AUTO DIFF WBC: CPT

## 2025-03-22 PROCEDURE — 87086 URINE CULTURE/COLONY COUNT: CPT | Mod: GENLAB

## 2025-03-22 PROCEDURE — 96374 THER/PROPH/DIAG INJ IV PUSH: CPT

## 2025-03-22 PROCEDURE — 83880 ASSAY OF NATRIURETIC PEPTIDE: CPT

## 2025-03-22 PROCEDURE — 93005 ELECTROCARDIOGRAM TRACING: CPT

## 2025-03-22 PROCEDURE — 81025 URINE PREGNANCY TEST: CPT

## 2025-03-22 PROCEDURE — 71045 X-RAY EXAM CHEST 1 VIEW: CPT | Performed by: RADIOLOGY

## 2025-03-22 PROCEDURE — 83605 ASSAY OF LACTIC ACID: CPT

## 2025-03-22 RX ORDER — POTASSIUM CHLORIDE 20 MEQ/1
40 TABLET, EXTENDED RELEASE ORAL DAILY
Status: DISCONTINUED | OUTPATIENT
Start: 2025-03-22 | End: 2025-03-22 | Stop reason: HOSPADM

## 2025-03-22 RX ORDER — METOCLOPRAMIDE HYDROCHLORIDE 5 MG/ML
10 INJECTION INTRAMUSCULAR; INTRAVENOUS ONCE
Status: COMPLETED | OUTPATIENT
Start: 2025-03-22 | End: 2025-03-22

## 2025-03-22 RX ORDER — SODIUM CHLORIDE, SODIUM LACTATE, POTASSIUM CHLORIDE, CALCIUM CHLORIDE 600; 310; 30; 20 MG/100ML; MG/100ML; MG/100ML; MG/100ML
125 INJECTION, SOLUTION INTRAVENOUS CONTINUOUS
Status: DISCONTINUED | OUTPATIENT
Start: 2025-03-22 | End: 2025-03-22 | Stop reason: HOSPADM

## 2025-03-22 RX ADMIN — SODIUM CHLORIDE, POTASSIUM CHLORIDE, SODIUM LACTATE AND CALCIUM CHLORIDE 125 ML/HR: 600; 310; 30; 20 INJECTION, SOLUTION INTRAVENOUS at 16:11

## 2025-03-22 RX ADMIN — METOCLOPRAMIDE HYDROCHLORIDE 10 MG: 5 INJECTION INTRAMUSCULAR; INTRAVENOUS at 16:18

## 2025-03-22 RX ADMIN — POTASSIUM CHLORIDE 40 MEQ: 1500 TABLET, EXTENDED RELEASE ORAL at 16:09

## 2025-03-22 ASSESSMENT — PAIN DESCRIPTION - PAIN TYPE: TYPE: CHRONIC PAIN

## 2025-03-22 ASSESSMENT — PAIN SCALES - GENERAL
PAINLEVEL_OUTOF10: 0 - NO PAIN
PAINLEVEL_OUTOF10: 0 - NO PAIN
PAINLEVEL_OUTOF10: 9

## 2025-03-22 ASSESSMENT — PAIN DESCRIPTION - FREQUENCY: FREQUENCY: INTERMITTENT

## 2025-03-22 ASSESSMENT — PAIN DESCRIPTION - LOCATION: LOCATION: ABDOMEN

## 2025-03-22 ASSESSMENT — PAIN - FUNCTIONAL ASSESSMENT: PAIN_FUNCTIONAL_ASSESSMENT: 0-10

## 2025-03-22 ASSESSMENT — PAIN DESCRIPTION - DESCRIPTORS: DESCRIPTORS: CRAMPING

## 2025-03-22 ASSESSMENT — PAIN DESCRIPTION - ORIENTATION: ORIENTATION: RIGHT

## 2025-03-22 NOTE — ED TRIAGE NOTES
"Pt to ED via EMS for multiple complaints, pt states she has generalized weakness, dizziness, heart palpitations that began on arrival, nausea, and \"ovarian pain\", pt denies having a syncopal episode   "

## 2025-03-22 NOTE — ED PROVIDER NOTES
HPI   Chief Complaint   Patient presents with    Weakness, Gen       Patient is a 44-year-old female with significant history of COPD, coronary artery disease presents to the ED for weakness x 3 days.  Patient states she was here 3 days ago and I saw the patient 3 days ago and she presented for syncopal episodes and chest pain.  Patient is a frequent flyer in the ER.  Patient states she has not had any syncope since then but does endorse weakness.  Patient states she also feels confused.  Patient is ANO x 3 and at baseline for me.  Patient endorses some nausea believes she has been tolerating p.o. intake normally.  Patient did cocaine yesterday.  Patient states she has some palpitations denies any chest pain or shortness of breath.  Patient denies any numbness tingling abdominal pain diarrhea constipation congestion cough fever chills.  Patient smokes half pack per day denies any alcohol abuse does cocaine.              Patient History   Past Medical History:   Diagnosis Date    COPD (chronic obstructive pulmonary disease) (Multi)     Coronary artery disease     Personal history of other endocrine, nutritional and metabolic disease     History of hypopituitarism    Personal history of other mental and behavioral disorders     History of depression    Personal history of other specified conditions     History of brain tumor    Unspecified convulsions (Multi)     Convulsion     Past Surgical History:   Procedure Laterality Date    BRAIN SURGERY      CARDIAC SURGERY      MR HEAD ANGIO WO IV CONTRAST  07/15/2021    MR HEAD ANGIO WO IV CONTRAST 7/15/2021 [facility]    MR HEAD ANGIO WO IV CONTRAST  11/28/2017    MR HEAD ANGIO WO IV CONTRAST [facility]    MR HEAD ANGIO WO IV CONTRAST  03/13/2018    MR HEAD ANGIO WO IV CONTRAST [facility]    MR NECK ANGIO WO IV CONTRAST  07/15/2021    MR NECK ANGIO WO IV CONTRAST 7/15/2021 [facility]    OTHER SURGICAL HISTORY  08/17/2013    Craniotomy  "Tumor Removal - Complete     No family history on file.  Social History     Tobacco Use    Smoking status: Former     Types: Cigarettes    Smokeless tobacco: Never   Vaping Use    Vaping status: Never Used   Substance Use Topics    Alcohol use: Never    Drug use: Yes     Types: \"Crack\" cocaine, Cocaine     Comment: last used 5 days ago       Physical Exam   ED Triage Vitals [03/22/25 1436]   Temperature Heart Rate Respirations BP   36.2 °C (97.2 °F) 80 18 91/65      Pulse Ox Temp Source Heart Rate Source Patient Position   97 % Temporal -- --      BP Location FiO2 (%)     -- --       Physical Exam  HENT:      Head: Normocephalic.   Eyes:      Extraocular Movements: Extraocular movements intact.   Cardiovascular:      Rate and Rhythm: Normal rate and regular rhythm.      Pulses: Normal pulses.   Pulmonary:      Effort: Pulmonary effort is normal.      Breath sounds: No wheezing, rhonchi or rales.   Abdominal:      Palpations: Abdomen is soft.      Tenderness: There is no abdominal tenderness. There is no guarding or rebound.   Neurological:      General: No focal deficit present.      Mental Status: She is alert and oriented to person, place, and time. Mental status is at baseline.      Cranial Nerves: No cranial nerve deficit.      Sensory: No sensory deficit.      Motor: Weakness (b/l LE weakness) present.           ED Course & MDM   ED Course as of 03/22/25 1842   Sat Mar 22, 2025   1451 EKG performed at 1449 normal sinus rhythm normal axis no acute signs of ischemia ventricular rate 79 bpm []      ED Course User Index  [] Ryanne Garcia PA-C         Diagnoses as of 03/22/25 1842   Generalized weakness   Elevated brain natriuretic peptide (BNP) level                 No data recorded     Edgar Coma Scale Score: 15 (03/22/25 1438 : Sunitha Roger, FATOU)                           Medical Decision Making  Medical Decision Making:  Patient presented as described in HPI. Patient case including ROS, PE, and " treatment and plan discussed with ED attending if attached as cosigner. Due to patients presentation orders completed include as documented.  Patient presents to the ED for weakness lightheadedness.  Patient denies any syncopal episodes.  Denies any injury or falls.  Patient endorses some neck pain.  Patient is a frequent flyer here in the ER.  Patient given fluids and Reglan.  Pending labs and imaging. Amphetamine and Cocaine positive UA.  BNP is 487 3 days ago was 24.  D-dimer was 571 will get CT PE study.  Potassium is 3.3 patient given potassium replacement.  Rest of labs are unremarkable.  CT head C-spine x-ray negative.  Pending CT PE.  After patient ate and had ginger ale she states she feels better and she wants to go home.  Patient educated on needing CT PE study and she would need to leave AGAINST MEDICAL ADVICE.  Patient understands the risks and still wishes to go home. Patient left AMA.         Patient care discussed with: N/A  Social Determinants affecting care: N/A    Final diagnosis and disposition as below.  See CI       Disposition:  AMA        This note has been transcribed using voice recognition and may contain grammatical errors, misplaced words, incorrect words, incorrect phrases or other errors.        Labs Reviewed   CBC WITH AUTO DIFFERENTIAL - Abnormal       Result Value    WBC 6.9      nRBC 0.0      RBC 4.44      Hemoglobin 12.1      Hematocrit 38.8      MCV 87      MCH 27.3      MCHC 31.2 (*)     RDW 14.0      Platelets 200      Neutrophils % 52.6      Immature Granulocytes %, Automated 0.3      Lymphocytes % 35.6      Monocytes % 5.5      Eosinophils % 4.5      Basophils % 1.5      Neutrophils Absolute 3.60      Immature Granulocytes Absolute, Automated 0.02      Lymphocytes Absolute 2.44      Monocytes Absolute 0.38      Eosinophils Absolute 0.31      Basophils Absolute 0.10     COMPREHENSIVE METABOLIC PANEL - Abnormal    Glucose 103 (*)     Sodium 131 (*)     Potassium 3.3 (*)      Chloride 98      Bicarbonate 22      Anion Gap 14      Urea Nitrogen 10      Creatinine 0.88      eGFR 83      Calcium 9.3      Albumin 4.1      Alkaline Phosphatase 55      Total Protein 6.7      AST 23      Bilirubin, Total 0.7      ALT 7     B-TYPE NATRIURETIC PEPTIDE - Abnormal     (*)     Narrative:        <100 pg/mL - Heart failure unlikely  100-299 pg/mL - Intermediate probability of acute heart                  failure exacerbation. Correlate with clinical                  context and patient history.    >=300 pg/mL - Heart Failure likely. Correlate with clinical                  context and patient history.    BNP testing is performed using different testing methodology at [facility] than at other [facility]. Direct result comparisons should only be made within the same method.      D-DIMER, VTE EXCLUSION - Abnormal    D-Dimer, Quantitative VTE Exclusion 571 (*)     Narrative:     The VTE Exclusion D-Dimer assay is reported in ng/mL Fibrinogen Equivalent Units (FEU).    Per 's instructions for use, a value of less than 500 ng/mL (FEU) may help to exclude DVT or PE in outpatients when the assay is used with a clinical pretest probability assessment.([facility] must utilize and document eCalc 'Wells Score Deep Vein Thrombosis Risk' for DVT exclusion only. Emergency Department should utilize  Guidelines for Emergency Department Use of the VTE Exclusion D-Dimer and Clinical Pretest probability assessment model for DVT or PE exclusion.)   URINALYSIS WITH REFLEX CULTURE AND MICROSCOPIC - Abnormal    Color, Urine Colorless (*)     Appearance, Urine Clear      Specific Gravity, Urine 1.009      pH, Urine 5.5      Protein, Urine NEGATIVE      Glucose, Urine Normal      Blood, Urine NEGATIVE      Ketones, Urine NEGATIVE      Bilirubin, Urine NEGATIVE      Urobilinogen, Urine Normal      Nitrite, Urine NEGATIVE      Leukocyte Esterase, Urine 75 Cesar/uL (*)    DRUG SCREEN,URINE -  Abnormal    Amphetamine Screen, Urine Presumptive Positive (*)     Barbiturate Screen, Urine Presumptive Negative      Benzodiazepines Screen, Urine Presumptive Negative      Cannabinoid Screen, Urine Presumptive Negative      Cocaine Metabolite Screen, Urine Presumptive Positive (*)     Fentanyl Screen, Urine Presumptive Negative      Opiate Screen, Urine Presumptive Negative      Oxycodone Screen, Urine Presumptive Negative      PCP Screen, Urine Presumptive Negative      Methadone Screen, Urine Presumptive Negative      Narrative:     Drug screen results are presumptive and should not be used to assess   compliance with prescribed medication. Contact the performing [facility] laboratory   to add-on definitive confirmatory testing if clinically indicated.    Toxicology screening results are reported qualitatively. The concentration must   be greater than or equal to the cutoff to be reported as positive. The concentration   at which the screening test can detect an individual drug or metabolite varies.   The absence of expected drug(s) and/or drug metabolite(s) may indicate non-compliance,   inappropriate timing of specimen collection relative to drug administration, poor drug   absorption, diluted/adulterated urine, or limitations of testing. For medical purposes   only; not valid for forensic use.    Interpretive questions should be directed to the laboratory medical directors.   MICROSCOPIC ONLY, URINE - Abnormal    WBC, Urine 6-10 (*)     RBC, Urine 1-2      Mucus, Urine FEW     HCG, URINE, QUALITATIVE - Normal    HCG, Urine NEGATIVE     MAGNESIUM - Normal    Magnesium 2.06     LACTATE - Normal    Lactate 1.2      Narrative:     Venipuncture immediately after or during the administration of Metamizole may lead to falsely low results. Testing should be performed immediately prior to Metamizole dosing.   TSH WITH REFLEX TO FREE T4 IF ABNORMAL - Normal    Thyroid Stimulating Hormone 2.65      Narrative:     TSH testing  is performed using different testing methodology at [facility] than at [facility]. Direct result comparisons should only be made within the same method.     SERIAL TROPONIN-INITIAL - Normal    Troponin I, High Sensitivity 3      Narrative:     Less than 99th percentile of normal range cutoff-  Female and children under 18 years old <14 ng/L; Male <21 ng/L: Negative  Repeat testing should be performed if clinically indicated.     Female and children under 18 years old 14-50 ng/L; Male 21-50 ng/L:  Consistent with possible cardiac damage and possible increased clinical   risk. Serial measurements may help to assess extent of myocardial damage.     >50 ng/L: Consistent with cardiac damage, increased clinical risk and  myocardial infarction. Serial measurements may help assess extent of   myocardial damage.      NOTE: Children less than 1 year old may have higher baseline troponin   levels and results should be interpreted in conjunction with the overall   clinical context.     NOTE: Troponin I testing is performed using a different   testing methodology at [facility] than at [facility]. Direct result comparisons should only   be made within the same method.   ACUTE TOXICOLOGY PANEL, BLOOD - Normal    Acetaminophen <10.0      Salicylate  <3      Alcohol <10     SERIAL TROPONIN, 1 HOUR - Normal    Troponin I, High Sensitivity 3      Narrative:     Less than 99th percentile of normal range cutoff-  Female and children under 18 years old <14 ng/L; Male <21 ng/L: Negative  Repeat testing should be performed if clinically indicated.     Female and children under 18 years old 14-50 ng/L; Male 21-50 ng/L:  Consistent with possible cardiac damage and possible increased clinical   risk. Serial measurements may help to assess extent of myocardial damage.     >50 ng/L: Consistent with cardiac damage, increased clinical risk and  myocardial infarction. Serial measurements may  help assess extent of   myocardial damage.      NOTE: Children less than 1 year old may have higher baseline troponin   levels and results should be interpreted in conjunction with the overall   clinical context.     NOTE: Troponin I testing is performed using a different   testing methodology at [facility] than at other   [facility]. Direct result comparisons should only   be made within the same method.   URINE CULTURE   TROPONIN SERIES- (INITIAL, 1 HR)    Narrative:     The following orders were created for panel order Troponin I Series, High Sensitivity (0, 1 HR).  Procedure                               Abnormality         Status                     ---------                               -----------         ------                     Troponin I, High Sensiti...[[id number]]  Normal              Final result               Troponin, High Sensitivi...[[id number]]  Normal              Final result                 Please view results for these tests on the individual orders.   URINALYSIS WITH REFLEX CULTURE AND MICROSCOPIC    Narrative:     The following orders were created for panel order Urinalysis with Reflex Culture and Microscopic.  Procedure                               Abnormality         Status                     ---------                               -----------         ------                     Urinalysis with Reflex C...[[id number]]  Abnormal            Final result               Extra Urine Gray Tube[[id number]]                            In process                   Please view results for these tests on the individual orders.   EXTRA URINE GRAY TUBE      CT head wo IV contrast   Final Result   No evidence of an acute cortical infarct or intracranial hemorrhage.        Postoperative changes described above with right temporal   encephalomalacia.        MACRO:   None        Signed by: Hakeem Manuel 3/22/2025 3:59 PM   Dictation workstation:   [id number]      CT cervical spine  wo IV contrast   Final Result   Minimal degenerative changes.        Reversal of cervical lordosis.        MACRO:   None        Signed by: Hakeem Manuel 3/22/2025 4:07 PM   Dictation workstation:   [id number]      XR chest 1 view   Final Result   No evidence of acute intrathoracic abnormality.        Signed by: Zhang Vo 3/22/2025 3:02 PM   Dictation workstation:   [id number]      CT angio chest for pulmonary embolism    (Results Pending)        Procedure  Procedures     Ryanne Garcia PA-C  03/22/25 3696

## 2025-03-23 LAB — HOLD SPECIMEN: NORMAL

## 2025-03-24 ENCOUNTER — HOSPITAL ENCOUNTER (OUTPATIENT)
Dept: CARDIOLOGY | Facility: HOSPITAL | Age: 45
Discharge: HOME | End: 2025-03-24
Payer: MEDICARE

## 2025-03-24 ENCOUNTER — OFFICE VISIT (OUTPATIENT)
Dept: URGENT CARE | Age: 45
End: 2025-03-24
Payer: MEDICARE

## 2025-03-24 ENCOUNTER — APPOINTMENT (OUTPATIENT)
Dept: RADIOLOGY | Facility: HOSPITAL | Age: 45
End: 2025-03-24
Payer: MEDICARE

## 2025-03-24 ENCOUNTER — HOSPITAL ENCOUNTER (EMERGENCY)
Facility: HOSPITAL | Age: 45
Discharge: LEFT BEFORE DISCHARGE | End: 2025-03-24
Attending: EMERGENCY MEDICINE
Payer: MEDICARE

## 2025-03-24 VITALS
RESPIRATION RATE: 20 BRPM | TEMPERATURE: 97.5 F | WEIGHT: 145.28 LBS | SYSTOLIC BLOOD PRESSURE: 113 MMHG | OXYGEN SATURATION: 100 % | HEART RATE: 60 BPM | HEIGHT: 62 IN | DIASTOLIC BLOOD PRESSURE: 78 MMHG | BODY MASS INDEX: 26.74 KG/M2

## 2025-03-24 VITALS
RESPIRATION RATE: 14 BRPM | TEMPERATURE: 97.5 F | OXYGEN SATURATION: 100 % | SYSTOLIC BLOOD PRESSURE: 130 MMHG | HEIGHT: 62 IN | WEIGHT: 145.28 LBS | HEART RATE: 63 BPM | DIASTOLIC BLOOD PRESSURE: 89 MMHG | BODY MASS INDEX: 26.74 KG/M2

## 2025-03-24 DIAGNOSIS — R51.9 CHRONIC INTRACTABLE HEADACHE, UNSPECIFIED HEADACHE TYPE: ICD-10-CM

## 2025-03-24 DIAGNOSIS — R07.9 CHEST PAIN, UNSPECIFIED TYPE: Primary | ICD-10-CM

## 2025-03-24 DIAGNOSIS — R53.1 WEAKNESS: ICD-10-CM

## 2025-03-24 DIAGNOSIS — G89.29 CHRONIC INTRACTABLE HEADACHE, UNSPECIFIED HEADACHE TYPE: ICD-10-CM

## 2025-03-24 DIAGNOSIS — R41.0 CONFUSION: ICD-10-CM

## 2025-03-24 LAB
ALBUMIN SERPL BCP-MCNC: 4.7 G/DL (ref 3.4–5)
ALP SERPL-CCNC: 49 U/L (ref 33–110)
ALT SERPL W P-5'-P-CCNC: 9 U/L (ref 7–45)
ANION GAP SERPL CALC-SCNC: 9 MMOL/L (ref 10–20)
AST SERPL W P-5'-P-CCNC: 40 U/L (ref 9–39)
ATRIAL RATE: 79 BPM
BACTERIA UR CULT: ABNORMAL
BASOPHILS # BLD AUTO: 0.05 X10*3/UL (ref 0–0.1)
BASOPHILS NFR BLD AUTO: 0.8 %
BILIRUB SERPL-MCNC: 0.4 MG/DL (ref 0–1.2)
BUN SERPL-MCNC: 9 MG/DL (ref 6–23)
CALCIUM SERPL-MCNC: 9.1 MG/DL (ref 8.6–10.3)
CARDIAC TROPONIN I PNL SERPL HS: <3 NG/L (ref 0–13)
CHLORIDE SERPL-SCNC: 98 MMOL/L (ref 98–107)
CO2 SERPL-SCNC: 27 MMOL/L (ref 21–32)
CREAT SERPL-MCNC: 0.69 MG/DL (ref 0.5–1.05)
EGFRCR SERPLBLD CKD-EPI 2021: >90 ML/MIN/1.73M*2
EOSINOPHIL # BLD AUTO: 0.12 X10*3/UL (ref 0–0.7)
EOSINOPHIL NFR BLD AUTO: 1.9 %
ERYTHROCYTE [DISTWIDTH] IN BLOOD BY AUTOMATED COUNT: 14.1 % (ref 11.5–14.5)
GLUCOSE SERPL-MCNC: 90 MG/DL (ref 74–99)
HCT VFR BLD AUTO: 34.3 % (ref 36–46)
HGB BLD-MCNC: 11 G/DL (ref 12–16)
IMM GRANULOCYTES # BLD AUTO: 0.01 X10*3/UL (ref 0–0.7)
IMM GRANULOCYTES NFR BLD AUTO: 0.2 % (ref 0–0.9)
LYMPHOCYTES # BLD AUTO: 2.29 X10*3/UL (ref 1.2–4.8)
LYMPHOCYTES NFR BLD AUTO: 36.7 %
MAGNESIUM SERPL-MCNC: 2.31 MG/DL (ref 1.6–2.4)
MCH RBC QN AUTO: 27.2 PG (ref 26–34)
MCHC RBC AUTO-ENTMCNC: 32.1 G/DL (ref 32–36)
MCV RBC AUTO: 85 FL (ref 80–100)
MONOCYTES # BLD AUTO: 0.35 X10*3/UL (ref 0.1–1)
MONOCYTES NFR BLD AUTO: 5.6 %
NEUTROPHILS # BLD AUTO: 3.42 X10*3/UL (ref 1.2–7.7)
NEUTROPHILS NFR BLD AUTO: 54.8 %
NRBC BLD-RTO: 0 /100 WBCS (ref 0–0)
P AXIS: 41 DEGREES
P OFFSET: 172 MS
P ONSET: 138 MS
PLATELET # BLD AUTO: 185 X10*3/UL (ref 150–450)
POC FINGERSTICK BLOOD GLUCOSE: 101 MG/DL (ref 70–100)
POTASSIUM SERPL-SCNC: 6.1 MMOL/L (ref 3.5–5.3)
PR INTERVAL: 164 MS
PROT SERPL-MCNC: 7 G/DL (ref 6.4–8.2)
Q ONSET: 220 MS
QRS COUNT: 13 BEATS
QRS DURATION: 98 MS
QT INTERVAL: 410 MS
QTC CALCULATION(BAZETT): 470 MS
QTC FREDERICIA: 449 MS
R AXIS: 81 DEGREES
RBC # BLD AUTO: 4.05 X10*6/UL (ref 4–5.2)
SODIUM SERPL-SCNC: 128 MMOL/L (ref 136–145)
T AXIS: 193 DEGREES
T OFFSET: 425 MS
VENTRICULAR RATE: 79 BPM
WBC # BLD AUTO: 6.2 X10*3/UL (ref 4.4–11.3)

## 2025-03-24 PROCEDURE — 3008F BODY MASS INDEX DOCD: CPT

## 2025-03-24 PROCEDURE — 84484 ASSAY OF TROPONIN QUANT: CPT | Performed by: EMERGENCY MEDICINE

## 2025-03-24 PROCEDURE — 2500000001 HC RX 250 WO HCPCS SELF ADMINISTERED DRUGS (ALT 637 FOR MEDICARE OP): Performed by: EMERGENCY MEDICINE

## 2025-03-24 PROCEDURE — 82962 GLUCOSE BLOOD TEST: CPT

## 2025-03-24 PROCEDURE — 1036F TOBACCO NON-USER: CPT

## 2025-03-24 PROCEDURE — 85025 COMPLETE CBC W/AUTO DIFF WBC: CPT | Performed by: EMERGENCY MEDICINE

## 2025-03-24 PROCEDURE — 93005 ELECTROCARDIOGRAM TRACING: CPT

## 2025-03-24 PROCEDURE — 99215 OFFICE O/P EST HI 40 MIN: CPT

## 2025-03-24 PROCEDURE — 93000 ELECTROCARDIOGRAM COMPLETE: CPT

## 2025-03-24 PROCEDURE — 36415 COLL VENOUS BLD VENIPUNCTURE: CPT | Performed by: EMERGENCY MEDICINE

## 2025-03-24 PROCEDURE — 83735 ASSAY OF MAGNESIUM: CPT | Performed by: EMERGENCY MEDICINE

## 2025-03-24 PROCEDURE — 80053 COMPREHEN METABOLIC PANEL: CPT | Performed by: EMERGENCY MEDICINE

## 2025-03-24 PROCEDURE — 99284 EMERGENCY DEPT VISIT MOD MDM: CPT | Performed by: EMERGENCY MEDICINE

## 2025-03-24 RX ORDER — ALBUTEROL SULFATE 90 UG/1
INHALANT RESPIRATORY (INHALATION)
COMMUNITY
Start: 2025-03-20

## 2025-03-24 RX ORDER — TRAZODONE HYDROCHLORIDE 50 MG/1
50 TABLET ORAL NIGHTLY
COMMUNITY
Start: 2025-03-18

## 2025-03-24 RX ORDER — LEVETIRACETAM 750 MG/1
TABLET ORAL 2 TIMES DAILY
COMMUNITY
Start: 2019-08-26

## 2025-03-24 RX ORDER — ACETAMINOPHEN 325 MG/1
650 TABLET ORAL ONCE
Status: COMPLETED | OUTPATIENT
Start: 2025-03-24 | End: 2025-03-24

## 2025-03-24 RX ADMIN — ACETAMINOPHEN 650 MG: 325 TABLET, FILM COATED ORAL at 11:43

## 2025-03-24 ASSESSMENT — PAIN SCALES - GENERAL: PAINLEVEL_OUTOF10: 10 - WORST POSSIBLE PAIN

## 2025-03-24 ASSESSMENT — ENCOUNTER SYMPTOMS: ALTERED MENTAL STATUS: 1

## 2025-03-24 ASSESSMENT — PATIENT HEALTH QUESTIONNAIRE - PHQ9
SUM OF ALL RESPONSES TO PHQ9 QUESTIONS 1 & 2: 0
2. FEELING DOWN, DEPRESSED OR HOPELESS: NOT AT ALL
1. LITTLE INTEREST OR PLEASURE IN DOING THINGS: NOT AT ALL

## 2025-03-24 ASSESSMENT — PAIN DESCRIPTION - LOCATION: LOCATION: CHEST

## 2025-03-24 ASSESSMENT — PAIN - FUNCTIONAL ASSESSMENT: PAIN_FUNCTIONAL_ASSESSMENT: 0-10

## 2025-03-24 NOTE — Clinical Note
Patient has changed her mind.  She prefers to leave AGAINST MEDICAL ADVICE.  She decided she prefers the care at [facility].  I have cautioned the patient against leaving to wear certain she is safe for discharge.  She is adamant and already decision  to go to [place].  Her daughter is on the way to pick her up.

## 2025-03-24 NOTE — ED TRIAGE NOTES
Arrives by EMS. A&O x4. Chest pain that hasn't gone away since last visit here in ED. Pain is intermittent. Also, headache. Some nausea that has now subsided.

## 2025-03-24 NOTE — PROGRESS NOTES
Subjective   Patient ID: Lu Fall is a 44 y.o. female. They present today with a chief complaint of Altered Mental Status (PT states AMS started Tuesday. PT states she went to ER on Wednesday and Friday. PT states she took cocaine x 2 days ago.) and Weakness, Gen.    History of Present Illness  44-year-old female presents urgent care with confusion weakness and intermittent chest pain starting Tuesday.  States her gait is also off when she is ambulating.  States she last took cocaine yesterday on Sunday.  Patient was in the emergency department on 3/22/2025 for confusion and weakness as well and left AGAINST MEDICAL ADVICE before they could do a CT chest with IV contrast to check for pulmonary embolism given her confusion, elevated D-dimer and BNP.  Patient that she does have history of blood clots and is not on anticoagulants or blood thinners.  States she supposed be taking aspirin but is not taking it.  Denies current alcohol use.  Patient denies any current nausea or vomiting, fevers or chills, focal deficits.  States she has had abdominal pain on and off for the past 1 month.  Fingerstick glucose is 101.  EKG shows sinus rhythm heart rate 61 bpm,  ms,  ms,  ms, QRS axis 72 degrees, no significant ST elevation or depression.  Patient was referred to the emergency department and was taken via ambulance.      Altered Mental Status  Weakness, Gen      Past Medical History  Allergies as of 03/24/2025 - Reviewed 03/24/2025   Allergen Reaction Noted    Adhesive Hives and Unknown 12/18/2017    Hydrocodone-acetaminophen Other, Unknown, and Nausea/vomiting 08/25/2018    Hydromorphone Itching and Rash 10/03/2018    Ondansetron Other and Unknown 03/16/2021       (Not in a hospital admission)       Past Medical History:   Diagnosis Date    COPD (chronic obstructive pulmonary disease) (Multi)     Coronary artery disease     Personal history of other endocrine, nutritional and metabolic disease      "History of hypopituitarism    Personal history of other mental and behavioral disorders     History of depression    Personal history of other specified conditions     History of brain tumor    Unspecified convulsions (Multi)     Convulsion       Past Surgical History:   Procedure Laterality Date    BRAIN SURGERY      CARDIAC SURGERY      MR HEAD ANGIO WO IV CONTRAST  07/15/2021    MR HEAD ANGIO WO IV CONTRAST 7/15/2021 [facility]    MR HEAD ANGIO WO IV CONTRAST  11/28/2017    MR HEAD ANGIO WO IV CONTRAST [facility]    MR HEAD ANGIO WO IV CONTRAST  03/13/2018    MR HEAD ANGIO WO IV CONTRAST [facility]    MR NECK ANGIO WO IV CONTRAST  07/15/2021    MR NECK ANGIO WO IV CONTRAST 7/15/2021 [facility]    OTHER SURGICAL HISTORY  08/17/2013    Craniotomy Tumor Removal - Complete        reports that she has quit smoking. Her smoking use included cigarettes. She has never used smokeless tobacco. She reports current drug use. Drugs: \"Crack\" cocaine and Cocaine. She reports that she does not drink alcohol.    Review of Systems  Review of Systems   All other systems reviewed and are negative.                                 Objective    Vitals:    03/24/25 0914   BP: 113/78   Pulse: 60   Resp: 20   TempSrc: Oral   SpO2: 100%   Weight: 65.9 kg (145 lb 4.5 oz)   Height: 1.575 m (5' 2\")     No LMP recorded (lmp unknown). Patient is postmenopausal.    Physical Exam  Vitals reviewed.   Constitutional:       General: She is not in acute distress.     Appearance: Normal appearance. She is not ill-appearing, toxic-appearing or diaphoretic.   HENT:      Head: Normocephalic and atraumatic.      Mouth/Throat:      Mouth: Mucous membranes are moist.      Pharynx: Oropharynx is clear.      Comments: Pharynx unremarkable, airway clear.  Eyes:      Extraocular Movements: Extraocular movements intact.      Pupils: Pupils are equal, round, and reactive to light.   Cardiovascular:      Rate and Rhythm: " Normal rate and regular rhythm.   Pulmonary:      Effort: Pulmonary effort is normal. No respiratory distress.      Breath sounds: Normal breath sounds. No stridor. No wheezing, rhonchi or rales.   Abdominal:      General: Abdomen is flat.      Palpations: Abdomen is soft.      Tenderness: There is no abdominal tenderness. There is no right CVA tenderness or left CVA tenderness.   Musculoskeletal:      Cervical back: Normal range of motion and neck supple. No rigidity or tenderness.   Lymphadenopathy:      Cervical: No cervical adenopathy.   Skin:     General: Skin is warm and dry.   Neurological:      General: No focal deficit present.      Mental Status: She is alert and oriented to person, place, and time.      Motor: Weakness (Weak  strength bilaterally.) present.      Coordination: Coordination normal.   Psychiatric:         Mood and Affect: Mood normal.         Behavior: Behavior normal.         Procedures    Point of Care Test & Imaging Results from this visit  No results found for this visit on 03/24/25.   CT cervical spine wo IV contrast    Result Date: 3/22/2025  Interpreted By:  Hakeem Manuel, STUDY: CT CERVICAL SPINE WO IV CONTRAST;  3/22/2025 3:02 pm   INDICATION: Signs/Symptoms:neck pain.   COMPARISON: 01/06/2025 cervical spine CT.   ACCESSION NUMBER(S): [id number]   ORDERING CLINICIAN: JULES LIRA   TECHNIQUE: Axial CT images of the cervical spine are obtained. Axial, coronal and sagittal reconstructions are provided for review. The study was performed without IV contrast.   FINDINGS: No fracture, dislocation, lytic or blastic lesion or bony encroachment upon the spinal canal or neural foramina is apparent. Minimal anterior disc space narrowing and minute anterior osteophytes are present at C5-6 and C6-7. Reversal of cervical lordosis and cervical spine curvature convex to the left increased since 01/25 could be positional or due to muscle spasm.       Minimal degenerative changes.    Reversal of cervical lordosis.   MACRO: None   Signed by: Hakeem Manuel 3/22/2025 4:07 PM Dictation workstation:   [id number]    CT head wo IV contrast    Result Date: 3/22/2025  Interpreted By:  Hakeem Manuel, STUDY: CT HEAD WO IV CONTRAST;  3/22/2025 3:02 pm   INDICATION: Signs/Symptoms: confusion.   COMPARISON: 01/06/2025 unenhanced head CT.   ACCESSION NUMBER(S): [id number]   ORDERING CLINICIAN: JULES LIRA   TECHNIQUE: Noncontrast axial CT scan of head was performed. Angled reformats in brain and bone windows and coronal and sagittal reconstructions were generated. The images were reviewed in bone, brain, blood and soft tissue windows.   FINDINGS: CSF Spaces: The ventricles, sulci and basal cisterns are within normal limits. There is no extraaxial fluid collection.   Parenchyma: Moderate inferior right temporal encephalomalacia underlies a craniectomy. A craniotomy extends superiorly from this. The grey-white differentiation is intact. There is no midline shift, other mass effect or intracranial hemorrhage. Very little if any pituitary tissue is seen in a normal size sella turcica.   Calvarium: Right temporal and parietal craniectomy and craniotomy. Sphenoid, posterior ethmoid, posterior nasal septum and small skull base resections are also present.   Paranasal sinuses and mastoids: The mastoid air cells, tympanic cavities and visualized portions of the sinuses are well developed and clear aside from mucosal thickening in the postoperative sphenoid sinus.       No evidence of an acute cortical infarct or intracranial hemorrhage.   Postoperative changes described above with right temporal encephalomalacia.   MACRO: None   Signed by: Hakeem Manuel 3/22/2025 3:59 PM Dictation workstation:   [id number]    XR chest 1 view    Result Date: 3/22/2025  Interpreted By:  Zhang Vo, STUDY: XR CHEST 1 VIEW   INDICATION: Signs/Symptoms:weak.   COMPARISON: March 19, 2025   ACCESSION  NUMBER(S): [id number]   ORDERING CLINICIAN: JULES LIRA   FINDINGS: No consolidation, effusion, edema, or pneumothorax. Previous median sternotomy.       No evidence of acute intrathoracic abnormality.   Signed by: Zhang Vo 3/22/2025 3:02 PM Dictation workstation:   [id number]     Diagnostic study results (if any) were reviewed by Neeru Galdamez PA-C.    Assessment/Plan   Allergies, medications, history, and pertinent labs/EKGs/Imaging reviewed by Neeru Galdamez PA-C.     Medical Decision Making  44-year-old female presents urgent care with confusion weakness and intermittent chest pain starting Tuesday.  States her gait is also off when she is ambulating.  States she last took cocaine yesterday on Sunday.  Patient was in the emergency department on 3/22/2025 for confusion and weakness as well and left AGAINST MEDICAL ADVICE before they could do a CT chest with IV contrast to check for pulmonary embolism given her confusion, elevated D-dimer and BNP.  Patient that she does have history of blood clots and is not on anticoagulants or blood thinners.  States she supposed be taking aspirin but is not taking it.  Denies current alcohol use.  Patient denies any current nausea or vomiting, fevers or chills, focal deficits.  States she has had abdominal pain on and off for the past 1 month.  Fingerstick glucose is 101.  EKG shows sinus rhythm heart rate 61 bpm,  ms,  ms,  ms, QRS axis 72 degrees, no significant ST elevation or depression.  Patient was referred to the emergency department and was taken via ambulance.    Orders and Diagnoses  There are no diagnoses linked to this encounter.    Medical Admin Record      Patient disposition: ED    Electronically signed by Neeru Galdamez PA-C  9:21 AM

## 2025-03-24 NOTE — DISCHARGE INSTRUCTIONS
You are leaving AGAINST MEDICAL ADVICE and assuming full responsibility for her decision.  I have explained you that your tests are not back and we cannot be certain it is safe you to be discharged from the hospital.  Please go directly to get further help as you are at risk for further complications and possible death.

## 2025-03-24 NOTE — ED PROVIDER NOTES
[facility]  ED  Provider Note  3/24/2025 10:01 AM  AC05/AC05              Chief Complaint   Patient presents with    Chest Pain     Chest pain that hasn't gone away since last visit here in ED. Pain is intermittent. Also, headache. Some nausea that has now subsided.         History of Present Illness:   Lu Fall is a 44 y.o. female presenting to the ED for Chest pain, headache, shortness of breath., beginning Earlier this week.  The complaint has been Persistent, Moderate to severe in severity, and worsened by Nothing.  Patient has had chest pain for the past week or 2 all day every day.  She has had previous ED and urgent care visits was been negative for evaluation of cardiac disease.  She has a significant cardiac history of previous stenting and bypass surgery she had mitral valve replacement and tricuspid valve repair.  She also reports has been feeling short of breath at rest.  She had a recent D-dimer test which is elevated.  She reports a history of PEs but is not taking blood thinners.  She also complains of a headache reports she has had 2 surgeries to resect brain tumors in the past.  Last episode was 15 years ago.  She has history of cocaine abuse, convulsions COPD and depression/anxiety.       Review of Systems:   Pertinent positives and review of systems as noted above.  Remaining 10 review of systems is negative or noncontributory to today's episode of care.  Review of Systems       --------------------------------------------- PAST HISTORY ---------------------------------------------  Past Medical History:   Diagnosis Date    COPD (chronic obstructive pulmonary disease) (Multi)     Coronary artery disease     Personal history of other endocrine, nutritional and metabolic disease     History of hypopituitarism    Personal history of other mental and behavioral disorders     History of depression    Personal history of other specified conditions     History of brain tumor     "Unspecified convulsions (Multi)     Convulsion         has a past surgical history that includes Other surgical history (08/17/2013); MR angio head wo IV contrast (07/15/2021); MR angio neck wo IV contrast (07/15/2021); MR angio head wo IV contrast (11/28/2017); MR angio head wo IV contrast (03/13/2018); Brain surgery; and Cardiac surgery.     reports that she has quit smoking. Her smoking use included cigarettes. She has never used smokeless tobacco. She reports current drug use. Drugs: \"Crack\" cocaine and Cocaine. She reports that she does not drink alcohol.    family history is not on file. Unless otherwise noted, family history is non contributory    Patient's Medications   New Prescriptions    No medications on file   Previous Medications    ACETAMINOPHEN (TYLENOL) 500 MG TABLET    Take 2 tablets (1,000 mg) by mouth every 6 hours if needed for moderate pain (4 - 6) or mild pain (1 - 3).    ALBUTEROL 90 MCG/ACTUATION INHALER    INHALE TWO (2) PUFFS BY MOUTH EVERY 4 HOURS AS NEEDED FOR SHORTNESS OF BREATH/WHEEZING    ALUM-MAG HYDROXIDE-SIMETH (MYLANTA) 200-200-20 MG/5 ML ORAL SUSPENSION    Take 30 mL by mouth 4 times a day as needed for indigestion or heartburn.    AMANTADINE (SYMMETREL) 100 MG CAPSULE    Take 1 capsule (100 mg) by mouth twice a day.    ARIPIPRAZOLE (ABILIFY) 15 MG TABLET    Take 1 tablet (15 mg) by mouth once daily.    ARIPIPRAZOLE (ABILIFY) 2 MG TABLET    Take 1 tablet (2 mg) by mouth once daily at bedtime.    ASPIRIN 81 MG CHEWABLE TABLET    Chew 1 tablet (81 mg) once daily.    ATORVASTATIN (LIPITOR) 80 MG TABLET    Take 1 tablet (80 mg) by mouth once daily at bedtime.    BUSPIRONE (BUSPAR) 15 MG TABLET    Take 1 tablet (15 mg) by mouth every 12 hours if needed.    CETIRIZINE (ZYRTEC) 10 MG TABLET    Take 1 tablet (10 mg) by mouth every 12 hours.    DESMOPRESSIN (DDAVP) 10 MCG/SPRAY (0.1 ML)    Administer 1 spray (10 mcg) into one nostril once daily at bedtime.    EZETIMIBE (ZETIA) 10 MG " TABLET    Take 1 tablet (10 mg) by mouth once daily.    FLUOXETINE (PROZAC) 10 MG CAPSULE    Take 1 capsule (10 mg) by mouth once daily.    FREMANEZUMAB (AJOVY SYRINGE) 225 MG/1.5 ML PREFILLED SYRINGE    Inject 1.5 mL (225 mg) under the skin every 30 (thirty) days.    HYDROCORTISONE (CORTEF) 5 MG TABLET    Take 1 tablet (5 mg) by mouth.  Take 2 tabs in am, one tablet at lunch, and one tablet at dinner    LEVETIRACETAM (KEPPRA) 750 MG TABLET    Take by mouth twice a day.    LEVOTHYROXINE (SYNTHROID, LEVOXYL) 112 MCG TABLET    Take 1 tablet (112 mcg) by mouth once daily.    LIDOCAINE 4 % PATCH    Place 1 patch over 12 hours on the skin once daily. Remove & discard patch within 12 hours or as directed by MD. Do not start before January 30, 2024.    METHOCARBAMOL (ROBAXIN) 500 MG TABLET    Take 1 tablet (500 mg) by mouth every 6 hours if needed.    METOCLOPRAMIDE (REGLAN) 10 MG TABLET    Take 1 tablet (10 mg) by mouth every 8 hours if needed.    METOCLOPRAMIDE (REGLAN) 10 MG TABLET    Take 1 tablet (10 mg) by mouth every 6 hours for 7 days.    MIDODRINE (PROAMATINE) 5 MG TABLET    Take 2 tablets (10 mg) by mouth 3 times a day.    MONTELUKAST (SINGULAIR) 10 MG TABLET    Take 1 tablet (10 mg) by mouth once daily at bedtime.    PANTOPRAZOLE (PROTONIX) 40 MG EC TABLET    Take 1 tablet (40 mg) by mouth once daily.    PREDNISONE (DELTASONE) 5 MG TABLET    8 p.o. on day 1 and then decrease by 1 pill daily until gone    PREGABALIN (LYRICA) 100 MG CAPSULE    Take 1 capsule (100 mg) by mouth 2 times a day.    PROMETHAZINE (PHENERGAN) 25 MG TABLET    Take 0.5 tablets (12.5 mg) by mouth every 6 hours if needed for nausea or vomiting for up to 10 doses.    TRAZODONE (DESYREL) 50 MG TABLET    Take 50 mg by mouth once daily at bedtime.    UBROGEPANT (UBRELVY) 100 MG TABLET TABLET    Take 1 tablet (100 mg) by mouth if needed.    ZOLPIDEM (AMBIEN) 10 MG TABLET    Take 1 tablet (10 mg) by mouth once daily as needed for sleep.   Modified  Medications    No medications on file   Discontinued Medications    No medications on file      The patient’s home medications have been reviewed.    Allergies: Adhesive, Hydrocodone-acetaminophen, Hydromorphone, and Ondansetron    -------------------------------------------------- RESULTS -------------------------------------------------  All laboratory and radiology results have been personally reviewed by myself   LABS:  Labs Reviewed   CBC WITH AUTO DIFFERENTIAL - Abnormal       Result Value    WBC 6.2      nRBC 0.0      RBC 4.05      Hemoglobin 11.0 (*)     Hematocrit 34.3 (*)     MCV 85      MCH 27.2      MCHC 32.1      RDW 14.1      Platelets 185      Neutrophils % 54.8      Immature Granulocytes %, Automated 0.2      Lymphocytes % 36.7      Monocytes % 5.6      Eosinophils % 1.9      Basophils % 0.8      Neutrophils Absolute 3.42      Immature Granulocytes Absolute, Automated 0.01      Lymphocytes Absolute 2.29      Monocytes Absolute 0.35      Eosinophils Absolute 0.12      Basophils Absolute 0.05     COMPREHENSIVE METABOLIC PANEL - Abnormal    Glucose 90      Sodium 128 (*)     Potassium 6.1 (*)     Chloride 98      Bicarbonate 27      Anion Gap 9 (*)     Urea Nitrogen 9      Creatinine 0.69      eGFR >90      Calcium 9.1      Albumin 4.7      Alkaline Phosphatase 49      Total Protein 7.0      AST 40 (*)     Bilirubin, Total 0.4      ALT 9     MAGNESIUM - Normal    Magnesium 2.31     SERIAL TROPONIN-INITIAL - Normal    Troponin I, High Sensitivity <3      Narrative:     Less than 99th percentile of normal range cutoff-  Female and children under 18 years old <14 ng/L; Male <21 ng/L: Negative  Repeat testing should be performed if clinically indicated.     Female and children under 18 years old 14-50 ng/L; Male 21-50 ng/L:  Consistent with possible cardiac damage and possible increased clinical   risk. Serial measurements may help to assess extent of myocardial damage.     >50 ng/L: Consistent with cardiac  "damage, increased clinical risk and  myocardial infarction. Serial measurements may help assess extent of   myocardial damage.      NOTE: Children less than 1 year old may have higher baseline troponin   levels and results should be interpreted in conjunction with the overall   clinical context.     NOTE: Troponin I testing is performed using a different   testing methodology at [facility] than at other   [facility]. Direct result comparisons should only   be made within the same method.   TROPONIN SERIES- (INITIAL, 1 HR)    Narrative:     The following orders were created for panel order Troponin I Series, High Sensitivity (0, 1 HR).  Procedure                               Abnormality         Status                     ---------                               -----------         ------                     Troponin I, High Sensiti...[[id number]]  Normal              Final result               Troponin, High Sensitivi...[[id number]]                                                   Please view results for these tests on the individual orders.   DRUG SCREEN,URINE   SERIAL TROPONIN, 1 HOUR       EKG:  Sinus rhythm at 61 bpm, low voltage QRS complexes, normal axis, incomplete right bundle branch block, no acute ST elevations.  Interpreted by SERINA Darby MD    RADIOLOGY:  Interpreted by Radiologist.  CT angio chest for pulmonary embolism    (Results Pending)   CT head w and wo IV contrast    (Results Pending)       ------------------------- NURSING NOTES AND VITALS REVIEWED ---------------------------   The nursing notes within the ED encounter and vital signs as below have been reviewed.   /89 (BP Location: Right arm, Patient Position: Lying)   Pulse 64   Temp 36.4 °C (97.6 °F) (Temporal)   Resp 20   Ht 1.575 m (5' 2\")   Wt 65.9 kg (145 lb 4.5 oz)   LMP  (LMP Unknown) Comment: PT unable to confirm when LMP was  BMI 26.57 kg/m²   Oxygen Saturation Interpretation: " Normal      ---------------------------------------------------PHYSICAL EXAM--------------------------------------  Physical Exam   Constitutional/General: Alert and oriented x3, well appearing, non toxic in NAD  Head: Normocephalic and atraumatic  Eyes: PERRL, EOMI, conjunctiva normal, sclera non icteric  Mouth: Oropharynx clear, handling secretions, no trismus, no asymmetry of the posterior oropharynx or uvular edema  Neck: Supple, full ROM, non tender to palpation in the midline, no stridor, no crepitus, no meningeal signs  Respiratory: Lungs clear to auscultation bilaterally, no wheezes, rales, or rhonchi  Cardiovascular:  Regular rate. Regular rhythm. No murmurs, gallops, or rubs. 2+ distal pulses  Chest: No chest wall tenderness  GI:  Abdomen Soft, Non tender, Non distended.  +BS. No organomegaly, no palpable masses,  No rebound, guarding, or rigidity. No CVAT   Musculoskeletal: Moves all extremities x 4. Warm and well perfused, no clubbing, cyanosis, or edema. Capillary refill <3 seconds  Integument: skin warm and dry. No rashes.   Lymphatic: no lymphadenopathy noted  Neurologic:  No focal deficits, symmetric strength 5/5 in the upper and lower extremities bilaterally  Psychiatric: Normal Affect    Procedures    Diagnoses as of 03/24/25 1248   Chest pain, unspecified type   Chronic intractable headache, unspecified headache type          Medical Decision Making:   Patient is requesting to leave the hospital AGAINST MEDICAL ADVICE.  She prefers to go to another facility.  She assumes full responsibility for her decision including possible poor outcomes secondary to leaving the hospital AGAINST MEDICAL ADVICE.      Counseling:   The emergency provider has spoken with the patient and discussed today’s results, in addition to providing specific details for the plan of care and counseling regarding the diagnosis and prognosis.  Questions are answered at this time and they are agreeable with the  plan.      --------------------------------- IMPRESSION AND DISPOSITION ---------------------------------        IMPRESSION  1. Chest pain, unspecified type    2. Chronic intractable headache, unspecified headache type        DISPOSITION  Disposition:  Patient left AMA   Patient condition is serious      Billing Provider Critical Care Time: 0 minutes     Srinivas Darby MD  03/25/25 0812

## 2025-04-03 LAB
ATRIAL RATE: 61 BPM
P AXIS: 6 DEGREES
P OFFSET: 169 MS
P ONSET: 137 MS
PR INTERVAL: 168 MS
Q ONSET: 221 MS
QRS COUNT: 11 BEATS
QRS DURATION: 104 MS
QT INTERVAL: 462 MS
QTC CALCULATION(BAZETT): 465 MS
QTC FREDERICIA: 464 MS
R AXIS: 69 DEGREES
T AXIS: -8 DEGREES
T OFFSET: 452 MS
VENTRICULAR RATE: 61 BPM

## 2025-04-11 ENCOUNTER — HOSPITAL ENCOUNTER (EMERGENCY)
Facility: HOSPITAL | Age: 45
Discharge: HOME | End: 2025-04-12
Payer: MEDICARE

## 2025-04-11 ENCOUNTER — APPOINTMENT (OUTPATIENT)
Dept: CARDIOLOGY | Facility: HOSPITAL | Age: 45
End: 2025-04-11
Payer: MEDICARE

## 2025-04-11 ENCOUNTER — APPOINTMENT (OUTPATIENT)
Dept: RADIOLOGY | Facility: HOSPITAL | Age: 45
End: 2025-04-11
Payer: MEDICARE

## 2025-04-11 DIAGNOSIS — N30.00 ACUTE CYSTITIS WITHOUT HEMATURIA: Primary | ICD-10-CM

## 2025-04-11 DIAGNOSIS — D64.9 NORMOCYTIC ANEMIA: ICD-10-CM

## 2025-04-11 LAB
ALBUMIN SERPL BCP-MCNC: 4.2 G/DL (ref 3.4–5)
ALP SERPL-CCNC: 47 U/L (ref 33–110)
ALT SERPL W P-5'-P-CCNC: 4 U/L (ref 7–45)
ANION GAP SERPL CALCULATED.3IONS-SCNC: 13 MMOL/L (ref 10–20)
APPEARANCE UR: CLEAR
AST SERPL W P-5'-P-CCNC: 13 U/L (ref 9–39)
BASOPHILS # BLD AUTO: 0.09 X10*3/UL (ref 0–0.1)
BASOPHILS NFR BLD AUTO: 1.2 %
BILIRUB SERPL-MCNC: 0.3 MG/DL (ref 0–1.2)
BILIRUB UR STRIP.AUTO-MCNC: NEGATIVE MG/DL
BUN SERPL-MCNC: 16 MG/DL (ref 6–23)
CALCIUM SERPL-MCNC: 9.4 MG/DL (ref 8.6–10.3)
CHLORIDE SERPL-SCNC: 101 MMOL/L (ref 98–107)
CO2 SERPL-SCNC: 24 MMOL/L (ref 21–32)
COLOR UR: COLORLESS
CREAT SERPL-MCNC: 0.84 MG/DL (ref 0.5–1.05)
EGFRCR SERPLBLD CKD-EPI 2021: 87 ML/MIN/1.73M*2
EOSINOPHIL # BLD AUTO: 0.2 X10*3/UL (ref 0–0.7)
EOSINOPHIL NFR BLD AUTO: 2.7 %
ERYTHROCYTE [DISTWIDTH] IN BLOOD BY AUTOMATED COUNT: 14.5 % (ref 11.5–14.5)
GLUCOSE SERPL-MCNC: 73 MG/DL (ref 74–99)
GLUCOSE UR STRIP.AUTO-MCNC: NORMAL MG/DL
HCG UR QL IA.RAPID: NEGATIVE
HCT VFR BLD AUTO: 32.7 % (ref 36–46)
HGB BLD-MCNC: 10.6 G/DL (ref 12–16)
IMM GRANULOCYTES # BLD AUTO: 0.03 X10*3/UL (ref 0–0.7)
IMM GRANULOCYTES NFR BLD AUTO: 0.4 % (ref 0–0.9)
KETONES UR STRIP.AUTO-MCNC: NEGATIVE MG/DL
LEUKOCYTE ESTERASE UR QL STRIP.AUTO: ABNORMAL
LIPASE SERPL-CCNC: 12 U/L (ref 9–82)
LYMPHOCYTES # BLD AUTO: 3.06 X10*3/UL (ref 1.2–4.8)
LYMPHOCYTES NFR BLD AUTO: 40.9 %
MCH RBC QN AUTO: 27 PG (ref 26–34)
MCHC RBC AUTO-ENTMCNC: 32.4 G/DL (ref 32–36)
MCV RBC AUTO: 83 FL (ref 80–100)
MONOCYTES # BLD AUTO: 0.44 X10*3/UL (ref 0.1–1)
MONOCYTES NFR BLD AUTO: 5.9 %
NEUTROPHILS # BLD AUTO: 3.66 X10*3/UL (ref 1.2–7.7)
NEUTROPHILS NFR BLD AUTO: 48.9 %
NITRITE UR QL STRIP.AUTO: NEGATIVE
NRBC BLD-RTO: 0 /100 WBCS (ref 0–0)
PH UR STRIP.AUTO: 5.5 [PH]
PLATELET # BLD AUTO: 191 X10*3/UL (ref 150–450)
POTASSIUM SERPL-SCNC: 3.5 MMOL/L (ref 3.5–5.3)
PROT SERPL-MCNC: 6.5 G/DL (ref 6.4–8.2)
PROT UR STRIP.AUTO-MCNC: NEGATIVE MG/DL
RBC # BLD AUTO: 3.92 X10*6/UL (ref 4–5.2)
RBC # UR STRIP.AUTO: NEGATIVE MG/DL
RBC #/AREA URNS AUTO: ABNORMAL /HPF
SODIUM SERPL-SCNC: 134 MMOL/L (ref 136–145)
SP GR UR STRIP.AUTO: 1
UROBILINOGEN UR STRIP.AUTO-MCNC: NORMAL MG/DL
WBC # BLD AUTO: 7.5 X10*3/UL (ref 4.4–11.3)
WBC #/AREA URNS AUTO: ABNORMAL /HPF

## 2025-04-11 PROCEDURE — 96375 TX/PRO/DX INJ NEW DRUG ADDON: CPT

## 2025-04-11 PROCEDURE — 81001 URINALYSIS AUTO W/SCOPE: CPT | Performed by: STUDENT IN AN ORGANIZED HEALTH CARE EDUCATION/TRAINING PROGRAM

## 2025-04-11 PROCEDURE — 85025 COMPLETE CBC W/AUTO DIFF WBC: CPT | Performed by: STUDENT IN AN ORGANIZED HEALTH CARE EDUCATION/TRAINING PROGRAM

## 2025-04-11 PROCEDURE — 99285 EMERGENCY DEPT VISIT HI MDM: CPT | Mod: 25

## 2025-04-11 PROCEDURE — 96361 HYDRATE IV INFUSION ADD-ON: CPT

## 2025-04-11 PROCEDURE — 2550000001 HC RX 255 CONTRASTS: Performed by: PHYSICIAN ASSISTANT

## 2025-04-11 PROCEDURE — 74177 CT ABD & PELVIS W/CONTRAST: CPT | Performed by: RADIOLOGY

## 2025-04-11 PROCEDURE — 83690 ASSAY OF LIPASE: CPT | Performed by: STUDENT IN AN ORGANIZED HEALTH CARE EDUCATION/TRAINING PROGRAM

## 2025-04-11 PROCEDURE — 96365 THER/PROPH/DIAG IV INF INIT: CPT | Mod: 59

## 2025-04-11 PROCEDURE — 80053 COMPREHEN METABOLIC PANEL: CPT | Performed by: STUDENT IN AN ORGANIZED HEALTH CARE EDUCATION/TRAINING PROGRAM

## 2025-04-11 PROCEDURE — 2500000004 HC RX 250 GENERAL PHARMACY W/ HCPCS (ALT 636 FOR OP/ED): Performed by: PHYSICIAN ASSISTANT

## 2025-04-11 PROCEDURE — 87077 CULTURE AEROBIC IDENTIFY: CPT | Mod: TRILAB | Performed by: STUDENT IN AN ORGANIZED HEALTH CARE EDUCATION/TRAINING PROGRAM

## 2025-04-11 PROCEDURE — 36415 COLL VENOUS BLD VENIPUNCTURE: CPT | Performed by: STUDENT IN AN ORGANIZED HEALTH CARE EDUCATION/TRAINING PROGRAM

## 2025-04-11 PROCEDURE — 93005 ELECTROCARDIOGRAM TRACING: CPT

## 2025-04-11 PROCEDURE — 81025 URINE PREGNANCY TEST: CPT | Performed by: PHYSICIAN ASSISTANT

## 2025-04-11 PROCEDURE — 2500000001 HC RX 250 WO HCPCS SELF ADMINISTERED DRUGS (ALT 637 FOR MEDICARE OP): Performed by: PHYSICIAN ASSISTANT

## 2025-04-11 PROCEDURE — 74177 CT ABD & PELVIS W/CONTRAST: CPT

## 2025-04-11 RX ORDER — DICYCLOMINE HYDROCHLORIDE 10 MG/1
20 CAPSULE ORAL ONCE
Status: COMPLETED | OUTPATIENT
Start: 2025-04-11 | End: 2025-04-11

## 2025-04-11 RX ORDER — CEFTRIAXONE 1 G/50ML
1 INJECTION, SOLUTION INTRAVENOUS ONCE
Status: COMPLETED | OUTPATIENT
Start: 2025-04-11 | End: 2025-04-12

## 2025-04-11 RX ORDER — FAMOTIDINE 10 MG/ML
20 INJECTION, SOLUTION INTRAVENOUS ONCE
Status: COMPLETED | OUTPATIENT
Start: 2025-04-11 | End: 2025-04-11

## 2025-04-11 RX ORDER — KETOROLAC TROMETHAMINE 30 MG/ML
15 INJECTION, SOLUTION INTRAMUSCULAR; INTRAVENOUS ONCE
Status: COMPLETED | OUTPATIENT
Start: 2025-04-11 | End: 2025-04-11

## 2025-04-11 RX ADMIN — FAMOTIDINE 20 MG: 10 INJECTION, SOLUTION INTRAVENOUS at 22:19

## 2025-04-11 RX ADMIN — IOHEXOL 75 ML: 350 INJECTION, SOLUTION INTRAVENOUS at 23:12

## 2025-04-11 RX ADMIN — DEXAMETHASONE SODIUM PHOSPHATE 4 MG: 4 INJECTION, SOLUTION INTRAMUSCULAR; INTRAVENOUS at 22:19

## 2025-04-11 RX ADMIN — DICYCLOMINE HYDROCHLORIDE 20 MG: 10 CAPSULE ORAL at 23:29

## 2025-04-11 RX ADMIN — CEFTRIAXONE 1 G: 1 INJECTION, SOLUTION INTRAVENOUS at 23:39

## 2025-04-11 RX ADMIN — KETOROLAC TROMETHAMINE 15 MG: 30 INJECTION, SOLUTION INTRAMUSCULAR at 22:19

## 2025-04-11 RX ADMIN — SODIUM CHLORIDE 1000 ML: 900 INJECTION, SOLUTION INTRAVENOUS at 22:20

## 2025-04-11 ASSESSMENT — PAIN DESCRIPTION - DESCRIPTORS
DESCRIPTORS: SHARP
DESCRIPTORS: SHARP;STABBING

## 2025-04-11 ASSESSMENT — PAIN DESCRIPTION - FREQUENCY: FREQUENCY: CONSTANT/CONTINUOUS

## 2025-04-11 ASSESSMENT — PAIN DESCRIPTION - LOCATION: LOCATION: ABDOMEN

## 2025-04-11 ASSESSMENT — PAIN - FUNCTIONAL ASSESSMENT: PAIN_FUNCTIONAL_ASSESSMENT: 0-10

## 2025-04-11 ASSESSMENT — PAIN DESCRIPTION - PAIN TYPE: TYPE: ACUTE PAIN

## 2025-04-11 ASSESSMENT — PAIN DESCRIPTION - ONSET: ONSET: ONGOING

## 2025-04-11 ASSESSMENT — PAIN DESCRIPTION - PROGRESSION: CLINICAL_PROGRESSION: GRADUALLY WORSENING

## 2025-04-11 ASSESSMENT — PAIN DESCRIPTION - ORIENTATION: ORIENTATION: RIGHT;LOWER

## 2025-04-11 ASSESSMENT — PAIN SCALES - GENERAL: PAINLEVEL_OUTOF10: 10 - WORST POSSIBLE PAIN

## 2025-04-12 ENCOUNTER — HOSPITAL ENCOUNTER (EMERGENCY)
Facility: HOSPITAL | Age: 45
Discharge: HOME | End: 2025-04-13
Payer: MEDICARE

## 2025-04-12 VITALS
WEIGHT: 145.28 LBS | HEART RATE: 64 BPM | HEIGHT: 62 IN | TEMPERATURE: 98.2 F | RESPIRATION RATE: 16 BRPM | SYSTOLIC BLOOD PRESSURE: 124 MMHG | BODY MASS INDEX: 26.74 KG/M2 | DIASTOLIC BLOOD PRESSURE: 72 MMHG | OXYGEN SATURATION: 100 %

## 2025-04-12 DIAGNOSIS — R07.9 ACUTE CHEST PAIN: Primary | ICD-10-CM

## 2025-04-12 PROCEDURE — 99285 EMERGENCY DEPT VISIT HI MDM: CPT

## 2025-04-12 PROCEDURE — 93005 ELECTROCARDIOGRAM TRACING: CPT

## 2025-04-12 RX ORDER — CEFPODOXIME PROXETIL 200 MG/1
200 TABLET, FILM COATED ORAL 2 TIMES DAILY
Qty: 20 TABLET | Refills: 0 | Status: SHIPPED | OUTPATIENT
Start: 2025-04-12 | End: 2025-04-22

## 2025-04-12 RX ORDER — CEPHALEXIN 500 MG/1
500 CAPSULE ORAL 3 TIMES DAILY
Qty: 21 CAPSULE | Refills: 0 | Status: SHIPPED | OUTPATIENT
Start: 2025-04-12 | End: 2025-04-12

## 2025-04-12 ASSESSMENT — PAIN SCALES - GENERAL
PAINLEVEL_OUTOF10: 0 - NO PAIN
PAINLEVEL_OUTOF10: 10 - WORST POSSIBLE PAIN

## 2025-04-12 ASSESSMENT — PAIN DESCRIPTION - PAIN TYPE: TYPE: ACUTE PAIN

## 2025-04-12 ASSESSMENT — PAIN - FUNCTIONAL ASSESSMENT
PAIN_FUNCTIONAL_ASSESSMENT: 0-10
PAIN_FUNCTIONAL_ASSESSMENT: 0-10

## 2025-04-12 ASSESSMENT — PAIN DESCRIPTION - LOCATION: LOCATION: CHEST

## 2025-04-12 NOTE — Clinical Note
Lu Fall was seen and treated in our emergency department on 4/12/2025.  She may return to work on 04/14/2025.       If you have any questions or concerns, please don't hesitate to call.      Yoni Cook, DO

## 2025-04-12 NOTE — ED PROVIDER NOTES
HPI   Chief Complaint   Patient presents with    Abdominal Pain     Pt brought in by [facility] for right lower quadrant abdominal pain. Pt states she has had a stabbing pain since Tuesday that isn't going away.       HPI  This is a 44-year-old female presenting to the emergency department for evaluation of right lower quadrant abdominal pain.  Patient states symptoms started Tuesday.  Pain has been constant.  It is a sharp pain that is nonradiating.  No urinary complaints.  No changes to her bowel movements.  She has been nauseous but no vomiting.  No fevers or chills.  No chest pain or shortness of breath.  Patient states she had an inguinal hernia repaired but has had no other abdominal surgeries.  She denies bloody stools or bloody vomit.    Please see HPI for pertinent positive and negative ROS.       Patient History   Past Medical History:   Diagnosis Date    COPD (chronic obstructive pulmonary disease) (Multi)     Coronary artery disease     Personal history of other endocrine, nutritional and metabolic disease     History of hypopituitarism    Personal history of other mental and behavioral disorders     History of depression    Personal history of other specified conditions     History of brain tumor    Unspecified convulsions (Multi)     Convulsion     Past Surgical History:   Procedure Laterality Date    BRAIN SURGERY      CARDIAC SURGERY      MR HEAD ANGIO WO IV CONTRAST  07/15/2021    MR HEAD ANGIO WO IV CONTRAST 7/15/2021 [facility]    MR HEAD ANGIO WO IV CONTRAST  11/28/2017    MR HEAD ANGIO WO IV CONTRAST [facility]    MR HEAD ANGIO WO IV CONTRAST  03/13/2018    MR HEAD ANGIO WO IV CONTRAST [facility]    MR NECK ANGIO WO IV CONTRAST  07/15/2021    MR NECK ANGIO WO IV CONTRAST 7/15/2021 [facility]    OTHER SURGICAL HISTORY  08/17/2013    Craniotomy Tumor Removal - Complete     No family history on file.  Social History     Tobacco Use    Smoking status: Former  "    Types: Cigarettes    Smokeless tobacco: Never   Vaping Use    Vaping status: Never Used   Substance Use Topics    Alcohol use: Never    Drug use: Yes     Types: \"Crack\" cocaine, Cocaine     Comment: last used 5 days ago       Physical Exam   ED Triage Vitals [04/11/25 1935]   Temperature Heart Rate Respirations BP   36.8 °C (98.2 °F) 67 16 127/69      Pulse Ox Temp Source Heart Rate Source Patient Position   97 % Temporal Monitor Sitting      BP Location FiO2 (%)     Right arm --       Physical Exam  GENERAL APPEARANCE: Awake and alert. No acute respiratory distress.   VITAL SIGNS: As per the nurses' triage record.  HEENT: Normocephalic, atraumatic.   NECK: Soft, nontender and supple, full gross ROM, no meningeal signs.  CHEST: Nontender to palpation. Clear to auscultation bilaterally. No rales, rhonchi, or wheezing. Symmetric rise and fall of chest wall.   HEART: Clear S1 and S2. Regular rate and rhythm. No murmurs appreciated on auscultation.  Strong and equal pulses in the extremities.  ABDOMEN: Soft, tenderness to palpation in the right lower quadrant, negative CVA tenderness bilaterally, nondistended, positive bowel sounds, no palpable masses.  MUSCULOSKELETAL: Full gross active range of motion. Ambulating on own with no acute difficulties  NEUROLOGICAL: Awake, alert and oriented x 3. Motor power intact in the upper and lower extremities. Sensation is intact to light touch in the upper and lower extremities. Patient answering questions appropriately.   IMMUNOLOGICAL: No lymphatic streaking noted  DERMATOLOGIC: Warm and dry   PYSCH: Cooperative with appropriate mood and affect.    ED Course & MDM   ED Course as of 04/12/25 0108   Fri Apr 11, 2025 2222 EKG interpreted by myself independently, EKG shows normal sinus rhythm, rate of 63 bpm, IL interval 164, QRS 98, , QTc 462, patient has no ST elevation or depression, negative for acute MI [RUFINO]      ED Course User Index  [RUFINO] Yoni Cook,        "   Diagnoses as of 04/12/25 0108   Acute cystitis without hematuria   Normocytic anemia                 No data recorded                                 Medical Decision Making  Parts of this chart have been completed using voice recognition software. Please excuse any errors of transcription.  My thought process and reason for plan has been formulated from the time that I saw the patient until the time of disposition and is not specific to one specific moment during their visit and furthermore my MDM encompasses this entire chart and not only this text box.      HPI: Detailed above.    Exam: A medically appropriate exam performed, outlined above, given the known history and presentation.    History obtained from: Patient    EKG: See my supervising physician's EKG interpretation    Medications given during visit:  Medications   famotidine PF (Pepcid) injection 20 mg (20 mg intravenous Given 4/11/25 2219)   sodium chloride 0.9 % bolus 1,000 mL (0 mL intravenous Stopped 4/12/25 0001)   ketorolac (Toradol) injection 15 mg (15 mg intravenous Given 4/11/25 2219)   dexAMETHasone (Decadron) injection 4 mg (4 mg intravenous Given 4/11/25 2219)   iohexol (OMNIPaque) 350 mg iodine/mL solution 75 mL (75 mL intravenous Given 4/11/25 2312)   cefTRIAXone (Rocephin) 1 g in dextrose (iso) IV 50 mL (0 g intravenous Stopped 4/12/25 0002)   dicyclomine (Bentyl) capsule 20 mg (20 mg oral Given 4/11/25 2329)        Diagnostic/tests  Labs Reviewed   CBC WITH AUTO DIFFERENTIAL - Abnormal       Result Value    WBC 7.5      nRBC 0.0      RBC 3.92 (*)     Hemoglobin 10.6 (*)     Hematocrit 32.7 (*)     MCV 83      MCH 27.0      MCHC 32.4      RDW 14.5      Platelets 191      Neutrophils % 48.9      Immature Granulocytes %, Automated 0.4      Lymphocytes % 40.9      Monocytes % 5.9      Eosinophils % 2.7      Basophils % 1.2      Neutrophils Absolute 3.66      Immature Granulocytes Absolute, Automated 0.03      Lymphocytes Absolute 3.06       Monocytes Absolute 0.44      Eosinophils Absolute 0.20      Basophils Absolute 0.09     COMPREHENSIVE METABOLIC PANEL - Abnormal    Glucose 73 (*)     Sodium 134 (*)     Potassium 3.5      Chloride 101      Bicarbonate 24      Anion Gap 13      Urea Nitrogen 16      Creatinine 0.84      eGFR 87      Calcium 9.4      Albumin 4.2      Alkaline Phosphatase 47      Total Protein 6.5      AST 13      Bilirubin, Total 0.3      ALT 4 (*)    URINALYSIS WITH REFLEX CULTURE AND MICROSCOPIC - Abnormal    Color, Urine Colorless (*)     Appearance, Urine Clear      Specific Gravity, Urine 1.004 (*)     pH, Urine 5.5      Protein, Urine NEGATIVE      Glucose, Urine Normal      Blood, Urine NEGATIVE      Ketones, Urine NEGATIVE      Bilirubin, Urine NEGATIVE      Urobilinogen, Urine Normal      Nitrite, Urine NEGATIVE      Leukocyte Esterase, Urine 250 Cesar/uL (*)    MICROSCOPIC ONLY, URINE - Abnormal    WBC, Urine 6-10 (*)     RBC, Urine 1-2     LIPASE - Normal    Lipase 12      Narrative:     Venipuncture immediately after or during the administration of Metamizole may lead to falsely low results. Testing should be performed immediately prior to Metamizole dosing.   HCG, URINE, QUALITATIVE - Normal    HCG, Urine NEGATIVE     URINE CULTURE   URINALYSIS WITH REFLEX CULTURE AND MICROSCOPIC    Narrative:     The following orders were created for panel order Urinalysis with Reflex Culture and Microscopic.  Procedure                               Abnormality         Status                     ---------                               -----------         ------                     Urinalysis with Reflex C...[[id number]]  Abnormal            Final result               Extra Urine Gray Tube[[id number]]                                                         Please view results for these tests on the individual orders.   EXTRA URINE GRAY TUBE      CT abdomen pelvis w IV contrast   Final Result   No CT evidence of appendicitis.        Mild  bladder wall thickening for which cystitis can present similarly.        Mild constipation        MACRO:   None        Signed by: Mohinder Snyder 4/12/2025 12:57 AM   Dictation workstation:   [id number]           Considerations/further MDM:    Differential diagnosis includes was not limited to gastritis versus appendicitis versus ureterolithiasis versus pyelonephritis versus an atypical ACS.    CBC showed no leukocytosis.  Normocytic anemia is present.  Upon review of previous CBCs.  Patient has had most 3 point drop in hemoglobin in the last month.  She denies any melena, hematochezia, hemoptysis, or hematemesis.  Patient does not take any blood thinners.    CMP shows no elevation in BUN which is reassuring, no evidence of BARBIE.  Lipase is not elevated at 12.  Urinalysis shows 250 leukocytes with 610 WBCs.  Negative pregnancy test.    Consider atypical presentation of ACS however patient did not have any chest pain.  EKG showed no evidence of acute STEMI.    Patient was given 1 L IV normal saline, IV ceftriaxone, oral Bentyl, IV dexamethasone 4 mg due to history of prolonged QT to help for nausea, IV Toradol and IV famotidine with resolution of her symptoms.    CT scan abdomen pelvis with IV contrast shows no CT evidence appendicitis.  There is mild bladder wall thickening which came is consistent with cystitis.  Mild constipation.    She was given 1 g IV ceftriaxone due to urinalysis shows evidence of UTI.  She was also given 1 L of normal saline, IV dexamethasone for nausea, IV Pepcid, IV Toradol and oral dicyclomine with resolution of her abdominal discomfort.  Patient was educated on lab and imaging findings.  She dysarthria prescription for antibiotic.  Return precautions were discussed.  She was discharged in stable condition.    Procedure  Procedures     Amarilys Martinez PA-C  04/13/25 3824

## 2025-04-12 NOTE — DISCHARGE INSTRUCTIONS
Your CT scan showed that you have inflammation to your bladder wall and your urinalysis shows evidence of a UTI.  Your hemoglobin is lower today.  I do see you have a history of anemia.  Please follow-up with hematology listed on your discharge paperwork.  If you begin to have bloody stools, vomiting blood, vaginal bleeding, lightheadedness or dizziness return to the emergency department.  Also if he went to have return of abdominal pain, nausea or vomiting or any other new or worsening symptoms please return to the emergency department for reevaluation.

## 2025-04-13 ENCOUNTER — APPOINTMENT (OUTPATIENT)
Dept: RADIOLOGY | Facility: HOSPITAL | Age: 45
End: 2025-04-13
Payer: MEDICARE

## 2025-04-13 ENCOUNTER — APPOINTMENT (OUTPATIENT)
Dept: CARDIOLOGY | Facility: HOSPITAL | Age: 45
End: 2025-04-13
Payer: MEDICARE

## 2025-04-13 VITALS
WEIGHT: 135 LBS | RESPIRATION RATE: 17 BRPM | SYSTOLIC BLOOD PRESSURE: 131 MMHG | HEIGHT: 62 IN | DIASTOLIC BLOOD PRESSURE: 81 MMHG | BODY MASS INDEX: 24.84 KG/M2 | HEART RATE: 66 BPM | OXYGEN SATURATION: 97 %

## 2025-04-13 LAB
ANION GAP SERPL CALCULATED.3IONS-SCNC: 15 MMOL/L (ref 10–20)
BACTERIA UR CULT: ABNORMAL
BASOPHILS # BLD AUTO: 0.07 X10*3/UL (ref 0–0.1)
BASOPHILS NFR BLD AUTO: 0.8 %
BNP SERPL-MCNC: 283 PG/ML (ref 0–99)
BUN SERPL-MCNC: 15 MG/DL (ref 6–23)
CALCIUM SERPL-MCNC: 9.2 MG/DL (ref 8.6–10.3)
CARDIAC TROPONIN I PNL SERPL HS: <3 NG/L (ref 0–13)
CARDIAC TROPONIN I PNL SERPL HS: <3 NG/L (ref 0–13)
CHLORIDE SERPL-SCNC: 102 MMOL/L (ref 98–107)
CO2 SERPL-SCNC: 22 MMOL/L (ref 21–32)
CREAT SERPL-MCNC: 0.78 MG/DL (ref 0.5–1.05)
EGFRCR SERPLBLD CKD-EPI 2021: >90 ML/MIN/1.73M*2
EOSINOPHIL # BLD AUTO: 0.09 X10*3/UL (ref 0–0.7)
EOSINOPHIL NFR BLD AUTO: 1 %
ERYTHROCYTE [DISTWIDTH] IN BLOOD BY AUTOMATED COUNT: 14.6 % (ref 11.5–14.5)
GLUCOSE SERPL-MCNC: 69 MG/DL (ref 74–99)
HCT VFR BLD AUTO: 31.8 % (ref 36–46)
HGB BLD-MCNC: 10.4 G/DL (ref 12–16)
IMM GRANULOCYTES # BLD AUTO: 0.09 X10*3/UL (ref 0–0.7)
IMM GRANULOCYTES NFR BLD AUTO: 1 % (ref 0–0.9)
LYMPHOCYTES # BLD AUTO: 2.4 X10*3/UL (ref 1.2–4.8)
LYMPHOCYTES NFR BLD AUTO: 26.7 %
MCH RBC QN AUTO: 27.1 PG (ref 26–34)
MCHC RBC AUTO-ENTMCNC: 32.7 G/DL (ref 32–36)
MCV RBC AUTO: 83 FL (ref 80–100)
MONOCYTES # BLD AUTO: 0.58 X10*3/UL (ref 0.1–1)
MONOCYTES NFR BLD AUTO: 6.4 %
NEUTROPHILS # BLD AUTO: 5.77 X10*3/UL (ref 1.2–7.7)
NEUTROPHILS NFR BLD AUTO: 64.1 %
NRBC BLD-RTO: 0 /100 WBCS (ref 0–0)
PLATELET # BLD AUTO: 184 X10*3/UL (ref 150–450)
POTASSIUM SERPL-SCNC: 3.6 MMOL/L (ref 3.5–5.3)
RBC # BLD AUTO: 3.84 X10*6/UL (ref 4–5.2)
SODIUM SERPL-SCNC: 135 MMOL/L (ref 136–145)
WBC # BLD AUTO: 9 X10*3/UL (ref 4.4–11.3)

## 2025-04-13 PROCEDURE — 83880 ASSAY OF NATRIURETIC PEPTIDE: CPT

## 2025-04-13 PROCEDURE — 36415 COLL VENOUS BLD VENIPUNCTURE: CPT

## 2025-04-13 PROCEDURE — 71046 X-RAY EXAM CHEST 2 VIEWS: CPT

## 2025-04-13 PROCEDURE — 80048 BASIC METABOLIC PNL TOTAL CA: CPT

## 2025-04-13 PROCEDURE — 96372 THER/PROPH/DIAG INJ SC/IM: CPT

## 2025-04-13 PROCEDURE — 71046 X-RAY EXAM CHEST 2 VIEWS: CPT | Mod: FOREIGN READ | Performed by: RADIOLOGY

## 2025-04-13 PROCEDURE — 2500000004 HC RX 250 GENERAL PHARMACY W/ HCPCS (ALT 636 FOR OP/ED)

## 2025-04-13 PROCEDURE — 84484 ASSAY OF TROPONIN QUANT: CPT

## 2025-04-13 PROCEDURE — 85025 COMPLETE CBC W/AUTO DIFF WBC: CPT

## 2025-04-13 RX ORDER — KETOROLAC TROMETHAMINE 30 MG/ML
30 INJECTION, SOLUTION INTRAMUSCULAR; INTRAVENOUS ONCE
Status: COMPLETED | OUTPATIENT
Start: 2025-04-13 | End: 2025-04-13

## 2025-04-13 RX ADMIN — KETOROLAC TROMETHAMINE 30 MG: 30 INJECTION, SOLUTION INTRAMUSCULAR at 01:05

## 2025-04-13 ASSESSMENT — PAIN DESCRIPTION - DESCRIPTORS: DESCRIPTORS: PRESSURE

## 2025-04-13 ASSESSMENT — PAIN SCALES - GENERAL
PAINLEVEL_OUTOF10: 4
PAINLEVEL_OUTOF10: 7

## 2025-04-13 ASSESSMENT — PAIN DESCRIPTION - LOCATION: LOCATION: CHEST

## 2025-04-13 ASSESSMENT — PAIN - FUNCTIONAL ASSESSMENT: PAIN_FUNCTIONAL_ASSESSMENT: 0-10

## 2025-04-13 NOTE — DISCHARGE INSTRUCTIONS
You are seen today for chest pain, your lab works reassuring, please follow-up with your primary care provider, please return the ER if any worsening symptoms

## 2025-04-13 NOTE — ED PROVIDER NOTES
"HPI   No chief complaint on file.      Patient is a 44-year-old female with a chief complaint of chest pain.  Patient states she has had chest pain for about 7 hours now, she has no shortness of breath, no nausea or vomiting, states she has extensive cardiac history, patient has no fevers or chills,      History provided by:  Patient          Patient History   Past Medical History:   Diagnosis Date    COPD (chronic obstructive pulmonary disease) (Multi)     Coronary artery disease     Personal history of other endocrine, nutritional and metabolic disease     History of hypopituitarism    Personal history of other mental and behavioral disorders     History of depression    Personal history of other specified conditions     History of brain tumor    Unspecified convulsions (Multi)     Convulsion     Past Surgical History:   Procedure Laterality Date    BRAIN SURGERY      CARDIAC SURGERY      MR HEAD ANGIO WO IV CONTRAST  07/15/2021    MR HEAD ANGIO WO IV CONTRAST 7/15/2021 [facility]    MR HEAD ANGIO WO IV CONTRAST  11/28/2017    MR HEAD ANGIO WO IV CONTRAST [facility]    MR HEAD ANGIO WO IV CONTRAST  03/13/2018    MR HEAD ANGIO WO IV CONTRAST [facility]    MR NECK ANGIO WO IV CONTRAST  07/15/2021    MR NECK ANGIO WO IV CONTRAST 7/15/2021 [facility]    OTHER SURGICAL HISTORY  08/17/2013    Craniotomy Tumor Removal - Complete     No family history on file.  Social History     Tobacco Use    Smoking status: Former     Types: Cigarettes    Smokeless tobacco: Never   Vaping Use    Vaping status: Never Used   Substance Use Topics    Alcohol use: Never    Drug use: Yes     Types: \"Crack\" cocaine, Cocaine     Comment: last used 5 days ago       Physical Exam   ED Triage Vitals   Temp Pulse Resp BP   -- -- -- --      SpO2 Temp src Heart Rate Source Patient Position   -- -- -- --      BP Location FiO2 (%)     -- --       Physical Exam  Vitals and nursing note reviewed.   Constitutional: "       General: She is not in acute distress.     Appearance: She is well-developed and normal weight. She is not ill-appearing, toxic-appearing or diaphoretic.   HENT:      Head: Normocephalic and atraumatic.   Eyes:      Extraocular Movements: Extraocular movements intact.      Pupils: Pupils are equal, round, and reactive to light.   Cardiovascular:      Rate and Rhythm: Normal rate and regular rhythm.      Heart sounds: Normal heart sounds. No murmur heard.     No friction rub. No gallop.   Pulmonary:      Effort: Pulmonary effort is normal.      Breath sounds: Normal breath sounds.   Chest:      Chest wall: No tenderness.   Abdominal:      General: Bowel sounds are normal.      Palpations: Abdomen is soft.   Musculoskeletal:         General: Normal range of motion.   Skin:     General: Skin is warm and dry.      Capillary Refill: Capillary refill takes less than 2 seconds.   Neurological:      General: No focal deficit present.      Mental Status: She is alert and oriented to person, place, and time.   Psychiatric:         Behavior: Behavior normal.           ED Course & MDM   ED Course as of 04/13/25 0317   Sat Apr 12, 2025   2335 EKG interpreted by myself independently, EKG shows a sinus rhythm, rate of 66 bpm, VT interval 152, , , QTc 482, patient has no ST elevation or depression, negative for acute MI [RUFINO]      ED Course User Index  [RUFINO] Yoni Cook DO         Diagnoses as of 04/13/25 0317   Acute chest pain                 No data recorded                                 Medical Decision Making  Patient seen and evaluated at bedside, patient is in no acute distress.  I will order a CBC, BMP, troponin, chest x-ray, EKG, Toradol. Differential diagnosis includes but is not limited to costochondritis, ACS, musculoskeletal chest pain,  Patient CBC showed hemoglobin 10.4, crit 31.8, , improved from prior, troponin less than 3, less than 3, BMP showed sodium 135, chest x-ray results showed no  acute findings, formal report below, patient improvement symptoms with Toradol, patient will be discharged home, is follow-up with her primary care provider, patient is agreeable to discharge plan, return precautions were discussed.    Diagnosis: Chest pain  XR chest 2 views   Final Result    No acute abnormality.    Signed by Jimi Almazan MD     Results for orders placed or performed during the hospital encounter of 04/12/25  -Basic metabolic panel:   Collection Time: 04/13/25 12:13 AM       Result                      Value             Ref Range           Glucose                     69 (L)            74 - 99 mg/dL       Sodium                      135 (L)           136 - 145 mm*       Potassium                   3.6               3.5 - 5.3 mm*       Chloride                    102               98 - 107 mmo*       Bicarbonate                 22                21 - 32 mmol*       Anion Gap                   15                10 - 20 mmol*       Urea Nitrogen               15                6 - 23 mg/dL        Creatinine                  0.78              0.50 - 1.05 *       eGFR                        >90               >60 mL/min/1*       Calcium                     9.2               8.6 - 10.3 m*  -Troponin I, High Sensitivity, Initial:   Collection Time: 04/13/25 12:13 AM       Result                      Value             Ref Range           Troponin I, High Sensi*     <3                0 - 13 ng/L    -CBC and Auto Differential:   Collection Time: 04/13/25  1:19 AM       Result                      Value             Ref Range           WBC                         9.0               4.4 - 11.3 x*       nRBC                        0.0               0.0 - 0.0 /1*       RBC                         3.84 (L)          4.00 - 5.20 *       Hemoglobin                  10.4 (L)          12.0 - 16.0 *       Hematocrit                  31.8 (L)          36.0 - 46.0 %       MCV                         83                80 -  100 fL         MCH                         27.1              26.0 - 34.0 *       MCHC                        32.7              32.0 - 36.0 *       RDW                         14.6 (H)          11.5 - 14.5 %       Platelets                   184               150 - 450 x1*       Neutrophils %               64.1              40.0 - 80.0 %       Immature Granulocytes *     1.0 (H)           0.0 - 0.9 %         Lymphocytes %               26.7              13.0 - 44.0 %       Monocytes %                 6.4               2.0 - 10.0 %        Eosinophils %               1.0               0.0 - 6.0 %         Basophils %                 0.8               0.0 - 2.0 %         Neutrophils Absolute        5.77              1.20 - 7.70 *       Immature Granulocytes *     0.09              0.00 - 0.70 *       Lymphocytes Absolute        2.40              1.20 - 4.80 *       Monocytes Absolute          0.58              0.10 - 1.00 *       Eosinophils Absolute        0.09              0.00 - 0.70 *       Basophils Absolute          0.07              0.00 - 0.10 *  -B-Type Natriuretic Peptide:   Collection Time: 04/13/25  1:19 AM       Result                      Value             Ref Range           BNP                         283 (H)           0 - 99 pg/mL   -Troponin, High Sensitivity, 1 Hour:   Collection Time: 04/13/25  1:19 AM       Result                      Value             Ref Range           Troponin I, High Sensi*     <3                0 - 13 ng/L    .        Procedure  Procedures  Sections of this report were created using voice-to-text technology and may contain errors in translation    Yoni Cook DO  Emergency Medicine         Yoni Cook DO  04/13/25 0324

## 2025-04-14 LAB
ATRIAL RATE: 63 BPM
ATRIAL RATE: 66 BPM
P AXIS: 41 DEGREES
P AXIS: 44 DEGREES
P OFFSET: 172 MS
P OFFSET: 179 MS
P ONSET: 136 MS
P ONSET: 143 MS
PR INTERVAL: 152 MS
PR INTERVAL: 164 MS
Q ONSET: 218 MS
Q ONSET: 219 MS
QRS COUNT: 10 BEATS
QRS COUNT: 11 BEATS
QRS DURATION: 104 MS
QRS DURATION: 98 MS
QT INTERVAL: 452 MS
QT INTERVAL: 460 MS
QTC CALCULATION(BAZETT): 462 MS
QTC CALCULATION(BAZETT): 482 MS
QTC FREDERICIA: 459 MS
QTC FREDERICIA: 475 MS
R AXIS: 69 DEGREES
R AXIS: 85 DEGREES
T AXIS: 52 DEGREES
T AXIS: 69 DEGREES
T OFFSET: 444 MS
T OFFSET: 449 MS
VENTRICULAR RATE: 63 BPM
VENTRICULAR RATE: 66 BPM

## 2025-04-30 ENCOUNTER — APPOINTMENT (OUTPATIENT)
Dept: CARDIOLOGY | Facility: HOSPITAL | Age: 45
End: 2025-04-30
Payer: MEDICARE

## 2025-04-30 ENCOUNTER — APPOINTMENT (OUTPATIENT)
Dept: RADIOLOGY | Facility: HOSPITAL | Age: 45
End: 2025-04-30
Payer: MEDICARE

## 2025-04-30 ENCOUNTER — HOSPITAL ENCOUNTER (EMERGENCY)
Facility: HOSPITAL | Age: 45
Discharge: HOME | End: 2025-04-30
Attending: STUDENT IN AN ORGANIZED HEALTH CARE EDUCATION/TRAINING PROGRAM
Payer: MEDICARE

## 2025-04-30 VITALS
HEIGHT: 62 IN | HEART RATE: 71 BPM | TEMPERATURE: 98.6 F | WEIGHT: 141.1 LBS | OXYGEN SATURATION: 100 % | SYSTOLIC BLOOD PRESSURE: 112 MMHG | DIASTOLIC BLOOD PRESSURE: 75 MMHG | BODY MASS INDEX: 25.96 KG/M2 | RESPIRATION RATE: 27 BRPM

## 2025-04-30 DIAGNOSIS — R22.43 LOCALIZED SWELLING OF BOTH LOWER LEGS: Primary | ICD-10-CM

## 2025-04-30 LAB
ANION GAP SERPL CALCULATED.3IONS-SCNC: 10 MMOL/L (ref 10–20)
BASOPHILS # BLD AUTO: 0.07 X10*3/UL (ref 0–0.1)
BASOPHILS NFR BLD AUTO: 1.5 %
BNP SERPL-MCNC: 30 PG/ML (ref 0–99)
BUN SERPL-MCNC: 11 MG/DL (ref 6–23)
CALCIUM SERPL-MCNC: 9.2 MG/DL (ref 8.6–10.3)
CARDIAC TROPONIN I PNL SERPL HS: <3 NG/L (ref 0–13)
CHLORIDE SERPL-SCNC: 104 MMOL/L (ref 98–107)
CO2 SERPL-SCNC: 24 MMOL/L (ref 21–32)
CREAT SERPL-MCNC: 0.9 MG/DL (ref 0.5–1.05)
EGFRCR SERPLBLD CKD-EPI 2021: 81 ML/MIN/1.73M*2
EOSINOPHIL # BLD AUTO: 0.12 X10*3/UL (ref 0–0.7)
EOSINOPHIL NFR BLD AUTO: 2.6 %
ERYTHROCYTE [DISTWIDTH] IN BLOOD BY AUTOMATED COUNT: 14.1 % (ref 11.5–14.5)
GLUCOSE SERPL-MCNC: 77 MG/DL (ref 74–99)
HCT VFR BLD AUTO: 34.3 % (ref 36–46)
HGB BLD-MCNC: 11.1 G/DL (ref 12–16)
IMM GRANULOCYTES # BLD AUTO: 0.02 X10*3/UL (ref 0–0.7)
IMM GRANULOCYTES NFR BLD AUTO: 0.4 % (ref 0–0.9)
LYMPHOCYTES # BLD AUTO: 2.09 X10*3/UL (ref 1.2–4.8)
LYMPHOCYTES NFR BLD AUTO: 44.6 %
MCH RBC QN AUTO: 26.6 PG (ref 26–34)
MCHC RBC AUTO-ENTMCNC: 32.4 G/DL (ref 32–36)
MCV RBC AUTO: 82 FL (ref 80–100)
MONOCYTES # BLD AUTO: 0.39 X10*3/UL (ref 0.1–1)
MONOCYTES NFR BLD AUTO: 8.3 %
NEUTROPHILS # BLD AUTO: 2 X10*3/UL (ref 1.2–7.7)
NEUTROPHILS NFR BLD AUTO: 42.6 %
NRBC BLD-RTO: 0 /100 WBCS (ref 0–0)
PLATELET # BLD AUTO: 158 X10*3/UL (ref 150–450)
POTASSIUM SERPL-SCNC: 4 MMOL/L (ref 3.5–5.3)
RBC # BLD AUTO: 4.18 X10*6/UL (ref 4–5.2)
SODIUM SERPL-SCNC: 134 MMOL/L (ref 136–145)
TSH SERPL-ACNC: 0.85 MIU/L (ref 0.44–3.98)
WBC # BLD AUTO: 4.7 X10*3/UL (ref 4.4–11.3)

## 2025-04-30 PROCEDURE — 84484 ASSAY OF TROPONIN QUANT: CPT | Performed by: STUDENT IN AN ORGANIZED HEALTH CARE EDUCATION/TRAINING PROGRAM

## 2025-04-30 PROCEDURE — 99284 EMERGENCY DEPT VISIT MOD MDM: CPT | Mod: 25 | Performed by: STUDENT IN AN ORGANIZED HEALTH CARE EDUCATION/TRAINING PROGRAM

## 2025-04-30 PROCEDURE — 96361 HYDRATE IV INFUSION ADD-ON: CPT

## 2025-04-30 PROCEDURE — 96374 THER/PROPH/DIAG INJ IV PUSH: CPT

## 2025-04-30 PROCEDURE — 99285 EMERGENCY DEPT VISIT HI MDM: CPT | Mod: 25

## 2025-04-30 PROCEDURE — 93970 EXTREMITY STUDY: CPT

## 2025-04-30 PROCEDURE — 93971 EXTREMITY STUDY: CPT | Performed by: RADIOLOGY

## 2025-04-30 PROCEDURE — 85025 COMPLETE CBC W/AUTO DIFF WBC: CPT | Performed by: STUDENT IN AN ORGANIZED HEALTH CARE EDUCATION/TRAINING PROGRAM

## 2025-04-30 PROCEDURE — 93005 ELECTROCARDIOGRAM TRACING: CPT

## 2025-04-30 PROCEDURE — 80048 BASIC METABOLIC PNL TOTAL CA: CPT | Performed by: STUDENT IN AN ORGANIZED HEALTH CARE EDUCATION/TRAINING PROGRAM

## 2025-04-30 PROCEDURE — 2500000004 HC RX 250 GENERAL PHARMACY W/ HCPCS (ALT 636 FOR OP/ED): Performed by: STUDENT IN AN ORGANIZED HEALTH CARE EDUCATION/TRAINING PROGRAM

## 2025-04-30 PROCEDURE — 83880 ASSAY OF NATRIURETIC PEPTIDE: CPT | Performed by: STUDENT IN AN ORGANIZED HEALTH CARE EDUCATION/TRAINING PROGRAM

## 2025-04-30 PROCEDURE — 36415 COLL VENOUS BLD VENIPUNCTURE: CPT | Performed by: STUDENT IN AN ORGANIZED HEALTH CARE EDUCATION/TRAINING PROGRAM

## 2025-04-30 PROCEDURE — 84443 ASSAY THYROID STIM HORMONE: CPT | Performed by: STUDENT IN AN ORGANIZED HEALTH CARE EDUCATION/TRAINING PROGRAM

## 2025-04-30 PROCEDURE — 2500000001 HC RX 250 WO HCPCS SELF ADMINISTERED DRUGS (ALT 637 FOR MEDICARE OP): Performed by: STUDENT IN AN ORGANIZED HEALTH CARE EDUCATION/TRAINING PROGRAM

## 2025-04-30 RX ORDER — TRAMADOL HYDROCHLORIDE 50 MG/1
50 TABLET ORAL EVERY 6 HOURS PRN
Qty: 8 TABLET | Refills: 0 | Status: SHIPPED | OUTPATIENT
Start: 2025-04-30 | End: 2025-05-03

## 2025-04-30 RX ORDER — NAPROXEN 500 MG/1
500 TABLET ORAL
Qty: 30 TABLET | Refills: 0 | Status: SHIPPED | OUTPATIENT
Start: 2025-04-30 | End: 2025-05-15

## 2025-04-30 RX ORDER — OXYCODONE HYDROCHLORIDE 5 MG/1
5 TABLET ORAL ONCE
Refills: 0 | Status: COMPLETED | OUTPATIENT
Start: 2025-04-30 | End: 2025-04-30

## 2025-04-30 RX ORDER — KETOROLAC TROMETHAMINE 30 MG/ML
15 INJECTION, SOLUTION INTRAMUSCULAR; INTRAVENOUS ONCE
Status: COMPLETED | OUTPATIENT
Start: 2025-04-30 | End: 2025-04-30

## 2025-04-30 RX ADMIN — OXYCODONE HYDROCHLORIDE 5 MG: 5 TABLET ORAL at 15:46

## 2025-04-30 RX ADMIN — SODIUM CHLORIDE 1000 ML: 900 INJECTION, SOLUTION INTRAVENOUS at 15:09

## 2025-04-30 RX ADMIN — KETOROLAC TROMETHAMINE 15 MG: 30 INJECTION, SOLUTION INTRAMUSCULAR at 15:10

## 2025-04-30 ASSESSMENT — PAIN DESCRIPTION - ORIENTATION
ORIENTATION: RIGHT;LEFT
ORIENTATION: RIGHT;LEFT

## 2025-04-30 ASSESSMENT — PAIN DESCRIPTION - DESCRIPTORS
DESCRIPTORS: PRESSURE
DESCRIPTORS: THROBBING
DESCRIPTORS: PRESSURE

## 2025-04-30 ASSESSMENT — PAIN SCALES - GENERAL
PAINLEVEL_OUTOF10: 9
PAINLEVEL_OUTOF10: 2
PAINLEVEL_OUTOF10: 9

## 2025-04-30 ASSESSMENT — PAIN - FUNCTIONAL ASSESSMENT
PAIN_FUNCTIONAL_ASSESSMENT: 0-10

## 2025-04-30 ASSESSMENT — PAIN DESCRIPTION - LOCATION
LOCATION: LEG
LOCATION: LEG

## 2025-04-30 ASSESSMENT — PAIN DESCRIPTION - FREQUENCY: FREQUENCY: CONSTANT/CONTINUOUS

## 2025-04-30 ASSESSMENT — PAIN DESCRIPTION - PAIN TYPE: TYPE: ACUTE PAIN

## 2025-04-30 NOTE — ED PROVIDER NOTES
History of Present Illness     History provided by: Patient  Limitations to History: None  External Records Reviewed with Brief Summary: Prior ED notes    HPI:  Lu Fall is a 44 y.o. female who presents with leg swelling.  Patient has a history of CABG and stents placed 4 years ago.  She states that she has had increased leg swelling over the past several days, worse with standing, improves with rest and elevation.  She notes increased pain when the swelling is worse.  No prior history of DVT.  Not on any blood thinners.    As an aside, patient notes increased brain fog over the last several days.  Patient states that her Synthroid has not been delivered to her home and has been delayed therefore she has not taken it in the same amount of time.    Physical Exam   Triage vitals:  T 37 °C (98.6 °F)  HR 75  /72  RR 16  O2 99 % None (Room air)    Physical Exam  Constitutional:       General: She is not in acute distress.  HENT:      Head: Normocephalic and atraumatic.      Mouth/Throat:      Mouth: Mucous membranes are moist.      Pharynx: Oropharynx is clear.   Eyes:      Extraocular Movements: Extraocular movements intact.      Conjunctiva/sclera: Conjunctivae normal.      Pupils: Pupils are equal, round, and reactive to light.   Cardiovascular:      Comments: Appears well perfused  Pulmonary:      Effort: Pulmonary effort is normal. No respiratory distress.   Abdominal:      General: There is no distension.   Musculoskeletal:         General: No deformity. Normal range of motion.      Cervical back: Normal range of motion.      Right lower leg: No edema.      Left lower leg: No edema.      Comments: No edema noted to bilateral lower extremities, no redness or calf tenderness on palpation   Skin:     General: Skin is dry.   Neurological:      General: No focal deficit present.      Mental Status: She is alert and oriented to person, place, and time. Mental status is at baseline.   Psychiatric:          Mood and Affect: Mood normal.         Behavior: Behavior normal.          Medical Decision Making & ED Course   Medical Decision Makin y.o. female who presents with leg swelling.  Considered DVT, cellulitis, peripheral edema.  No overlying erythema or tenderness on palpation, low suspicion for cellulitis.  DVT ultrasound obtained, negative for DVT in bilateral lower extremities.  Patient not actively swollen on examination, suspicious for peripheral edema especially in the setting of swelling that worsens with standing.  BNP and troponin checked, both negative.  Normal renal function.  Did check a TSH given recent report of delayed Synthroid.  TSH was within normal limits.  One-time low blood pressure reading, treated with IV fluids with immediate improvement.  At this time recommend the patient wait for her delivery of her medication.  Pain medication sent to her pharmacy.  Patient stable for discharge and return crutches given.  ----      Social Determinants of Health which Significantly Impact Care: None identified     EKG Independent Interpretation: EKG interpreted by myself. Please see ED Course for full interpretation.    Independent Result Review and Interpretation: Relevant laboratory and radiographic results were reviewed and independently interpreted by myself.  As necessary, they are commented on in the ED Course.    Chronic conditions affecting the patient's care: As documented above in [facility]    The patient was discussed with the following consultants/services: None    Care Considerations: As documented above in [facility]    ED Course:  ED Course as of 25 1539      1420 ECG 12 lead  Performed at  1359, HR of 75, NSR, NAD, QTc 444, no sign of STEMI, T wave inversion to inferior leads    Reviewed and interpreted by me at time performed   [JM]   1527 Vascular US lower extremity venous duplex bilateral  IMPRESSION:  No sonographic evidence for deep vein thrombosis within the evaluated  veins of the bilateral lower extremity.    [JM]      ED Course User Index  [JM] Sandrita Ellison MD         Diagnoses as of 04/30/25 1539   Localized swelling of both lower legs       Procedures   Procedures    Sandrita Ellison MD  Emergency Medicine     Sandrita Ellison MD  04/30/25 1617

## 2025-04-30 NOTE — DISCHARGE INSTRUCTIONS
Are seen in the Emergency Department today for leg swelling.  At this time you do not need to stay in the hospital.  Your DVT ultrasound was negative for blood clot.  You also have no signs of significant heart failure.  I believe this is called peripheral edema and can be treated with compression stockings and elevating your legs.  I have given you some pain medication which you can use at home.  If you have any other concerns, you can return for reevaluation.

## 2025-05-02 LAB
ATRIAL RATE: 75 BPM
P AXIS: 40 DEGREES
P OFFSET: 175 MS
P ONSET: 127 MS
PR INTERVAL: 186 MS
Q ONSET: 220 MS
QRS COUNT: 12 BEATS
QRS DURATION: 100 MS
QT INTERVAL: 398 MS
QTC CALCULATION(BAZETT): 444 MS
QTC FREDERICIA: 428 MS
R AXIS: 81 DEGREES
T AXIS: 257 DEGREES
T OFFSET: 419 MS
VENTRICULAR RATE: 75 BPM

## 2025-05-04 ENCOUNTER — HOSPITAL ENCOUNTER (EMERGENCY)
Facility: HOSPITAL | Age: 45
Discharge: HOME | End: 2025-05-04
Attending: STUDENT IN AN ORGANIZED HEALTH CARE EDUCATION/TRAINING PROGRAM
Payer: MEDICARE

## 2025-05-04 ENCOUNTER — APPOINTMENT (OUTPATIENT)
Dept: CARDIOLOGY | Facility: HOSPITAL | Age: 45
End: 2025-05-04
Payer: MEDICARE

## 2025-05-04 ENCOUNTER — APPOINTMENT (OUTPATIENT)
Dept: RADIOLOGY | Facility: HOSPITAL | Age: 45
End: 2025-05-04
Payer: MEDICARE

## 2025-05-04 VITALS
OXYGEN SATURATION: 100 % | HEIGHT: 62 IN | HEART RATE: 72 BPM | TEMPERATURE: 97.9 F | BODY MASS INDEX: 25.76 KG/M2 | RESPIRATION RATE: 18 BRPM | DIASTOLIC BLOOD PRESSURE: 92 MMHG | WEIGHT: 140 LBS | SYSTOLIC BLOOD PRESSURE: 134 MMHG

## 2025-05-04 DIAGNOSIS — M54.17 LUMBOSACRAL RADICULOPATHY AT S1: Primary | ICD-10-CM

## 2025-05-04 DIAGNOSIS — M51.26 LUMBAR HERNIATED DISC: ICD-10-CM

## 2025-05-04 LAB
ALBUMIN SERPL BCP-MCNC: 4.3 G/DL (ref 3.4–5)
ALP SERPL-CCNC: 68 U/L (ref 33–110)
ALT SERPL W P-5'-P-CCNC: 10 U/L (ref 7–45)
ANION GAP SERPL CALC-SCNC: 10 MMOL/L (ref 10–20)
APTT PPP: 40 SECONDS (ref 26–36)
AST SERPL W P-5'-P-CCNC: 17 U/L (ref 9–39)
BASOPHILS # BLD AUTO: 0.06 X10*3/UL (ref 0–0.1)
BASOPHILS NFR BLD AUTO: 1.3 %
BILIRUB SERPL-MCNC: 0.4 MG/DL (ref 0–1.2)
BNP SERPL-MCNC: 60 PG/ML (ref 0–99)
BUN SERPL-MCNC: 8 MG/DL (ref 6–23)
CALCIUM SERPL-MCNC: 8.7 MG/DL (ref 8.6–10.3)
CARDIAC TROPONIN I PNL SERPL HS: <3 NG/L (ref 0–13)
CHLORIDE SERPL-SCNC: 105 MMOL/L (ref 98–107)
CO2 SERPL-SCNC: 26 MMOL/L (ref 21–32)
CREAT SERPL-MCNC: 0.9 MG/DL (ref 0.5–1.05)
EGFRCR SERPLBLD CKD-EPI 2021: 81 ML/MIN/1.73M*2
EOSINOPHIL # BLD AUTO: 0.17 X10*3/UL (ref 0–0.7)
EOSINOPHIL NFR BLD AUTO: 3.7 %
ERYTHROCYTE [DISTWIDTH] IN BLOOD BY AUTOMATED COUNT: 14.1 % (ref 11.5–14.5)
GLUCOSE SERPL-MCNC: 88 MG/DL (ref 74–99)
HCT VFR BLD AUTO: 35 % (ref 36–46)
HGB BLD-MCNC: 11.6 G/DL (ref 12–16)
IMM GRANULOCYTES # BLD AUTO: 0.01 X10*3/UL (ref 0–0.7)
IMM GRANULOCYTES NFR BLD AUTO: 0.2 % (ref 0–0.9)
INR PPP: 1.2 (ref 0.9–1.1)
LYMPHOCYTES # BLD AUTO: 1.8 X10*3/UL (ref 1.2–4.8)
LYMPHOCYTES NFR BLD AUTO: 39.5 %
MAGNESIUM SERPL-MCNC: 1.84 MG/DL (ref 1.6–2.4)
MCH RBC QN AUTO: 27.4 PG (ref 26–34)
MCHC RBC AUTO-ENTMCNC: 33.1 G/DL (ref 32–36)
MCV RBC AUTO: 83 FL (ref 80–100)
MONOCYTES # BLD AUTO: 0.33 X10*3/UL (ref 0.1–1)
MONOCYTES NFR BLD AUTO: 7.2 %
NEUTROPHILS # BLD AUTO: 2.19 X10*3/UL (ref 1.2–7.7)
NEUTROPHILS NFR BLD AUTO: 48.1 %
NRBC BLD-RTO: 0 /100 WBCS (ref 0–0)
PLATELET # BLD AUTO: 154 X10*3/UL (ref 150–450)
POTASSIUM SERPL-SCNC: 3.6 MMOL/L (ref 3.5–5.3)
PROT SERPL-MCNC: 6.7 G/DL (ref 6.4–8.2)
PROTHROMBIN TIME: 13.7 SECONDS (ref 9.8–12.4)
RBC # BLD AUTO: 4.24 X10*6/UL (ref 4–5.2)
SODIUM SERPL-SCNC: 137 MMOL/L (ref 136–145)
WBC # BLD AUTO: 4.6 X10*3/UL (ref 4.4–11.3)

## 2025-05-04 PROCEDURE — 85610 PROTHROMBIN TIME: CPT | Performed by: STUDENT IN AN ORGANIZED HEALTH CARE EDUCATION/TRAINING PROGRAM

## 2025-05-04 PROCEDURE — 85025 COMPLETE CBC W/AUTO DIFF WBC: CPT | Performed by: STUDENT IN AN ORGANIZED HEALTH CARE EDUCATION/TRAINING PROGRAM

## 2025-05-04 PROCEDURE — 99285 EMERGENCY DEPT VISIT HI MDM: CPT | Performed by: STUDENT IN AN ORGANIZED HEALTH CARE EDUCATION/TRAINING PROGRAM

## 2025-05-04 PROCEDURE — A9575 INJ GADOTERATE MEGLUMI 0.1ML: HCPCS | Mod: JW | Performed by: STUDENT IN AN ORGANIZED HEALTH CARE EDUCATION/TRAINING PROGRAM

## 2025-05-04 PROCEDURE — 96374 THER/PROPH/DIAG INJ IV PUSH: CPT

## 2025-05-04 PROCEDURE — 36415 COLL VENOUS BLD VENIPUNCTURE: CPT | Performed by: STUDENT IN AN ORGANIZED HEALTH CARE EDUCATION/TRAINING PROGRAM

## 2025-05-04 PROCEDURE — 84484 ASSAY OF TROPONIN QUANT: CPT | Performed by: STUDENT IN AN ORGANIZED HEALTH CARE EDUCATION/TRAINING PROGRAM

## 2025-05-04 PROCEDURE — 71045 X-RAY EXAM CHEST 1 VIEW: CPT

## 2025-05-04 PROCEDURE — 93005 ELECTROCARDIOGRAM TRACING: CPT

## 2025-05-04 PROCEDURE — 71045 X-RAY EXAM CHEST 1 VIEW: CPT | Performed by: STUDENT IN AN ORGANIZED HEALTH CARE EDUCATION/TRAINING PROGRAM

## 2025-05-04 PROCEDURE — 84075 ASSAY ALKALINE PHOSPHATASE: CPT | Performed by: STUDENT IN AN ORGANIZED HEALTH CARE EDUCATION/TRAINING PROGRAM

## 2025-05-04 PROCEDURE — 96375 TX/PRO/DX INJ NEW DRUG ADDON: CPT

## 2025-05-04 PROCEDURE — 83880 ASSAY OF NATRIURETIC PEPTIDE: CPT | Performed by: STUDENT IN AN ORGANIZED HEALTH CARE EDUCATION/TRAINING PROGRAM

## 2025-05-04 PROCEDURE — 72158 MRI LUMBAR SPINE W/O & W/DYE: CPT

## 2025-05-04 PROCEDURE — 72158 MRI LUMBAR SPINE W/O & W/DYE: CPT | Performed by: RADIOLOGY

## 2025-05-04 PROCEDURE — 83735 ASSAY OF MAGNESIUM: CPT | Performed by: STUDENT IN AN ORGANIZED HEALTH CARE EDUCATION/TRAINING PROGRAM

## 2025-05-04 PROCEDURE — 2550000001 HC RX 255 CONTRASTS: Mod: JW | Performed by: STUDENT IN AN ORGANIZED HEALTH CARE EDUCATION/TRAINING PROGRAM

## 2025-05-04 PROCEDURE — 2500000004 HC RX 250 GENERAL PHARMACY W/ HCPCS (ALT 636 FOR OP/ED): Mod: JZ | Performed by: STUDENT IN AN ORGANIZED HEALTH CARE EDUCATION/TRAINING PROGRAM

## 2025-05-04 RX ORDER — ORPHENADRINE CITRATE 100 MG/1
100 TABLET, EXTENDED RELEASE ORAL 2 TIMES DAILY PRN
Qty: 14 TABLET | Refills: 0 | Status: SHIPPED | OUTPATIENT
Start: 2025-05-04 | End: 2025-05-11

## 2025-05-04 RX ORDER — IBUPROFEN 200 MG
600 TABLET ORAL EVERY 6 HOURS PRN
Qty: 84 TABLET | Refills: 0 | Status: SHIPPED | OUTPATIENT
Start: 2025-05-04 | End: 2025-05-11

## 2025-05-04 RX ORDER — ACETAMINOPHEN 325 MG/1
975 TABLET ORAL EVERY 6 HOURS PRN
Qty: 84 TABLET | Refills: 0 | Status: SHIPPED | OUTPATIENT
Start: 2025-05-04 | End: 2025-05-04

## 2025-05-04 RX ORDER — GADOTERATE MEGLUMINE 376.9 MG/ML
12 INJECTION INTRAVENOUS
Status: COMPLETED | OUTPATIENT
Start: 2025-05-04 | End: 2025-05-04

## 2025-05-04 RX ORDER — OXYCODONE HYDROCHLORIDE 5 MG/1
5 TABLET ORAL EVERY 6 HOURS PRN
Qty: 4 TABLET | Refills: 0 | Status: SHIPPED | OUTPATIENT
Start: 2025-05-04 | End: 2025-05-05

## 2025-05-04 RX ORDER — ACETAMINOPHEN 325 MG/1
975 TABLET ORAL EVERY 6 HOURS PRN
Qty: 84 TABLET | Refills: 0 | Status: SHIPPED | OUTPATIENT
Start: 2025-05-04 | End: 2025-05-11

## 2025-05-04 RX ORDER — ORPHENADRINE CITRATE 100 MG/1
100 TABLET, EXTENDED RELEASE ORAL 2 TIMES DAILY PRN
Qty: 14 TABLET | Refills: 0 | Status: SHIPPED | OUTPATIENT
Start: 2025-05-04 | End: 2025-05-04

## 2025-05-04 RX ORDER — KETOROLAC TROMETHAMINE 15 MG/ML
15 INJECTION, SOLUTION INTRAMUSCULAR; INTRAVENOUS ONCE
Status: COMPLETED | OUTPATIENT
Start: 2025-05-04 | End: 2025-05-04

## 2025-05-04 RX ORDER — OXYCODONE HYDROCHLORIDE 5 MG/1
5 TABLET ORAL EVERY 6 HOURS PRN
Qty: 4 TABLET | Refills: 0 | Status: SHIPPED | OUTPATIENT
Start: 2025-05-04 | End: 2025-05-04

## 2025-05-04 RX ORDER — ORPHENADRINE CITRATE 30 MG/ML
60 INJECTION INTRAMUSCULAR; INTRAVENOUS ONCE
Status: COMPLETED | OUTPATIENT
Start: 2025-05-04 | End: 2025-05-04

## 2025-05-04 RX ORDER — IBUPROFEN 200 MG
600 TABLET ORAL EVERY 6 HOURS PRN
Qty: 84 TABLET | Refills: 0 | Status: SHIPPED | OUTPATIENT
Start: 2025-05-04 | End: 2025-05-04

## 2025-05-04 RX ADMIN — HYDROMORPHONE HYDROCHLORIDE 0.5 MG: 1 INJECTION, SOLUTION INTRAMUSCULAR; INTRAVENOUS; SUBCUTANEOUS at 08:15

## 2025-05-04 RX ADMIN — GADOTERATE MEGLUMINE 12 ML: 376.9 INJECTION INTRAVENOUS at 11:30

## 2025-05-04 RX ADMIN — KETOROLAC TROMETHAMINE 15 MG: 15 INJECTION, SOLUTION INTRAMUSCULAR; INTRAVENOUS at 07:29

## 2025-05-04 RX ADMIN — ORPHENADRINE CITRATE 60 MG: 60 INJECTION INTRAMUSCULAR; INTRAVENOUS at 07:30

## 2025-05-04 ASSESSMENT — PAIN SCALES - GENERAL
PAINLEVEL_OUTOF10: 4
PAINLEVEL_OUTOF10: 7

## 2025-05-04 ASSESSMENT — PAIN DESCRIPTION - LOCATION: LOCATION: LEG

## 2025-05-04 ASSESSMENT — PAIN DESCRIPTION - PAIN TYPE: TYPE: CHRONIC PAIN

## 2025-05-04 ASSESSMENT — PAIN - FUNCTIONAL ASSESSMENT
PAIN_FUNCTIONAL_ASSESSMENT: 0-10
PAIN_FUNCTIONAL_ASSESSMENT: 0-10

## 2025-05-04 NOTE — ED PROVIDER NOTES
"CC: Leg Pain (Pt has chronic bilateral leg pain. Pt states \"left leg hurts worse today and its hard to walk on\")     HPI:  44 year old female with HTN, HLD, chronic pain, mood disorder on Prozac and Abilify, epilepsy on Keppra, rheumatic heart disease status post mitral valve replacement in 2022, tricuspid valve repaired, COPD, STEVEN, craniopharyngioma s/p surgery 2002 and 2017, c/b panhypopituitarism and DI and a CABG presents the emergency department with left lower leg pain.  She states has been persistent for 3 weeks.  States she was seen at [facility] and written for tramadol without relief of her symptoms.  States that her pain is from her right buttocks all the way down to her right foot and it is very numb.  She states she cannot feel her left leg down to the bottom of her foot.  She denies injury or trauma.  States she woke up with it 3 weeks ago and is progressively getting worse.  Reports history of spinal cord injury for which she did not walk for over a year.  She is able to walk with assistance currently.  States she is also been feeling short of breath and had an outpatient echo at [facility] on Friday for follow-up regarding her valve replacements.    IMPRESSION:  No sonographic evidence for deep vein thrombosis within the evaluated  veins of the bilateral lower extremity.    Records Reviewed:  Recent available ED and inpatient notes reviewed in EMR.    PMHx/PSHx:  Per HPI.   - has a past medical history of COPD (chronic obstructive pulmonary disease) (Multi), Coronary artery disease, Personal history of other endocrine, nutritional and metabolic disease, Personal history of other mental and behavioral disorders, Personal history of other specified conditions, and Unspecified convulsions (Multi).  - has a past surgical history that includes Other surgical history (08/17/2013); MR angio head wo IV contrast (07/15/2021); MR angio neck wo IV contrast (07/15/2021); MR angio head wo IV contrast (11/28/2017); MR " "angio head wo IV contrast (03/13/2018); Brain surgery; and Cardiac surgery.  - has Nonspecific abdominal pain; Acute cystitis without hematuria; Intentional drug overdose (Multi); Depression; Chest pain, unspecified type; Chest pain; Cocaine use; Hypotension; CAD (coronary artery disease); HLD (hyperlipidemia); Seizure disorder (Multi); Hypothyroid; Adrenal insufficiency (Multi); Diabetes insipidus; Mild intermittent asthma; Gastroesophageal reflux disease without esophagitis; Anxiety; and Chronic intractable headache on their problem list.    Medications:  Reviewed in EMR. See EMR for complete list of medications and doses.    Allergies:  Adhesive, Hydrocodone-acetaminophen, and Ondansetron    Social History:  - Tobacco:  reports that she has quit smoking. Her smoking use included cigarettes. She has never used smokeless tobacco.   - Alcohol:  reports no history of alcohol use.   - Illicit Drugs:  reports current drug use. Drugs: \"Crack\" cocaine and Cocaine.     ROS:  Per HPI.       ???????????????????????????????????????????????????????????????  Triage Vitals:  T 36.6 °C (97.9 °F)  HR 72  /84  RR 18  O2 96 % None (Room air)    Physical Exam  ???????????????????????????????????????????????????????????????  GEN: chronically ill appearing, in mild acute distress  HEAD: atraumatic  EYES: PERRL, EOMI, no scleral icterus  ENT: mmm  NECK: no c-spine tenderness to palpation  CVS/CHEST: reg rate, nl rhythm, + murmur  PULM: CTA b/l no wheezes, crackles, or rhonchi   GI: soft, NT/ND, no rebound or guarding   BACK: +L vertebral point tenderness  EXT: no LE edema, 2+ periph pulses in bilat radial and DP, positive straight leg raise, tenderness to palpation over the left piriformis, saddle sensation intact.  Weak gluteal tone  NEURO: Awake and alert, strength is approximately 3 out of 5 with dorsiflexion and plantarflexion on the left and a 4 out of 5 on the right.  Endorses sensory deficit on the left compared to the " right  SKIN: warm, dry    Assessment and Plan:  Patient is a 44-year-old with chronic comorbidities presenting the emergency department with left leg pain.  She is exquisitely tender with palpation in the piriformis and does have a positive straight leg raise.  May be sciatica in nature however given her history of paralysis's and weakness as well as numbness that she is feeling down the left leg as well as tenderness to palpation in the L-spine will obtain MRI with and without contrast.  Patient will be treated with a multimodal pain regiment.  She is also having some mild shortness of breath.  Will obtain cardiac workup.  Her shortness of breath has been quite persistent and she reports having an outpatient echocardiogram on Friday for follow-up at the [facility].  Unable to review the results from echocardiogram that was obtained on Friday as it is not in epic.  Discussed with patient.  Anticipate outpatient follow-up for her shortness of breath that she is not tachycardic or hypoxic.  She had a duplex ultrasound obtained on 430 that was negative for DVT.  She clinically has no signs of DVT or PE.  She is not tachycardic or hypoxic therefore have a lower suspicion for pulmonary embolism.  Disposition pending workup.  Leg pain and numbness has been going on for 3 weeks.      Unfortunately due to her medications steroids are contraindicated.  Written for a multimodal pain regiment and referred to outpatient pain management and spine.    ED Course:  ED Course as of 05/04/25 1913   Sun May 04, 2025   6510 EKG read by me reviewed by me is normal sinus rhythm at 68 bpm.  Normal axis.  Prolonged QT at 501 ms.  No significant ST segment elevation or depression. [HD]   1154     1. Very small right subarticular/intraforaminal disc protrusion at  L5-S1. It mildly encroaches on the right lateral recess and right  neuroforamen and appears to abut and contact the right S1 nerve root  within the right lateral recess.  2.  Mild degenerative changes of the lumbar spine primarily from L2-3  through L5-S1.   [HD]   1158 There is no significant central canal or neural foraminal  stenosis   [HD]      ED Course User Index  [HD] Bre Granado DO         Diagnoses as of 05/04/25 1913   Lumbosacral radiculopathy at S1   Lumbar herniated disc       Disposition:  Discharged in stable condition with return precautions    Bre Granado DO      Procedures ? Smart[employer] last updated 5/4/2025 7:08 AM        Bre Granado DO  05/04/25 1913

## 2025-05-04 NOTE — DISCHARGE INSTRUCTIONS
Return to the emergency department if you have significant worsening of symptoms or for any other acute concerns.     IMPRESSION:      1. Very small right subarticular/intraforaminal disc protrusion at  L5-S1. It mildly encroaches on the right lateral recess and right  neuroforamen and appears to abut and contact the right S1 nerve root  within the right lateral recess.  2. Mild degenerative changes of the lumbar spine primarily from L2-3  through L5-S1.

## 2025-05-05 ENCOUNTER — HOSPITAL ENCOUNTER (EMERGENCY)
Facility: HOSPITAL | Age: 45
Discharge: HOME | End: 2025-05-05
Attending: STUDENT IN AN ORGANIZED HEALTH CARE EDUCATION/TRAINING PROGRAM
Payer: MEDICARE

## 2025-05-05 VITALS
HEIGHT: 62 IN | DIASTOLIC BLOOD PRESSURE: 90 MMHG | BODY MASS INDEX: 26.68 KG/M2 | SYSTOLIC BLOOD PRESSURE: 130 MMHG | TEMPERATURE: 97.5 F | HEART RATE: 67 BPM | WEIGHT: 145 LBS | OXYGEN SATURATION: 98 % | RESPIRATION RATE: 18 BRPM

## 2025-05-05 DIAGNOSIS — M54.17 LUMBOSACRAL RADICULOPATHY AT S1: ICD-10-CM

## 2025-05-05 DIAGNOSIS — M51.26 LUMBAR HERNIATED DISC: Primary | ICD-10-CM

## 2025-05-05 LAB
ATRIAL RATE: 68 BPM
P AXIS: 8 DEGREES
P OFFSET: 166 MS
P ONSET: 130 MS
PR INTERVAL: 176 MS
Q ONSET: 218 MS
QRS COUNT: 11 BEATS
QRS DURATION: 100 MS
QT INTERVAL: 472 MS
QTC CALCULATION(BAZETT): 501 MS
QTC FREDERICIA: 492 MS
R AXIS: 60 DEGREES
T AXIS: 92 DEGREES
T OFFSET: 454 MS
VENTRICULAR RATE: 68 BPM

## 2025-05-05 PROCEDURE — 2500000001 HC RX 250 WO HCPCS SELF ADMINISTERED DRUGS (ALT 637 FOR MEDICARE OP)

## 2025-05-05 PROCEDURE — 96372 THER/PROPH/DIAG INJ SC/IM: CPT

## 2025-05-05 PROCEDURE — 2500000004 HC RX 250 GENERAL PHARMACY W/ HCPCS (ALT 636 FOR OP/ED): Mod: JZ

## 2025-05-05 PROCEDURE — 99284 EMERGENCY DEPT VISIT MOD MDM: CPT | Performed by: STUDENT IN AN ORGANIZED HEALTH CARE EDUCATION/TRAINING PROGRAM

## 2025-05-05 RX ORDER — KETOROLAC TROMETHAMINE 15 MG/ML
15 INJECTION, SOLUTION INTRAMUSCULAR; INTRAVENOUS ONCE
Status: COMPLETED | OUTPATIENT
Start: 2025-05-05 | End: 2025-05-05

## 2025-05-05 RX ORDER — OXYCODONE HYDROCHLORIDE 5 MG/1
5 TABLET ORAL EVERY 8 HOURS PRN
Qty: 9 TABLET | Refills: 0 | Status: SHIPPED | OUTPATIENT
Start: 2025-05-05 | End: 2025-05-08

## 2025-05-05 RX ORDER — OXYCODONE HYDROCHLORIDE 5 MG/1
5 TABLET ORAL ONCE
Refills: 0 | Status: COMPLETED | OUTPATIENT
Start: 2025-05-05 | End: 2025-05-05

## 2025-05-05 RX ADMIN — OXYCODONE HYDROCHLORIDE 5 MG: 5 TABLET ORAL at 16:26

## 2025-05-05 RX ADMIN — KETOROLAC TROMETHAMINE 15 MG: 15 INJECTION, SOLUTION INTRAMUSCULAR; INTRAVENOUS at 16:26

## 2025-05-05 ASSESSMENT — PAIN DESCRIPTION - ONSET: ONSET: SUDDEN

## 2025-05-05 ASSESSMENT — PAIN DESCRIPTION - PROGRESSION: CLINICAL_PROGRESSION: NOT CHANGED

## 2025-05-05 ASSESSMENT — PAIN DESCRIPTION - PAIN TYPE: TYPE: CHRONIC PAIN

## 2025-05-05 ASSESSMENT — PAIN DESCRIPTION - DESCRIPTORS: DESCRIPTORS: SHARP

## 2025-05-05 ASSESSMENT — PAIN SCALES - GENERAL: PAINLEVEL_OUTOF10: 10 - WORST POSSIBLE PAIN

## 2025-05-05 ASSESSMENT — PAIN DESCRIPTION - ORIENTATION: ORIENTATION: LEFT

## 2025-05-05 ASSESSMENT — PAIN DESCRIPTION - FREQUENCY: FREQUENCY: CONSTANT/CONTINUOUS

## 2025-05-05 ASSESSMENT — PAIN - FUNCTIONAL ASSESSMENT: PAIN_FUNCTIONAL_ASSESSMENT: 0-10

## 2025-05-05 ASSESSMENT — PAIN DESCRIPTION - LOCATION: LOCATION: BACK

## 2025-05-05 NOTE — Clinical Note
Lu Fall was seen and treated in our emergency department on 5/5/2025.  She may return to work on 05/07/2025.       If you have any questions or concerns, please don't hesitate to call.      Magdaleno Evans PA-C

## 2025-05-05 NOTE — ED PROVIDER NOTES
HPI   Chief Complaint   Patient presents with    Tailbone Pain     For the past month I have had sciatica which goes down my lt leg and lt foot numbness it hurts to walk        Patient is a 44-year-old female presenting via EMS from home with concerns for med refill.  She was at [facility] yesterday with complaints of low back pain and bilateral leg pain as well as numbness in her left leg.  This has been onset for over a month.  She states that when she was at the other hospital, she was diagnosed with a herniated disc.  She states that she was only sent with 1 day of pain medication and she is in significant pain.  This is pain that she has been in but has no pain medication for relief.  The oxycodone she was given she states did minimal relief.  10 out of 10, radiating down the left leg.  Sharp in nature.  Patient denies fevers, chills, cough, sore throat, runny nose, chest pain, shortness of breath, abdominal pain, nausea, vomiting, diarrhea or urinary complaints.              Patient History   Medical History[1]  Surgical History[2]  Family History[3]  Social History[4]    Physical Exam   ED Triage Vitals [05/05/25 1543]   Temperature Heart Rate Respirations BP   36.4 °C (97.5 °F) 67 18 130/90      Pulse Ox Temp Source Heart Rate Source Patient Position   98 % Oral Monitor Sitting      BP Location FiO2 (%)     Right arm --       Physical Exam  Vitals and nursing note reviewed.   Constitutional:       Appearance: She is well-developed.      Comments: Awake, laying in examination bed   HENT:      Head: Normocephalic and atraumatic.      Nose: Nose normal.      Mouth/Throat:      Mouth: Mucous membranes are moist.      Pharynx: Oropharynx is clear.   Eyes:      Extraocular Movements: Extraocular movements intact.      Conjunctiva/sclera: Conjunctivae normal.      Pupils: Pupils are equal, round, and reactive to light.   Cardiovascular:      Rate and Rhythm: Normal rate and regular rhythm.      Pulses: Normal  pulses.      Heart sounds: Normal heart sounds. No murmur heard.  Pulmonary:      Effort: Pulmonary effort is normal. No respiratory distress.      Breath sounds: Normal breath sounds.   Abdominal:      General: Abdomen is flat.      Palpations: Abdomen is soft.      Tenderness: There is no abdominal tenderness.   Musculoskeletal:         General: No swelling. Normal range of motion.      Cervical back: Normal range of motion and neck supple.      Comments: Tenderness in the lumbar region   Skin:     General: Skin is warm and dry.      Capillary Refill: Capillary refill takes less than 2 seconds.   Neurological:      General: No focal deficit present.      Mental Status: She is alert and oriented to person, place, and time.      Comments: Awake, alert and oriented x 3. Power intact in the upper and lower extremities. Sensation is intact to light touch in the upper and lower extremities. Cranial Nerves 2-12 are intact. Patella DTRs intact. Finger-to-nose intact. No truncal ataxia.   Psychiatric:         Mood and Affect: Mood normal.         Behavior: Behavior normal.           ED Course & MDM   ED Course as of 05/05/25 1941   Mon May 05, 2025   0769 On PDMP review, it appears that patient was given 4 total pills of oxycodone.  1 days quantity. [AR]      ED Course User Index  [AR] Magdaleno Evans PA-C         Diagnoses as of 05/05/25 1941   Lumbar herniated disc   Lumbosacral radiculopathy at S1                 No data recorded     Edgar Coma Scale Score: 15 (05/05/25 1553 : Gretel Grier RN)                           Medical Decision Making  Patient is a 44-year-old female presenting via EMS with concerns for med refill.  Medication ordered.  Conditions considered include but are not limited: Contusion, fracture, dislocation, herniated disc.    I did review the patient's MRI and printed out report to discussed at bedside with the patient because she stated that she was confused on her results.    I saw this  patient in conjunction with Dr. Capps.  Patient was given oxycodone and Toradol.  A prescription for oxycodone sent.  Referral to orthopedic spine provider given.  I believe this patient is at low risk for complication, and a disposition of discharge is acceptable.  Return to the Emergency Department if new or worsening symptoms including headache, fever, chills, chest pain, shortness of breath, syncope, near syncope, abdominal pain, nausea, vomiting,  diarrhea, or worsening pain.  Patient is agreeable to a disposition of discharge and to follow with respective fields in the next several days.    Portions of this note made with Dragon software, please be mindful of potential grammatical errors.      Medications   oxyCODONE (Roxicodone) immediate release tablet 5 mg (5 mg oral Given 5/5/25 1626)   ketorolac (Toradol) injection 15 mg (15 mg intramuscular Given 5/5/25 1626)         Procedure  Procedures         [1]   Past Medical History:  Diagnosis Date    COPD (chronic obstructive pulmonary disease) (Multi)     Coronary artery disease     Personal history of other endocrine, nutritional and metabolic disease     History of hypopituitarism    Personal history of other mental and behavioral disorders     History of depression    Personal history of other specified conditions     History of brain tumor    Unspecified convulsions (Multi)     Convulsion   [2]   Past Surgical History:  Procedure Laterality Date    BRAIN SURGERY      CARDIAC SURGERY      MR HEAD ANGIO WO IV CONTRAST  07/15/2021    MR HEAD ANGIO WO IV CONTRAST 7/15/2021 [facility]    MR HEAD ANGIO WO IV CONTRAST  11/28/2017    MR HEAD ANGIO WO IV CONTRAST [facility]    MR HEAD ANGIO WO IV CONTRAST  03/13/2018    MR HEAD ANGIO WO IV CONTRAST [facility]    MR NECK ANGIO WO IV CONTRAST  07/15/2021    MR NECK ANGIO WO IV CONTRAST 7/15/2021 [facility]    OTHER SURGICAL HISTORY  08/17/2013    Craniotomy Tumor Removal -  "Complete   [3] No family history on file.  [4]   Social History  Tobacco Use    Smoking status: Former     Types: Cigarettes    Smokeless tobacco: Never   Vaping Use    Vaping status: Never Used   Substance Use Topics    Alcohol use: Never    Drug use: Yes     Types: \"Crack\" cocaine, Cocaine     Comment: last used 5 days ago        Magdaleno Evans PA-C  05/05/25 1941    "

## 2025-05-05 NOTE — DISCHARGE INSTRUCTIONS
Utilize over-the-counter prescription medications.  Follow-up with orthopedic spinal provider.    Be sure to take all medications, over the counter medications or prescription medications only as directed.    Be sure to follow up as directed in 1-2 days. All of the details of your follow up instructions are detailed in the follow up section of this packet.    If you are being discharged with any pains medications or muscle relaxers (norco, Vicodin, hydrocodone products, Percocet, oxycodone products, flexeril, cyclobenzaprine, robaxin, norflex, brand or generic, or any other pain controlling medications with the exception of Ibuprofen and regular Tylenol, do not drive or operate machinery, climb ladders or participate in any activity that could potentially put yourself or others at risk should you get dizzy, or be/feel impaired at all.    Return to emergency room without delay for ANY new or worsening pains or for any other symptoms or concerns. Return with worsening pains, nausea, vomiting, trouble breathing, palpitations, shortness of breath, inability to pass stool or urine, loss of control of stool or urine, any numbness or tingling (that is not normal for you), uncontrolled fevers, the passing of blood or other material in stool or urine, rashes, pains or for any other symptoms or concerns you may have. You are always welcome to return to the ER at any time for any reason or for any other concerns you may have.

## 2025-05-05 NOTE — ED PROVIDER NOTES
"The patient was seen in conjunction with the advanced practice provider, and I performed a substantive portion of the encounter. The following is my supervisory note.    History:  Patient resents the ED for worsening lumbar/tailbone pain over the last month and was recently evaluated and explained she has a bulging disc of her L5/S1.  Does note radiates in her right leg.  Describes the pain as stabbing and burning, no alleviating/aggravating factors.  Notes no difficulty with micturition/defecation is experienced any bowel/bladder incontinence.  Notes no recent falls or injuries.  Nexrutine associated fever/chills.  States she was taking the pain meds that she was prescribed but unable to bear symptoms and notes exacerbation of pain with ambulation along with twisting/bending.  Notes no appreciable significant numbness/tingling of her distal extremities.  Patient requesting refill of pain medications.  Patient is not connected with physical therapy or pain management.  Patient denies any spinal surgical history or recent injections.  Denies any IVDU history.  Denies appreciable fever/chills.  Denies any other significant systemic symptoms or complaints.    VS:  /90 (BP Location: Right arm, Patient Position: Sitting)   Pulse 67   Temp 36.4 °C (97.5 °F) (Oral)   Resp 18   Ht 1.575 m (5' 2\")   Wt 65.8 kg (145 lb)   LMP  (LMP Unknown) Comment: PT unable to confirm when LMP was  SpO2 98%   BMI 26.52 kg/m²      Physical exam:  CONST: alert, normal appearance, no acute distress, does not appear ill/toxic  HEAD: normocephalic, atraumatic  NECK: Full AROM and supple  ENT: MMM, no rhinorrhea/congestion, posterior oropharynx clear and oral secretions well controlled  EYES: PEPRL, EOMI, no scleral icterus, no nystagmus  CV: RRR, no murmurs, 2+ equal/symmetrical pulses x4  PULM: CTAB, no respiratory distress, not requiring supplemental O2  ABD: soft, flat/non-tender/non-distended, no mass  MSK: Mild tenderness " palpation of the left lumbosacral region, no appreciable midline lumbosacral tenderness palpation, no step-offs/deformities, no obvious trauma, positive L SLR, no abnormal tone x 2 (BLE)  SKIN: warm/dry, no pallor or jaundice, no rash  NEURO: A&Ox4, no facial asymmetry, no focal neuro deficits, gross strength/motor/sensation intact x4, normal gait, patellar reflexes equal/metrical 2+, appropriate dorsal/plantarflexion 5/5 bilaterally      I personally reviewed and interpreted the following studies: EKG is N/A, labs are significant for N/A, images are notable for N/A.      MDM:  Patient presented to the ED for evaluation of worsening lumbosacral pain radiating down RLE and out of pain medications with recent diagnosis of disc protrusion of L5/S1.  Concerning PMHx of cocaine use, adrenal insufficiency, seizure disorder, GERD, anxiety.    Per Chart Review: MRI obtained on 5/4/2025 notable for small right subarticular/infra foraminal disc protrusion at L5-S1 encroaching on right neuroforamina abutting against the right S1 nerve root.    Assessment/evaluation consistent with likely exacerbation of lumbosacral radiculopathy. No concerning history, clinical evidence/work-up, or exam findings for the concerning differentials of cauda equina/conus medullaris syndrome, SEA/epidural abscess, axial skeletal fracture/malalignment, sciatica. These conditions have been thoroughly evaluated and determined to be sufficiently unlikely to be the etiology of patient's presenting symptoms.       ED Course/Diagnosis:  ED Course as of 05/06/25 0902   Mon May 05, 2025   1559 On PDMP review, it appears that patient was given 4 total pills of oxycodone.  1 days quantity. [AR]      ED Course User Index  [AR] Magdaleno Evans PA-C         Diagnoses as of 05/06/25 0902   Lumbar herniated disc   Lumbosacral radiculopathy at S1       1. Lumbar herniated disc  oxyCODONE (Roxicodone) 5 mg immediate release tablet      2. Lumbosacral  radiculopathy at S1  oxyCODONE (Roxicodone) 5 mg immediate release tablet               Tyrese Capps MD  05/06/25 0995

## 2025-05-06 ENCOUNTER — PATIENT MESSAGE (OUTPATIENT)
Dept: HEMATOLOGY/ONCOLOGY | Facility: HOSPITAL | Age: 45
End: 2025-05-06
Payer: MEDICARE

## 2025-05-12 ENCOUNTER — HOSPITAL ENCOUNTER (EMERGENCY)
Facility: HOSPITAL | Age: 45
Discharge: LEFT BEFORE DISCHARGE | End: 2025-05-12
Payer: MEDICARE

## 2025-05-12 ENCOUNTER — TELEPHONE (OUTPATIENT)
Dept: HEMATOLOGY/ONCOLOGY | Facility: HOSPITAL | Age: 45
End: 2025-05-12
Payer: MEDICARE

## 2025-05-12 VITALS
OXYGEN SATURATION: 99 % | BODY MASS INDEX: 25.69 KG/M2 | HEIGHT: 63 IN | TEMPERATURE: 95.5 F | RESPIRATION RATE: 30 BRPM | SYSTOLIC BLOOD PRESSURE: 90 MMHG | WEIGHT: 145 LBS | DIASTOLIC BLOOD PRESSURE: 65 MMHG | HEART RATE: 68 BPM

## 2025-05-12 PROCEDURE — 4500999001 HC ED NO CHARGE

## 2025-05-12 PROCEDURE — 99283 EMERGENCY DEPT VISIT LOW MDM: CPT

## 2025-05-12 ASSESSMENT — PAIN - FUNCTIONAL ASSESSMENT: PAIN_FUNCTIONAL_ASSESSMENT: 0-10

## 2025-05-12 ASSESSMENT — PAIN SCALES - GENERAL: PAINLEVEL_OUTOF10: 8

## 2025-05-12 NOTE — ED TRIAGE NOTES
Pt BIB EMS from home. Presents to triage via WC w/ c/o feeling unwell and difficulty breathing, dizziness - all since this morning. Speaking in full sentences. Denies anxiety. Nauseated but no vomiting. C/o stephanie leg pain.

## 2025-05-12 NOTE — TELEPHONE ENCOUNTER
Reason for Conversation  Follow-up (Hematology/Oncology Referral)      Background     Referral to Hematology Oncology placed by Amarilys Martinez PA-C on 4/12/2025, for further evaluation of normocytic anemia.  Our [facility] Scheduling Team and this RN have been unable to connect with the patient for appointment scheduling, to date.    Disposition     Voicemail message left, requesting call back to discuss appointment scheduling, questions or updates regarding this referral; my contact information was provided.    Alejandra Beckwith, RN  RN Care Coordinator  [facility]

## 2025-05-15 ENCOUNTER — APPOINTMENT (OUTPATIENT)
Dept: ORTHOPEDIC SURGERY | Facility: CLINIC | Age: 45
End: 2025-05-15
Payer: MEDICARE

## 2025-05-20 ENCOUNTER — APPOINTMENT (OUTPATIENT)
Dept: PAIN MEDICINE | Facility: CLINIC | Age: 45
End: 2025-05-20
Payer: MEDICARE

## 2025-07-10 ENCOUNTER — HOSPITAL ENCOUNTER (EMERGENCY)
Facility: HOSPITAL | Age: 45
Discharge: HOME | End: 2025-07-11
Attending: STUDENT IN AN ORGANIZED HEALTH CARE EDUCATION/TRAINING PROGRAM
Payer: MEDICARE

## 2025-07-10 DIAGNOSIS — R45.89 HOPELESSNESS: Primary | ICD-10-CM

## 2025-07-10 PROCEDURE — 99285 EMERGENCY DEPT VISIT HI MDM: CPT | Performed by: STUDENT IN AN ORGANIZED HEALTH CARE EDUCATION/TRAINING PROGRAM

## 2025-07-11 ENCOUNTER — APPOINTMENT (OUTPATIENT)
Dept: RADIOLOGY | Facility: HOSPITAL | Age: 45
End: 2025-07-11
Payer: MEDICARE

## 2025-07-11 ENCOUNTER — APPOINTMENT (OUTPATIENT)
Dept: CARDIOLOGY | Facility: HOSPITAL | Age: 45
End: 2025-07-11
Payer: MEDICARE

## 2025-07-11 VITALS
TEMPERATURE: 96.6 F | HEART RATE: 77 BPM | BODY MASS INDEX: 23.92 KG/M2 | WEIGHT: 135 LBS | HEIGHT: 63 IN | SYSTOLIC BLOOD PRESSURE: 133 MMHG | DIASTOLIC BLOOD PRESSURE: 86 MMHG | OXYGEN SATURATION: 98 % | RESPIRATION RATE: 16 BRPM

## 2025-07-11 LAB
ALBUMIN SERPL BCP-MCNC: 4 G/DL (ref 3.4–5)
ALP SERPL-CCNC: 72 U/L (ref 33–110)
ALT SERPL W P-5'-P-CCNC: 23 U/L (ref 7–45)
AMPHETAMINES UR QL SCN: NORMAL
ANION GAP SERPL CALC-SCNC: 11 MMOL/L (ref 10–20)
APAP SERPL-MCNC: <10 UG/ML (ref ?–30)
APPEARANCE UR: CLEAR
AST SERPL W P-5'-P-CCNC: 34 U/L (ref 9–39)
ATRIAL RATE: 69 BPM
BARBITURATES UR QL SCN: NORMAL
BASOPHILS # BLD AUTO: 0.09 X10*3/UL (ref 0–0.1)
BASOPHILS NFR BLD AUTO: 1.3 %
BENZODIAZ UR QL SCN: NORMAL
BILIRUB SERPL-MCNC: 0.6 MG/DL (ref 0–1.2)
BILIRUB UR STRIP.AUTO-MCNC: NEGATIVE MG/DL
BUN SERPL-MCNC: 10 MG/DL (ref 6–23)
BZE UR QL SCN: NORMAL
CALCIUM SERPL-MCNC: 9.3 MG/DL (ref 8.6–10.3)
CANNABINOIDS UR QL SCN: NORMAL
CARDIAC TROPONIN I PNL SERPL HS: 5 NG/L (ref 0–13)
CHLORIDE SERPL-SCNC: 102 MMOL/L (ref 98–107)
CO2 SERPL-SCNC: 27 MMOL/L (ref 21–32)
COLOR UR: COLORLESS
CREAT SERPL-MCNC: 0.75 MG/DL (ref 0.5–1.05)
EGFRCR SERPLBLD CKD-EPI 2021: >90 ML/MIN/1.73M*2
EOSINOPHIL # BLD AUTO: 0.45 X10*3/UL (ref 0–0.7)
EOSINOPHIL NFR BLD AUTO: 6.5 %
ERYTHROCYTE [DISTWIDTH] IN BLOOD BY AUTOMATED COUNT: 14.4 % (ref 11.5–14.5)
ETHANOL SERPL-MCNC: <10 MG/DL
FENTANYL+NORFENTANYL UR QL SCN: NORMAL
GLUCOSE SERPL-MCNC: 84 MG/DL (ref 74–99)
GLUCOSE UR STRIP.AUTO-MCNC: NORMAL MG/DL
HCG UR QL IA.RAPID: NEGATIVE
HCT VFR BLD AUTO: 39.4 % (ref 36–46)
HGB BLD-MCNC: 13 G/DL (ref 12–16)
HOLD SPECIMEN: NORMAL
IMM GRANULOCYTES # BLD AUTO: 0.02 X10*3/UL (ref 0–0.7)
IMM GRANULOCYTES NFR BLD AUTO: 0.3 % (ref 0–0.9)
KETONES UR STRIP.AUTO-MCNC: NEGATIVE MG/DL
LEUKOCYTE ESTERASE UR QL STRIP.AUTO: ABNORMAL
LIPASE SERPL-CCNC: 14 U/L (ref 9–82)
LYMPHOCYTES # BLD AUTO: 2.77 X10*3/UL (ref 1.2–4.8)
LYMPHOCYTES NFR BLD AUTO: 40 %
MCH RBC QN AUTO: 26.4 PG (ref 26–34)
MCHC RBC AUTO-ENTMCNC: 33 G/DL (ref 32–36)
MCV RBC AUTO: 80 FL (ref 80–100)
METHADONE UR QL SCN: NORMAL
MONOCYTES # BLD AUTO: 0.5 X10*3/UL (ref 0.1–1)
MONOCYTES NFR BLD AUTO: 7.2 %
NEUTROPHILS # BLD AUTO: 3.09 X10*3/UL (ref 1.2–7.7)
NEUTROPHILS NFR BLD AUTO: 44.7 %
NITRITE UR QL STRIP.AUTO: NEGATIVE
NRBC BLD-RTO: 0 /100 WBCS (ref 0–0)
OPIATES UR QL SCN: NORMAL
OXYCODONE+OXYMORPHONE UR QL SCN: NORMAL
P AXIS: -6 DEGREES
P OFFSET: 176 MS
P ONSET: 123 MS
PCP UR QL SCN: NORMAL
PH UR STRIP.AUTO: 7 [PH]
PLATELET # BLD AUTO: 202 X10*3/UL (ref 150–450)
POTASSIUM SERPL-SCNC: 3.7 MMOL/L (ref 3.5–5.3)
PR INTERVAL: 194 MS
PROT SERPL-MCNC: 7.2 G/DL (ref 6.4–8.2)
PROT UR STRIP.AUTO-MCNC: NEGATIVE MG/DL
Q ONSET: 220 MS
QRS COUNT: 12 BEATS
QRS DURATION: 98 MS
QT INTERVAL: 450 MS
QTC CALCULATION(BAZETT): 482 MS
QTC FREDERICIA: 471 MS
R AXIS: 72 DEGREES
RBC # BLD AUTO: 4.92 X10*6/UL (ref 4–5.2)
RBC # UR STRIP.AUTO: NEGATIVE MG/DL
RBC #/AREA URNS AUTO: ABNORMAL /HPF
SALICYLATES SERPL-MCNC: <3 MG/DL (ref ?–20)
SODIUM SERPL-SCNC: 136 MMOL/L (ref 136–145)
SP GR UR STRIP.AUTO: 1.01
T AXIS: -35 DEGREES
T OFFSET: 445 MS
TSH SERPL-ACNC: 2.93 MIU/L (ref 0.44–3.98)
UROBILINOGEN UR STRIP.AUTO-MCNC: NORMAL MG/DL
VENTRICULAR RATE: 69 BPM
WBC # BLD AUTO: 6.9 X10*3/UL (ref 4.4–11.3)
WBC #/AREA URNS AUTO: ABNORMAL /HPF

## 2025-07-11 PROCEDURE — 83690 ASSAY OF LIPASE: CPT | Performed by: STUDENT IN AN ORGANIZED HEALTH CARE EDUCATION/TRAINING PROGRAM

## 2025-07-11 PROCEDURE — 70450 CT HEAD/BRAIN W/O DYE: CPT

## 2025-07-11 PROCEDURE — 87086 URINE CULTURE/COLONY COUNT: CPT | Mod: GEALAB | Performed by: STUDENT IN AN ORGANIZED HEALTH CARE EDUCATION/TRAINING PROGRAM

## 2025-07-11 PROCEDURE — 85025 COMPLETE CBC W/AUTO DIFF WBC: CPT | Performed by: STUDENT IN AN ORGANIZED HEALTH CARE EDUCATION/TRAINING PROGRAM

## 2025-07-11 PROCEDURE — 2500000004 HC RX 250 GENERAL PHARMACY W/ HCPCS (ALT 636 FOR OP/ED): Performed by: STUDENT IN AN ORGANIZED HEALTH CARE EDUCATION/TRAINING PROGRAM

## 2025-07-11 PROCEDURE — 96361 HYDRATE IV INFUSION ADD-ON: CPT

## 2025-07-11 PROCEDURE — 99223 1ST HOSP IP/OBS HIGH 75: CPT

## 2025-07-11 PROCEDURE — 96374 THER/PROPH/DIAG INJ IV PUSH: CPT

## 2025-07-11 PROCEDURE — 80307 DRUG TEST PRSMV CHEM ANLYZR: CPT | Performed by: STUDENT IN AN ORGANIZED HEALTH CARE EDUCATION/TRAINING PROGRAM

## 2025-07-11 PROCEDURE — 84075 ASSAY ALKALINE PHOSPHATASE: CPT | Performed by: STUDENT IN AN ORGANIZED HEALTH CARE EDUCATION/TRAINING PROGRAM

## 2025-07-11 PROCEDURE — 81025 URINE PREGNANCY TEST: CPT | Performed by: STUDENT IN AN ORGANIZED HEALTH CARE EDUCATION/TRAINING PROGRAM

## 2025-07-11 PROCEDURE — 80320 DRUG SCREEN QUANTALCOHOLS: CPT | Performed by: STUDENT IN AN ORGANIZED HEALTH CARE EDUCATION/TRAINING PROGRAM

## 2025-07-11 PROCEDURE — 2500000001 HC RX 250 WO HCPCS SELF ADMINISTERED DRUGS (ALT 637 FOR MEDICARE OP): Performed by: STUDENT IN AN ORGANIZED HEALTH CARE EDUCATION/TRAINING PROGRAM

## 2025-07-11 PROCEDURE — 84443 ASSAY THYROID STIM HORMONE: CPT | Performed by: STUDENT IN AN ORGANIZED HEALTH CARE EDUCATION/TRAINING PROGRAM

## 2025-07-11 PROCEDURE — 84484 ASSAY OF TROPONIN QUANT: CPT | Performed by: STUDENT IN AN ORGANIZED HEALTH CARE EDUCATION/TRAINING PROGRAM

## 2025-07-11 PROCEDURE — 93005 ELECTROCARDIOGRAM TRACING: CPT

## 2025-07-11 PROCEDURE — 70450 CT HEAD/BRAIN W/O DYE: CPT | Performed by: RADIOLOGY

## 2025-07-11 PROCEDURE — 36415 COLL VENOUS BLD VENIPUNCTURE: CPT | Performed by: STUDENT IN AN ORGANIZED HEALTH CARE EDUCATION/TRAINING PROGRAM

## 2025-07-11 PROCEDURE — 2500000002 HC RX 250 W HCPCS SELF ADMINISTERED DRUGS (ALT 637 FOR MEDICARE OP, ALT 636 FOR OP/ED): Performed by: STUDENT IN AN ORGANIZED HEALTH CARE EDUCATION/TRAINING PROGRAM

## 2025-07-11 PROCEDURE — 81001 URINALYSIS AUTO W/SCOPE: CPT | Performed by: STUDENT IN AN ORGANIZED HEALTH CARE EDUCATION/TRAINING PROGRAM

## 2025-07-11 PROCEDURE — 2500000005 HC RX 250 GENERAL PHARMACY W/O HCPCS: Performed by: STUDENT IN AN ORGANIZED HEALTH CARE EDUCATION/TRAINING PROGRAM

## 2025-07-11 RX ORDER — HYDROCORTISONE 10 MG/1
5 TABLET ORAL EVERY MORNING
Status: DISCONTINUED | OUTPATIENT
Start: 2025-07-11 | End: 2025-07-11 | Stop reason: HOSPADM

## 2025-07-11 RX ORDER — HYDROCORTISONE 10 MG/1
5 TABLET ORAL 3 TIMES DAILY
Status: DISCONTINUED | OUTPATIENT
Start: 2025-07-11 | End: 2025-07-11 | Stop reason: HOSPADM

## 2025-07-11 RX ORDER — GRANULES FOR ORAL 3 G/1
3 POWDER ORAL ONCE
Status: COMPLETED | OUTPATIENT
Start: 2025-07-11 | End: 2025-07-11

## 2025-07-11 RX ORDER — ATORVASTATIN CALCIUM 40 MG/1
80 TABLET, FILM COATED ORAL NIGHTLY
Status: DISCONTINUED | OUTPATIENT
Start: 2025-07-11 | End: 2025-07-11 | Stop reason: HOSPADM

## 2025-07-11 RX ORDER — PANTOPRAZOLE SODIUM 40 MG/1
40 TABLET, DELAYED RELEASE ORAL DAILY
Status: DISCONTINUED | OUTPATIENT
Start: 2025-07-11 | End: 2025-07-11 | Stop reason: HOSPADM

## 2025-07-11 RX ORDER — EZETIMIBE 10 MG/1
10 TABLET ORAL DAILY
Status: DISCONTINUED | OUTPATIENT
Start: 2025-07-11 | End: 2025-07-11 | Stop reason: HOSPADM

## 2025-07-11 RX ORDER — CETIRIZINE HYDROCHLORIDE 10 MG/1
10 TABLET ORAL DAILY
Status: DISCONTINUED | OUTPATIENT
Start: 2025-07-11 | End: 2025-07-11 | Stop reason: HOSPADM

## 2025-07-11 RX ORDER — METOCLOPRAMIDE 10 MG/1
5 TABLET ORAL ONCE
Status: COMPLETED | OUTPATIENT
Start: 2025-07-11 | End: 2025-07-11

## 2025-07-11 RX ORDER — LEVETIRACETAM 500 MG/1
750 TABLET ORAL 2 TIMES DAILY
Status: DISCONTINUED | OUTPATIENT
Start: 2025-07-11 | End: 2025-07-11 | Stop reason: HOSPADM

## 2025-07-11 RX ORDER — LEVOTHYROXINE SODIUM 112 UG/1
112 TABLET ORAL DAILY
Status: DISCONTINUED | OUTPATIENT
Start: 2025-07-11 | End: 2025-07-11 | Stop reason: HOSPADM

## 2025-07-11 RX ORDER — TRAZODONE HYDROCHLORIDE 50 MG/1
50 TABLET ORAL NIGHTLY
Status: DISCONTINUED | OUTPATIENT
Start: 2025-07-11 | End: 2025-07-11 | Stop reason: HOSPADM

## 2025-07-11 RX ORDER — NAPROXEN SODIUM 220 MG/1
81 TABLET, FILM COATED ORAL DAILY
Status: DISCONTINUED | OUTPATIENT
Start: 2025-07-11 | End: 2025-07-11 | Stop reason: HOSPADM

## 2025-07-11 RX ORDER — DESMOPRESSIN ACETATE 0.1 MG/ML
2 SOLUTION NASAL 2 TIMES DAILY
Status: DISCONTINUED | OUTPATIENT
Start: 2025-07-11 | End: 2025-07-11 | Stop reason: HOSPADM

## 2025-07-11 RX ORDER — MIDODRINE HYDROCHLORIDE 5 MG/1
10 TABLET ORAL
Status: DISCONTINUED | OUTPATIENT
Start: 2025-07-11 | End: 2025-07-11

## 2025-07-11 RX ORDER — LIDOCAINE 560 MG/1
1 PATCH PERCUTANEOUS; TOPICAL; TRANSDERMAL ONCE
Status: COMPLETED | OUTPATIENT
Start: 2025-07-11 | End: 2025-07-11

## 2025-07-11 RX ORDER — MONTELUKAST SODIUM 10 MG/1
10 TABLET ORAL NIGHTLY
Status: DISCONTINUED | OUTPATIENT
Start: 2025-07-11 | End: 2025-07-11 | Stop reason: HOSPADM

## 2025-07-11 RX ORDER — DESMOPRESSIN ACETATE 150/SPRAY
150 AEROSOL, SPRAY WITH PUMP (EA) NASAL 2 TIMES DAILY
Status: DISCONTINUED | OUTPATIENT
Start: 2025-07-11 | End: 2025-07-11

## 2025-07-11 RX ORDER — ORPHENADRINE CITRATE 30 MG/ML
60 INJECTION INTRAMUSCULAR; INTRAVENOUS ONCE
Status: COMPLETED | OUTPATIENT
Start: 2025-07-11 | End: 2025-07-11

## 2025-07-11 RX ORDER — DESMOPRESSIN ACETATE 0.1 MG/ML
2 SOLUTION NASAL 2 TIMES DAILY
Status: DISCONTINUED | OUTPATIENT
Start: 2025-07-11 | End: 2025-07-11

## 2025-07-11 RX ADMIN — ORPHENADRINE CITRATE 60 MG: 30 INJECTION, SOLUTION INTRAMUSCULAR; INTRAVENOUS at 01:16

## 2025-07-11 RX ADMIN — PREGABALIN 100 MG: 75 CAPSULE ORAL at 11:27

## 2025-07-11 RX ADMIN — FOSFOMYCIN TROMETHAMINE 3 G: 3 GRANULE, FOR SOLUTION ORAL at 02:07

## 2025-07-11 RX ADMIN — PANTOPRAZOLE SODIUM 40 MG: 40 TABLET, DELAYED RELEASE ORAL at 11:28

## 2025-07-11 RX ADMIN — LIDOCAINE 4% 1 PATCH: 40 PATCH TOPICAL at 01:16

## 2025-07-11 RX ADMIN — LEVETIRACETAM 750 MG: 500 TABLET, FILM COATED ORAL at 11:27

## 2025-07-11 RX ADMIN — SODIUM CHLORIDE 1000 ML: 0.9 INJECTION, SOLUTION INTRAVENOUS at 00:46

## 2025-07-11 RX ADMIN — HYDROCORTISONE 5 MG: 5 TABLET ORAL at 11:32

## 2025-07-11 RX ADMIN — METOCLOPRAMIDE 5 MG: 10 TABLET ORAL at 00:46

## 2025-07-11 RX ADMIN — DESMOPRESSIN ACETATE 20 MCG: 0.1 SOLUTION NASAL at 11:29

## 2025-07-11 RX ADMIN — ASPIRIN 81 MG: 81 TABLET, CHEWABLE ORAL at 11:29

## 2025-07-11 SDOH — HEALTH STABILITY: MENTAL HEALTH
OTHER SUICIDE PRECAUTIONS INCLUDE: PATIENT PLACED IN AN EASILY OBSERVABLE ROOM WITH DOOR/CURTAIN REMAINING OPEN;PATIENT PLACED IN GOWN (SNAPS OR PAPER GOWNS PREFERRED) AND WANDED;REMAINING RISKS IDENTIFIED AND MITIGATED;PATIENT PLACED IN PSYCH SAFE ROOM (IF AVAILABLE);PROVIDER NOTIFIED;ELOPEMENT RISK IDENTIFIED;FAMILY/VISITOR ADVISED TO MAINTAIN CONTROL OF OWN PERSONAL BELONGINGS IN ROOM;FREQUENT ROUNDING WITH IRREGULAR CHECKS AT MINIMUM OF EVERY 15 MINUTES TO ASSESS PSYCH SAFETY PERFORMED;HOME MEDICATION LIST COLLECTED AND SHARED WITH PROVIDER;HOURLY BEHAVIORAL ASSESSMENT PERFORMED;PERSONAL BELONGINGS SECURED;TREATMENT PLAN BASED ON RISK FACTORS DEVELOPED (ED ONLY - IF PATIENT IN ED MORE THAN 8 HOURS);VISITORS LIMITED WHEN NECESSARY AND PERSONAL ITEMS SCREENED

## 2025-07-11 SDOH — HEALTH STABILITY: MENTAL HEALTH: HAVE YOU EVER DONE ANYTHING, STARTED TO DO ANYTHING, OR PREPARED TO DO ANYTHING TO END YOUR LIFE?: NO

## 2025-07-11 SDOH — HEALTH STABILITY: MENTAL HEALTH: HAVE YOU WISHED YOU WERE DEAD OR WISHED YOU COULD GO TO SLEEP AND NOT WAKE UP?: NO

## 2025-07-11 SDOH — HEALTH STABILITY: MENTAL HEALTH: SUICIDE ASSESSMENT: ADULT (C-SSRS)

## 2025-07-11 SDOH — HEALTH STABILITY: MENTAL HEALTH: BEHAVIORAL HEALTH(WDL): EXCEPTIONS TO WDL

## 2025-07-11 SDOH — HEALTH STABILITY: MENTAL HEALTH: BEHAVIORS/MOOD: COOPERATIVE;CRYING;AGITATED

## 2025-07-11 SDOH — HEALTH STABILITY: MENTAL HEALTH: HAVE YOU ACTUALLY HAD ANY THOUGHTS OF KILLING YOURSELF?: NO

## 2025-07-11 SDOH — HEALTH STABILITY: MENTAL HEALTH: FOR HIGH RISK PATIENTS: ALL INTERVENTIONS ABOVE, PLUS:;1:1 PATIENT OBSERVER AT ALL TIMES

## 2025-07-11 ASSESSMENT — LIFESTYLE VARIABLES
HAVE PEOPLE ANNOYED YOU BY CRITICIZING YOUR DRINKING: NO
EVER FELT BAD OR GUILTY ABOUT YOUR DRINKING: NO
EVER HAD A DRINK FIRST THING IN THE MORNING TO STEADY YOUR NERVES TO GET RID OF A HANGOVER: NO
HAVE YOU EVER FELT YOU SHOULD CUT DOWN ON YOUR DRINKING: NO
TOTAL SCORE: 0

## 2025-07-11 ASSESSMENT — PAIN SCALES - GENERAL
PAINLEVEL_OUTOF10: 0 - NO PAIN
PAINLEVEL_OUTOF10: 0 - NO PAIN

## 2025-07-11 ASSESSMENT — PAIN - FUNCTIONAL ASSESSMENT: PAIN_FUNCTIONAL_ASSESSMENT: 0-10

## 2025-07-11 NOTE — CONSULTS
"EPAT Initial Psychiatry Consult    ASSESSMENT  Lu Fall is a 43 yo female with past psychiatric history of unspecified mood disorder, unspecified anxiety disorder, cocaine abuse and a complicated medical history including COPD, STEVEN, craniopharyngioma, and hypopituitarism who presented to the ED following daughter calling EMS concerned due to patient stating she was \"done and didn't want to do this anymore.\" On initial assessment, patient does not appear to be at acute risk of harming self. She denies any suicidal ideations, intent, or plan. Daughter was unable to be reached but did not mention any suicidal gestures or speech that would indicate the patient is suicidal. Inpatient psychiatric admission was considered. Patient DOES NOT require involuntary psychiatric admission at this time. Patient declines voluntary admission. Patient was referred to [facility] and was agreeable with this plan. ED provider was informed of recommendation.    IMPRESSION  - Unspecified mood disorder   - Unspecified anxiety disorder    RECOMMENDATIONS  Safety:  - Patient does not currently meet criteria for inpatient psychiatric admission.     Medications:  Continue home medication regimen       Follow-Up:  Resources are provided for [facility] for psychiatry     Recommendations discussed with ED provider, Dr. Harris.  Patient seen and staffed with Dr. Leavitt, who agrees with above plan.    Nunu Pruitt MD    ---------------------------------------------------------------------------------------------------------------    Referred by: [facility]   An interactive audio and video telecommunication system which permits real time communications between the patient (at the originating site) and provider (at the distant site) was utilized to provide this telehealth service.     HISTORY OF PRESENT ILLNESS  Lu Fall is a 44 y.o. female with a past psychiatric history " "of unspecified depressive and unspecified anxiety disorders, cocaine abuse, and a past medical history of COPD, STEVEN, Rheumatic valve disease, unspecified convulsive disorder, history of craniopharyngioma, and hypopituitarism, who presented to the ED via EMS after her daughter called because she reported the patient stated \"she was done and didn't want to do this any more.\" Patient was medically cleared and EPAT was consulted to complete an evaluation.    On chart review, patient reported to the ED provider that she is tired of going to hospitals and doesn't want any additional brain or heart surgeries. She is getting evicted, doesn't have clean drinking water, and has been out of her medications for at least 2 months due to transportation and financial limitations. Patient had elevated leukocytes in her urine and she was treated with antibiotics while in the ED. UDS was negative.     On interview, the patient reports that her daughter called the EMS last night because she has been stressed due to financial and housing stressors. She reports that she needed food and water and her daughter wouldn't take her to get it because she was tired. She indicated that unexpectedly the EMS were there to take her to the hospital. Patient reports that she has been stressed lately due to her lack of clean water, her chronic pain, and medical issues. She recently was told she might need an additional heart surgery, but she does not want to pursue this treatment. She states \"I'm tired of all the brain and heart surgeries.\"     She denies any suicidal ideations, intent, or plan. Patient is future-oriented and reports that she is looking forward to seeing her daughter graduate soon and move into a larger house one day. She feels safe at home and would like to be discharged.     Patient's daughter (Daphne Fall [phone]) was called for collateral. She reports that her family believes the patient has bipolar disorder because her rapid " "mood swings and erratic behavior such as threatening others. She states the patient said \"I finally found my way out and come get the animals.\" The daughter does not believe the patient has taken any preparations to take her life. The daughter reports the patient has been saying these things over the past year and that this time was not particularly different than the other times. We did discuss with the daughter that if she is concerned for her mother again to return to the ED or call 911 for help.     Psychiatric Review of Symptoms  Depression: depressed mood and fatigue or loss of energy  Anxiety: patient reports she is anxious about her social stressors   Estephania: negative  Psychosis: negative  Delirium: negative   Trauma: negative    Psychiatric History  Prior diagnoses: Unspecified mood disorder, Unspecified anxiety disorder  Prior hospitalizations: None documented  History of suicide attempts: Reports one overdose several years ago, patient was unable to describe the circumstances around this event and states she \"blacked out\" and does not remember who called EMS or why she overdosed      Current psychiatrist: Patient reports seeing a Psychiatrist in the past at [facility], but can't go anymore because of transportation issues  Current mental health agency: None  Current : None  Current outpatient treatment: PCP prescribes her medications  Guardian or payee: None    Current psychiatric medications: Prozac 20 mg oral daily, Keppra 750 mg oral BID  Past psychiatric medications: Abilify, Ambien, Lyrica   Past psychiatric treatments: None    Substance Use History  Tobacco: Previous smoker, quit in 2022 per chart review  Alcohol: Denies  Cannabis: Denies   Other substances: cocaine per chart review, patient denies any other substance use   Prior substance use disorder treatment: Denies    Social History  Current living situation: Lives in a trailer but states she is going to be evicted  Current " "employment/source of income: on disability  Current stressors: Eviction, no clean drinking water, lack of transportation and financial stressors  Marital status:    Children: one daughter  Social support: daughter and neighbors  Access to weapons: patient denies any weapons or firearms in the home    Past Medical History  Medical History[1]       Past Surgical History  Surgical History[2]       Family History  Family History[3]     Allergies  Adhesive, Hydrocodone-acetaminophen, and Ondansetron    PDMP Review  Reviewed: Yes  Comments: Patient reports she is in pain clinic, last prescription for Oxycodone was in May 2025  OBJECTIVE  Vitals  Blood pressure 120/84, pulse 78, temperature 36 °C (96.8 °F), temperature source Temporal, resp. rate 16, height 1.6 m (5' 3\"), weight 61.2 kg (135 lb), SpO2 98%.    Mental Status Exam  General/Appearance: no distress, appears stated age, poorly kept  Attitude/Behavior: Cooperative, conversant, engaged, and with good eye contact.  Motor Activity: no psychomotor agitation or retardation, no tremor or other abnormal movements, gait: was not evaluated  Mood: \"doing fine\"  Affect: Irritable, Full range, Mood congruent, and Appropriate to content  Speech: Spontaneous, Coherent, Fluent, Appropriate volume, Appropriate rate, and Appropriate quantity  Thought Process: linear, goal directed, future oriented (is looking forward to seeing her daughter graduate and get  one day)  Thought Content: no suicidal ideation, no homicidal ideation, delusions:none appreciated   Thought Perception: no perceptual abnormalities noted  Cognition: grossly intact  Insight: fair  Judgment: intact    Home Medications  Medication Documentation Review Audit       Reviewed by Linda León RN (Registered Nurse) on 07/11/25 at 0919      Medication Order Taking? Sig Documenting Provider Last Dose Status   albuterol 90 mcg/actuation inhaler [id number]  INHALE TWO (2) PUFFS BY MOUTH EVERY 4 HOURS " AS NEEDED FOR SHORTNESS OF BREATH/WHEEZING Historical Provider, MD  Active   alum-mag hydroxide-simeth (Mylanta) 200-200-20 mg/5 mL oral suspension [id number]  Take 30 mL by mouth 4 times a day as needed for indigestion or heartburn.   Patient not taking: Reported on 3/24/2025    Nikki Solorio PA-C  Active   amantadine (Symmetrel) 100 mg capsule [id number] Yes Take 1 capsule (100 mg) by mouth twice a day. Rosaline Ruiz MD More than a month Active   ARIPiprazole (Abilify) 15 mg tablet [id number]  Take 1 tablet (15 mg) by mouth once daily. Historical Provider, MD  Active   ARIPiprazole (Abilify) 2 mg tablet [id number]  Take 1 tablet (2 mg) by mouth once daily at bedtime. Rosaline Ruiz MD  Active   aspirin 81 mg chewable tablet [id number] Yes Chew and swallow 1 tablet (81 mg) once daily. Rosaline Ruiz MD 7/10/2025 Active   atorvastatin (Lipitor) 80 mg tablet [id number] Yes Take 1 tablet (80 mg) by mouth once daily at bedtime. Historical Provider, MD Past Week Active   busPIRone (Buspar) 15 mg tablet [id number]  Take 1 tablet (15 mg) by mouth every 12 hours if needed.   Patient not taking: Reported on 3/24/2025    Historical MD Sara  Active   cetirizine (ZyrTEC) 10 mg tablet [id number] No Take 1 tablet (10 mg) by mouth every 12 hours. Rosaline Ruiz MD Unknown Active   desmopressin (DDAVP) 10 mcg/spray (0.1 mL) [id number] Yes Administer 1 spray (10 mcg) into one nostril once daily at bedtime. Rosaline Ruiz MD 7/10/2025 Active   ezetimibe (Zetia) 10 mg tablet [id number] Yes Take 1 tablet (10 mg) by mouth once daily. Rosaline Ruiz MD Past Week Active   FLUoxetine (PROzac) 10 mg capsule [id number] No Take 1 capsule (10 mg) by mouth once daily. Historical MD Sara Unknown Active   fremanezumab (Ajovy Syringe) 225 mg/1.5 mL prefilled syringe [id number] No Inject 1.5 mL (225 mg) under the skin every 30 (thirty) days. Historical Provider, MD Unknown Active   hydrocortisone (Cortef) 5 mg  tablet [id number] No Take 1 tablet (5 mg) by mouth.  Take 2 tabs in am, one tablet at lunch, and one tablet at dinner Rosaline Ruiz MD 2025 Active   levETIRAcetam (Keppra) 750 mg tablet [id number] No Take by mouth twice a day. Rosaline Ruiz MD 2025 Active   levothyroxine (Synthroid, Levoxyl) 112 mcg tablet [id number] Yes Take 1 tablet (112 mcg) by mouth once daily. Historical Provider, MD More than a month Active   lidocaine 4 % patch [id number]  Place 1 patch over 12 hours on the skin once daily. Remove & discard patch within 12 hours or as directed by MD. Do not start before 2024.   Patient not taking: Reported on 3/24/2025    Nikki Solorio PA-C  Active   methocarbamol (Robaxin) 500 mg tablet [id number] Yes Take 1 tablet (500 mg) by mouth every 6 hours if needed. Historical MD Sara More than a month Active   metoclopramide (Reglan) 10 mg tablet [id number] Yes Take 1 tablet (10 mg) by mouth every 8 hours if needed. Historical Provider, MD More than a month Active   metoclopramide (Reglan) 10 mg tablet [id number]  Take 1 tablet (10 mg) by mouth every 6 hours for 7 days. Saulo Osorio PA-C   03/10/25 235   midodrine (Proamatine) 5 mg tablet [id number] No Take 2 tablets (10 mg) by mouth 3 times a day. Historical Provider, MD Unknown Active   montelukast (Singulair) 10 mg tablet [id number]  Take 1 tablet (10 mg) by mouth once daily at bedtime. Historical Provider, MD  Active   orphenadrine (Norflex) 100 mg 12 hr tablet [id number]  Take 1 tablet (100 mg) by mouth 2 times a day as needed for muscle spasms for up to 7 days. Do not crush, chew, or split. Bre Granado DO   25 235   pantoprazole (ProtoNix) 40 mg EC tablet [id number] Yes Take 1 tablet (40 mg) by mouth once daily. Historical Provider, MD Past Month Active   predniSONE (Deltasone) 5 mg tablet [id number] No 8 p.o. on day 1 and then decrease by 1 pill daily until gone Romel Schuster MD Unknown Active    pregabalin (Lyrica) 100 mg capsule [id number] Yes Take 1 capsule (100 mg) by mouth 2 times a day. Historical Provider, MD Past Month Active   promethazine (Phenergan) 25 mg tablet [id number] No Take 0.5 tablets (12.5 mg) by mouth every 6 hours if needed for nausea or vomiting for up to 10 doses. Cody Ritchie PA-C Unknown Active   traZODone (Desyrel) 50 mg tablet [id number] Yes Take 1 tablet (50 mg) by mouth once daily at bedtime. Historical Provider, MD Past Month Active   ubrogepant (Ubrelvy) 100 mg tablet tablet [id number] Yes Take 1 tablet (100 mg) by mouth if needed. Historical Provider, MD Past Month Active   zolpidem (Ambien) 10 mg tablet [id number]  Take 1 tablet (10 mg) by mouth once daily as needed for sleep. Historical Provider, MD   [id number]                     Current Medications  Scheduled Medications[4]  Continuous Medications[5]  PRN Medications[6]     Labs  Results for orders placed or performed during the hospital encounter of 07/10/25 (from the past 24 hours)   ECG 12 Lead   Result Value Ref Range    Ventricular Rate 69 BPM    Atrial Rate 69 BPM    ND Interval 194 ms    QRS Duration 98 ms    QT Interval 450 ms    QTC Calculation(Bazett) 482 ms    P Axis -6 degrees    R Axis 72 degrees    T Axis -35 degrees    QRS Count 12 beats    Q Onset 220 ms    P Onset 123 ms    P Offset 176 ms    T Offset 445 ms    QTC Fredericia 471 ms   CBC and Auto Differential   Result Value Ref Range    WBC 6.9 4.4 - 11.3 x10*3/uL    nRBC 0.0 0.0 - 0.0 /100 WBCs    RBC 4.92 4.00 - 5.20 x10*6/uL    Hemoglobin 13.0 12.0 - 16.0 g/dL    Hematocrit 39.4 36.0 - 46.0 %    MCV 80 80 - 100 fL    MCH 26.4 26.0 - 34.0 pg    MCHC 33.0 32.0 - 36.0 g/dL    RDW 14.4 11.5 - 14.5 %    Platelets 202 150 - 450 x10*3/uL    Neutrophils % 44.7 40.0 - 80.0 %    Immature Granulocytes %, Automated 0.3 0.0 - 0.9 %    Lymphocytes % 40.0 13.0 - 44.0 %    Monocytes % 7.2 2.0 - 10.0 %    Eosinophils % 6.5 0.0 - 6.0 %    Basophils % 1.3 0.0  - 2.0 %    Neutrophils Absolute 3.09 1.20 - 7.70 x10*3/uL    Immature Granulocytes Absolute, Automated 0.02 0.00 - 0.70 x10*3/uL    Lymphocytes Absolute 2.77 1.20 - 4.80 x10*3/uL    Monocytes Absolute 0.50 0.10 - 1.00 x10*3/uL    Eosinophils Absolute 0.45 0.00 - 0.70 x10*3/uL    Basophils Absolute 0.09 0.00 - 0.10 x10*3/uL   Comprehensive Metabolic Panel   Result Value Ref Range    Glucose 84 74 - 99 mg/dL    Sodium 136 136 - 145 mmol/L    Potassium 3.7 3.5 - 5.3 mmol/L    Chloride 102 98 - 107 mmol/L    Bicarbonate 27 21 - 32 mmol/L    Anion Gap 11 10 - 20 mmol/L    Urea Nitrogen 10 6 - 23 mg/dL    Creatinine 0.75 0.50 - 1.05 mg/dL    eGFR >90 >60 mL/min/1.73m*2    Calcium 9.3 8.6 - 10.3 mg/dL    Albumin 4.0 3.4 - 5.0 g/dL    Alkaline Phosphatase 72 33 - 110 U/L    Total Protein 7.2 6.4 - 8.2 g/dL    AST 34 9 - 39 U/L    Bilirubin, Total 0.6 0.0 - 1.2 mg/dL    ALT 23 7 - 45 U/L   Lipase   Result Value Ref Range    Lipase 14 9 - 82 U/L   Troponin I, High Sensitivity   Result Value Ref Range    Troponin I, High Sensitivity 5 0 - 13 ng/L   TSH with reflex to Free T4 if abnormal   Result Value Ref Range    Thyroid Stimulating Hormone 2.93 0.44 - 3.98 mIU/L   Acute Toxicology Panel, Blood   Result Value Ref Range    Acetaminophen <10.0 10.0 - 30.0 ug/mL    Salicylate  <3 4 - 20 mg/dL    Alcohol <10 <=10 mg/dL   hCG, Urine, Qualitative   Result Value Ref Range    HCG, Urine NEGATIVE NEGATIVE   DRUG SCREEN,URINE   Result Value Ref Range    Amphetamine Screen, Urine Presumptive Negative Presumptive Negative    Barbiturate Screen, Urine Presumptive Negative Presumptive Negative    Benzodiazepines Screen, Urine Presumptive Negative Presumptive Negative    Cannabinoid Screen, Urine Presumptive Negative Presumptive Negative    Cocaine Metabolite Screen, Urine Presumptive Negative Presumptive Negative    Fentanyl Screen, Urine Presumptive Negative Presumptive Negative    Opiate Screen, Urine Presumptive Negative Presumptive  Negative    Oxycodone Screen, Urine Presumptive Negative Presumptive Negative    PCP Screen, Urine Presumptive Negative Presumptive Negative    Methadone Screen, Urine Presumptive Negative Presumptive Negative   Urinalysis with Reflex Culture and Microscopic   Result Value Ref Range    Color, Urine Colorless (N) Light-Yellow, Yellow, Dark-Yellow    Appearance, Urine Clear Clear    Specific Gravity, Urine 1.010 1.005 - 1.035    pH, Urine 7.0 5.0, 5.5, 6.0, 6.5, 7.0, 7.5, 8.0    Protein, Urine NEGATIVE NEGATIVE, 10 (TRACE), 20 (TRACE) mg/dL    Glucose, Urine Normal Normal mg/dL    Blood, Urine NEGATIVE NEGATIVE mg/dL    Ketones, Urine NEGATIVE NEGATIVE mg/dL    Bilirubin, Urine NEGATIVE NEGATIVE mg/dL    Urobilinogen, Urine Normal Normal mg/dL    Nitrite, Urine NEGATIVE NEGATIVE    Leukocyte Esterase, Urine 75 Cesar/uL (A) NEGATIVE   Microscopic Only, Urine   Result Value Ref Range    WBC, Urine 6-10 (A) 1-5, NONE /HPF    RBC, Urine 1-2 NONE, 1-2, 3-5 /HPF        Imaging  CT head wo IV contrast  Result Date: 7/11/2025  1. No acute intracranial hemorrhage or mass effect. 2. Stable postoperative/chronic findings as above.   Signed by: Erickson Durand 7/11/2025 1:21 AM Dictation workstation:   [id number]     No MRI head results found for the past 14 days     Encounter Date: 07/10/25   ECG 12 Lead   Result Value    Ventricular Rate 69    Atrial Rate 69    PA Interval 194    QRS Duration 98    QT Interval 450    QTC Calculation(Bazett) 482    P Axis -6    R Axis 72    T Axis -35    QRS Count 12    Q Onset 220    P Onset 123    P Offset 176    T Offset 445    QTC Fredericia 471    Narrative    Normal sinus rhythm  Nonspecific T wave abnormality  QTcB >= 480 msec  Abnormal ECG  When compared with ECG of 04-MAY-2025 07:26,  Nonspecific T wave abnormality, improved in Lateral leads       PSYCHIATRIC RISK ASSESSMENT  Violence Risk Factors:  substance abuse , unemployed, lower socioeconomic status, and  stress/destabilizers  Acute Risk of Harm to Others is Considered: Low  Suicide Risk Factors: ; /[place] native, prior suicide attempts , chronic pain, current psychiatric illness, substance abuse , and lack of treatment access, discontinuities in treatment, or recent discharge from hospital  Protective Factors: sense of responsibility towards family, social support/connectedness, and hopefulness/future-orientation  Acute Risk of Harm to Self is Considered: Low    Medication Consent  Medication Consent: n/a; consult service         [1]   Past Medical History:  Diagnosis Date    COPD (chronic obstructive pulmonary disease) (Multi)     Coronary artery disease     Personal history of other endocrine, nutritional and metabolic disease     History of hypopituitarism    Personal history of other mental and behavioral disorders     History of depression    Personal history of other specified conditions     History of brain tumor    Unspecified convulsions (Multi)     Convulsion   [2]   Past Surgical History:  Procedure Laterality Date    BRAIN SURGERY      CARDIAC SURGERY      MR HEAD ANGIO WO IV CONTRAST  07/15/2021    MR HEAD ANGIO WO IV CONTRAST 7/15/2021 [facility]    MR HEAD ANGIO WO IV CONTRAST  11/28/2017    MR HEAD ANGIO WO IV CONTRAST [facility]    MR HEAD ANGIO WO IV CONTRAST  03/13/2018    MR HEAD ANGIO WO IV CONTRAST [facility]    MR NECK ANGIO WO IV CONTRAST  07/15/2021    MR NECK ANGIO WO IV CONTRAST 7/15/2021 [facility]    OTHER SURGICAL HISTORY  08/17/2013    Craniotomy Tumor Removal - Complete   [3] No family history on file.  [4] aspirin, 81 mg, oral, Daily  atorvastatin, 80 mg, oral, Nightly  cetirizine, 10 mg, oral, Daily  desmopressin, 2 spray, One Nostril, BID  ezetimibe, 10 mg, oral, Daily  levETIRAcetam, 750 mg, oral, BID  levothyroxine, 112 mcg, oral, Daily  lidocaine, 1 patch, transdermal, Once  montelukast, 10 mg, oral,  Nightly  pantoprazole, 40 mg, oral, Daily  pregabalin, 100 mg, oral, BID  [5]    [6]

## 2025-07-11 NOTE — PROGRESS NOTES
Pharmacy Medication History Review    Lu Fall is a 44 y.o. female admitted for No Principal Problem: There is no principal problem currently on the Problem List. Please update the Problem List and refresh.. Pharmacy reviewed the patient's [id number] medications and allergies for accuracy.    The list below reflectives the updated PTA list. Please review each medication in order reconciliation for additional clarification and justification.  Prior to Admission Medications   Prescriptions Last Dose Informant Patient Reported? Taking?   alum-mag hydroxide-simeth (Mylanta) 200-200-20 mg/5 mL oral suspension  Self No No   Sig: Take 30 mL by mouth 4 times a day as needed for indigestion or heartburn.   Patient not taking: Reported on 3/24/2025   aspirin 81 mg chewable tablet 7/10/2025 Morning Self Yes Yes   Sig: Chew and swallow 1 tablet (81 mg) once daily.   atorvastatin (Lipitor) 80 mg tablet Past Month Self Yes Yes   Sig: Take 1 tablet (80 mg) by mouth once daily at bedtime.   desmopressin (DDAVP) 10 mcg/spray (0.1 mL) 7/9/2025 at  5:00 PM Self Yes Yes   Sig: Administer 1 spray (10 mcg) into one nostril once daily at bedtime.   ezetimibe (Zetia) 10 mg tablet Past Month Self Yes Yes   Sig: Take 1 tablet (10 mg) by mouth once daily.   hydrocortisone (Cortef) 5 mg tablet 7/10/2025 Self Yes Yes   Sig: Take 1 tablet (5 mg) by mouth.  Take 2 tabs in am, one tablet at lunch, and one tablet at dinner   levETIRAcetam (Keppra) 750 mg tablet Past Month Self Yes Yes   Sig: Take by mouth twice a day.   levothyroxine (Synthroid, Levoxyl) 112 mcg tablet 7/10/2025 Morning Self Yes Yes   Sig: Take 1 tablet (112 mcg) by mouth once daily.   lidocaine 4 % patch  Self No No   Sig: Place 1 patch over 12 hours on the skin once daily. Remove & discard patch within 12 hours or as directed by MD. Do not start before January 30, 2024.   Patient not taking: Reported on 3/24/2025   methocarbamol (Robaxin) 500 mg tablet More than a  month Self Yes Yes   Sig: Take 1 tablet (500 mg) by mouth 3 times a day as needed.   metoclopramide (Reglan) 10 mg tablet Past Month Self Yes Yes   Sig: Take 1 tablet (10 mg) by mouth every 6 hours for 7 days.   orphenadrine (Norflex) 100 mg 12 hr tablet Not Taking  No No   Sig: Take 1 tablet (100 mg) by mouth 2 times a day as needed for muscle spasms for up to 7 days. Do not crush, chew, or split.   Patient not taking: Reported on 2025   pantoprazole (ProtoNix) 40 mg EC tablet 7/10/2025 Self Yes Yes   Sig: Take 1 tablet (40 mg) by mouth once daily.   predniSONE (Deltasone) 5 mg tablet Not Taking Self No No   Si p.o. on day 1 and then decrease by 1 pill daily until gone   Patient not taking: Reported on 2025   pregabalin (Lyrica) 100 mg capsule Past Month Self Yes Yes   Sig: Take 1 capsule (100 mg) by mouth 2 times a day.   promethazine (Phenergan) 25 mg tablet Not Taking Self No No   Sig: Take 0.5 tablets (12.5 mg) by mouth every 6 hours if needed for nausea or vomiting for up to 10 doses.   Patient not taking: Reported on 2025   traZODone (Desyrel) 50 mg tablet Past Week Self Yes Yes   Sig: Take 1 tablet (50 mg) by mouth once daily at bedtime.   ubrogepant (Ubrelvy) 100 mg tablet tablet Past Month Self Yes Yes   Sig: Take 1 tablet (100 mg) by mouth if needed.   zolpidem (Ambien) 10 mg tablet More than a month Self Yes Yes   Sig: Take 1 tablet (10 mg) by mouth once daily as needed for sleep.      Facility-Administered Medications: None           The list below reflectives the updated allergy list. Please review each documented allergy for additional clarification and justification.  Allergies  Reviewed by Rebecca Garcia RN on 2025        Severity Reactions Comments    Adhesive Low Hives, Unknown     Hydrocodone-acetaminophen Low Other, Unknown, Nausea/vomiting Other reaction(s): Vomiting    Ondansetron Low Other, Unknown prolonged QT Client is unable to take this medication due  An  elongated QT wave   Other reaction(s): Unknown   Prolonged QT per patient Prolonged QT per patient            Below are additional concerns with the patient's PTA list.      Kiersten Talbot

## 2025-07-11 NOTE — PROGRESS NOTES
Emergency Department Transition of Care Note       Signout   I received Lu Fall in signout from Dr. Pinto.  Please see the ED Provider Note for all HPI, PE and MDM up to the time of signout at 0600.  This is in addition to the primary record.    In brief Lu Fall is an 44 y.o. female presenting for Hopelessness    At the time of signout we were awaiting:  EPAT evaluation    ED Course & Medical Decision Making   Medical Decision Making:  Under my care, patient remained stable.  Her home meds were restarted.  Discussed patient with EPAT, they states she does not meet inpatient criteria at this time.  I reassessed patient, she denies any suicidal ideation, denies homicidal ideation, does not appear to be a threat of harm to herself or others.  Feels comfortable going home.  EPAT faxed resources for outpatient psychiatric management.  Patient agreeable to this plan of care.  Patient discharged in stable condition.    ED Course:  ED Course as of 07/11/25 1129   Fri Jul 11, 2025   0013 EKG interpreted by me shows normal sinus rhythm no STEMI rate 69 compared to prior EKG similar findings present [WL]   0153 Leukocyte Esterase, Urine(!): 75 Cesar/uL [WL]   0153 WBC, Urine(!): 6-10 [WL]   0153 Given antibiotic here p.o. [WL]   0602 Creatinine: 0.75 [SH]   0808 Patient home meds reordered [SH]      ED Course User Index  [SH] Goyo Harris MD  [WL] Micheal Pinto, DO         Diagnoses as of 07/11/25 1129   Hopelessness       Disposition   As a result of the work-up, the patient was discharged home.  she was informed of her diagnosis and instructed to come back with any concerns or worsening of condition.  she and was agreeable to the plan as discussed above.  she was given the opportunity to ask questions.  All of the patient's questions were answered.    Procedures   Procedures        Goyo Harris MD  Emergency Medicine

## 2025-07-11 NOTE — ED PROVIDER NOTES
History/Exam limitations: none  HPI was provided by patient    HPI:    Chief Complaint   Patient presents with    Psychiatric Evaluation     Pt's daughter called 911 stating that the pt told her that she was done and didn't want to do this any more. The pt denies being suicidal. Pt states that she is tired of going to hospitals and does not want any more brain or heart surgeries. Pt states that she is getting evicted. Pt states that she does not have any clean drinking water at her trailer. Pt states that she has been out of her medications for at least 2 months. Pt states that she is done with hospitals and treatments.        Lu Fall is a 44 y.o. female presents with chief complaint of dizziness and nausea.  Patient though had made comments that she has not been taking her medications and would be okay if she just did not exist anymore.  No explicit suicidal ideation.  Currently denies auditory hallucinations. Denies visual hallucinations  Denies suicidal ideation. Denies homicidal ideation   Patient's complaints have been constant without any alleviating or exacerbating factors.       ROS: (Bolded text if patient is positive for) All other review of systems are negative except as noted above and HPI or ROS.   CONSTITUTIONAL fever, chills.  ENT sore throat, congestion, rhinorrhea.  CARDIOVASCULAR chest pain, palpitations.  RESPIRATORY cough, shortness of breath, wheeze.  GI abdominal pain, nausea, vomiting, diarrhea.  GENITOURINARY dysuria, hematuria, frequency.  MUSCULOSKELETAL deformity, neck pain.  SKIN rash, color change.  NEUROLOGIC headache, numbness, focal weakness.  NOTES: All systems reviewed, negative except as described above.       Physical Exam:  GENERAL: Alert, oriented , cooperative,  in no acute distress.  HEAD: normocephalic, atraumatic  SKIN: Intact,  dry skin, no lesions.  EYES: PERRL, EOMs intact,  Conjunctiva pink with no erythema or exudates. No scleral icterus.   ENT: No external  "deformities. Nares patent, mucus membranes moist.  Pharynx clear, uvula midline.   NECK: Supple, without meningismus. Trachea at midline. No lymphadenopathy.  PULMONARY: Clear bilaterally. No rales, rhonchi or wheezing.   CARDIAC: Regular rate and rhythm. good pulses.  ABDOMEN: Soft, nontender, active bowel sounds.  No palpable organomegaly.  No rebound or guarding.  No CVA tenderness.  : Exam deferred.  MUSCULOSKELETAL: Full range of motion, no deformity. Pulses full and equal. No EDEMA  NEUROLOGICAL:  CN II through XII are grossly intact, no focal neuro deficits.  Strength 5 out of 5 throughout bilateral upper and lower extremities neurovascular intact in bilateral upper and lower extremities  PSYCHIATRIC: Appropriate mood and affect. Calm.     Past Medical History  She has a past medical history of COPD (chronic obstructive pulmonary disease) (Multi), Coronary artery disease, Personal history of other endocrine, nutritional and metabolic disease, Personal history of other mental and behavioral disorders, Personal history of other specified conditions, and Unspecified convulsions (Multi).    Surgical History  She has a past surgical history that includes Other surgical history (08/17/2013); MR angio head wo IV contrast (07/15/2021); MR angio neck wo IV contrast (07/15/2021); MR angio head wo IV contrast (11/28/2017); MR angio head wo IV contrast (03/13/2018); Brain surgery; and Cardiac surgery.     Social History  She reports that she has quit smoking. Her smoking use included cigarettes. She has never used smokeless tobacco. She reports current drug use. Drugs: \"Crack\" cocaine and Cocaine. She reports that she does not drink alcohol.   Current Outpatient Medications   Medication Instructions    Ajovy Syringe 225 mg, Every 30 days    albuterol 90 mcg/actuation inhaler INHALE TWO (2) PUFFS BY MOUTH EVERY 4 HOURS AS NEEDED FOR SHORTNESS OF BREATH/WHEEZING    alum-mag hydroxide-simeth (Mylanta) 200-200-20 mg/5 mL " oral suspension 30 mL, oral, 4 times daily PRN    amantadine (SYMMETREL) 100 mg, 2 times daily    ARIPiprazole (Abilify) 15 mg tablet 1 tablet, Daily    ARIPiprazole (ABILIFY) 2 mg, Nightly    aspirin 81 mg, Daily    atorvastatin (LIPITOR) 80 mg, Nightly    busPIRone (BUSPAR) 15 mg, Every 12 hours PRN    cetirizine (ZYRTEC) 10 mg, Every 12 hours    desmopressin (DDAVP) 10 mcg/spray (0.1 mL) 1 spray, Nightly    ezetimibe (ZETIA) 10 mg, Daily    FLUoxetine (PROZAC) 10 mg, Daily    hydrocortisone (CORTEF) 5 mg    levETIRAcetam (Keppra) 750 mg tablet 2 times daily    levothyroxine (SYNTHROID, LEVOXYL) 112 mcg, Daily    lidocaine 4 % patch 1 patch, transdermal, Daily, Remove & discard patch within 12 hours or as directed by MD.    methocarbamol (ROBAXIN) 500 mg, Every 6 hours PRN    metoclopramide (REGLAN) 10 mg, Every 8 hours PRN    metoclopramide (REGLAN) 10 mg, oral, Every 6 hours    midodrine (PROAMATINE) 10 mg, 3 times daily    montelukast (SINGULAIR) 10 mg, Nightly    orphenadrine (NORFLEX) 100 mg, oral, 2 times daily PRN, Do not crush, chew, or split.    pantoprazole (PROTONIX) 40 mg, Daily RT    predniSONE (Deltasone) 5 mg tablet 8 p.o. on day 1 and then decrease by 1 pill daily until gone    pregabalin (LYRICA) 100 mg, 2 times daily    promethazine (PHENERGAN) 12.5 mg, oral, Every 6 hours PRN    traZODone (DESYREL) 50 mg, Nightly    ubrogepant (UBRELVY) 100 mg, As needed    zolpidem (AMBIEN) 10 mg, oral, Daily PRN     RX Allergies[1]      ED Triage Vitals   Temp Pulse Resp BP   -- -- -- --      SpO2 Temp src Heart Rate Source Patient Position   -- -- -- --      BP Location FiO2 (%)     -- --                   Labs and Imaging  CT head wo IV contrast   Final Result   1. No acute intracranial hemorrhage or mass effect.   2. Stable postoperative/chronic findings as above.        Signed by: Erickson Durand 7/11/2025 1:21 AM   Dictation workstation:   [id number]        Labs Reviewed   URINALYSIS WITH REFLEX  CULTURE AND MICROSCOPIC - Abnormal       Result Value    Color, Urine Colorless (*)     Appearance, Urine Clear      Specific Gravity, Urine 1.010      pH, Urine 7.0      Protein, Urine NEGATIVE      Glucose, Urine Normal      Blood, Urine NEGATIVE      Ketones, Urine NEGATIVE      Bilirubin, Urine NEGATIVE      Urobilinogen, Urine Normal      Nitrite, Urine NEGATIVE      Leukocyte Esterase, Urine 75 Cesar/uL (*)    MICROSCOPIC ONLY, URINE - Abnormal    WBC, Urine 6-10 (*)     RBC, Urine 1-2     COMPREHENSIVE METABOLIC PANEL - Normal    Glucose 84      Sodium 136      Potassium 3.7      Chloride 102      Bicarbonate 27      Anion Gap 11      Urea Nitrogen 10      Creatinine 0.75      eGFR >90      Calcium 9.3      Albumin 4.0      Alkaline Phosphatase 72      Total Protein 7.2      AST 34      Bilirubin, Total 0.6      ALT 23     LIPASE - Normal    Lipase 14      Narrative:     Venipuncture immediately after or during the administration of Metamizole may lead to falsely low results. Testing should be performed immediately prior to Metamizole dosing.   HCG, URINE, QUALITATIVE - Normal    HCG, Urine NEGATIVE     TROPONIN I, HIGH SENSITIVITY - Normal    Troponin I, High Sensitivity 5      Narrative:     Less than 99th percentile of normal range cutoff-  Female and children under 18 years old <14 ng/L; Male <21 ng/L: Negative  Repeat testing should be performed if clinically indicated.     Female and children under 18 years old 14-50 ng/L; Male 21-50 ng/L:  Consistent with possible cardiac damage and possible increased clinical   risk. Serial measurements may help to assess extent of myocardial damage.     >50 ng/L: Consistent with cardiac damage, increased clinical risk and  myocardial infarction. Serial measurements may help assess extent of   myocardial damage.      NOTE: Children less than 1 year old may have higher baseline troponin   levels and results should be interpreted in conjunction with the overall   clinical  context.     NOTE: Troponin I testing is performed using a different   testing methodology at [facility] than at other   [facility]. Direct result comparisons should only   be made within the same method.   TSH WITH REFLEX TO FREE T4 IF ABNORMAL - Normal    Thyroid Stimulating Hormone 2.93      Narrative:     TSH testing is performed using different testing methodology at [facility] than at other [facility]. Direct result comparisons should only be made within the same method.     ACUTE TOXICOLOGY PANEL, BLOOD - Normal    Acetaminophen <10.0      Salicylate  <3      Alcohol <10     DRUG SCREEN,URINE - Normal    Amphetamine Screen, Urine Presumptive Negative      Barbiturate Screen, Urine Presumptive Negative      Benzodiazepines Screen, Urine Presumptive Negative      Cannabinoid Screen, Urine Presumptive Negative      Cocaine Metabolite Screen, Urine Presumptive Negative      Fentanyl Screen, Urine Presumptive Negative      Opiate Screen, Urine Presumptive Negative      Oxycodone Screen, Urine Presumptive Negative      PCP Screen, Urine Presumptive Negative      Methadone Screen, Urine Presumptive Negative      Narrative:     Drug screen results are presumptive and should not be used to assess   compliance with prescribed medication. Contact the performing [facility] laboratory   to add-on definitive confirmatory testing if clinically indicated.    Toxicology screening results are reported qualitatively. The concentration must   be greater than or equal to the cutoff to be reported as positive. The concentration   at which the screening test can detect an individual drug or metabolite varies.   The absence of expected drug(s) and/or drug metabolite(s) may indicate non-compliance,   inappropriate timing of specimen collection relative to drug administration, poor drug   absorption, diluted/adulterated urine, or limitations of testing. For medical purposes   only; not valid for  forensic use.    Interpretive questions should be directed to the laboratory medical directors.   URINE CULTURE   CBC WITH AUTO DIFFERENTIAL    WBC 6.9      nRBC 0.0      RBC 4.92      Hemoglobin 13.0      Hematocrit 39.4      MCV 80      MCH 26.4      MCHC 33.0      RDW 14.4      Platelets 202      Neutrophils % 44.7      Immature Granulocytes %, Automated 0.3      Lymphocytes % 40.0      Monocytes % 7.2      Eosinophils % 6.5      Basophils % 1.3      Neutrophils Absolute 3.09      Immature Granulocytes Absolute, Automated 0.02      Lymphocytes Absolute 2.77      Monocytes Absolute 0.50      Eosinophils Absolute 0.45      Basophils Absolute 0.09     URINALYSIS WITH REFLEX CULTURE AND MICROSCOPIC    Narrative:     The following orders were created for panel order Urinalysis with Reflex Culture and Microscopic.  Procedure                               Abnormality         Status                     ---------                               -----------         ------                     Urinalysis with Reflex C...[[id number]]  Abnormal            Final result               Extra Urine Gray Tube[[id number]]                            In process                   Please view results for these tests on the individual orders.   EXTRA URINE GRAY TUBE         Medical Decision Making:     The patient presented for evaluation for a psych evaluation.    Plan will be to medically clear the patient.  Patient will then talk to EPAT.  Disposition pending at this time.            External Records Reviewed: I reviewed recent and relevant outside records    ED Course as of 07/11/25 0524 Fri Jul 11, 2025   0013 EKG interpreted by me shows normal sinus rhythm no STEMI rate 69 compared to prior EKG similar findings present [WL]   0153 Leukocyte Esterase, Urine(!): 75 Cesar/uL [WL]   0153 WBC, Urine(!): 6-10 [WL]   0153 Given antibiotic here p.o. [WL]      ED Course User Index  [WL] Micheal Pinto DO         Diagnoses as of 07/11/25 0524    Hopelessness         CT head wo IV contrast   Final Result   1. No acute intracranial hemorrhage or mass effect.   2. Stable postoperative/chronic findings as above.        Signed by: Erickson Durand 7/11/2025 1:21 AM   Dictation workstation:   [id number]        Labs Reviewed   URINALYSIS WITH REFLEX CULTURE AND MICROSCOPIC - Abnormal       Result Value    Color, Urine Colorless (*)     Appearance, Urine Clear      Specific Gravity, Urine 1.010      pH, Urine 7.0      Protein, Urine NEGATIVE      Glucose, Urine Normal      Blood, Urine NEGATIVE      Ketones, Urine NEGATIVE      Bilirubin, Urine NEGATIVE      Urobilinogen, Urine Normal      Nitrite, Urine NEGATIVE      Leukocyte Esterase, Urine 75 Cesar/uL (*)    MICROSCOPIC ONLY, URINE - Abnormal    WBC, Urine 6-10 (*)     RBC, Urine 1-2     COMPREHENSIVE METABOLIC PANEL - Normal    Glucose 84      Sodium 136      Potassium 3.7      Chloride 102      Bicarbonate 27      Anion Gap 11      Urea Nitrogen 10      Creatinine 0.75      eGFR >90      Calcium 9.3      Albumin 4.0      Alkaline Phosphatase 72      Total Protein 7.2      AST 34      Bilirubin, Total 0.6      ALT 23     LIPASE - Normal    Lipase 14      Narrative:     Venipuncture immediately after or during the administration of Metamizole may lead to falsely low results. Testing should be performed immediately prior to Metamizole dosing.   HCG, URINE, QUALITATIVE - Normal    HCG, Urine NEGATIVE     TROPONIN I, HIGH SENSITIVITY - Normal    Troponin I, High Sensitivity 5      Narrative:     Less than 99th percentile of normal range cutoff-  Female and children under 18 years old <14 ng/L; Male <21 ng/L: Negative  Repeat testing should be performed if clinically indicated.     Female and children under 18 years old 14-50 ng/L; Male 21-50 ng/L:  Consistent with possible cardiac damage and possible increased clinical   risk. Serial measurements may help to assess extent of myocardial damage.     >50 ng/L:  Consistent with cardiac damage, increased clinical risk and  myocardial infarction. Serial measurements may help assess extent of   myocardial damage.      NOTE: Children less than 1 year old may have higher baseline troponin   levels and results should be interpreted in conjunction with the overall   clinical context.     NOTE: Troponin I testing is performed using a different   testing methodology at [facility] than at other   [facility]. Direct result comparisons should only   be made within the same method.   TSH WITH REFLEX TO FREE T4 IF ABNORMAL - Normal    Thyroid Stimulating Hormone 2.93      Narrative:     TSH testing is performed using different testing methodology at [facility] than at other [facility]. Direct result comparisons should only be made within the same method.     ACUTE TOXICOLOGY PANEL, BLOOD - Normal    Acetaminophen <10.0      Salicylate  <3      Alcohol <10     DRUG SCREEN,URINE - Normal    Amphetamine Screen, Urine Presumptive Negative      Barbiturate Screen, Urine Presumptive Negative      Benzodiazepines Screen, Urine Presumptive Negative      Cannabinoid Screen, Urine Presumptive Negative      Cocaine Metabolite Screen, Urine Presumptive Negative      Fentanyl Screen, Urine Presumptive Negative      Opiate Screen, Urine Presumptive Negative      Oxycodone Screen, Urine Presumptive Negative      PCP Screen, Urine Presumptive Negative      Methadone Screen, Urine Presumptive Negative      Narrative:     Drug screen results are presumptive and should not be used to assess   compliance with prescribed medication. Contact the performing [facility] laboratory   to add-on definitive confirmatory testing if clinically indicated.    Toxicology screening results are reported qualitatively. The concentration must   be greater than or equal to the cutoff to be reported as positive. The concentration   at which the screening test can detect an individual drug or  metabolite varies.   The absence of expected drug(s) and/or drug metabolite(s) may indicate non-compliance,   inappropriate timing of specimen collection relative to drug administration, poor drug   absorption, diluted/adulterated urine, or limitations of testing. For medical purposes   only; not valid for forensic use.    Interpretive questions should be directed to the laboratory medical directors.   URINE CULTURE   CBC WITH AUTO DIFFERENTIAL    WBC 6.9      nRBC 0.0      RBC 4.92      Hemoglobin 13.0      Hematocrit 39.4      MCV 80      MCH 26.4      MCHC 33.0      RDW 14.4      Platelets 202      Neutrophils % 44.7      Immature Granulocytes %, Automated 0.3      Lymphocytes % 40.0      Monocytes % 7.2      Eosinophils % 6.5      Basophils % 1.3      Neutrophils Absolute 3.09      Immature Granulocytes Absolute, Automated 0.02      Lymphocytes Absolute 2.77      Monocytes Absolute 0.50      Eosinophils Absolute 0.45      Basophils Absolute 0.09     URINALYSIS WITH REFLEX CULTURE AND MICROSCOPIC    Narrative:     The following orders were created for panel order Urinalysis with Reflex Culture and Microscopic.  Procedure                               Abnormality         Status                     ---------                               -----------         ------                     Urinalysis with Reflex C...[[id number]]  Abnormal            Final result               Extra Urine Gray Tube[[id number]]                            In process                   Please view results for these tests on the individual orders.   EXTRA URINE GRAY TUBE           Procedure  Procedures     Pending EPAT evaluation patient signed out to oncoming physician at shift change    Note: This note was dictated by speech recognition. Minor errors in transcription may be present.             [1]   Allergies  Allergen Reactions    Adhesive Hives and Unknown    Hydrocodone-Acetaminophen Other, Unknown and Nausea/vomiting     Other  reaction(s): Vomiting    Ondansetron Other and Unknown     prolonged QT    Client is unable to take this medication due  An elongated QT wave   Other reaction(s): Unknown   Prolonged QT per patient    Prolonged QT per patient        Micheal Pinto DO  07/11/25 0564

## 2025-07-12 LAB — BACTERIA UR CULT: NORMAL

## 2025-07-29 ENCOUNTER — APPOINTMENT (OUTPATIENT)
Dept: RADIOLOGY | Facility: HOSPITAL | Age: 45
End: 2025-07-29
Payer: MEDICARE

## 2025-07-29 ENCOUNTER — HOSPITAL ENCOUNTER (OUTPATIENT)
Facility: HOSPITAL | Age: 45
Setting detail: OBSERVATION
Discharge: HOME | End: 2025-07-30
Attending: EMERGENCY MEDICINE | Admitting: INTERNAL MEDICINE
Payer: MEDICARE

## 2025-07-29 ENCOUNTER — APPOINTMENT (OUTPATIENT)
Dept: CARDIOLOGY | Facility: HOSPITAL | Age: 45
End: 2025-07-29
Payer: MEDICARE

## 2025-07-29 DIAGNOSIS — M54.6 ACUTE MIDLINE THORACIC BACK PAIN: ICD-10-CM

## 2025-07-29 DIAGNOSIS — R94.31 NONSPECIFIC ST-T WAVE ELECTROCARDIOGRAPHIC CHANGES: ICD-10-CM

## 2025-07-29 DIAGNOSIS — R06.02 SHORTNESS OF BREATH: Primary | ICD-10-CM

## 2025-07-29 DIAGNOSIS — M54.50 LUMBAR BACK PAIN: ICD-10-CM

## 2025-07-29 DIAGNOSIS — K76.0 FATTY LIVER: ICD-10-CM

## 2025-07-29 PROBLEM — G40.219 FOCAL EPILEPSY WITH IMPAIRMENT OF CONSCIOUSNESS, INTRACTABLE (MULTI): Status: ACTIVE | Noted: 2017-05-04

## 2025-07-29 PROBLEM — R42 DIZZINESS: Status: ACTIVE | Noted: 2021-04-16

## 2025-07-29 PROBLEM — R06.09 DYSPNEA ON EXERTION: Status: ACTIVE | Noted: 2019-07-10

## 2025-07-29 PROBLEM — R20.0 NUMBNESS: Status: ACTIVE | Noted: 2025-07-29

## 2025-07-29 PROBLEM — R55 SYNCOPE: Status: RESOLVED | Noted: 2025-07-29 | Resolved: 2025-07-29

## 2025-07-29 LAB
ALBUMIN SERPL BCP-MCNC: 4.3 G/DL (ref 3.4–5)
ALP SERPL-CCNC: 74 U/L (ref 33–110)
ALT SERPL W P-5'-P-CCNC: 22 U/L (ref 7–45)
AMPHETAMINES UR QL SCN: ABNORMAL
ANION GAP SERPL CALC-SCNC: 9 MMOL/L (ref 10–20)
AST SERPL W P-5'-P-CCNC: 36 U/L (ref 9–39)
ATRIAL RATE: 77 BPM
ATRIAL RATE: 86 BPM
BARBITURATES UR QL SCN: ABNORMAL
BASOPHILS # BLD AUTO: 0.1 X10*3/UL (ref 0–0.1)
BASOPHILS NFR BLD AUTO: 1.5 %
BENZODIAZ UR QL SCN: ABNORMAL
BILIRUB SERPL-MCNC: 0.7 MG/DL (ref 0–1.2)
BNP SERPL-MCNC: 45 PG/ML (ref 0–99)
BUN SERPL-MCNC: 8 MG/DL (ref 6–23)
BZE UR QL SCN: ABNORMAL
CALCIUM SERPL-MCNC: 9.8 MG/DL (ref 8.6–10.3)
CANNABINOIDS UR QL SCN: ABNORMAL
CARDIAC TROPONIN I PNL SERPL HS: 3 NG/L (ref 0–13)
CARDIAC TROPONIN I PNL SERPL HS: 4 NG/L (ref 0–13)
CHLORIDE SERPL-SCNC: 102 MMOL/L (ref 98–107)
CO2 SERPL-SCNC: 28 MMOL/L (ref 21–32)
CREAT SERPL-MCNC: 0.72 MG/DL (ref 0.5–1.05)
EGFRCR SERPLBLD CKD-EPI 2021: >90 ML/MIN/1.73M*2
EOSINOPHIL # BLD AUTO: 0.33 X10*3/UL (ref 0–0.7)
EOSINOPHIL NFR BLD AUTO: 4.9 %
ERYTHROCYTE [DISTWIDTH] IN BLOOD BY AUTOMATED COUNT: 14.5 % (ref 11.5–14.5)
ETHANOL SERPL-MCNC: <10 MG/DL
FENTANYL+NORFENTANYL UR QL SCN: ABNORMAL
GLUCOSE SERPL-MCNC: 77 MG/DL (ref 74–99)
HCG UR QL IA.RAPID: NEGATIVE
HCT VFR BLD AUTO: 39.7 % (ref 36–46)
HGB BLD-MCNC: 13.5 G/DL (ref 12–16)
IMM GRANULOCYTES # BLD AUTO: 0.01 X10*3/UL (ref 0–0.7)
IMM GRANULOCYTES NFR BLD AUTO: 0.1 % (ref 0–0.9)
LACTATE SERPL-SCNC: 1.1 MMOL/L (ref 0.4–2)
LYMPHOCYTES # BLD AUTO: 2.72 X10*3/UL (ref 1.2–4.8)
LYMPHOCYTES NFR BLD AUTO: 40.1 %
MAGNESIUM SERPL-MCNC: 2.21 MG/DL (ref 1.6–2.4)
MCH RBC QN AUTO: 27.2 PG (ref 26–34)
MCHC RBC AUTO-ENTMCNC: 34 G/DL (ref 32–36)
MCV RBC AUTO: 80 FL (ref 80–100)
METHADONE UR QL SCN: ABNORMAL
MONOCYTES # BLD AUTO: 0.53 X10*3/UL (ref 0.1–1)
MONOCYTES NFR BLD AUTO: 7.8 %
NEUTROPHILS # BLD AUTO: 3.1 X10*3/UL (ref 1.2–7.7)
NEUTROPHILS NFR BLD AUTO: 45.6 %
NRBC BLD-RTO: 0 /100 WBCS (ref 0–0)
OPIATES UR QL SCN: ABNORMAL
OXYCODONE+OXYMORPHONE UR QL SCN: ABNORMAL
P AXIS: 54 DEGREES
P AXIS: 54 DEGREES
P OFFSET: 177 MS
P OFFSET: 180 MS
P ONSET: 137 MS
P ONSET: 142 MS
PCP UR QL SCN: ABNORMAL
PHOSPHATE SERPL-MCNC: 3 MG/DL (ref 2.5–4.9)
PLATELET # BLD AUTO: 203 X10*3/UL (ref 150–450)
POTASSIUM SERPL-SCNC: 3.3 MMOL/L (ref 3.5–5.3)
PR INTERVAL: 158 MS
PR INTERVAL: 164 MS
PROT SERPL-MCNC: 7.3 G/DL (ref 6.4–8.2)
Q ONSET: 219 MS
Q ONSET: 221 MS
QRS COUNT: 12 BEATS
QRS COUNT: 14 BEATS
QRS DURATION: 102 MS
QRS DURATION: 96 MS
QT INTERVAL: 400 MS
QT INTERVAL: 472 MS
QTC CALCULATION(BAZETT): 478 MS
QTC CALCULATION(BAZETT): 534 MS
QTC FREDERICIA: 451 MS
QTC FREDERICIA: 512 MS
R AXIS: 66 DEGREES
R AXIS: 93 DEGREES
RBC # BLD AUTO: 4.97 X10*6/UL (ref 4–5.2)
SODIUM SERPL-SCNC: 136 MMOL/L (ref 136–145)
T AXIS: -76 DEGREES
T AXIS: 247 DEGREES
T OFFSET: 421 MS
T OFFSET: 455 MS
TSH SERPL-ACNC: 2.39 MIU/L (ref 0.44–3.98)
VENTRICULAR RATE: 77 BPM
VENTRICULAR RATE: 86 BPM
WBC # BLD AUTO: 6.8 X10*3/UL (ref 4.4–11.3)

## 2025-07-29 PROCEDURE — 71275 CT ANGIOGRAPHY CHEST: CPT

## 2025-07-29 PROCEDURE — 84443 ASSAY THYROID STIM HORMONE: CPT

## 2025-07-29 PROCEDURE — 84484 ASSAY OF TROPONIN QUANT: CPT

## 2025-07-29 PROCEDURE — 81025 URINE PREGNANCY TEST: CPT

## 2025-07-29 PROCEDURE — 71275 CT ANGIOGRAPHY CHEST: CPT | Performed by: RADIOLOGY

## 2025-07-29 PROCEDURE — 36415 COLL VENOUS BLD VENIPUNCTURE: CPT

## 2025-07-29 PROCEDURE — 74177 CT ABD & PELVIS W/CONTRAST: CPT | Performed by: RADIOLOGY

## 2025-07-29 PROCEDURE — 2500000004 HC RX 250 GENERAL PHARMACY W/ HCPCS (ALT 636 FOR OP/ED)

## 2025-07-29 PROCEDURE — 96375 TX/PRO/DX INJ NEW DRUG ADDON: CPT | Mod: 59

## 2025-07-29 PROCEDURE — 2500000001 HC RX 250 WO HCPCS SELF ADMINISTERED DRUGS (ALT 637 FOR MEDICARE OP): Performed by: STUDENT IN AN ORGANIZED HEALTH CARE EDUCATION/TRAINING PROGRAM

## 2025-07-29 PROCEDURE — 72128 CT CHEST SPINE W/O DYE: CPT | Performed by: RADIOLOGY

## 2025-07-29 PROCEDURE — 93005 ELECTROCARDIOGRAM TRACING: CPT

## 2025-07-29 PROCEDURE — 80053 COMPREHEN METABOLIC PANEL: CPT

## 2025-07-29 PROCEDURE — 85025 COMPLETE CBC W/AUTO DIFF WBC: CPT

## 2025-07-29 PROCEDURE — 83605 ASSAY OF LACTIC ACID: CPT

## 2025-07-29 PROCEDURE — 2500000002 HC RX 250 W HCPCS SELF ADMINISTERED DRUGS (ALT 637 FOR MEDICARE OP, ALT 636 FOR OP/ED)

## 2025-07-29 PROCEDURE — 73562 X-RAY EXAM OF KNEE 3: CPT | Mod: LT

## 2025-07-29 PROCEDURE — 99285 EMERGENCY DEPT VISIT HI MDM: CPT | Mod: 25 | Performed by: EMERGENCY MEDICINE

## 2025-07-29 PROCEDURE — 2500000001 HC RX 250 WO HCPCS SELF ADMINISTERED DRUGS (ALT 637 FOR MEDICARE OP)

## 2025-07-29 PROCEDURE — 96374 THER/PROPH/DIAG INJ IV PUSH: CPT | Mod: 59

## 2025-07-29 PROCEDURE — 2500000004 HC RX 250 GENERAL PHARMACY W/ HCPCS (ALT 636 FOR OP/ED): Mod: JZ

## 2025-07-29 PROCEDURE — 74177 CT ABD & PELVIS W/CONTRAST: CPT

## 2025-07-29 PROCEDURE — 82077 ASSAY SPEC XCP UR&BREATH IA: CPT | Mod: CCI

## 2025-07-29 PROCEDURE — 83880 ASSAY OF NATRIURETIC PEPTIDE: CPT

## 2025-07-29 PROCEDURE — 72131 CT LUMBAR SPINE W/O DYE: CPT | Performed by: RADIOLOGY

## 2025-07-29 PROCEDURE — 83735 ASSAY OF MAGNESIUM: CPT

## 2025-07-29 PROCEDURE — 73630 X-RAY EXAM OF FOOT: CPT | Mod: LEFT SIDE | Performed by: STUDENT IN AN ORGANIZED HEALTH CARE EDUCATION/TRAINING PROGRAM

## 2025-07-29 PROCEDURE — 96372 THER/PROPH/DIAG INJ SC/IM: CPT | Mod: 59

## 2025-07-29 PROCEDURE — G0378 HOSPITAL OBSERVATION PER HR: HCPCS

## 2025-07-29 PROCEDURE — 2550000001 HC RX 255 CONTRASTS: Performed by: EMERGENCY MEDICINE

## 2025-07-29 PROCEDURE — 99222 1ST HOSP IP/OBS MODERATE 55: CPT

## 2025-07-29 PROCEDURE — 73562 X-RAY EXAM OF KNEE 3: CPT | Mod: LEFT SIDE | Performed by: STUDENT IN AN ORGANIZED HEALTH CARE EDUCATION/TRAINING PROGRAM

## 2025-07-29 PROCEDURE — 80307 DRUG TEST PRSMV CHEM ANLYZR: CPT

## 2025-07-29 PROCEDURE — 73630 X-RAY EXAM OF FOOT: CPT | Mod: LT

## 2025-07-29 PROCEDURE — 80361 OPIATES 1 OR MORE: CPT | Mod: GEALAB

## 2025-07-29 PROCEDURE — 84100 ASSAY OF PHOSPHORUS: CPT

## 2025-07-29 RX ORDER — KETOROLAC TROMETHAMINE 15 MG/ML
15 INJECTION, SOLUTION INTRAMUSCULAR; INTRAVENOUS ONCE
Status: DISCONTINUED | OUTPATIENT
Start: 2025-07-29 | End: 2025-07-29

## 2025-07-29 RX ORDER — LEVOTHYROXINE SODIUM 112 UG/1
112 TABLET ORAL DAILY
Status: DISCONTINUED | OUTPATIENT
Start: 2025-07-30 | End: 2025-07-30 | Stop reason: HOSPADM

## 2025-07-29 RX ORDER — ENOXAPARIN SODIUM 100 MG/ML
40 INJECTION SUBCUTANEOUS EVERY 24 HOURS
Status: DISCONTINUED | OUTPATIENT
Start: 2025-07-29 | End: 2025-07-30 | Stop reason: HOSPADM

## 2025-07-29 RX ORDER — MORPHINE SULFATE 4 MG/ML
4 INJECTION INTRAVENOUS ONCE
Status: COMPLETED | OUTPATIENT
Start: 2025-07-29 | End: 2025-07-29

## 2025-07-29 RX ORDER — EZETIMIBE 10 MG/1
10 TABLET ORAL DAILY
Status: DISCONTINUED | OUTPATIENT
Start: 2025-07-29 | End: 2025-07-30 | Stop reason: HOSPADM

## 2025-07-29 RX ORDER — ATORVASTATIN CALCIUM 80 MG/1
80 TABLET, FILM COATED ORAL NIGHTLY
Status: DISCONTINUED | OUTPATIENT
Start: 2025-07-29 | End: 2025-07-30 | Stop reason: HOSPADM

## 2025-07-29 RX ORDER — METOCLOPRAMIDE HYDROCHLORIDE 5 MG/ML
10 INJECTION INTRAMUSCULAR; INTRAVENOUS ONCE
Status: COMPLETED | OUTPATIENT
Start: 2025-07-29 | End: 2025-07-29

## 2025-07-29 RX ORDER — NAPROXEN SODIUM 220 MG/1
81 TABLET, FILM COATED ORAL DAILY
Status: DISCONTINUED | OUTPATIENT
Start: 2025-07-29 | End: 2025-07-30 | Stop reason: HOSPADM

## 2025-07-29 RX ORDER — KETOROLAC TROMETHAMINE 15 MG/ML
15 INJECTION, SOLUTION INTRAMUSCULAR; INTRAVENOUS ONCE
Status: COMPLETED | OUTPATIENT
Start: 2025-07-29 | End: 2025-07-29

## 2025-07-29 RX ORDER — DESMOPRESSIN ACETATE 0.1 MG/ML
1 SOLUTION NASAL NIGHTLY
Status: DISCONTINUED | OUTPATIENT
Start: 2025-07-29 | End: 2025-07-29

## 2025-07-29 RX ORDER — PANTOPRAZOLE SODIUM 40 MG/1
40 TABLET, DELAYED RELEASE ORAL
Status: DISCONTINUED | OUTPATIENT
Start: 2025-07-30 | End: 2025-07-30 | Stop reason: HOSPADM

## 2025-07-29 RX ORDER — POTASSIUM CHLORIDE 1.5 G/1.58G
40 POWDER, FOR SOLUTION ORAL ONCE
Status: COMPLETED | OUTPATIENT
Start: 2025-07-29 | End: 2025-07-29

## 2025-07-29 RX ORDER — LIDOCAINE 560 MG/1
1 PATCH PERCUTANEOUS; TOPICAL; TRANSDERMAL DAILY
Status: DISCONTINUED | OUTPATIENT
Start: 2025-07-29 | End: 2025-07-30 | Stop reason: HOSPADM

## 2025-07-29 RX ORDER — LEVETIRACETAM 500 MG/1
750 TABLET ORAL 2 TIMES DAILY
Status: DISCONTINUED | OUTPATIENT
Start: 2025-07-29 | End: 2025-07-30 | Stop reason: HOSPADM

## 2025-07-29 RX ORDER — DESMOPRESSIN ACETATE 0.1 MG/ML
1 SOLUTION NASAL NIGHTLY
Status: DISCONTINUED | OUTPATIENT
Start: 2025-07-30 | End: 2025-07-30 | Stop reason: HOSPADM

## 2025-07-29 RX ORDER — POLYETHYLENE GLYCOL 3350 17 G/17G
17 POWDER, FOR SOLUTION ORAL DAILY
Status: DISCONTINUED | OUTPATIENT
Start: 2025-07-29 | End: 2025-07-30 | Stop reason: HOSPADM

## 2025-07-29 RX ADMIN — IOHEXOL 75 ML: 350 INJECTION, SOLUTION INTRAVENOUS at 15:19

## 2025-07-29 RX ADMIN — METOCLOPRAMIDE 10 MG: 5 INJECTION, SOLUTION INTRAMUSCULAR; INTRAVENOUS at 14:31

## 2025-07-29 RX ADMIN — LEVETIRACETAM 750 MG: 500 TABLET, FILM COATED ORAL at 20:52

## 2025-07-29 RX ADMIN — KETOROLAC TROMETHAMINE 15 MG: 15 INJECTION, SOLUTION INTRAMUSCULAR; INTRAVENOUS at 14:31

## 2025-07-29 RX ADMIN — EZETIMIBE 10 MG: 10 TABLET ORAL at 20:56

## 2025-07-29 RX ADMIN — DESMOPRESSIN ACETATE 10 MCG: 0.1 SOLUTION NASAL at 20:56

## 2025-07-29 RX ADMIN — ATORVASTATIN CALCIUM 80 MG: 80 TABLET, FILM COATED ORAL at 20:52

## 2025-07-29 RX ADMIN — MORPHINE SULFATE 4 MG: 4 INJECTION INTRAVENOUS at 14:31

## 2025-07-29 RX ADMIN — POTASSIUM CHLORIDE 40 MEQ: 1.5 POWDER, FOR SOLUTION ORAL at 15:54

## 2025-07-29 RX ADMIN — POLYETHYLENE GLYCOL 3350 17 G: 17 POWDER, FOR SOLUTION ORAL at 19:55

## 2025-07-29 RX ADMIN — ASPIRIN 81 MG: 81 TABLET, CHEWABLE ORAL at 20:52

## 2025-07-29 RX ADMIN — ENOXAPARIN SODIUM 40 MG: 100 INJECTION SUBCUTANEOUS at 19:57

## 2025-07-29 SDOH — SOCIAL STABILITY: SOCIAL INSECURITY
WITHIN THE LAST YEAR, HAVE YOU BEEN RAPED OR FORCED TO HAVE ANY KIND OF SEXUAL ACTIVITY BY YOUR PARTNER OR EX-PARTNER?: NO

## 2025-07-29 SDOH — ECONOMIC STABILITY: HOUSING INSECURITY: IN THE LAST 12 MONTHS, WAS THERE A TIME WHEN YOU WERE NOT ABLE TO PAY THE MORTGAGE OR RENT ON TIME?: NO

## 2025-07-29 SDOH — ECONOMIC STABILITY: FOOD INSECURITY: WITHIN THE PAST 12 MONTHS, THE FOOD YOU BOUGHT JUST DIDN'T LAST AND YOU DIDN'T HAVE MONEY TO GET MORE.: NEVER TRUE

## 2025-07-29 SDOH — ECONOMIC STABILITY: FOOD INSECURITY: WITHIN THE PAST 12 MONTHS, YOU WORRIED THAT YOUR FOOD WOULD RUN OUT BEFORE YOU GOT THE MONEY TO BUY MORE.: NEVER TRUE

## 2025-07-29 SDOH — SOCIAL STABILITY: SOCIAL INSECURITY: ABUSE: ADULT

## 2025-07-29 SDOH — ECONOMIC STABILITY: TRANSPORTATION INSECURITY: IN THE PAST 12 MONTHS, HAS LACK OF TRANSPORTATION KEPT YOU FROM MEDICAL APPOINTMENTS OR FROM GETTING MEDICATIONS?: YES

## 2025-07-29 SDOH — ECONOMIC STABILITY: HOUSING INSECURITY: AT ANY TIME IN THE PAST 12 MONTHS, WERE YOU HOMELESS OR LIVING IN A SHELTER (INCLUDING NOW)?: NO

## 2025-07-29 SDOH — SOCIAL STABILITY: SOCIAL INSECURITY: WITHIN THE LAST YEAR, HAVE YOU BEEN HUMILIATED OR EMOTIONALLY ABUSED IN OTHER WAYS BY YOUR PARTNER OR EX-PARTNER?: NO

## 2025-07-29 SDOH — ECONOMIC STABILITY: FOOD INSECURITY: HOW HARD IS IT FOR YOU TO PAY FOR THE VERY BASICS LIKE FOOD, HOUSING, MEDICAL CARE, AND HEATING?: NOT VERY HARD

## 2025-07-29 SDOH — SOCIAL STABILITY: SOCIAL INSECURITY: HAVE YOU HAD THOUGHTS OF HARMING ANYONE ELSE?: NO

## 2025-07-29 SDOH — SOCIAL STABILITY: SOCIAL INSECURITY
WITHIN THE LAST YEAR, HAVE YOU BEEN KICKED, HIT, SLAPPED, OR OTHERWISE PHYSICALLY HURT BY YOUR PARTNER OR EX-PARTNER?: NO

## 2025-07-29 SDOH — ECONOMIC STABILITY: INCOME INSECURITY: IN THE PAST 12 MONTHS HAS THE ELECTRIC, GAS, OIL, OR WATER COMPANY THREATENED TO SHUT OFF SERVICES IN YOUR HOME?: NO

## 2025-07-29 SDOH — SOCIAL STABILITY: SOCIAL INSECURITY: ARE THERE ANY APPARENT SIGNS OF INJURIES/BEHAVIORS THAT COULD BE RELATED TO ABUSE/NEGLECT?: NO

## 2025-07-29 SDOH — SOCIAL STABILITY: SOCIAL INSECURITY: WITHIN THE LAST YEAR, HAVE YOU BEEN AFRAID OF YOUR PARTNER OR EX-PARTNER?: NO

## 2025-07-29 SDOH — SOCIAL STABILITY: SOCIAL INSECURITY: DO YOU FEEL ANYONE HAS EXPLOITED OR TAKEN ADVANTAGE OF YOU FINANCIALLY OR OF YOUR PERSONAL PROPERTY?: NO

## 2025-07-29 SDOH — SOCIAL STABILITY: SOCIAL INSECURITY: HAVE YOU HAD ANY THOUGHTS OF HARMING ANYONE ELSE?: NO

## 2025-07-29 SDOH — ECONOMIC STABILITY: HOUSING INSECURITY: IN THE PAST 12 MONTHS, HOW MANY TIMES HAVE YOU MOVED WHERE YOU WERE LIVING?: 0

## 2025-07-29 SDOH — SOCIAL STABILITY: SOCIAL INSECURITY: HAS ANYONE EVER THREATENED TO HURT YOUR FAMILY OR YOUR PETS?: NO

## 2025-07-29 SDOH — SOCIAL STABILITY: SOCIAL INSECURITY: ARE YOU OR HAVE YOU BEEN THREATENED OR ABUSED PHYSICALLY, EMOTIONALLY, OR SEXUALLY BY ANYONE?: NO

## 2025-07-29 SDOH — SOCIAL STABILITY: SOCIAL INSECURITY: DOES ANYONE TRY TO KEEP YOU FROM HAVING/CONTACTING OTHER FRIENDS OR DOING THINGS OUTSIDE YOUR HOME?: NO

## 2025-07-29 SDOH — SOCIAL STABILITY: SOCIAL INSECURITY: WERE YOU ABLE TO COMPLETE ALL THE BEHAVIORAL HEALTH SCREENINGS?: YES

## 2025-07-29 SDOH — SOCIAL STABILITY: SOCIAL INSECURITY: DO YOU FEEL UNSAFE GOING BACK TO THE PLACE WHERE YOU ARE LIVING?: NO

## 2025-07-29 ASSESSMENT — PAIN - FUNCTIONAL ASSESSMENT
PAIN_FUNCTIONAL_ASSESSMENT: 0-10

## 2025-07-29 ASSESSMENT — COGNITIVE AND FUNCTIONAL STATUS - GENERAL
DRESSING REGULAR UPPER BODY CLOTHING: A LITTLE
TURNING FROM BACK TO SIDE WHILE IN FLAT BAD: A LITTLE
CLIMB 3 TO 5 STEPS WITH RAILING: A LOT
MOVING TO AND FROM BED TO CHAIR: A LITTLE
MOVING FROM LYING ON BACK TO SITTING ON SIDE OF FLAT BED WITH BEDRAILS: A LITTLE
PATIENT BASELINE BEDBOUND: NO
DRESSING REGULAR LOWER BODY CLOTHING: A LITTLE
MOBILITY SCORE: 17
TOILETING: A LITTLE
HELP NEEDED FOR BATHING: A LITTLE
STANDING UP FROM CHAIR USING ARMS: A LITTLE
DAILY ACTIVITIY SCORE: 20
WALKING IN HOSPITAL ROOM: A LITTLE

## 2025-07-29 ASSESSMENT — ACTIVITIES OF DAILY LIVING (ADL)
JUDGMENT_ADEQUATE_SAFELY_COMPLETE_DAILY_ACTIVITIES: YES
LACK_OF_TRANSPORTATION: NO
WALKS IN HOME: NEEDS ASSISTANCE
BATHING: NEEDS ASSISTANCE
TOILETING: NEEDS ASSISTANCE
PATIENT'S MEMORY ADEQUATE TO SAFELY COMPLETE DAILY ACTIVITIES?: YES
HEARING - RIGHT EAR: FUNCTIONAL
DRESSING YOURSELF: NEEDS ASSISTANCE
ADEQUATE_TO_COMPLETE_ADL: YES
FEEDING YOURSELF: NEEDS ASSISTANCE
HEARING - LEFT EAR: FUNCTIONAL
GROOMING: NEEDS ASSISTANCE

## 2025-07-29 ASSESSMENT — PATIENT HEALTH QUESTIONNAIRE - PHQ9
1. LITTLE INTEREST OR PLEASURE IN DOING THINGS: NOT AT ALL
SUM OF ALL RESPONSES TO PHQ9 QUESTIONS 1 & 2: 0
2. FEELING DOWN, DEPRESSED OR HOPELESS: NOT AT ALL

## 2025-07-29 ASSESSMENT — ENCOUNTER SYMPTOMS
BACK PAIN: 1
DIZZINESS: 0
PALPITATIONS: 1
CHILLS: 0
FATIGUE: 1
LIGHT-HEADEDNESS: 0
FEVER: 0
SHORTNESS OF BREATH: 1

## 2025-07-29 ASSESSMENT — LIFESTYLE VARIABLES
HOW MANY STANDARD DRINKS CONTAINING ALCOHOL DO YOU HAVE ON A TYPICAL DAY: PATIENT DOES NOT DRINK
SKIP TO QUESTIONS 9-10: 1
AUDIT-C TOTAL SCORE: 0
HOW OFTEN DO YOU HAVE A DRINK CONTAINING ALCOHOL: NEVER
HOW OFTEN DO YOU HAVE 6 OR MORE DRINKS ON ONE OCCASION: NEVER
AUDIT-C TOTAL SCORE: 0

## 2025-07-29 ASSESSMENT — PAIN SCALES - GENERAL
PAINLEVEL_OUTOF10: 0 - NO PAIN
PAINLEVEL_OUTOF10: 7
PAINLEVEL_OUTOF10: 0 - NO PAIN
PAINLEVEL_OUTOF10: 10 - WORST POSSIBLE PAIN

## 2025-07-29 NOTE — PROGRESS NOTES
Emergency Medicine Transition of Care Note.    I received Lu Fall in signout from Dr. Conway.  Please see the previous ED provider note for all HPI, PE and MDM up to the time of signout at 1400. This is in addition to the primary record.    In brief uL Fall is an 44 y.o. female presenting for   Chief Complaint   Patient presents with    Back Pain    Shortness of Breath     At the time of signout we were awaiting: Imaging.  Imaging reviewed by me.  She has no signs or symptoms of cauda equina on reevaluation.  However given the prolonged QTc as well as the T wave inversions will admit due to the shortness of breath.  Discussed with patient and hospitalist who are agreeable to plan.  Of note patient did endorse some pain in her foot and knee.  She has full flexion and extension and is neurovascularly intact have low suspicion for fracture or injury but will obtain x-rays awaiting admission.  Patient was ambulatory with a mildly antalgic gait but steady on her feet.  Gluteal tone intact.  Normal saddle sensation.  Equal strength bilaterally.    ED Course as of 07/29/25 1740   Tue Jul 29, 2025   1414 Patient is endorsed to oncoming physician Dr. Granado at 1430 [RZ]   1415 ECG 12 lead  Normal sinus rhythm, rightward axis, T wave abnormality, prolonged QT [BF]   1545 EKG read by me reviewed by me is normal sinus rhythm at 77 bpm.  Normal axis.  T wave inversions noted in inferior as well as anterior septal and lateral leads with a prolonged QTc at 534 ms. [HD]   1657 IMPRESSION:  1.  No evidence of pulmonary embolus, aneurysm or dissection.  2.  The lungs are relatively clear.  3. There has not been significant change from the previous exam.   [HD]   1657 IMPRESSION:  No acute findings.   [HD]   1657 IMPRESSION:  4 mm posterior disc protrusion at the L4-5 level with mild foraminal  impingement.   [HD]      ED Course User Index  [BF] Arnold Mccord DO  [HD] Bre Granado DO  [RZ] Dorian ARANDA  MD Zoraida         Diagnoses as of 07/29/25 1740   Shortness of breath   Acute midline thoracic back pain   Lumbar back pain   Nonspecific ST-T wave electrocardiographic changes       Procedure  Procedures    Bre Granado DO

## 2025-07-29 NOTE — H&P
"History Of Present Illness  Lu Fall is a 44 y.o. 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 The patient describes the pain as \"shooting\" and \"electric\". The patient states the SOB has has happened intermittently for some time, however, back pain is new. Patient got no relief from Tylenol. Patient also had a fall 3 days ago due to dizziness but reports no LOC.      Patient endorses, pain with deep breaths, and arm movement as well as fluttering. Denies chest pain, dizziness or positional sleep changes.       ED Course:  Vitals: T 36.5, /73, HR 82, RR 18, SpO2 98    Labs:   CMP- Potassium 3.3, otherwise wnl   CBC- wnl   Urine Tox- positive opiates (expected per meds), otherwise negative     Imaging:     EKG   Normal sinus rhythm  T wave abnormality, consider inferior ischemia  Prolonged QT  4. When compared with ECG of 29-JUL-2025 12:34, (unconfirmed)   5. nonspecific T wave abnormality has replaced inverted T waves in Lateral leads  6. QT has lengthened    CT Abdomen Pelvis W IV Contrast   1.  Fatty infiltration of the liver, probably with additional more  focal involvement at hepatic segment 4 B. MRI correlation should be  considered.  2. 2 left renal cortical hypodensities too small to definitively  characterize, while likely benign sonographic correlation would be  recommended.  3. Otherwise no " "acute findings or significant change.    CT Angio Chest for PE  1.  No evidence of pulmonary embolus, aneurysm or dissection.  2.  The lungs are relatively clear.  3. There has not been significant change from the previous exam.    CT Lumbar Spine   4 mm posterior disc protrusion at the L4-5 level with mild foraminal  impingement.    CT Thoracic Spine   No acute findings.    Interventions:   - Toradol (IV 15mg)   - Reglan (IV 10 mg)   - Morphine (IV 4mg)  - Potassium Chloride Packet (oral 40mEq)     Past Medical History    Medical History[1]  Surgical History  Surgical History[2]     Social History  Social History[3]      Allergies  Adhesive, Hydrocodone-acetaminophen, and Ondansetron     Review of Systems  Review of Systems   Constitutional:  Positive for fatigue. Negative for chills and fever.   Respiratory:  Positive for shortness of breath.    Cardiovascular:  Positive for palpitations (\"Fluttering\"). Negative for chest pain and leg swelling.   Musculoskeletal:  Positive for back pain.   Neurological:  Negative for dizziness and light-headedness.           Objective    Temp:  [36.5 °C (97.7 °F)] 36.5 °C (97.7 °F)  Heart Rate:  [75-85] 76  Resp:  [13-24] 13  BP: (107-137)/(73-98) 133/98    Physical Exam  Constitutional:       Comments: Looks older than stated age, slightly disheveled       Cardiovascular:      Rate and Rhythm: Normal rate and regular rhythm.      Heart sounds: Murmur (\"whooshing\") heard.   Pulmonary:      Effort: Pulmonary effort is normal. No respiratory distress.      Breath sounds: Normal breath sounds. No wheezing, rhonchi or rales.   Chest:      Chest wall: No tenderness.   Abdominal:      General: Abdomen is flat.      Palpations: Abdomen is soft.     Musculoskeletal:         General: Tenderness (Posterior midline thoracic) present. No signs of injury.      Cervical back: Tenderness (midline posterior) present.      Right lower leg: No edema.      Left lower leg: No edema.     Neurological: "      General: No focal deficit present.      Mental Status: She is alert and oriented to person, place, and time.         Scheduled medications  Scheduled Medications[4]  Continuous medications  Continuous Medications[5]  PRN medications  PRN Medications[6]       Assessment/Plan  Lu Fall is a 44 y.o. 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 Likely due to musculoskeletal vs neuropathic pain.  Admitted for T wave inversions on EKG, and hypokalemia, however trops were negative.      ACUTE PROBLEMS   #Back pain  :Musculoskeletal vs neuropathic 2 spinal stenosis  :CT unremarkable for fractures, shows disc herniation   -pain control: lidocaine patch, dilaudid   - monitor symptoms tomorrow   - consult PT/OT appreciate recs     #Hypokalemia   :T wave inversions   : appear on prior EKGs   : received 40mg in ED  :Troponin normal    - keep on telemetry   - continue to monitor   - repeat labs AM      CHRONIC PROBLEMS:  #Hypothyroidism - Lovenox   #Chronic pain - toradol, morphine  #Nausea and Migranes- metoclopramide        Global Plan of Care  IVF: none  Diet: normal   DVT prophylaxis:   Bowel regimen: miralx   Consults: OT/ PT   Code Status: Full code              Anabel Fregoso  MS3    Attestation: 44 Year old female presenting for shortness of breath and lower back pain. Inverted T waves on EKG. Repleting potassium and will continue to monitor. PT/OT consulted. Reviewed H and P, agree with plan outlined.  Wili  "DO Teri  PGY-2       [1]   Past Medical History:  Diagnosis Date    COPD (chronic obstructive pulmonary disease) (Multi)     Coronary artery disease     Personal history of other endocrine, nutritional and metabolic disease     History of hypopituitarism    Personal history of other mental and behavioral disorders     History of depression    Personal history of other specified conditions     History of brain tumor    Syncope 07/29/2025    Unspecified convulsions (Multi)     Convulsion   [2]   Past Surgical History:  Procedure Laterality Date    BRAIN SURGERY      CARDIAC SURGERY      MR HEAD ANGIO WO IV CONTRAST  07/15/2021    MR HEAD ANGIO WO IV CONTRAST 7/15/2021 [facility]    MR HEAD ANGIO WO IV CONTRAST  11/28/2017    MR HEAD ANGIO WO IV CONTRAST [facility]    MR HEAD ANGIO WO IV CONTRAST  03/13/2018    MR HEAD ANGIO WO IV CONTRAST [facility]    MR NECK ANGIO WO IV CONTRAST  07/15/2021    MR NECK ANGIO WO IV CONTRAST 7/15/2021 [facility]    OTHER SURGICAL HISTORY  08/17/2013    Craniotomy Tumor Removal - Complete   [3]   Social History  Tobacco Use    Smoking status: Former     Types: Cigarettes    Smokeless tobacco: Never   Vaping Use    Vaping status: Never Used   Substance Use Topics    Alcohol use: Never    Drug use: Yes     Types: \"Crack\" cocaine, Cocaine     Comment: last use a month ago as of 5/12/25   [4] enoxaparin, 40 mg, subcutaneous, q24h  lidocaine, 1 patch, transdermal, Daily  polyethylene glycol, 17 g, oral, Daily     [5]    [6] PRN medications: HYDROmorphone    "

## 2025-07-29 NOTE — ED TRIAGE NOTES
Pt presents via [facility] from home for c/o low back pain and Upper back pain. States when she takes a deep breathe she gets a shooting pain into her back and down her arms. States she can not feel her legs. Pt states the pain started last night, but she has been weak for a few weeks now.

## 2025-07-29 NOTE — PROGRESS NOTES
Pharmacy Medication History Review    Lu Fall is a 44 y.o. female admitted for Numbness. Pharmacy reviewed the patient's [id number] medications and allergies for accuracy.    The list below reflectives the updated PTA list. Please review each medication in order reconciliation for additional clarification and justification.  Prior to Admission Medications   Prescriptions Last Dose Informant Patient Reported? Taking?   alum-mag hydroxide-simeth (Mylanta) 200-200-20 mg/5 mL oral suspension Not Taking Self No No   Sig: Take 30 mL by mouth 4 times a day as needed for indigestion or heartburn.   Patient not taking: Reported on 7/29/2025   aspirin 81 mg chewable tablet 7/28/2025 Morning Self Yes Yes   Sig: Chew and swallow 1 tablet (81 mg) once daily.   atorvastatin (Lipitor) 80 mg tablet 7/28/2025 Evening Self Yes Yes   Sig: Take 1 tablet (80 mg) by mouth once daily at bedtime.   desmopressin (DDAVP) 10 mcg/spray (0.1 mL) 7/28/2025 Evening Self Yes Yes   Sig: Administer 1 spray (10 mcg) into one nostril once daily at bedtime.   ezetimibe (Zetia) 10 mg tablet 7/28/2025 Morning Self Yes Yes   Sig: Take 1 tablet (10 mg) by mouth once daily.   levETIRAcetam (Keppra) 750 mg tablet 7/28/2025 Morning Self Yes Yes   Sig: Take by mouth twice a day.   levothyroxine (Synthroid, Levoxyl) 112 mcg tablet 7/28/2025 Morning Self Yes Yes   Sig: Take 1 tablet (112 mcg) by mouth once daily.   lidocaine 4 % patch  Self No No   Sig: Place 1 patch over 12 hours on the skin once daily. Remove & discard patch within 12 hours or as directed by MD. Do not start before January 30, 2024.   Patient not taking: Reported on 3/24/2025   methocarbamol (Robaxin) 500 mg tablet Past Week Self Yes Yes   Sig: Take 1 tablet (500 mg) by mouth 3 times a day as needed.   metoclopramide (Reglan) 10 mg tablet Past Month Self No Yes   Sig: Take 1 tablet (10 mg) by mouth every 6 hours for 7 days.   orphenadrine (Norflex) 100 mg 12 hr tablet   No No    Sig: Take 1 tablet (100 mg) by mouth 2 times a day as needed for muscle spasms for up to 7 days. Do not crush, chew, or split.   Patient not taking: Reported on 2025   pantoprazole (ProtoNix) 40 mg EC tablet 2025 Morning Self Yes Yes   Sig: Take 1 tablet (40 mg) by mouth once daily.   predniSONE (Deltasone) 5 mg tablet 2025 Morning Self No Yes   Si p.o. on day 1 and then decrease by 1 pill daily until gone   pregabalin (Lyrica) 100 mg capsule  Self Yes No   Sig: Take 1 capsule (100 mg) by mouth 2 times a day.   promethazine (Phenergan) 25 mg tablet  Self No No   Sig: Take 0.5 tablets (12.5 mg) by mouth every 6 hours if needed for nausea or vomiting for up to 10 doses.   Patient not taking: Reported on 2025   traZODone (Desyrel) 50 mg tablet Past Month Self Yes Yes   Sig: Take 1 tablet (50 mg) by mouth once daily at bedtime.   ubrogepant (Ubrelvy) 100 mg tablet tablet Past Week Self Yes Yes   Sig: Take 1 tablet (100 mg) by mouth if needed.   zolpidem (Ambien) 10 mg tablet  Self Yes No   Sig: Take 1 tablet (10 mg) by mouth once daily as needed for sleep.      Facility-Administered Medications: None            The list below reflectives the updated allergy list. Please review each documented allergy for additional clarification and justification.  Allergies  Reviewed by Nabila Calderon RN on 2025        Severity Reactions Comments    Adhesive Low Hives, Unknown     Hydrocodone-acetaminophen Low Other, Unknown, Nausea/vomiting Other reaction(s): Vomiting    Ondansetron Low Other, Unknown prolonged QT Client is unable to take this medication due  An elongated QT wave   Other reaction(s): Unknown   Prolonged QT per patient Prolonged QT per patient            Below are additional concerns with the patient's PTA list.      MARTY YOU

## 2025-07-29 NOTE — ED PROVIDER NOTES
The patient was seen by the midlevel/resident.  I have personally saw the patient and made/approved the management plan and take responsibility for the patient management.  I reviewed the EKG's (when done) and agree with the interpretation.  I have seen and examined the patient; agree with the workup, evaluation, MDM, and diagnosis.  The care plan has been discussed with the midlevel/resident; I have reviewed the note and agree with the documented findings.     Presents with shortness of breath and pains in her back.  She also has pains in her legs.  A lot of her discomfort sounds like it may be neurologic type pain with burning.  In addition she has EKG that has inverted T waves which is concerning.  We worked up with CT angio of chest and CT abdomen.  Patient will also have cardiac workup.  Given some pain medication morphine has helped her in the past.  She has prolonged QT syndrome so we had to carefully choose medications.  Endorsed oncoming physician Dr. Granado at 1430 awaiting results  ED Course as of 07/30/25 1205   Tue Jul 29, 2025   1414 Patient is endorsed to oncoming physician Dr. Granado at 1430 [RZ]   1415 ECG 12 lead  Normal sinus rhythm, rightward axis, T wave abnormality, prolonged QT [BF]   1545 EKG read by me reviewed by me is normal sinus rhythm at 77 bpm.  Normal axis.  T wave inversions noted in inferior as well as anterior septal and lateral leads with a prolonged QTc at 534 ms. [HD]   1657 IMPRESSION:  1.  No evidence of pulmonary embolus, aneurysm or dissection.  2.  The lungs are relatively clear.  3. There has not been significant change from the previous exam.   [HD]   1657 IMPRESSION:  No acute findings.   [HD]   1657 IMPRESSION:  4 mm posterior disc protrusion at the L4-5 level with mild foraminal  impingement.   [HD]      ED Course User Index  [BF] Arnold Mccord DO  [HD] Bre Granado DO  [RZ] Dorian Conway MD         Diagnoses as of 07/30/25 1205   Shortness of breath    Acute midline thoracic back pain   Lumbar back pain   Nonspecific ST-T wave electrocardiographic changes     MD Dorian Ren MD  07/29/25 1415       Dorian Conway MD  07/30/25 1205       Dorian Conway MD  07/30/25 1200

## 2025-07-29 NOTE — ED PROVIDER NOTES
Emergency Department Provider Note       History of Present Illness     History provided by: Patient  Limitations to History: None  External Records Reviewed with Brief Summary: None    HPI:  Lu Fall is a 44 y.o. female presenting for lumbar and thoracic back pain. States that her thoracic back pain has been present for approximately 1 day. She states that she fell approximately 3 days ago and caught herself on her hands. She states she additionally has numbness and tingling in her bilateral arm pits and upper arms near the shoulder. Her lumbar back pain has bene present for one month. She states that she has numbness and tingling on the inner thigh bilaterally, denies urinary retention or urinary or bladder incontinence. She endorses pain radiating into her left leg intermittently. She endorses shortness of breath. Review os symptoms is positive for; Headaches that are remitted with at-home medication and eye pain with eye movements, which she states both are her baseline after brain surgery. Additionally endorses chest pain that is reproducible with palpation and shortness of breath.     Physical Exam   Triage vitals:  T 36.5 °C (97.7 °F)  HR 82  /73  RR 18  O2 98 % None (Room air)    General: Awake, alert. Appears uncomfortable lying in her back in bed.   Eyes: Gaze conjugate.  No scleral icterus or injection, PERRLA, EOMI  HENT: Normo-cephalic, atraumatic. No stridor.   CV: Regular rate, regular rhythm. Radial pulses 2+ bilaterally. History of cardiac valve replacement.  Resp: Breathing non-labored, speaking in full sentences.  Clear to auscultation bilaterally  GI: Soft, non-distended, non-tender. No rebound or guarding.  MSK/Extremities: No gross bony deformities. Moving all extremities. Bilateral upper extremity strength 4/5. Bilateral lower extremity strength 3/5. Patient endorses back pain with all extremity movements. Patient endorses back pain with sitting up and rolling onto sides.  Midline thoracic tenderness with palpation at approximate level of T7 vertebrae. Midline lumbar pain at approximately level of L4 vertebrae.  Skin: Warm. Appropriate color  Neuro: Alert. Oriented. Face symmetric. Speech is fluent.  Gross strength and sensation intact in b/l UE and LEs  Psych: Appropriate mood and affect      Medical Decision Making & ED Course   Medical Decision Makin y.o. female presenting with chest pain, shortness of breath, nausea, lumbar and thoracic back pain, and bilateral upper and lower extremity weakness that is worse in the lower extremities.  Patient has history of recent fall, 2 days prior to onset of upper extremity symptoms. Lower extremity symptoms have been present longer and are likely not caused by fall, but may have dejon exacerbated by it. Patient appears overall unwell and endorses chest pain and shortness of breath. Will evaluate further with CT angio PE and CT abdomen and pelvis with contrast. Back pain and extremity weakness, numbness/tingling may be result of or exacerbated by recent fall, will evaluate with CT thoracic and lumbar spine.   ----      Differential diagnoses considered include but are not limited to: Acute coronary syndrome, pulmonary embolism, lumbar spinal stenosis,     Social Determinants of Health which Significantly Impact Care: Social Determinants of Health which Significantly Impact Care: None identified     EKG Independent Interpretation: EKG interpreted by myself. Please see ED Course for full interpretation.    Independent Result Review and Interpretation: Relevant laboratory and radiographic results were reviewed and independently interpreted by myself.  As necessary, they are commented on in the ED Course.    Chronic conditions affecting the patient's care: As documented above in [facility]    The patient was discussed with the following consultants/services: None    Care Considerations: As documented above in [facility]    ED Course:  ED Course as of  07/29/25 1427   e Jul 29, 2025   1414 Patient is endorsed to oncoming physician Dr. Mahoney at 1430 [RZ]   1415 ECG 12 lead  Normal sinus rhythm, rightward axis, T wave abnormality, prolonged QT [BF]      ED Course User Index  [BF] Arnold Mccord DO  [RZ] Dorian Conway MD         Diagnoses as of 07/29/25 1427   Shortness of breath   Acute midline thoracic back pain   Lumbar back pain       Disposition   Patient was signed out to dr. mahoney at 2;29 PM pending completion of their work-up.  Please see the next provider's transition of care note for the remainder of the patient's care.     Currently waiting on results of CT AP with IV contrast, CT PE angio, CT thoracic and lumbar spine, CBC, CMP, magnesium, Phosphorus, lactate, troponin, hcg urine.   Procedures   Procedures    Patient seen and discussed with ED attending physician.    DO Arnold Fraser DO  07/29/25 1431

## 2025-07-30 VITALS
HEIGHT: 62 IN | WEIGHT: 138.89 LBS | BODY MASS INDEX: 25.56 KG/M2 | TEMPERATURE: 97.7 F | RESPIRATION RATE: 18 BRPM | SYSTOLIC BLOOD PRESSURE: 116 MMHG | DIASTOLIC BLOOD PRESSURE: 82 MMHG | HEART RATE: 87 BPM | OXYGEN SATURATION: 95 %

## 2025-07-30 PROBLEM — R06.09 DYSPNEA ON EXERTION: Status: RESOLVED | Noted: 2019-07-10 | Resolved: 2025-07-30

## 2025-07-30 PROBLEM — R20.0 NUMBNESS: Status: RESOLVED | Noted: 2025-07-29 | Resolved: 2025-07-30

## 2025-07-30 LAB
ALBUMIN SERPL BCP-MCNC: 4 G/DL (ref 3.4–5)
ANION GAP SERPL CALC-SCNC: 12 MMOL/L (ref 10–20)
BUN SERPL-MCNC: 10 MG/DL (ref 6–23)
CALCIUM SERPL-MCNC: 9.2 MG/DL (ref 8.6–10.3)
CHLORIDE SERPL-SCNC: 103 MMOL/L (ref 98–107)
CO2 SERPL-SCNC: 25 MMOL/L (ref 21–32)
CREAT SERPL-MCNC: 0.75 MG/DL (ref 0.5–1.05)
EGFRCR SERPLBLD CKD-EPI 2021: >90 ML/MIN/1.73M*2
ERYTHROCYTE [DISTWIDTH] IN BLOOD BY AUTOMATED COUNT: 14.8 % (ref 11.5–14.5)
FOLATE SERPL-MCNC: 11 NG/ML
GLUCOSE SERPL-MCNC: 91 MG/DL (ref 74–99)
HCT VFR BLD AUTO: 36.5 % (ref 36–46)
HGB BLD-MCNC: 12.1 G/DL (ref 12–16)
MAGNESIUM SERPL-MCNC: 2.24 MG/DL (ref 1.6–2.4)
MCH RBC QN AUTO: 27 PG (ref 26–34)
MCHC RBC AUTO-ENTMCNC: 33.2 G/DL (ref 32–36)
MCV RBC AUTO: 82 FL (ref 80–100)
NRBC BLD-RTO: 0 /100 WBCS (ref 0–0)
PHOSPHATE SERPL-MCNC: 5.3 MG/DL (ref 2.5–4.9)
PLATELET # BLD AUTO: 201 X10*3/UL (ref 150–450)
POTASSIUM SERPL-SCNC: 4.1 MMOL/L (ref 3.5–5.3)
RBC # BLD AUTO: 4.48 X10*6/UL (ref 4–5.2)
SODIUM SERPL-SCNC: 136 MMOL/L (ref 136–145)
VIT B12 SERPL-MCNC: 831 PG/ML (ref 211–911)
WBC # BLD AUTO: 6 X10*3/UL (ref 4.4–11.3)

## 2025-07-30 PROCEDURE — 2500000002 HC RX 250 W HCPCS SELF ADMINISTERED DRUGS (ALT 637 FOR MEDICARE OP, ALT 636 FOR OP/ED)

## 2025-07-30 PROCEDURE — 99239 HOSP IP/OBS DSCHRG MGMT >30: CPT

## 2025-07-30 PROCEDURE — 80069 RENAL FUNCTION PANEL: CPT

## 2025-07-30 PROCEDURE — 83735 ASSAY OF MAGNESIUM: CPT

## 2025-07-30 PROCEDURE — 85027 COMPLETE CBC AUTOMATED: CPT

## 2025-07-30 PROCEDURE — 82607 VITAMIN B-12: CPT | Mod: GEALAB

## 2025-07-30 PROCEDURE — 2500000001 HC RX 250 WO HCPCS SELF ADMINISTERED DRUGS (ALT 637 FOR MEDICARE OP)

## 2025-07-30 PROCEDURE — 82746 ASSAY OF FOLIC ACID SERUM: CPT | Mod: GEALAB

## 2025-07-30 PROCEDURE — G0378 HOSPITAL OBSERVATION PER HR: HCPCS

## 2025-07-30 PROCEDURE — 36415 COLL VENOUS BLD VENIPUNCTURE: CPT

## 2025-07-30 RX ADMIN — EZETIMIBE 10 MG: 10 TABLET ORAL at 08:16

## 2025-07-30 RX ADMIN — LEVETIRACETAM 750 MG: 500 TABLET, FILM COATED ORAL at 08:16

## 2025-07-30 RX ADMIN — LEVOTHYROXINE SODIUM 112 MCG: 112 TABLET ORAL at 06:04

## 2025-07-30 RX ADMIN — ASPIRIN 81 MG: 81 TABLET, CHEWABLE ORAL at 08:17

## 2025-07-30 ASSESSMENT — PAIN - FUNCTIONAL ASSESSMENT: PAIN_FUNCTIONAL_ASSESSMENT: 0-10

## 2025-07-30 ASSESSMENT — PAIN SCALES - GENERAL: PAINLEVEL_OUTOF10: 0 - NO PAIN

## 2025-07-30 NOTE — DISCHARGE INSTRUCTIONS
Please, take your home medications as instructed after being discharged from the hospital.    OTHER INSTRUCTIONS:  Please complete liver MRI  Please continue taking your home meds as prescribed    UPCOMING APPOINTMENTS:  Please follow-up with physical therapy for back pain.  Please follow-up with your primary care provider after discharge.    Please, follow-up with your primary care provider within 7 to 14 days after leaving the hospital. /appointment services has been requested to make an appointment for you, however if you do not hear back from them within 1 to 2 days, please call your primary physician's office to schedule an appointment. Bring your photo ID and insurance card to your appointment.   [facility]  services can be reached at [phone].     If you experience any worsening symptoms or have any concerns, please contact your primary care provider to schedule an appointment. If you cannot get in touch with your primary care physician, please return to the nearest emergency room or urgent care clinic for an evaluation and treatment.     Thank you for choosing [facility] and allowing us to partake in your medical care!     - Your [facility] inpatient primary care team.

## 2025-07-30 NOTE — HOSPITAL COURSE
Lu Fall is a 44 y.o. 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 ED vitals unremarkable and labs significant for hypokalemia 3.3 and positive opiate on urine tox (expected per home meds). EKG show NSR and inverted T-wave which patient had before but appears more prominent. CT chest show no evidence of PE, aneurysm, dissection. CT thoracic show no acute findings while CT lumbar show 4 mm posterior disc protrusion at the L4-5 level with mild foraminal impingement. CT abd/pelvis show fatty infiltration of liver w/ focal involvement at hepatic segment 4b. Patient received toradol, Reglan, morphine, and KCl while in the ED. She was admitted for T-wave inversions on EKG, and hypokalemia (however trops were negative), back pain. Hypokalemia resolved and patient reported improvement in symptoms. States she has no SOB or back pain (didn't required PRN pain meds). She is medically ready for discharge. Referral for PT given and she is to have liver MRI completed.

## 2025-07-30 NOTE — CARE PLAN
The patient's goals for the shift include  Problem: Pain - Adult  Goal: Verbalizes/displays adequate comfort level or baseline comfort level  Outcome: Progressing     Problem: Safety - Adult  Goal: Free from fall injury  Outcome: Progressing     Problem: Discharge Planning  Goal: Discharge to home or other facility with appropriate resources  Outcome: Progressing     Problem: Chronic Conditions and Co-morbidities  Goal: Patient's chronic conditions and co-morbidity symptoms are monitored and maintained or improved  Outcome: Progressing     Problem: Nutrition  Goal: Nutrient intake appropriate for maintaining nutritional needs  Outcome: Progressing     Problem: Pain  Goal: Takes deep breaths with improved pain control throughout the shift  Outcome: Progressing  Goal: Turns in bed with improved pain control throughout the shift  Outcome: Progressing  Goal: Walks with improved pain control throughout the shift  Outcome: Progressing  Goal: Performs ADL's with improved pain control throughout shift  Outcome: Progressing  Goal: Participates in PT with improved pain control throughout the shift  Outcome: Progressing  Goal: Free from opioid side effects throughout the shift  Outcome: Progressing  Goal: Free from acute confusion related to pain meds throughout the shift  Outcome: Progressing     Problem: Fall/Injury  Goal: Not fall by end of shift  Outcome: Progressing  Goal: Be free from injury by end of the shift  Outcome: Progressing  Goal: Verbalize understanding of personal risk factors for fall in the hospital  Outcome: Progressing  Goal: Verbalize understanding of risk factor reduction measures to prevent injury from fall in the home  Outcome: Progressing  Goal: Use assistive devices by end of the shift  Outcome: Progressing  Goal: Pace activities to prevent fatigue by end of the shift  Outcome: Progressing

## 2025-07-30 NOTE — CARE PLAN
The patient's goals for the shift include      The clinical goals for the shift include remain free from falls    Problem: Pain - Adult  Goal: Verbalizes/displays adequate comfort level or baseline comfort level  Outcome: Progressing     Problem: Safety - Adult  Goal: Free from fall injury  Outcome: Progressing     Problem: Discharge Planning  Goal: Discharge to home or other facility with appropriate resources  Outcome: Progressing     Problem: Chronic Conditions and Co-morbidities  Goal: Patient's chronic conditions and co-morbidity symptoms are monitored and maintained or improved  Outcome: Progressing     Problem: Nutrition  Goal: Nutrient intake appropriate for maintaining nutritional needs  Outcome: Progressing     Problem: Pain  Goal: Takes deep breaths with improved pain control throughout the shift  Outcome: Progressing  Goal: Turns in bed with improved pain control throughout the shift  Outcome: Progressing  Goal: Walks with improved pain control throughout the shift  Outcome: Progressing  Goal: Performs ADL's with improved pain control throughout shift  Outcome: Progressing  Goal: Participates in PT with improved pain control throughout the shift  Outcome: Progressing  Goal: Free from opioid side effects throughout the shift  Outcome: Progressing  Goal: Free from acute confusion related to pain meds throughout the shift  Outcome: Progressing     Problem: Fall/Injury  Goal: Not fall by end of shift  Outcome: Progressing  Goal: Be free from injury by end of the shift  Outcome: Progressing  Goal: Verbalize understanding of personal risk factors for fall in the hospital  Outcome: Progressing  Goal: Verbalize understanding of risk factor reduction measures to prevent injury from fall in the home  Outcome: Progressing  Goal: Use assistive devices by end of the shift  Outcome: Progressing  Goal: Pace activities to prevent fatigue by end of the shift  Outcome: Progressing

## 2025-07-30 NOTE — DISCHARGE SUMMARY
Discharge Diagnosis  Thoracic Back Pain    Issues Requiring Follow-Up  Thoracic Back Pain    Discharge Meds     Medication List      CONTINUE taking these medications     aspirin 81 mg chewable tablet   atorvastatin 80 mg tablet; Commonly known as: Lipitor   desmopressin 10 mcg/spray (0.1 mL); Commonly known as: DDAVP   ezetimibe 10 mg tablet; Commonly known as: Zetia   Keppra 750 mg tablet; Generic drug: levETIRAcetam   levothyroxine 112 mcg tablet; Commonly known as: Synthroid, Levoxyl   metoclopramide 10 mg tablet; Commonly known as: Reglan; Take 1 tablet   (10 mg) by mouth every 6 hours for 7 days.   pantoprazole 40 mg EC tablet; Commonly known as: ProtoNix   pregabalin 100 mg capsule; Commonly known as: Lyrica   traZODone 50 mg tablet; Commonly known as: Desyrel   ubrogepant 100 mg tablet; Commonly known as: Ubrelvy   zolpidem 10 mg tablet; Commonly known as: Ambien     STOP taking these medications     alum-mag hydroxide-simeth 200-200-20 mg/5 mL oral suspension; Commonly   known as: Mylanta   lidocaine 4 % patch   methocarbamol 500 mg tablet; Commonly known as: Robaxin   orphenadrine 100 mg 12 hr tablet; Commonly known as: Norflex   predniSONE 5 mg tablet; Commonly known as: Deltasone   promethazine 25 mg tablet; Commonly known as: Phenergan     Test Results Pending At Discharge  Pending Labs       Order Current Status    Folate In process    Opiate Confirmation, Urine In process    Vitamin B12 In process          Hospital Course  Lu Fall is a 44 y.o. 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 ED vitals unremarkable and labs significant for hypokalemia 3.3 and positive opiate on urine tox (expected per home meds). EKG show NSR and inverted T-wave which patient had before but appears more prominent. CT chest show no evidence of PE, aneurysm, dissection. CT thoracic show no acute findings while CT lumbar show 4 mm posterior disc protrusion at the L4-5 level with mild foraminal impingement. CT abd/pelvis show fatty infiltration of liver w/ focal involvement at hepatic segment 4b. Patient received toradol, Reglan, morphine, and KCl while in the ED. She was admitted for T-wave inversions on EKG, and hypokalemia (however trops were negative), back pain. Hypokalemia resolved and patient reported improvement in symptoms. States she has no SOB or back pain (didn't required PRN pain meds). She is medically ready for discharge. Referral for PT given and she is to have liver MRI completed.    Pertinent Physical Exam At Time of Discharge  Physical Exam  Vitals reviewed.   Constitutional:       General: She is not in acute distress.     Appearance: She is not ill-appearing.   HENT:      Head: Normocephalic and atraumatic.     Cardiovascular:      Rate and Rhythm: Normal rate and regular rhythm.      Heart sounds: No murmur heard.  Pulmonary:      Effort: Pulmonary effort is normal. No respiratory distress.      Breath sounds: Normal breath sounds.     Musculoskeletal:      Cervical back: No tenderness.      Thoracic back: No tenderness.     Skin:     General: Skin is warm and dry.     Neurological:      Mental Status: She is alert and oriented to person, place, and time.     Outpatient Follow-Up  No future appointments.    Abida Yadav,   PGY-1

## 2025-07-30 NOTE — NURSING NOTE
Discharge instructions reviewed with patient. No questions at this time. No new medications given. Telemetry removed and left at nurses station, masimo removed and left at bedside. IV removed. [facility] stable condition.

## 2025-07-31 LAB
6MAM UR CFM-MCNC: <25 NG/ML
CODEINE UR CFM-MCNC: <50 NG/ML
HYDROCODONE CTO UR CFM-MCNC: <25 NG/ML
HYDROMORPHONE UR CFM-MCNC: <25 NG/ML
MORPHINE UR CFM-MCNC: >2500 NG/ML
NORHYDROCODONE UR CFM-MCNC: <25 NG/ML
NOROXYCODONE UR CFM-MCNC: <25 NG/ML
OXYCODONE UR CFM-MCNC: <25 NG/ML
OXYMORPHONE UR CFM-MCNC: <25 NG/ML

## 2025-08-01 ENCOUNTER — APPOINTMENT (OUTPATIENT)
Dept: RADIOLOGY | Facility: HOSPITAL | Age: 45
End: 2025-08-01
Payer: MEDICARE

## 2025-08-01 ENCOUNTER — APPOINTMENT (OUTPATIENT)
Dept: CARDIOLOGY | Facility: HOSPITAL | Age: 45
End: 2025-08-01
Payer: MEDICARE

## 2025-08-01 ENCOUNTER — HOSPITAL ENCOUNTER (EMERGENCY)
Facility: HOSPITAL | Age: 45
Discharge: HOME | End: 2025-08-01
Payer: MEDICARE

## 2025-08-01 VITALS
BODY MASS INDEX: 25.76 KG/M2 | HEART RATE: 93 BPM | OXYGEN SATURATION: 97 % | RESPIRATION RATE: 16 BRPM | HEIGHT: 62 IN | SYSTOLIC BLOOD PRESSURE: 132 MMHG | TEMPERATURE: 98.2 F | WEIGHT: 140 LBS | DIASTOLIC BLOOD PRESSURE: 93 MMHG

## 2025-08-01 DIAGNOSIS — M54.6 CHRONIC LEFT-SIDED THORACIC BACK PAIN: Primary | ICD-10-CM

## 2025-08-01 DIAGNOSIS — R07.81 RIB PAIN ON LEFT SIDE: ICD-10-CM

## 2025-08-01 DIAGNOSIS — G89.29 CHRONIC LEFT-SIDED THORACIC BACK PAIN: Primary | ICD-10-CM

## 2025-08-01 PROCEDURE — 71046 X-RAY EXAM CHEST 2 VIEWS: CPT

## 2025-08-01 PROCEDURE — 93005 ELECTROCARDIOGRAM TRACING: CPT

## 2025-08-01 PROCEDURE — 2500000004 HC RX 250 GENERAL PHARMACY W/ HCPCS (ALT 636 FOR OP/ED)

## 2025-08-01 PROCEDURE — 96372 THER/PROPH/DIAG INJ SC/IM: CPT

## 2025-08-01 PROCEDURE — 99284 EMERGENCY DEPT VISIT MOD MDM: CPT | Mod: 25

## 2025-08-01 PROCEDURE — 71046 X-RAY EXAM CHEST 2 VIEWS: CPT | Performed by: STUDENT IN AN ORGANIZED HEALTH CARE EDUCATION/TRAINING PROGRAM

## 2025-08-01 RX ORDER — ORPHENADRINE CITRATE 30 MG/ML
60 INJECTION INTRAMUSCULAR; INTRAVENOUS ONCE
Status: COMPLETED | OUTPATIENT
Start: 2025-08-01 | End: 2025-08-01

## 2025-08-01 RX ORDER — LIDOCAINE 50 MG/G
1 PATCH TOPICAL DAILY
Qty: 9 PATCH | Refills: 0 | Status: SHIPPED | OUTPATIENT
Start: 2025-08-01

## 2025-08-01 RX ORDER — KETOROLAC TROMETHAMINE 15 MG/ML
15 INJECTION, SOLUTION INTRAMUSCULAR; INTRAVENOUS ONCE
Status: COMPLETED | OUTPATIENT
Start: 2025-08-01 | End: 2025-08-01

## 2025-08-01 RX ORDER — CYCLOBENZAPRINE HCL 10 MG
10 TABLET ORAL 2 TIMES DAILY PRN
Qty: 20 TABLET | Refills: 0 | Status: SHIPPED | OUTPATIENT
Start: 2025-08-01 | End: 2025-08-11

## 2025-08-01 RX ORDER — IBUPROFEN 400 MG/1
400 TABLET, FILM COATED ORAL EVERY 6 HOURS PRN
Qty: 28 TABLET | Refills: 0 | Status: SHIPPED | OUTPATIENT
Start: 2025-08-01 | End: 2025-08-08

## 2025-08-01 RX ADMIN — ORPHENADRINE CITRATE 60 MG: 30 INJECTION, SOLUTION INTRAMUSCULAR; INTRAVENOUS at 12:01

## 2025-08-01 RX ADMIN — KETOROLAC TROMETHAMINE 15 MG: 15 INJECTION, SOLUTION INTRAMUSCULAR; INTRAVENOUS at 12:01

## 2025-08-01 ASSESSMENT — PAIN - FUNCTIONAL ASSESSMENT
PAIN_FUNCTIONAL_ASSESSMENT: 0-10
PAIN_FUNCTIONAL_ASSESSMENT: 0-10

## 2025-08-01 ASSESSMENT — PAIN DESCRIPTION - LOCATION: LOCATION: BACK

## 2025-08-01 ASSESSMENT — PAIN SCALES - GENERAL
PAINLEVEL_OUTOF10: 10 - WORST POSSIBLE PAIN
PAINLEVEL_OUTOF10: 3

## 2025-08-01 ASSESSMENT — PAIN DESCRIPTION - PROGRESSION: CLINICAL_PROGRESSION: NOT CHANGED

## 2025-08-01 ASSESSMENT — PAIN DESCRIPTION - PAIN TYPE: TYPE: ACUTE PAIN

## 2025-08-01 NOTE — DISCHARGE INSTRUCTIONS
Thank you for you patience and cooperation today. Please read all aftercare instructions.    Be sure to take all medications, over the counter medications or prescription medications only as directed. Be sure to follow up as directed.  All of the details of your follow up instructions are detailed in the follow up section of this packet.    PAIN MEDICATIONS:  If you are being discharged with any pains medications or muscle relaxers (norco, Vicodin, hydrocodone products, Percocet, oxycodone products, flexeril, cyclobenzaprine, robaxin, norflex, brand or generic, or any other pain controlling medications with the exception of Ibuprofen and regular Tylenol, do not drive or operate machinery, climb ladders or participate in any activity that could potentially put yourself or others at risk should you get dizzy, or be/feel impaired at all.      RETURN PRECAUTIONS:  Return to emergency room without delay for ANY new or worsening pains or for any other symptoms or concerns.  Return with worsening pains, nausea, vomiting, trouble breathing, palpitations, shortness of breath, inability to pass stool or urine, loss of control of stool or urine, any numbness or tingling (that is not normal for you), uncontrolled fevers, the passing of blood or other material in stool or urine, rashes, pains or for any other symptoms or concerns you may have.  You are always welcome to return to the ER at any time for any reason or for any other concerns you may have.

## 2025-08-01 NOTE — ED PROVIDER NOTES
[facility]   Chief Complaint   Patient presents with    Back Pain     Pt bib EMS from home for chronic back pain 10/10, pt was recently seen at [facility] for same issue. Vitals stable.       HPI  Patient is a 44-year-old female with history of COPD, cocaine use, CAD, CABG x 1, hyperlipidemia, anxiety and depression, intentional drug overdose who presents ED for chief complaint of left-sided back and rib pain.  She states the pain starts in her thoracic region and wraps around her ribs.  The pain is worse with active range of motion.  Patient states pain started last night.  Denies any falls or injuries.  Patient was discharged from [facility] 2 days ago.  During her hospitalization she had CT imaging of her chest, thoracic spine, abdomen pelvis and lumbar spine which were all without acute findings.  Patient was discharged in good condition, she states she was doing well in the interim until last night.      Patient History   Medical History[1]  Surgical History[2]  Family History[3]  Social History[4]    Physical Exam   ED Triage Vitals [08/01/25 1137]   Temperature Heart Rate Respirations BP   36.8 °C (98.2 °F) 93 16 (!) 132/93      Pulse Ox Temp Source Heart Rate Source Patient Position   97 % Tympanic Monitor --      BP Location FiO2 (%)     -- --       Physical Exam  Vitals reviewed.   Constitutional:       General: She is not in acute distress.     Appearance: Normal appearance. She is not ill-appearing, toxic-appearing or diaphoretic.   HENT:      Head: Normocephalic and atraumatic.      Mouth/Throat:      Mouth: Mucous membranes are moist.     Eyes:      Extraocular Movements: Extraocular movements intact.       Cardiovascular:      Rate and Rhythm: Normal rate and regular rhythm.      Heart sounds: Normal heart sounds.   Pulmonary:      Effort: Pulmonary effort is normal.      Breath sounds: Normal breath sounds.   Chest:      Chest wall: Tenderness (Bony tenderness over left lateral ribs) present.   Abdominal:       General: Abdomen is flat. There is no distension.      Palpations: Abdomen is soft.      Tenderness: There is no abdominal tenderness.     Musculoskeletal:         General: Normal range of motion.      Cervical back: Normal, normal range of motion and neck supple.      Thoracic back: Normal.      Lumbar back: Normal.     Skin:     General: Skin is warm and dry.     Neurological:      General: No focal deficit present.      Mental Status: She is alert and oriented to person, place, and time.      Sensory: No sensory deficit.      Motor: No weakness.     Psychiatric:         Mood and Affect: Mood is anxious. Affect is tearful.         Behavior: Behavior is cooperative.           ED Course & MDM   Diagnoses as of 08/01/25 1605   Chronic left-sided thoracic back pain   Rib pain on left side                 No data recorded                                 Medical Decision Making  Parts of this chart have been completed using voice recognition software. Please excuse any errors of transcription.  My thought process and reason for plan has been formulated from the time that I saw the patient until the time of disposition and is not specific to one specific moment during their visit and furthermore my MDM encompasses this entire chart and not only this text box.    HPI:   A medically appropriate HPI was obtained, outlined above.    Lu Fall is a  44 y.o. female    Chief Complaint   Patient presents with    Back Pain     Pt bib EMS from home for chronic back pain 10/10, pt was recently seen at [facility] for same issue. Vitals stable.       Medical History[5]    Surgical History[6]    Social History[7]    Family History[8]    Allergies[9]    Current Outpatient Medications   Medication Instructions    aspirin 81 mg, Daily    atorvastatin (LIPITOR) 80 mg, Nightly    cyclobenzaprine (FLEXERIL) 10 mg, oral, 2 times daily PRN    desmopressin (DDAVP) 10 mcg/spray (0.1 mL) 1 spray, Nightly    ezetimibe (ZETIA) 10 mg, Daily     "ibuprofen 400 mg, oral, Every 6 hours PRN    levETIRAcetam (Keppra) 750 mg tablet 2 times daily    levothyroxine (SYNTHROID, LEVOXYL) 112 mcg, Daily    lidocaine (Lidoderm) 5 % patch 1 patch, transdermal, Daily, Remove & discard patch within 12 hours or as directed by MD.    metoclopramide (REGLAN) 10 mg, oral, Every 6 hours    pantoprazole (PROTONIX) 40 mg, Daily RT    pregabalin (LYRICA) 100 mg, oral, 2 times daily    traZODone (DESYREL) 50 mg, Nightly    ubrogepant (UBRELVY) 100 mg, As needed    zolpidem (AMBIEN) 10 mg, oral, Daily PRN   for details    Exam:   Patient Vitals for the past 24 hrs:   BP Temp Temp src Pulse Resp SpO2 Height Weight   08/01/25 1137 (!) 132/93 36.8 °C (98.2 °F) Tympanic 93 16 97 % 1.575 m (5' 2\") 63.5 kg (140 lb)       A medically appropriate exam performed, outlined above, given the known history and presentation.    EKG/Cardiac monitor:   If EKG was done and, it was interpreted by attending physician, see their note for ED course for more detail.    Medications given during visit:  Medications   ketorolac (Toradol) injection 15 mg (15 mg intramuscular Given 8/1/25 1201)   orphenadrine (Norflex) injection 60 mg (60 mg intramuscular Given 8/1/25 1201)        Diagnostic/tests:  Labs Reviewed - No data to display     XR chest 2 views   Final Result   1.  No evidence of acute cardiopulmonary process.                  MACRO:   None        Signed by: Carlos A Atwood 8/1/2025 12:52 PM   Dictation workstation:   [id number]             [facility] Summary:  No acute findings on chest x-ray.  Patient seems comfortable after Toradol.  Aftercare instructions were discussed with the patient in detail, including rest, ice, using NSAIDs for pain.  Return precautions were discussed.    We have discussed the diagnosis and risks, and we agree with discharging home to follow-up with appropriate physician as directed. We also discussed returning to the Emergency Department immediately if new or worsening symptoms " occur. We have discussed the symptoms which are most concerning that necessitate immediate return. Pt symptoms have been well controlled here and the patient is safe for discharge with appropriate outpatient follow up. The patient has verbalized understanding to return to ER without delay for new or worsening pains or for any other symptoms or concerns. I utilized the discharge clinical management tool provided [employer] to help estimate risk of negative outcome for this patient.        Disposition:  ED Prescriptions       Medication Sig Dispense Start Date End Date Auth. Provider    ibuprofen 400 mg tablet Take 1 tablet (400 mg) by mouth every 6 hours if needed for moderate pain (4 - 6) for up to 7 days. 28 tablet 8/1/2025 8/8/2025 Avel Lazaro PA-C    cyclobenzaprine (Flexeril) 10 mg tablet Take 1 tablet (10 mg) by mouth 2 times a day as needed for muscle spasms for up to 10 days. 20 tablet 8/1/2025 8/11/2025 Avel Lazaro PA-C    lidocaine (Lidoderm) 5 % patch Place 1 patch over 12 hours on the skin once daily. Remove & discard patch within 12 hours or as directed by MD. 9 patch 8/1/2025 -- Avel Lazaro PA-C            All of the patient's questions were answered to the best of my ability. Patient states understanding that they have been screened for an emergency today and we have not found any etiology of symptoms that requires emergent treatment or admission to the hospital at this point. They understand that they may have not had definitive care today and require follow-up for treatment of their condition. They also state understanding that they may have an emergent condition that may potentially have not of detected at this visit and they must return to the emergency department if they develop any worsening of symptoms or new complaints.       Procedure  Procedures       [1]   Past Medical History:  Diagnosis Date    COPD (chronic obstructive pulmonary disease) (Multi)     Coronary  "artery disease     Personal history of other endocrine, nutritional and metabolic disease     History of hypopituitarism    Personal history of other mental and behavioral disorders     History of depression    Personal history of other specified conditions     History of brain tumor    Syncope 07/29/2025    Unspecified convulsions (Multi)     Convulsion   [2]   Past Surgical History:  Procedure Laterality Date    BRAIN SURGERY      CARDIAC SURGERY      MR HEAD ANGIO WO IV CONTRAST  07/15/2021    MR HEAD ANGIO WO IV CONTRAST 7/15/2021 [facility]    MR HEAD ANGIO WO IV CONTRAST  11/28/2017    MR HEAD ANGIO WO IV CONTRAST [facility]    MR HEAD ANGIO WO IV CONTRAST  03/13/2018    MR HEAD ANGIO WO IV CONTRAST [facility]    MR NECK ANGIO WO IV CONTRAST  07/15/2021    MR NECK ANGIO WO IV CONTRAST 7/15/2021 [facility]    OTHER SURGICAL HISTORY  08/17/2013    Craniotomy Tumor Removal - Complete   [3] No family history on file.  [4]   Social History  Tobacco Use    Smoking status: Former     Types: Cigarettes    Smokeless tobacco: Never   Vaping Use    Vaping status: Never Used   Substance Use Topics    Alcohol use: Never    Drug use: Yes     Types: \"Crack\" cocaine, Cocaine     Comment: last use a month ago as of 5/12/25   [5]   Past Medical History:  Diagnosis Date    COPD (chronic obstructive pulmonary disease) (Multi)     Coronary artery disease     Personal history of other endocrine, nutritional and metabolic disease     History of hypopituitarism    Personal history of other mental and behavioral disorders     History of depression    Personal history of other specified conditions     History of brain tumor    Syncope 07/29/2025    Unspecified convulsions (Multi)     Convulsion   [6]   Past Surgical History:  Procedure Laterality Date    BRAIN SURGERY      CARDIAC SURGERY      MR HEAD ANGIO WO IV CONTRAST  07/15/2021    MR HEAD ANGIO WO IV CONTRAST 7/15/2021 [facility] " "   MR HEAD ANGIO WO IV CONTRAST  11/28/2017    MR HEAD ANGIO WO IV CONTRAST [facility]    MR HEAD ANGIO WO IV CONTRAST  03/13/2018    MR HEAD ANGIO WO IV CONTRAST [facility]    MR NECK ANGIO WO IV CONTRAST  07/15/2021    MR NECK ANGIO WO IV CONTRAST 7/15/2021 [facility]    OTHER SURGICAL HISTORY  08/17/2013    Craniotomy Tumor Removal - Complete   [7]   Social History  Tobacco Use    Smoking status: Former     Types: Cigarettes    Smokeless tobacco: Never   Vaping Use    Vaping status: Never Used   Substance Use Topics    Alcohol use: Never    Drug use: Yes     Types: \"Crack\" cocaine, Cocaine     Comment: last use a month ago as of 5/12/25   [8] No family history on file.  [9]   Allergies  Allergen Reactions    Adhesive Hives and Unknown    Hydrocodone-Acetaminophen Other, Unknown and Nausea/vomiting     Other reaction(s): Vomiting    Ondansetron Other and Unknown     prolonged QT    Client is unable to take this medication due  An elongated QT wave   Other reaction(s): Unknown   Prolonged QT per patient    Prolonged QT per patient        Avel Lazaro PA-C  08/01/25 1615    "

## 2025-08-03 LAB
ATRIAL RATE: 69 BPM
P AXIS: -6 DEGREES
P OFFSET: 176 MS
P ONSET: 123 MS
PR INTERVAL: 194 MS
Q ONSET: 220 MS
QRS COUNT: 12 BEATS
QRS DURATION: 98 MS
QT INTERVAL: 450 MS
QTC CALCULATION(BAZETT): 482 MS
QTC FREDERICIA: 471 MS
R AXIS: 72 DEGREES
T AXIS: -35 DEGREES
T OFFSET: 445 MS
VENTRICULAR RATE: 69 BPM

## 2025-08-08 LAB
ATRIAL RATE: 89 BPM
P AXIS: 16 DEGREES
P OFFSET: 176 MS
P ONSET: 116 MS
PR INTERVAL: 208 MS
Q ONSET: 220 MS
QRS COUNT: 15 BEATS
QRS DURATION: 94 MS
QT INTERVAL: 416 MS
QTC CALCULATION(BAZETT): 506 MS
QTC FREDERICIA: 474 MS
R AXIS: 80 DEGREES
T AXIS: 79 DEGREES
T OFFSET: 428 MS
VENTRICULAR RATE: 89 BPM

## 2025-08-09 LAB
ATRIAL RATE: 77 BPM
ATRIAL RATE: 86 BPM
P AXIS: 54 DEGREES
P AXIS: 54 DEGREES
P OFFSET: 177 MS
P OFFSET: 180 MS
P ONSET: 137 MS
P ONSET: 142 MS
PR INTERVAL: 158 MS
PR INTERVAL: 164 MS
Q ONSET: 219 MS
Q ONSET: 221 MS
QRS COUNT: 12 BEATS
QRS COUNT: 14 BEATS
QRS DURATION: 102 MS
QRS DURATION: 96 MS
QT INTERVAL: 400 MS
QT INTERVAL: 472 MS
QTC CALCULATION(BAZETT): 478 MS
QTC CALCULATION(BAZETT): 534 MS
QTC FREDERICIA: 451 MS
QTC FREDERICIA: 512 MS
R AXIS: 66 DEGREES
R AXIS: 93 DEGREES
T AXIS: -76 DEGREES
T AXIS: 247 DEGREES
T OFFSET: 421 MS
T OFFSET: 455 MS
VENTRICULAR RATE: 77 BPM
VENTRICULAR RATE: 86 BPM

## 2025-08-10 ENCOUNTER — APPOINTMENT (OUTPATIENT)
Dept: CARDIOLOGY | Facility: HOSPITAL | Age: 45
End: 2025-08-10
Payer: MEDICARE

## 2025-08-10 ENCOUNTER — HOSPITAL ENCOUNTER (EMERGENCY)
Facility: HOSPITAL | Age: 45
Discharge: LEFT BEFORE DISCHARGE | End: 2025-08-10
Payer: MEDICARE

## 2025-08-10 ENCOUNTER — APPOINTMENT (OUTPATIENT)
Dept: RADIOLOGY | Facility: HOSPITAL | Age: 45
End: 2025-08-10
Payer: MEDICARE

## 2025-08-10 VITALS
SYSTOLIC BLOOD PRESSURE: 115 MMHG | DIASTOLIC BLOOD PRESSURE: 88 MMHG | OXYGEN SATURATION: 97 % | RESPIRATION RATE: 15 BRPM | BODY MASS INDEX: 24.84 KG/M2 | WEIGHT: 135 LBS | HEART RATE: 80 BPM | HEIGHT: 62 IN | TEMPERATURE: 97.5 F

## 2025-08-10 LAB
ALBUMIN SERPL BCP-MCNC: 4.3 G/DL (ref 3.4–5)
ALP SERPL-CCNC: 82 U/L (ref 33–110)
ALT SERPL W P-5'-P-CCNC: 25 U/L (ref 7–45)
ANION GAP SERPL CALCULATED.3IONS-SCNC: 13 MMOL/L (ref 10–20)
AST SERPL W P-5'-P-CCNC: 41 U/L (ref 9–39)
BASOPHILS # BLD AUTO: 0.1 X10*3/UL (ref 0–0.1)
BASOPHILS NFR BLD AUTO: 1.5 %
BILIRUB SERPL-MCNC: 0.5 MG/DL (ref 0–1.2)
BNP SERPL-MCNC: 63 PG/ML (ref 0–99)
BUN SERPL-MCNC: 11 MG/DL (ref 6–23)
CALCIUM SERPL-MCNC: 9.2 MG/DL (ref 8.6–10.3)
CARDIAC TROPONIN I PNL SERPL HS: 3 NG/L (ref 0–13)
CHLORIDE SERPL-SCNC: 105 MMOL/L (ref 98–107)
CO2 SERPL-SCNC: 23 MMOL/L (ref 21–32)
CREAT SERPL-MCNC: 0.69 MG/DL (ref 0.5–1.05)
EGFRCR SERPLBLD CKD-EPI 2021: >90 ML/MIN/1.73M*2
EOSINOPHIL # BLD AUTO: 0.35 X10*3/UL (ref 0–0.7)
EOSINOPHIL NFR BLD AUTO: 5.1 %
ERYTHROCYTE [DISTWIDTH] IN BLOOD BY AUTOMATED COUNT: 14.8 % (ref 11.5–14.5)
GLUCOSE SERPL-MCNC: 109 MG/DL (ref 74–99)
HCT VFR BLD AUTO: 37.1 % (ref 36–46)
HGB BLD-MCNC: 12.3 G/DL (ref 12–16)
IMM GRANULOCYTES # BLD AUTO: 0.02 X10*3/UL (ref 0–0.7)
IMM GRANULOCYTES NFR BLD AUTO: 0.3 % (ref 0–0.9)
LYMPHOCYTES # BLD AUTO: 2.26 X10*3/UL (ref 1.2–4.8)
LYMPHOCYTES NFR BLD AUTO: 33.2 %
MCH RBC QN AUTO: 27.2 PG (ref 26–34)
MCHC RBC AUTO-ENTMCNC: 33.2 G/DL (ref 32–36)
MCV RBC AUTO: 82 FL (ref 80–100)
MONOCYTES # BLD AUTO: 0.6 X10*3/UL (ref 0.1–1)
MONOCYTES NFR BLD AUTO: 8.8 %
NEUTROPHILS # BLD AUTO: 3.48 X10*3/UL (ref 1.2–7.7)
NEUTROPHILS NFR BLD AUTO: 51.1 %
NRBC BLD-RTO: 0 /100 WBCS (ref 0–0)
PLATELET # BLD AUTO: 217 X10*3/UL (ref 150–450)
POTASSIUM SERPL-SCNC: 3.5 MMOL/L (ref 3.5–5.3)
PROT SERPL-MCNC: 6.9 G/DL (ref 6.4–8.2)
RBC # BLD AUTO: 4.52 X10*6/UL (ref 4–5.2)
SODIUM SERPL-SCNC: 137 MMOL/L (ref 136–145)
WBC # BLD AUTO: 6.8 X10*3/UL (ref 4.4–11.3)

## 2025-08-10 PROCEDURE — 93005 ELECTROCARDIOGRAM TRACING: CPT

## 2025-08-10 PROCEDURE — 36415 COLL VENOUS BLD VENIPUNCTURE: CPT | Performed by: STUDENT IN AN ORGANIZED HEALTH CARE EDUCATION/TRAINING PROGRAM

## 2025-08-10 PROCEDURE — 84484 ASSAY OF TROPONIN QUANT: CPT | Performed by: STUDENT IN AN ORGANIZED HEALTH CARE EDUCATION/TRAINING PROGRAM

## 2025-08-10 PROCEDURE — 99285 EMERGENCY DEPT VISIT HI MDM: CPT | Mod: 25

## 2025-08-10 PROCEDURE — 80053 COMPREHEN METABOLIC PANEL: CPT | Performed by: STUDENT IN AN ORGANIZED HEALTH CARE EDUCATION/TRAINING PROGRAM

## 2025-08-10 PROCEDURE — 83880 ASSAY OF NATRIURETIC PEPTIDE: CPT | Performed by: STUDENT IN AN ORGANIZED HEALTH CARE EDUCATION/TRAINING PROGRAM

## 2025-08-10 PROCEDURE — 71045 X-RAY EXAM CHEST 1 VIEW: CPT

## 2025-08-10 PROCEDURE — 71045 X-RAY EXAM CHEST 1 VIEW: CPT | Performed by: RADIOLOGY

## 2025-08-10 PROCEDURE — 85025 COMPLETE CBC W/AUTO DIFF WBC: CPT | Performed by: STUDENT IN AN ORGANIZED HEALTH CARE EDUCATION/TRAINING PROGRAM

## 2025-08-10 RX ORDER — ACETAMINOPHEN 500 MG
1000 TABLET ORAL ONCE
Status: DISCONTINUED | OUTPATIENT
Start: 2025-08-10 | End: 2025-08-11 | Stop reason: HOSPADM

## 2025-08-10 RX ORDER — IPRATROPIUM BROMIDE AND ALBUTEROL SULFATE 2.5; .5 MG/3ML; MG/3ML
3 SOLUTION RESPIRATORY (INHALATION) ONCE
Status: DISCONTINUED | OUTPATIENT
Start: 2025-08-10 | End: 2025-08-11 | Stop reason: HOSPADM

## 2025-08-13 LAB
ATRIAL RATE: 82 BPM
P AXIS: 33 DEGREES
P OFFSET: 180 MS
P ONSET: 135 MS
PR INTERVAL: 166 MS
Q ONSET: 218 MS
QRS COUNT: 14 BEATS
QRS DURATION: 94 MS
QT INTERVAL: 426 MS
QTC CALCULATION(BAZETT): 497 MS
QTC FREDERICIA: 472 MS
R AXIS: 63 DEGREES
T AXIS: 50 DEGREES
T OFFSET: 431 MS
VENTRICULAR RATE: 82 BPM

## 2025-08-15 ENCOUNTER — HOSPITAL ENCOUNTER (EMERGENCY)
Facility: HOSPITAL | Age: 45
Discharge: HOME | End: 2025-08-15
Payer: MEDICARE

## 2025-08-15 ENCOUNTER — APPOINTMENT (OUTPATIENT)
Dept: CARDIOLOGY | Facility: HOSPITAL | Age: 45
End: 2025-08-15
Payer: MEDICARE

## 2025-08-15 ENCOUNTER — APPOINTMENT (OUTPATIENT)
Dept: RADIOLOGY | Facility: HOSPITAL | Age: 45
End: 2025-08-15
Payer: MEDICARE

## 2025-08-15 VITALS
RESPIRATION RATE: 22 BRPM | HEART RATE: 77 BPM | SYSTOLIC BLOOD PRESSURE: 123 MMHG | TEMPERATURE: 97.4 F | OXYGEN SATURATION: 99 % | WEIGHT: 145 LBS | HEIGHT: 64 IN | BODY MASS INDEX: 24.75 KG/M2 | DIASTOLIC BLOOD PRESSURE: 76 MMHG

## 2025-08-15 DIAGNOSIS — R07.9 NONSPECIFIC CHEST PAIN: Primary | ICD-10-CM

## 2025-08-15 DIAGNOSIS — M35.3 POLYMYALGIA (MULTI): ICD-10-CM

## 2025-08-15 LAB
ALBUMIN SERPL BCP-MCNC: 4.3 G/DL (ref 3.4–5)
ALP SERPL-CCNC: 81 U/L (ref 33–110)
ALT SERPL W P-5'-P-CCNC: 42 U/L (ref 7–45)
ANION GAP SERPL CALC-SCNC: 12 MMOL/L (ref 10–20)
AST SERPL W P-5'-P-CCNC: 45 U/L (ref 9–39)
BASOPHILS # BLD AUTO: 0.07 X10*3/UL (ref 0–0.1)
BASOPHILS NFR BLD AUTO: 1.3 %
BILIRUB SERPL-MCNC: 0.6 MG/DL (ref 0–1.2)
BNP SERPL-MCNC: 70 PG/ML (ref 0–99)
BUN SERPL-MCNC: 10 MG/DL (ref 6–23)
CALCIUM SERPL-MCNC: 9.3 MG/DL (ref 8.6–10.3)
CARDIAC TROPONIN I PNL SERPL HS: 7 NG/L (ref 0–13)
CARDIAC TROPONIN I PNL SERPL HS: 7 NG/L (ref 0–13)
CHLORIDE SERPL-SCNC: 102 MMOL/L (ref 98–107)
CK SERPL-CCNC: 67 U/L (ref 0–215)
CO2 SERPL-SCNC: 29 MMOL/L (ref 21–32)
CREAT SERPL-MCNC: 0.75 MG/DL (ref 0.5–1.05)
EGFRCR SERPLBLD CKD-EPI 2021: >90 ML/MIN/1.73M*2
EOSINOPHIL # BLD AUTO: 0.23 X10*3/UL (ref 0–0.7)
EOSINOPHIL NFR BLD AUTO: 4.2 %
ERYTHROCYTE [DISTWIDTH] IN BLOOD BY AUTOMATED COUNT: 15 % (ref 11.5–14.5)
GLUCOSE SERPL-MCNC: 89 MG/DL (ref 74–99)
HCT VFR BLD AUTO: 35.8 % (ref 36–46)
HGB BLD-MCNC: 11.9 G/DL (ref 12–16)
IMM GRANULOCYTES # BLD AUTO: 0.02 X10*3/UL (ref 0–0.7)
IMM GRANULOCYTES NFR BLD AUTO: 0.4 % (ref 0–0.9)
INR PPP: 1.1 (ref 0.9–1.1)
LACTATE SERPL-SCNC: 1.1 MMOL/L (ref 0.4–2)
LIPASE SERPL-CCNC: 10 U/L (ref 9–82)
LYMPHOCYTES # BLD AUTO: 2.08 X10*3/UL (ref 1.2–4.8)
LYMPHOCYTES NFR BLD AUTO: 37.9 %
MAGNESIUM SERPL-MCNC: 2.1 MG/DL (ref 1.6–2.4)
MCH RBC QN AUTO: 27.2 PG (ref 26–34)
MCHC RBC AUTO-ENTMCNC: 33.2 G/DL (ref 32–36)
MCV RBC AUTO: 82 FL (ref 80–100)
MONOCYTES # BLD AUTO: 0.45 X10*3/UL (ref 0.1–1)
MONOCYTES NFR BLD AUTO: 8.2 %
NEUTROPHILS # BLD AUTO: 2.64 X10*3/UL (ref 1.2–7.7)
NEUTROPHILS NFR BLD AUTO: 48 %
NRBC BLD-RTO: 0 /100 WBCS (ref 0–0)
PLATELET # BLD AUTO: 175 X10*3/UL (ref 150–450)
POTASSIUM SERPL-SCNC: 3.6 MMOL/L (ref 3.5–5.3)
PROT SERPL-MCNC: 7 G/DL (ref 6.4–8.2)
PROTHROMBIN TIME: 11.7 SECONDS (ref 9.8–12.4)
RBC # BLD AUTO: 4.37 X10*6/UL (ref 4–5.2)
SODIUM SERPL-SCNC: 139 MMOL/L (ref 136–145)
WBC # BLD AUTO: 5.5 X10*3/UL (ref 4.4–11.3)

## 2025-08-15 PROCEDURE — 84075 ASSAY ALKALINE PHOSPHATASE: CPT | Performed by: HEALTH CARE PROVIDER

## 2025-08-15 PROCEDURE — 71045 X-RAY EXAM CHEST 1 VIEW: CPT | Performed by: STUDENT IN AN ORGANIZED HEALTH CARE EDUCATION/TRAINING PROGRAM

## 2025-08-15 PROCEDURE — 96361 HYDRATE IV INFUSION ADD-ON: CPT

## 2025-08-15 PROCEDURE — 93005 ELECTROCARDIOGRAM TRACING: CPT

## 2025-08-15 PROCEDURE — 96374 THER/PROPH/DIAG INJ IV PUSH: CPT

## 2025-08-15 PROCEDURE — 84484 ASSAY OF TROPONIN QUANT: CPT | Performed by: HEALTH CARE PROVIDER

## 2025-08-15 PROCEDURE — 85610 PROTHROMBIN TIME: CPT | Performed by: HEALTH CARE PROVIDER

## 2025-08-15 PROCEDURE — 36415 COLL VENOUS BLD VENIPUNCTURE: CPT | Performed by: HEALTH CARE PROVIDER

## 2025-08-15 PROCEDURE — 83735 ASSAY OF MAGNESIUM: CPT | Performed by: HEALTH CARE PROVIDER

## 2025-08-15 PROCEDURE — 71045 X-RAY EXAM CHEST 1 VIEW: CPT

## 2025-08-15 PROCEDURE — 85025 COMPLETE CBC W/AUTO DIFF WBC: CPT | Performed by: HEALTH CARE PROVIDER

## 2025-08-15 PROCEDURE — 99285 EMERGENCY DEPT VISIT HI MDM: CPT | Mod: 25

## 2025-08-15 PROCEDURE — 83880 ASSAY OF NATRIURETIC PEPTIDE: CPT | Performed by: HEALTH CARE PROVIDER

## 2025-08-15 PROCEDURE — 82550 ASSAY OF CK (CPK): CPT | Performed by: HEALTH CARE PROVIDER

## 2025-08-15 PROCEDURE — 83605 ASSAY OF LACTIC ACID: CPT | Performed by: HEALTH CARE PROVIDER

## 2025-08-15 PROCEDURE — 2500000004 HC RX 250 GENERAL PHARMACY W/ HCPCS (ALT 636 FOR OP/ED): Mod: JZ | Performed by: HEALTH CARE PROVIDER

## 2025-08-15 PROCEDURE — 83690 ASSAY OF LIPASE: CPT | Performed by: HEALTH CARE PROVIDER

## 2025-08-15 RX ORDER — KETOROLAC TROMETHAMINE 15 MG/ML
15 INJECTION, SOLUTION INTRAMUSCULAR; INTRAVENOUS ONCE
Status: COMPLETED | OUTPATIENT
Start: 2025-08-15 | End: 2025-08-15

## 2025-08-15 RX ADMIN — SODIUM CHLORIDE 500 ML: 9 INJECTION, SOLUTION INTRAVENOUS at 16:38

## 2025-08-15 RX ADMIN — KETOROLAC TROMETHAMINE 15 MG: 15 INJECTION, SOLUTION INTRAMUSCULAR; INTRAVENOUS at 16:30

## 2025-08-15 ASSESSMENT — PAIN DESCRIPTION - LOCATION: LOCATION: CHEST

## 2025-08-15 ASSESSMENT — PAIN - FUNCTIONAL ASSESSMENT: PAIN_FUNCTIONAL_ASSESSMENT: 0-10

## 2025-08-15 ASSESSMENT — PAIN DESCRIPTION - PAIN TYPE: TYPE: ACUTE PAIN

## 2025-08-15 ASSESSMENT — PAIN SCALES - GENERAL: PAINLEVEL_OUTOF10: 6

## 2025-08-16 ENCOUNTER — HOSPITAL ENCOUNTER (EMERGENCY)
Facility: HOSPITAL | Age: 45
Discharge: HOME | End: 2025-08-16
Attending: STUDENT IN AN ORGANIZED HEALTH CARE EDUCATION/TRAINING PROGRAM
Payer: MEDICARE

## 2025-08-16 ENCOUNTER — APPOINTMENT (OUTPATIENT)
Dept: CARDIOLOGY | Facility: HOSPITAL | Age: 45
End: 2025-08-16
Payer: MEDICARE

## 2025-08-16 VITALS
HEIGHT: 62 IN | OXYGEN SATURATION: 98 % | BODY MASS INDEX: 25.76 KG/M2 | WEIGHT: 140 LBS | RESPIRATION RATE: 15 BRPM | DIASTOLIC BLOOD PRESSURE: 72 MMHG | SYSTOLIC BLOOD PRESSURE: 114 MMHG | HEART RATE: 74 BPM | TEMPERATURE: 97.5 F

## 2025-08-16 DIAGNOSIS — Z76.5 MALINGERING: ICD-10-CM

## 2025-08-16 DIAGNOSIS — F45.21 ILLNESS ANXIETY DISORDER: ICD-10-CM

## 2025-08-16 DIAGNOSIS — M79.10 MYALGIA: ICD-10-CM

## 2025-08-16 DIAGNOSIS — R06.02 SHORTNESS OF BREATH: Primary | ICD-10-CM

## 2025-08-16 DIAGNOSIS — R07.9 ACUTE CHEST PAIN: ICD-10-CM

## 2025-08-16 LAB
CARDIAC TROPONIN I PNL SERPL HS: 7 NG/L (ref 0–13)
D DIMER PPP FEU-MCNC: 0.36 MG/L FEU (ref 0.19–0.5)

## 2025-08-16 PROCEDURE — 85300 ANTITHROMBIN III ACTIVITY: CPT | Performed by: STUDENT IN AN ORGANIZED HEALTH CARE EDUCATION/TRAINING PROGRAM

## 2025-08-16 PROCEDURE — 96372 THER/PROPH/DIAG INJ SC/IM: CPT | Performed by: STUDENT IN AN ORGANIZED HEALTH CARE EDUCATION/TRAINING PROGRAM

## 2025-08-16 PROCEDURE — 99284 EMERGENCY DEPT VISIT MOD MDM: CPT | Mod: 25 | Performed by: STUDENT IN AN ORGANIZED HEALTH CARE EDUCATION/TRAINING PROGRAM

## 2025-08-16 PROCEDURE — 94640 AIRWAY INHALATION TREATMENT: CPT | Mod: 59

## 2025-08-16 PROCEDURE — 36415 COLL VENOUS BLD VENIPUNCTURE: CPT | Performed by: STUDENT IN AN ORGANIZED HEALTH CARE EDUCATION/TRAINING PROGRAM

## 2025-08-16 PROCEDURE — 93005 ELECTROCARDIOGRAM TRACING: CPT

## 2025-08-16 PROCEDURE — 96360 HYDRATION IV INFUSION INIT: CPT

## 2025-08-16 PROCEDURE — 2500000004 HC RX 250 GENERAL PHARMACY W/ HCPCS (ALT 636 FOR OP/ED): Mod: JZ | Performed by: STUDENT IN AN ORGANIZED HEALTH CARE EDUCATION/TRAINING PROGRAM

## 2025-08-16 PROCEDURE — 2500000002 HC RX 250 W HCPCS SELF ADMINISTERED DRUGS (ALT 637 FOR MEDICARE OP, ALT 636 FOR OP/ED): Performed by: STUDENT IN AN ORGANIZED HEALTH CARE EDUCATION/TRAINING PROGRAM

## 2025-08-16 PROCEDURE — 2500000001 HC RX 250 WO HCPCS SELF ADMINISTERED DRUGS (ALT 637 FOR MEDICARE OP): Performed by: STUDENT IN AN ORGANIZED HEALTH CARE EDUCATION/TRAINING PROGRAM

## 2025-08-16 PROCEDURE — 84484 ASSAY OF TROPONIN QUANT: CPT | Performed by: STUDENT IN AN ORGANIZED HEALTH CARE EDUCATION/TRAINING PROGRAM

## 2025-08-16 RX ORDER — IPRATROPIUM BROMIDE AND ALBUTEROL SULFATE 2.5; .5 MG/3ML; MG/3ML
3 SOLUTION RESPIRATORY (INHALATION)
Status: DISCONTINUED | OUTPATIENT
Start: 2025-08-16 | End: 2025-08-16 | Stop reason: HOSPADM

## 2025-08-16 RX ORDER — ACETAMINOPHEN 500 MG
1000 TABLET ORAL ONCE
Status: COMPLETED | OUTPATIENT
Start: 2025-08-16 | End: 2025-08-16

## 2025-08-16 RX ORDER — MECLIZINE HYDROCHLORIDE 25 MG/1
25 TABLET ORAL ONCE
Status: COMPLETED | OUTPATIENT
Start: 2025-08-16 | End: 2025-08-16

## 2025-08-16 RX ORDER — KETOROLAC TROMETHAMINE 15 MG/ML
15 INJECTION, SOLUTION INTRAMUSCULAR; INTRAVENOUS ONCE
Status: COMPLETED | OUTPATIENT
Start: 2025-08-16 | End: 2025-08-16

## 2025-08-16 RX ADMIN — ACETAMINOPHEN 1000 MG: 500 TABLET ORAL at 04:18

## 2025-08-16 RX ADMIN — KETOROLAC TROMETHAMINE 15 MG: 15 INJECTION, SOLUTION INTRAMUSCULAR; INTRAVENOUS at 03:48

## 2025-08-16 RX ADMIN — MECLIZINE HYDROCHLORIDE 25 MG: 25 TABLET ORAL at 03:48

## 2025-08-16 RX ADMIN — SODIUM CHLORIDE 1000 ML: 900 INJECTION, SOLUTION INTRAVENOUS at 03:48

## 2025-08-16 RX ADMIN — IPRATROPIUM BROMIDE AND ALBUTEROL SULFATE 3 ML: 2.5; .5 SOLUTION RESPIRATORY (INHALATION) at 03:48

## 2025-08-18 LAB
ATRIAL RATE: 72 BPM
P AXIS: -15 DEGREES
P OFFSET: 164 MS
P ONSET: 131 MS
PR INTERVAL: 174 MS
Q ONSET: 218 MS
QRS COUNT: 12 BEATS
QRS DURATION: 96 MS
QT INTERVAL: 490 MS
QTC CALCULATION(BAZETT): 536 MS
QTC FREDERICIA: 521 MS
R AXIS: 54 DEGREES
T AXIS: 65 DEGREES
T OFFSET: 463 MS
VENTRICULAR RATE: 72 BPM

## 2025-08-19 LAB
ATRIAL RATE: 77 BPM
P AXIS: 6 DEGREES
P OFFSET: 170 MS
P ONSET: 119 MS
PR INTERVAL: 200 MS
Q ONSET: 219 MS
QRS COUNT: 13 BEATS
QRS DURATION: 90 MS
QT INTERVAL: 474 MS
QTC CALCULATION(BAZETT): 536 MS
QTC FREDERICIA: 515 MS
R AXIS: 55 DEGREES
T AXIS: 34 DEGREES
T OFFSET: 456 MS
VENTRICULAR RATE: 77 BPM
